# Patient Record
Sex: MALE | Race: WHITE | Employment: OTHER | URBAN - METROPOLITAN AREA
[De-identification: names, ages, dates, MRNs, and addresses within clinical notes are randomized per-mention and may not be internally consistent; named-entity substitution may affect disease eponyms.]

---

## 2017-02-07 ENCOUNTER — OFFICE VISIT (OUTPATIENT)
Dept: NEUROLOGY | Age: 77
End: 2017-02-07

## 2017-02-07 VITALS
HEART RATE: 82 BPM | BODY MASS INDEX: 31.92 KG/M2 | OXYGEN SATURATION: 97 % | DIASTOLIC BLOOD PRESSURE: 70 MMHG | HEIGHT: 64 IN | SYSTOLIC BLOOD PRESSURE: 124 MMHG | WEIGHT: 187 LBS

## 2017-02-07 DIAGNOSIS — G31.84 MCI (MILD COGNITIVE IMPAIRMENT): ICD-10-CM

## 2017-02-07 DIAGNOSIS — G20 PARKINSON'S DISEASE (HCC): Primary | ICD-10-CM

## 2017-02-07 NOTE — PROGRESS NOTES
Neurology Progress Note    Patient ID:  Brady Camacho  [id number]  22 y.o.  1940    Chief Complaint: PD    Subjective:    Patient is here for followup of Parkinson's disease. He says he is not getting better. He doesn't understand why he isn't better. He does go to the gym. He had a slip and bruised his foot without falling. He feels he is not as strong as he used to be. He denies freezing. He says he is not as agile. He is now on 2.5 mg of coumadin daily. His level has been off and they have been adjusting this. He is on this for stroke prevention. He is off celebrex. He is taking Vit E and multivitamin. They are questioning his need for this. Recap:  He says if he is sitting or standing he is fine. When he starts walking he will get pain. He is taking the Sinemet 4 times a day. He is afraid to increase his dose. He continues to tremor in his left hand. He says he is driving okay but he might be on the wrong side of the road. He says he has no issues otherwise. Reaction time is fine. Memory is fine. He feels like walking he is doing better than he was. He goes to the gym 5 times a week. He has some hearing aids but doesn't use them. Objective: All records in [facility] reviewed and noted    ROS:  Per HPI  All other 12 pt ROS negative    Meds:  Current Outpatient Prescriptions   Medication Sig    carbidopa-levodopa (SINEMET)  mg per tablet Take 2 tabs in AM, 1 tab at lunch, 1 tab at dinner, and 1 tab prior to bed    aspirin delayed-release 81 mg tablet Take  by mouth daily.  vitamin e (E GEMS) 1,000 unit capsule Take 1,000 Units by mouth daily.  acetaminophen (TYLENOL) 325 mg tablet Take 500 mg by mouth every four (4) hours as needed for Pain.  gabapentin (NEURONTIN) 100 mg capsule Take 2 capsules by mouth two (2) times a day.  warfarin (COUMADIN) 5 mg tablet Take 5 mg by mouth five (5) days a week.  On Tuesday, Thursday, Friday, Saturday, and Sunday    paroxetine (PAXIL) 10 mg tablet Take  by mouth daily.  esomeprazole (NEXIUM) 20 mg capsule Take 20 mg by mouth daily.  MULTIVITAMIN PO Take  by mouth daily.  amlodipine (NORVASC) 10 mg tablet Take  by mouth daily.  atorvastatin (LIPITOR) 40 mg tablet Take  by mouth daily. No current facility-administered medications for this visit. Imaging:  No new imaging    Lab Review   Results for orders placed or performed during the hospital encounter of 07/07/15   PROTHROMBIN TIME + INR   Result Value Ref Range    INR 2.0 (H) 0.9 - 1.1      Prothrombin time 21.0 (H) 9.0 - 11.1 sec       Exam:  Visit Vitals    /70    Pulse 82    Ht 5' 4\" (1.626 m)    Wt 187 lb (84.8 kg)    SpO2 97%    BMI 32.1 kg/m2     Gen: Well developed  CV: RRR  Lungs: non labored breathing  Abd: non distending  Neuro: A&O x 3, no dysarthria or aphasia  CN II-XII: PERRL, EOMI, face symmetric, tongue/palate midline  Motor: strength 5/5 all four ext, no resting tremor today  Sensory: intact to LT  Gait: stooped and shuffling    Assessment:     Patient Active Problem List   Diagnosis Code    Lumbar stenosis M48.06    Hypertension I10    Stroke ([facility] [address] I63.9    MCI (mild cognitive impairment) G31.84    CKD (chronic kidney disease) stage 2, GFR 60-89 ml/min N18.2    Parkinson's disease ([facility] [address] G20       65 y/o here for f/u of PD. He is still having some freezing. I think at this point, he would benefit from an increased dose of the Sinemet in the morning. He is not interested in adding a new medication but is willing to try two tabs in the AM.    Plan:   1. Cont sinemet 25/100 QID. He will add an additional tablet in the morning as needed. Wife does the medication  2. Discussed dopamine agonists or azilect for future potential options. Pt doesn't want to add now. 3. Encourage patient to continue to exercise. He will continue with [employer]  4.  Cont Paxil 10mg daily    FU 6 months    Signed:  Lauren Hayes MD  2/7/2017  4:02 PM    This note was created using voice recognition software. Despite editing, there may be syntax errors. This note will not be viewable in [address]

## 2017-02-07 NOTE — LETTER
Neurology Progress Note Patient ID: 
Janet Basilio [id number] 
64 y.o. 
1940 Chief Complaint: PD Subjective:  
 Patient is here for followup of Parkinson's disease. He says he is not getting better. He doesn't understand why he isn't better. He does go to the gym. He had a slip and bruised his foot without falling. He feels he is not as strong as he used to be. He denies freezing. He says he is not as agile. He is now on 2.5 mg of coumadin daily. His level has been off and they have been adjusting this. He is on this for stroke prevention. He is off celebrex. He is taking Vit E and multivitamin. They are questioning his need for this. Recap: 
He says if he is sitting or standing he is fine. When he starts walking he will get pain. He is taking the Sinemet 4 times a day. He is afraid to increase his dose. He continues to tremor in his left hand. He says he is driving okay but he might be on the wrong side of the road. He says he has no issues otherwise. Reaction time is fine. Memory is fine. He feels like walking he is doing better than he was. He goes to the gym 5 times a week. He has some hearing aids but doesn't use them. Objective: All records in [facility] reviewed and noted ROS: 
Per HPI All other 12 pt ROS negative Meds: 
Current Outpatient Prescriptions Medication Sig  carbidopa-levodopa (SINEMET)  mg per tablet Take 2 tabs in AM, 1 tab at lunch, 1 tab at dinner, and 1 tab prior to bed  aspirin delayed-release 81 mg tablet Take  by mouth daily.  vitamin e (E GEMS) 1,000 unit capsule Take 1,000 Units by mouth daily.  acetaminophen (TYLENOL) 325 mg tablet Take 500 mg by mouth every four (4) hours as needed for Pain.  gabapentin (NEURONTIN) 100 mg capsule Take 2 capsules by mouth two (2) times a day.  warfarin (COUMADIN) 5 mg tablet Take 5 mg by mouth five (5) days a week. On Tuesday, Thursday, Friday, Saturday, and Sunday  paroxetine (PAXIL) 10 mg tablet Take  by mouth daily.  esomeprazole (NEXIUM) 20 mg capsule Take 20 mg by mouth daily.  MULTIVITAMIN PO Take  by mouth daily.  amlodipine (NORVASC) 10 mg tablet Take  by mouth daily.  atorvastatin (LIPITOR) 40 mg tablet Take  by mouth daily. No current facility-administered medications for this visit. Imaging: No new imaging Lab Review Results for orders placed or performed during the hospital encounter of 07/07/15 PROTHROMBIN TIME + INR Result Value Ref Range INR 2.0 (H) 0.9 - 1.1 Prothrombin time 21.0 (H) 9.0 - 11.1 sec Exam: 
Visit Vitals  /70  Pulse 82  Ht 5' 4\" (1.626 m)  Wt 187 lb (84.8 kg)  SpO2 97%  BMI 32.1 kg/m2 Gen: Well developed CV: RRR Lungs: non labored breathing Abd: non distending Neuro: A&O x 3, no dysarthria or aphasia CN II-XII: PERRL, EOMI, face symmetric, tongue/palate midline Motor: strength 5/5 all four ext, no resting tremor today Sensory: intact to LT Gait: stooped and shuffling Assessment:  
 
Patient Active Problem List  
Diagnosis Code  Lumbar stenosis M48.06  
 Hypertension I10  Stroke ([facility] [address] I63.9  MCI (mild cognitive impairment) G31.84  
 CKD (chronic kidney disease) stage 2, GFR 60-89 ml/min N18.2  Parkinson's disease ([facility] [address] G20  
 
 
65 y/o here for f/u of PD. He is still having some freezing. I think at this point, he would benefit from an increased dose of the Sinemet in the morning. He is not interested in adding a new medication but is willing to try two tabs in the AM. Plan: 1. Cont sinemet 25/100 QID. He will add an additional tablet in the morning as needed. Wife does the medication 2. Discussed dopamine agonists or azilect for future potential options. Pt doesn't want to add now. 3. Encourage patient to continue to exercise. He will continue with [employer] 4. Cont Paxil 10mg daily FU 6 months Signed: 
Sherice Page MD 
 2/7/2017 
4:02 PM 
 
This note was created using voice recognition software. Despite editing, there may be syntax errors. This note will not be viewable in [address]

## 2017-02-07 NOTE — MR AVS SNAPSHOT
Visit Information Date & Time Provider Department Dept. Phone Encounter #  
 2/7/2017  1:40 PM Mónica Barros MD Neurology Clinic at [facility] [phone] [id number] Upcoming Health Maintenance Date Due DTaP/Tdap/Td series (1 - Tdap) 2/10/1959 ZOSTER VACCINE AGE 60> 2/10/1998 GLAUCOMA SCREENING Q2Y 2/10/2003 MEDICARE YEARLY EXAM 2/10/2003 INFLUENZA AGE 9 TO ADULT 8/1/2016 Pneumococcal 65+ High/Highest Risk (2 of 2 - PPSV23) 11/1/2019 Allergies as of 2/7/2017  Review Complete On: 7/26/2016 By: Mónica Barros MD  
 No Known Allergies Current Immunizations  Never Reviewed Name Date Influenza Vaccine 11/1/2014 Pneumococcal Vaccine (Unspecified Type) 11/1/2014 Not reviewed this visit You Were Diagnosed With   
  
 Codes Comments [facility]    -  Primary ICD-10-CM: G20 
ICD-9-CM: 332.0 MCI (mild cognitive impairment)     ICD-10-CM: G31.84 ICD-9-CM: 331.83 Vitals BP Pulse Height(growth percentile) Weight(growth percentile) SpO2 BMI  
 124/70 82 5' 4\" (1.626 m) 187 lb (84.8 kg) 97% 32.1 kg/m2 Smoking Status Former Smoker Vitals History BMI and BSA Data Body Mass Index Body Surface Area  
 32.1 kg/m 2 1.96 m 2 Preferred Pharmacy Pharmacy Name Phone [address] [facility] [phone] Your Updated Medication List  
  
   
This list is accurate as of: 2/7/17  2:15 PM.  Always use your most recent med list. amLODIPine 10 mg tablet Commonly known as:  [place] Take  by mouth daily. aspirin delayed-release 81 mg tablet Take  by mouth daily. carbidopa-levodopa  mg per tablet Commonly known as:  SINEMET Take 2 tabs in AM, 1 tab at lunch, 1 tab at dinner, and 1 tab prior to bed  
  
 gabapentin 100 mg capsule Commonly known as:  NEURONTIN  
 Take 2 capsules by mouth two (2) times a day. LIPITOR 40 mg tablet Generic drug:  atorvastatin Take  by mouth daily. MULTIVITAMIN PO Take  by mouth daily. NexIUM 20 mg capsule Generic drug:  esomeprazole Take 20 mg by mouth daily. PARoxetine 10 mg tablet Commonly known as:  PAXIL Take  by mouth daily. TYLENOL 325 mg tablet Generic drug:  acetaminophen Take 500 mg by mouth every four (4) hours as needed for Pain.  
  
 vitamin e 1,000 unit capsule Commonly known as:  E GEMS Take 1,000 Units by mouth daily. warfarin 5 mg tablet Commonly known as:  COUMADIN Take 5 mg by mouth five (5) days a week. On Tuesday, Thursday, Friday, Saturday, and Sunday Introducing [facility]! [facility] introduces AlgEvolve patient portal. Now you can access parts of your medical record, email your doctor's office, and request medication refills online. 1. In your internet browser, go to [email or web address] 2. Click on the First Time User? Click Here link in the Sign In box. You will see the New Member Sign Up page. 3. Enter your AlgEvolve Access Code exactly as it appears below. You will not need to use this code after youve completed the sign-up process. If you do not sign up before the expiration date, you must request a new code. · AlgEvolve Access Code: [email or web address] Expires: 5/8/2017  2:14 PM 
 
4. Enter the last four digits of your Social Security Number (xxxx) and Date of Birth (mm/dd/yyyy) as indicated and click Submit. You will be taken to the next sign-up page. 5. Create a Mercantect ID. This will be your AlgEvolve login ID and cannot be changed, so think of one that is secure and easy to remember. 6. Create a AlgEvolve password. You can change your password at any time. 7. Enter your Password Reset Question and Answer. This can be used at a later time if you forget your password. 8. Enter your e-mail address. You will receive e-mail notification when new information is available in [address] 9. Click Sign Up. You can now view and download portions of your medical record. 10. Click the Download Summary menu link to download a portable copy of your medical information. If you have questions, please visit the Frequently Asked Questions section of the Pintics website. Remember, Pintics is NOT to be used for urgent needs. For medical emergencies, dial 911. Now available from your iPhone and Android! Please provide this summary of care documentation to your next provider. Your primary care clinician is listed as [address] [place] [address]. If you have any questions after today's visit, please call [phone].

## 2017-04-11 RX ORDER — CARBIDOPA AND LEVODOPA 25; 100 MG/1; MG/1
TABLET ORAL
Qty: 450 TAB | Refills: 1 | Status: SHIPPED | OUTPATIENT
Start: 2017-04-11 | End: 2017-10-08 | Stop reason: SDUPTHER

## 2017-06-26 ENCOUNTER — OFFICE VISIT (OUTPATIENT)
Dept: NEUROLOGY | Age: 77
End: 2017-06-26

## 2017-06-26 VITALS
HEART RATE: 74 BPM | HEIGHT: 64 IN | SYSTOLIC BLOOD PRESSURE: 142 MMHG | DIASTOLIC BLOOD PRESSURE: 70 MMHG | OXYGEN SATURATION: 97 % | WEIGHT: 181 LBS | BODY MASS INDEX: 30.9 KG/M2

## 2017-06-26 DIAGNOSIS — G31.84 MCI (MILD COGNITIVE IMPAIRMENT): ICD-10-CM

## 2017-06-26 DIAGNOSIS — G20 PARKINSON'S DISEASE (HCC): Primary | ICD-10-CM

## 2017-06-26 DIAGNOSIS — I63.9 CEREBROVASCULAR ACCIDENT (CVA), UNSPECIFIED MECHANISM (HCC): ICD-10-CM

## 2017-06-26 NOTE — MR AVS SNAPSHOT
Visit Information Date & Time Provider Department Dept. Phone Encounter #  
 6/26/2017 11:40 AM Citlali Elias MD Neurology Clinic at [facility] [phone] [id number] Follow-up Instructions Return in about 6 months (around 12/26/2017). Your Appointments 6/29/2017 10:40 AM  
FOLLOW UP 10 with MD HEMALATHA Valentine [facility] ([facility]) Appt Note: 3m  
 [address] [id number] [address] [facility] [phone] Upcoming Health Maintenance Date Due DTaP/Tdap/Td series (1 - Tdap) 2/10/1959 ZOSTER VACCINE AGE 60> 2/10/1998 GLAUCOMA SCREENING Q2Y 2/10/2003 MEDICARE YEARLY EXAM 2/10/2003 INFLUENZA AGE 9 TO ADULT 8/1/2017 Pneumococcal 65+ Low/Medium Risk (2 of 2 - PPSV23) 11/1/2019 Allergies as of 6/26/2017  Review Complete On: 6/26/2017 By: Ashley Witt LPN No Known Allergies Current Immunizations  Never Reviewed Name Date Influenza Vaccine 11/1/2014 Pneumococcal Vaccine (Unspecified Type) 11/1/2014 Not reviewed this visit Vitals BP Pulse Height(growth percentile) Weight(growth percentile) SpO2 BMI  
 142/70 74 5' 4\" (1.626 m) 181 lb (82.1 kg) 97% 31.07 kg/m2 Smoking Status Former Smoker Vitals History BMI and BSA Data Body Mass Index Body Surface Area 31.07 kg/m 2 1.93 m 2 Preferred Pharmacy Pharmacy Name Phone Vignesh Rangel [facility] [phone] Your Updated Medication List  
  
   
This list is accurate as of: 6/26/17 11:56 AM.  Always use your most recent med list. amLODIPine 10 mg tablet Commonly known as:  Ysabel Ortiz Take  by mouth daily. aspirin delayed-release 81 mg tablet Take  by mouth daily. carbidopa-levodopa  mg per tablet Commonly known as:  SINEMET  
TAKE 2 TABLETS IN THE MORNING, 1 TABLET AT LUNCH, 1 TABLET AT DINNER, AND 1 TABLET PRIOR TO BED  
  
 gabapentin 100 mg capsule Commonly known as:  NEURONTIN Take 2 capsules by mouth two (2) times a day. LIPITOR 40 mg tablet Generic drug:  atorvastatin Take  by mouth daily. MULTIVITAMIN PO Take  by mouth daily. NexIUM 20 mg capsule Generic drug:  esomeprazole Take 20 mg by mouth daily. PARoxetine 10 mg tablet Commonly known as:  PAXIL Take  by mouth daily. TYLENOL 325 mg tablet Generic drug:  acetaminophen Take 500 mg by mouth every four (4) hours as needed for Pain.  
  
 vitamin e 1,000 unit capsule Commonly known as:  E GEMS Take 1,000 Units by mouth daily. warfarin 5 mg tablet Commonly known as:  COUMADIN Take 5 mg by mouth five (5) days a week. On Tuesday, Thursday, Friday, Saturday, and Sunday Follow-up Instructions Return in about 6 months (around 12/26/2017). Patient Instructions PRESCRIPTION REFILL POLICY [facility] Statement to Patients April 1, 2014 In an effort to ensure the large volume of patient prescription refills is processed in the most efficient and expeditious manner, we are asking our patients to assist us by calling your Pharmacy for all prescription refills, this will include also your  Mail Order Pharmacy. The pharmacy will contact our office electronically to continue the refill process. Please do not wait until the last minute to call your pharmacy. We need at least 48 hours (2days) to fill prescriptions. We also encourage you to call your pharmacy before going to  your prescription to make sure it is ready.   
 
With regard to controlled substance prescription refill requests (narcotic refills) that need to be picked up at our office, we ask your cooperation by providing us with at least 72 hours (3days) notice that you will need a refill. We will not refill narcotic prescription refill requests after 4:00pm on any weekday, Monday through Thursday, or after 2:00pm on Fridays, or on the weekends. We encourage everyone to explore another way of getting your prescription refill request processed using Zenoss, our patient web portal through our electronic medical record system. Zenoss is an efficient and effective way to communicate your medication request directly to the office and  downloadable as an nina on your smart phone . Zenoss also features a review functionality that allows you to view your medication list as well as leave messages for your physician. Are you ready to get connected? If so please review the attatched instructions or speak to any of our staff to get you set up right away! Thank you so much for your cooperation. Should you have any questions please contact our Practice Administrator. The Physicians and Staff,  [facility] Introducing [facility]! [facility] introduces Zenoss patient portal. Now you can access parts of your medical record, email your doctor's office, and request medication refills online. 1. In your internet browser, go to [email or web address] 2. Click on the First Time User? Click Here link in the Sign In box. You will see the New Member Sign Up page. 3. Enter your Zenoss Access Code exactly as it appears below. You will not need to use this code after youve completed the sign-up process. If you do not sign up before the expiration date, you must request a new code. · Zenoss Access Code: [email or web address] Expires: 9/24/2017 11:56 AM 
 
4. Enter the last four digits of your Social Security Number (xxxx) and Date of Birth (mm/dd/yyyy) as indicated and click Submit. You will be taken to the next sign-up page. 5. Create a [employer]S [employer]etics ID. This will be your [employer] login ID and cannot be changed, so think of one that is secure and easy to remember. 6. Create a [employer] password. You can change your password at any time. 7. Enter your Password Reset Question and Answer. This can be used at a later time if you forget your password. 8. Enter your e-mail address. You will receive e-mail notification when new information is available in [address] 9. Click Sign Up. You can now view and download portions of your medical record. 10. Click the Download Summary menu link to download a portable copy of your medical information. If you have questions, please visit the Frequently Asked Questions section of the [employer]S [employer] website. Remember, [employer] is NOT to be used for urgent needs. For medical emergencies, dial 911. Now available from your iPhone and Android! Please provide this summary of care documentation to your next provider. Your primary care clinician is listed as Georges Weathers. If you have any questions after today's visit, please call [phone].

## 2017-06-26 NOTE — PATIENT INSTRUCTIONS
10 [facility]   Statement to Patients  April 1, 2014      In an effort to ensure the large volume of patient prescription refills is processed in the most efficient and expeditious manner, we are asking our patients to assist us by calling your Pharmacy for all prescription refills, this will include also your  Mail Order Pharmacy. The pharmacy will contact our office electronically to continue the refill process. Please do not wait until the last minute to call your pharmacy. We need at least 48 hours (2days) to fill prescriptions. We also encourage you to call your pharmacy before going to  your prescription to make sure it is ready. With regard to controlled substance prescription refill requests (narcotic refills) that need to be picked up at our office, we ask your cooperation by providing us with at least 72 hours (3days) notice that you will need a refill. We will not refill narcotic prescription refill requests after 4:00pm on any weekday, Monday through Thursday, or after 2:00pm on Fridays, or on the weekends. We encourage everyone to explore another way of getting your prescription refill request processed using Zoeticx, our patient web portal through our electronic medical record system. Zoeticx is an efficient and effective way to communicate your medication request directly to the office and  downloadable as an nina on your smart phone . Zo[employer]x also features a review functionality that allows you to view your medication list as well as leave messages for your physician. Are you ready to get connected? If so please review the attatched instructions or speak to any of our staff to get you set up right away! Thank you so much for your cooperation. Should you have any questions please contact our Practice Administrator.     The Physicians and Staff,  [facility]

## 2017-06-26 NOTE — PROGRESS NOTES
HISTORY OF PRESENT ILLNESS  Bess Murray is a 76 y.o. male. HPI Comments: The patient does not think he has progressed since he last saw Dr. Allison Torres 6 months ago. He continues to operate a motor vehicle. He goes to the gym and rides the stationary bicycle. He is taking Sinemet 25 102 tablets in the morning 1 at lunch and one at dinner. His biggest complaint is trouble getting out of the car. He also states that he would like to ride his bicycle around the neighborhood. He denies any visual hallucinations, he has no problems with freezing episodes, neither his wife nor he have noticed any choreoathetosis. Tremors     Neurologic Problem   The history is provided by the patient and spouse. This is a chronic (Several years) problem. The problem has been gradually worsening. There was no focality noted. Review of Systems   Constitutional: Negative. HENT: Positive for hearing loss. Eyes: Negative. Respiratory: Negative. Cardiovascular: Negative. Gastrointestinal: Negative. Genitourinary: Negative. Musculoskeletal: Negative. Skin: Negative. Neurological: Positive for tremors. Negative for speech change. Endo/Heme/Allergies: Negative. Psychiatric/Behavioral: Negative. Current Outpatient Prescriptions on File Prior to Visit   Medication Sig Dispense Refill    carbidopa-levodopa (SINEMET)  mg per tablet TAKE 2 TABLETS IN THE MORNING, 1 TABLET AT LUNCH, 1 TABLET AT DINNER, AND 1 TABLET PRIOR TO  Tab 1    aspirin delayed-release 81 mg tablet Take  by mouth daily.  vitamin e (E GEMS) 1,000 unit capsule Take 1,000 Units by mouth daily.  acetaminophen (TYLENOL) 325 mg tablet Take 500 mg by mouth every four (4) hours as needed for Pain.  gabapentin (NEURONTIN) 100 mg capsule Take 2 capsules by mouth two (2) times a day. 1 capsule 0    warfarin (COUMADIN) 5 mg tablet Take 5 mg by mouth five (5) days a week.  On Tuesday, Thursday, Friday, Saturday, and Sunday      paroxetine (PAXIL) 10 mg tablet Take  by mouth daily.  esomeprazole (NEXIUM) 20 mg capsule Take 20 mg by mouth daily.  MULTIVITAMIN PO Take  by mouth daily.  amlodipine (NORVASC) 10 mg tablet Take  by mouth daily.  atorvastatin (LIPITOR) 40 mg tablet Take  by mouth daily. No current facility-administered medications on file prior to visit. Past Medical History:   Diagnosis Date    Aaron esophagus     CKD (chronic kidney disease) stage 2, GFR 60-89 ml/min     Constipation     Depression     Falls     Headache     Hyperlipidemia     Hypertension     MCI (mild cognitive impairment)     Snoring     Stroke (HCC)     Unspecified sleep apnea     lost weight no longer has it     /70  Pulse 74  Ht 5' 4\" (1.626 m)  Wt 181 lb (82.1 kg)  SpO2 97%  BMI 31.07 kg/m2  Physical Exam   Constitutional: He is oriented to person, place, and time. He appears well-developed and well-nourished. HENT:   Head: Normocephalic and atraumatic. Eyes: Conjunctivae and EOM are normal. Pupils are equal, round, and reactive to light. Cardiovascular: Normal rate, regular rhythm and normal heart sounds. Musculoskeletal: Normal range of motion. He exhibits no edema. Neurological: He is alert and oriented to person, place, and time. He has normal strength and normal reflexes. He displays no atrophy. No cranial nerve deficit or sensory deficit. He exhibits normal muscle tone. He displays no Babinski's sign on the right side. He displays no Babinski's sign on the left side. He has a mild resting tremor in his hands, he has minimal facial masking. Speech, language and mentation appear to be normal  Walking with the walker from the door of room 6 to the end of the robles by my office at back to 33 seconds. Skin: Skin is warm and dry. Psychiatric: He has a normal mood and affect.  His behavior is normal. Judgment and thought content normal.       ASSESSMENT and PLAN    Parkinson's disease  His symptoms appear to be under moderate control on Sinemet 25 102 tablets in the a.m., 1 tablet at lunch, 1 tablet at dinner and 1 tablet prior to bed. He might benefit from some longer acting agent such as Requip however he and his wife are not interested in extra medication at this time. I did tell him that under no circumstances was easily riding his bicycle, his wife agreed. He continues to take his Paxil prescribed by his primary care doctor. I will see him back in 6 months, I left the option of additional medication up to them, they will let me know when they feel he is ready. History of stroke  I see no evidence of it on my exam however he will continue to take his aspirin 81 mg a day. Mild cognitive impairment  I suspect this secondary to of his Parkinson's disease however time will tell if we are dealing with a significant progressive dementia. His last head CT scan was done 2 years ago and did show volume loss and periventricular white matter changes but no evidence of stroke.

## 2017-07-12 DIAGNOSIS — G47.30 SLEEP APNEA IN ADULT: ICD-10-CM

## 2017-07-12 DIAGNOSIS — M77.9 TENDINITIS: ICD-10-CM

## 2017-07-12 DIAGNOSIS — R25.1 TREMOR: ICD-10-CM

## 2017-07-12 PROBLEM — I25.10 CORONARY ARTERY DISEASE: Status: ACTIVE | Noted: 2017-07-12

## 2017-07-12 PROBLEM — H91.93 HEARING DIFFICULTY OF BOTH EARS: Status: ACTIVE | Noted: 2017-07-12

## 2017-07-12 PROBLEM — N40.0 BENIGN NODULAR PROSTATIC HYPERPLASIA WITHOUT LOWER URINARY TRACT SYMPTOMS: Status: ACTIVE | Noted: 2017-07-12

## 2017-07-12 PROBLEM — R41.89 IMPAIRED COGNITION: Status: ACTIVE | Noted: 2017-07-12

## 2017-07-12 PROBLEM — M75.82 TENDINITIS OF LEFT ROTATOR CUFF: Status: ACTIVE | Noted: 2017-07-12

## 2017-07-12 PROBLEM — G25.81 RESTLESS LEG SYNDROME: Status: ACTIVE | Noted: 2017-07-12

## 2017-07-12 PROBLEM — M19.90 DJD (DEGENERATIVE JOINT DISEASE): Status: ACTIVE | Noted: 2017-07-12

## 2017-07-12 PROBLEM — M10.9 GOUT: Status: ACTIVE | Noted: 2017-07-12

## 2017-07-12 RX ORDER — AMOXICILLIN 250 MG
1 CAPSULE ORAL EVERY OTHER DAY
COMMUNITY

## 2017-07-13 ENCOUNTER — LAB ONLY (OUTPATIENT)
Dept: INTERNAL MEDICINE CLINIC | Age: 77
End: 2017-07-13

## 2017-07-13 DIAGNOSIS — I63.9 CEREBROVASCULAR ACCIDENT (CVA), UNSPECIFIED MECHANISM (HCC): Primary | ICD-10-CM

## 2017-07-14 ENCOUNTER — TELEPHONE (OUTPATIENT)
Dept: INTERNAL MEDICINE CLINIC | Age: 77
End: 2017-07-14

## 2017-07-14 LAB
INR PPP: 1.5 (ref 0.8–1.2)
PROTHROMBIN TIME: 15.4 SEC (ref 9.1–12)

## 2017-07-14 NOTE — TELEPHONE ENCOUNTER
Patient was seen office today and notified of lab results also had a blood pressure today of 122/80. Notified if he has any questions to call office.

## 2017-07-14 NOTE — TELEPHONE ENCOUNTER
----- Message from Eduardo José MD sent at 7/14/2017  8:57 AM EDT -----  INR low 1.5. Increase Coumadin 5 mg thurs and Fridays. 2.5 mg other days.   Recheck INR 2 weeks

## 2017-07-28 ENCOUNTER — LAB ONLY (OUTPATIENT)
Dept: INTERNAL MEDICINE CLINIC | Age: 77
End: 2017-07-28

## 2017-07-28 DIAGNOSIS — I63.9 CEREBROVASCULAR ACCIDENT (CVA), UNSPECIFIED MECHANISM (HCC): Primary | ICD-10-CM

## 2017-07-29 LAB
INR PPP: 1.8 (ref 0.8–1.2)
PROTHROMBIN TIME: 18.6 SEC (ref 9.1–12)

## 2017-07-31 ENCOUNTER — TELEPHONE ANTICOAG (OUTPATIENT)
Dept: INTERNAL MEDICINE CLINIC | Age: 77
End: 2017-07-31

## 2017-07-31 DIAGNOSIS — I63.9 CEREBROVASCULAR ACCIDENT (CVA), UNSPECIFIED MECHANISM (HCC): Primary | ICD-10-CM

## 2017-08-08 RX ORDER — PAROXETINE 10 MG/1
10 TABLET, FILM COATED ORAL DAILY
Qty: 90 TAB | Refills: 3 | Status: SHIPPED | OUTPATIENT
Start: 2017-08-08 | End: 2018-08-06 | Stop reason: SDUPTHER

## 2017-08-11 ENCOUNTER — LAB ONLY (OUTPATIENT)
Dept: INTERNAL MEDICINE CLINIC | Age: 77
End: 2017-08-11

## 2017-08-11 DIAGNOSIS — I63.9 CEREBROVASCULAR ACCIDENT (CVA), UNSPECIFIED MECHANISM (HCC): Primary | ICD-10-CM

## 2017-08-11 LAB
INR BLD: NORMAL
PT POC: NORMAL SECONDS
VALID INTERNAL CONTROL?: NORMAL

## 2017-08-12 LAB
INR PPP: 1.8 (ref 0.8–1.2)
PROTHROMBIN TIME: 18.6 SEC (ref 9.1–12)

## 2017-08-14 ENCOUNTER — TELEPHONE ANTICOAG (OUTPATIENT)
Dept: INTERNAL MEDICINE CLINIC | Age: 77
End: 2017-08-14

## 2017-08-14 NOTE — PROGRESS NOTES
Mr. Mukul Kenny is here today for anticoagulation monitoring for the diagnosis of CVA. His INR goal is 2.0-3.0 and his current Coumadin dose is 5mg. Today's findings include an INR of 1.8. Considering Mr. Ohara's past history, todays findings, and per the coumadin policy/protocol, Mr. Ammy Wilkins was instructed to take Coumadin as follows,  Continue the smame. He was also instructed to schedule an appointment in 4 weeks. A full discussion of the nature of anticoagulants has been carried out. A full discussion of the need for frequent and regular monitoring, precise dosage adjustment and compliance was stressed. Side effects of potential bleeding were discussed and Mr. Ammy Wilkins was instructed to call [phone] if there are any signs of abnormal bleeding. Mr. Ammy Wilkins was instructed to avoid any OTC items containing aspirin or ibuprofen and prior to starting any new OTC products to consult with his physician or pharmacist to ensure no drug interactions are present. Mr. Ammy Wilkins was instructed to avoid any major changes in his general diet and to avoid alcohol consumption. .    Mr. Ammy Wilkins was provided a literature booklet, \"Treatment with Warfarin (Coumadin)\", that includes topics on understanding coumadin therapy, drug interaction considerations, vitamin K and coumadin use, interactions with foods and supplements containing vitamin K, and the use of herbal products. Mr. Ammy Wilkins verbalized his understanding of all instructions and will call the office with any questions, concerns, or signs of abnormal bleeding or blood clot.

## 2017-09-26 RX ORDER — AMLODIPINE BESYLATE 10 MG/1
TABLET ORAL
Qty: 90 TAB | Refills: 2 | Status: SHIPPED | OUTPATIENT
Start: 2017-09-26 | End: 2018-06-23 | Stop reason: SDUPTHER

## 2017-09-30 PROBLEM — Z00.00 PE (PHYSICAL EXAM), ANNUAL: Status: ACTIVE | Noted: 2017-09-30

## 2017-09-30 PROBLEM — K21.9 GASTROESOPHAGEAL REFLUX DISEASE WITHOUT ESOPHAGITIS: Status: ACTIVE | Noted: 2017-09-30

## 2017-09-30 RX ORDER — ACETAMINOPHEN 500 MG
1000 TABLET ORAL
COMMUNITY

## 2017-10-02 ENCOUNTER — OFFICE VISIT (OUTPATIENT)
Dept: INTERNAL MEDICINE CLINIC | Age: 77
End: 2017-10-02

## 2017-10-02 VITALS
OXYGEN SATURATION: 95 % | RESPIRATION RATE: 20 BRPM | TEMPERATURE: 97.9 F | SYSTOLIC BLOOD PRESSURE: 177 MMHG | WEIGHT: 184 LBS | HEART RATE: 52 BPM | DIASTOLIC BLOOD PRESSURE: 82 MMHG | BODY MASS INDEX: 31.41 KG/M2 | HEIGHT: 64 IN

## 2017-10-02 DIAGNOSIS — I10 HYPERTENSION, UNSPECIFIED TYPE: ICD-10-CM

## 2017-10-02 DIAGNOSIS — Z23 ENCOUNTER FOR IMMUNIZATION: ICD-10-CM

## 2017-10-02 DIAGNOSIS — I63.9 CEREBROVASCULAR ACCIDENT (CVA), UNSPECIFIED MECHANISM (HCC): Primary | ICD-10-CM

## 2017-10-02 DIAGNOSIS — E78.5 HYPERLIPIDEMIA, UNSPECIFIED HYPERLIPIDEMIA TYPE: ICD-10-CM

## 2017-10-02 DIAGNOSIS — N18.2 CKD (CHRONIC KIDNEY DISEASE) STAGE 2, GFR 60-89 ML/MIN: ICD-10-CM

## 2017-10-02 LAB
BUN BLD-MCNC: NORMAL MG/DL (ref 9–20)
CALCIUM BLD-MCNC: NORMAL MG/DL (ref 8.4–10.2)
CHLORIDE BLD-SCNC: NORMAL MMOL/L (ref 98–107)
CO2 POC: NORMAL MMOL/L (ref 22–32)
CREAT BLD-MCNC: NORMAL MG/DL (ref 0.8–1.5)
EGFR (POC): NORMAL
GLUCOSE POC: NORMAL MG/DL (ref 75–110)
GRAN# POC: NORMAL K/UL (ref 2–7.8)
GRAN% POC: NORMAL % (ref 37–92)
HCT VFR BLD CALC: NORMAL % (ref 37–51)
HGB BLD-MCNC: NORMAL G/DL (ref 12–18)
INR BLD: NORMAL
LY# POC: NORMAL K/UL (ref 0.6–4.1)
LY% POC: NORMAL % (ref 10–58.5)
MCH RBC QN: NORMAL PG (ref 26–32)
MCHC RBC-ENTMCNC: NORMAL G/DL (ref 30–36)
MCV RBC: NORMAL FL (ref 80–97)
MID #, POC: NORMAL K/UL (ref 0–1.8)
MID% POC: NORMAL % (ref 0.1–24)
PLATELET # BLD: NORMAL K/UL (ref 140–440)
POTASSIUM SERPL-SCNC: NORMAL MMOL/L (ref 3.6–5)
PT POC: NORMAL SECONDS
RBC # BLD: NORMAL M/UL (ref 4.2–6.3)
SODIUM SERPL-SCNC: NORMAL MMOL/L (ref 137–145)
VALID INTERNAL CONTROL?: NORMAL
WBC # BLD: NORMAL K/UL (ref 4.1–10.9)

## 2017-10-02 RX ORDER — LOSARTAN POTASSIUM 50 MG/1
50 TABLET ORAL DAILY
Qty: 30 TAB | Refills: 0 | Status: SHIPPED | OUTPATIENT
Start: 2017-10-02 | End: 2017-10-27 | Stop reason: SDUPTHER

## 2017-10-02 NOTE — PROGRESS NOTES
Subjective:   Linda Romero is a 76 y.o. male      Chief Complaint   Patient presents with    Parkinsons Disease     pt here for follow up.  Immunization/Injection     Flu Shot        History of present illness:  Mr. Nathan Solo returns in follow up. He's been stable since last visit. He denies any new complaints. He's had no problems with shortness of breath or chest pain and denies any recurrent neurologic or TIA-like symptoms. He continues with issues with his Parkinson's, which limits his mobility to a degree. Otherwise he's been very stable. No chest pain, shortness of breath, increasing lower extremity edema, nausea, vomiting, abdominal pain or  issues. He does continue to exercise at the gym four times per week. There is still some question about his Coumadin dose, but will check his Pro-Time and INR and check with his daughter about his dosing to make sure. She apparently has taken over management of his medications. He will bring a list with him the next time he comes in and his daughter will try to provide us with a list within the next 24-48 hours as well so that we can confirm his current medications. I believe they are correct at the present time, however.         Patient Active Problem List    Diagnosis Date Noted    Parkinson's disease ([facility] 01/30/2015     Priority: 1 - One    Hypertension      Priority: 1 - One    Stroke ([facility]      Priority: 1 - One    MCI (mild cognitive impairment)      Priority: 1 - One    Aaron esophagus      Priority: 2 - Two    Hyperlipidemia      Priority: 2 - Two    Coronary artery disease 07/12/2017     Priority: 2 - Two    CKD (chronic kidney disease) stage 2, GFR 60-89 ml/min      Priority: 2 - Two    Gastroesophageal reflux disease without esophagitis 09/30/2017     Priority: 3 - Three    Depression      Priority: 3 - Three    Tendinitis of left rotator cuff 07/12/2017     Priority: 3 - Three    DJD (degenerative joint disease) 07/12/2017 Priority: 3 - Three    Tremor 07/12/2017     Priority: 3 - Three    Snoring      Priority: 4 - Four    Sleep apnea in adult 07/12/2017     Priority: 4 - Four    Tendinitis 07/12/2017     Priority: 4 - Four    Gout 07/12/2017     Priority: 4 - Four    Restless leg syndrome 07/12/2017     Priority: 4 - Four    Benign nodular prostatic hyperplasia without lower urinary tract symptoms 07/12/2017     Priority: 4 - Four    Lumbar stenosis 01/29/2015     Priority: 4 - Four    Hearing difficulty of both ears 07/12/2017     Priority: 5 - Five    PE (physical exam), annual 09/30/2017      Past Surgical History:   Procedure Laterality Date    HX ORTHOPAEDIC  1/27/15    L4-L5 MICRODECOMPRESSION (Right    CT EGD TRANSORAL BIOPSY SINGLE/MULTIPLE  8/16/2011         CT EGD TRANSORAL BIOPSY SINGLE/MULTIPLE  10/1/2013           No Known Allergies   Family History   Problem Relation Age of Onset    Heart Disease Mother     Heart Disease Father       Social History     Social History    Marital status:      Spouse name: N/A    Number of children: N/A    Years of education: N/A     Occupational History    Not on file. Social History Main Topics    Smoking status: Former Smoker    Smokeless tobacco: Never Used    Alcohol use No    Drug use: No    Sexual activity: Not on file     Other Topics Concern    Not on file     Social History Narrative          Review of Systems              Constitutional:  He denies fever, weight loss, sweats or fatigue. HEENT:  No blurred or double vision, headache or dizziness. No difficulty with swallowing, taste, speech or smell. Respiratory:  No cough, wheezing or shortness of breath. No sputum production. Cardiac:  Denies chest pain, palpitations, unexplained indigestion, syncope, edema, PND or orthopnea. GI:  No changes in bowel movements, no abdominal pain, no bloating, anorexia, nausea, vomiting or heartburn. :  No frequency or dysuria.   Denies incontinence. Extremities:  No joint pain, stiffness or swelling. Skin:  No recent rashes or mole changes. Neurological:  No numbness, tingling, burning paresthesias or loss of motor strength. No syncope, dizziness, frequent headaches or memory loss. Objective:     Vitals:    10/02/17 1034   BP: 177/82   Pulse: (!) 52   Resp: 20   Temp: 97.9 °F (36.6 °C)   TempSrc: Oral   SpO2: 95%   Weight: 184 lb (83.5 kg)   Height: 5' 4\" (1.626 m)   PainSc:   0 - No pain       Body mass index is 31.58 kg/(m^2). Physical Examination:              General Appearance:  Well-developed, well-nourished, no acute  distress. HEENT:      Ears:  The TMs and ear canals were clear. Eyes:  The pupillary responses were normal.  Extraocular muscle function intact. Lids and conjunctiva not injected. Neck:  Supple without thyromegaly or adenopathy. No JVD noted. Lungs:  Clear to auscultation and percussion. Cardiac:  Regular rate and rhythm without murmur. GI: nontender w/o mass. Normal BS's. Extremities:  No clubbing, cyanosis or edema. Skin:  No rash or unusual mole changes noted. Neurological:  Grossly normal.            Assessment/Plan:   Impressions:      ICD-10-CM ICD-9-CM    1. Cerebrovascular accident (CVA), unspecified mechanism ([facility] I63.9 434.91 AMB POC COMPLETE CBC,AUTOMATED ENTER      AMB POC PT/INR      PROTHROMBIN TIME + INR      CBC WITH AUTOMATED DIFF   2. Hypertension, unspecified type I10 401.9 AMB POC BASIC METABOLIC PANEL      METABOLIC PANEL, BASIC   3. Hyperlipidemia, unspecified hyperlipidemia type E78.5 272.4 AMB POC BASIC METABOLIC PANEL   4. CKD (chronic kidney disease) stage 2, GFR 60-89 ml/min N18.2 585.2 AMB POC BASIC METABOLIC PANEL      METABOLIC PANEL, BASIC   5. Encounter for immunization Z23 V03.89 INFLUENZA VIRUS VACCINE, HIGH DOSE SEASONAL, PRESERVATIVE FREE      VA IMMUNIZ ADMIN,1 SINGLE/COMB VAC/TOXOID        Plan:  1. Continue present meds  2.  Lifestyle modifications including Na restriction, low carb/fat diet, weight reduction and exercise (at least a walking program). 3. Added Losartan        Follow-up Disposition:  Return in about 4 weeks (around 10/30/2017). Orders Placed This Encounter    Influenza virus vaccine (FLUZONE HIGH-DOSE) 65 years and older (65737)    PROTHROMBIN TIME + INR    CBC WITH AUTOMATED DIFF    METABOLIC PANEL, BASIC    AMB POC COMPLETE CBC,AUTOMATED ENTER    AMB POC BASIC METABOLIC PANEL    AMB POC PT/INR    PA IMMUNIZ ADMIN,1 SINGLE/COMB VAC/TOXOID    losartan (COZAAR) 50 mg tablet     Sig: Take 1 Tab by mouth daily.      Dispense:  30 Tab     Refill:  0       Sarah Garcia MD

## 2017-10-02 NOTE — PROGRESS NOTES
Chief Complaint   Patient presents with    Parkinsons Disease     pt here for follow up. 1. Have you been to the ER, urgent care clinic since your last visit? Hospitalized since your last visit? No    2. Have you seen or consulted any other health care providers outside of the [address] [place] [address] since your last visit? Include any pap smears or colon screening.  No

## 2017-10-02 NOTE — MR AVS SNAPSHOT
Visit Information Date & Time Provider Department Dept. Phone Encounter #  
 10/2/2017 10:40 AM Yudith Aragon MD [address] [place] [address] [facility] [phone] [id number] Follow-up Instructions Return in about 4 weeks (around 10/30/2017). Your Appointments 12/22/2017  9:40 AM  
Follow Up with Eros Gant MD  
Neurology Clinic at [facility] [address]) Appt Note: follow up Parkinson $ 0 CP jll 6/26/17  
 [address], 
[address] [place] [address], [address] [phone]  
[address] [facility], [address] [place] [address], [phone] Upcoming Health Maintenance Date Due DTaP/Tdap/Td series (1 - Tdap) 2/10/1961 ZOSTER VACCINE AGE 60> 12/10/1997 GLAUCOMA SCREENING Q2Y 2/10/2003 MEDICARE YEARLY EXAM 2/10/2003 INFLUENZA AGE 9 TO ADULT 8/1/2017 Pneumococcal 65+ Low/Medium Risk (2 of 2 - PPSV23) 11/1/2019 Allergies as of 10/2/2017  Review Complete On: 10/2/2017 By: Yudith Aragon MD  
 No Known Allergies Current Immunizations  Never Reviewed Name Date Influenza Vaccine 11/1/2014 Pneumococcal Vaccine (Unspecified Type) 11/1/2014 Not reviewed this visit You Were Diagnosed With   
  
 Codes Comments Cerebrovascular accident (CVA), unspecified mechanism ([facility]    -  Primary ICD-10-CM: I63.9 ICD-9-CM: 434.91 Hypertension, unspecified type     ICD-10-CM: I10 
ICD-9-CM: 401.9 Hyperlipidemia, unspecified hyperlipidemia type     ICD-10-CM: E78.5 ICD-9-CM: 272.4 CKD (chronic kidney disease) stage 2, GFR 60-89 ml/min     ICD-10-CM: N18.2 ICD-9-CM: 552. 2 Vitals BP Pulse Temp Resp Height(growth percentile) Weight(growth percentile) 177/82 (BP 1 Location: Right arm, BP Patient Position: Sitting) (!) 52 97.9 °F (36.6 °C) (Oral) 20 5' 4\" (1.626 m) 184 lb (83.5 kg) SpO2 BMI Smoking Status 95% 31.58 kg/m2 Former Smoker BMI and BSA Data Body Mass Index Body Surface Area 31.58 kg/m 2 1.94 m 2 Preferred Pharmacy Pharmacy Name Phone [facility] [phone] Your Updated Medication List  
  
   
This list is accurate as of: 10/2/17 11:29 AM.  Always use your most recent med list. amLODIPine 10 mg tablet Commonly known as:  Owenal Song TAKE 1 TABLET DAILY  
  
 aspirin delayed-release 81 mg tablet Take  by mouth daily. carbidopa-levodopa  mg per tablet Commonly known as:  SINEMET  
TAKE 2 TABLETS IN THE MORNING, 1 TABLET AT LUNCH, 1 TABLET AT DINNER, AND 1 TABLET PRIOR TO BED  
  
 gabapentin 100 mg capsule Commonly known as:  NEURONTIN Take 2 capsules by mouth two (2) times a day. LIPITOR 40 mg tablet Generic drug:  atorvastatin Take  by mouth daily. losartan 50 mg tablet Commonly known as:  COZAAR Take 1 Tab by mouth daily. MULTIVITAMIN PO Take 1 Tab by mouth daily. NexIUM 20 mg capsule Generic drug:  esomeprazole Take 20 mg by mouth daily. PARoxetine 10 mg tablet Commonly known as:  PAXIL Take 1 Tab by mouth daily. SENNA-S 8.6-50 mg per tablet Generic drug:  senna-docusate Take 1 Tab by mouth two (2) times a day. TYLENOL EXTRA STRENGTH 500 mg tablet Generic drug:  acetaminophen Take 1,000 mg by mouth three (3) times daily as needed for Pain.  
  
 vitamin e 1,000 unit capsule Commonly known as:  E GEMS Take 2,000 Units by mouth daily. warfarin 5 mg tablet Commonly known as:  COUMADIN Take 5 mg by mouth. 5 mg thur/Fri; 2.5 mg Sat-Wed Prescriptions Sent to Pharmacy Refills  
 losartan (COZAAR) 50 mg tablet 0 Sig: Take 1 Tab by mouth daily. Class: Normal  
 Pharmacy: [facility] Ph #: [phone]  Route: Oral  
  
 We Performed the Following AMB POC BASIC METABOLIC PANEL [88357 CPT(R)] AMB POC COMPLETE CBC,AUTOMATED ENTER B532280 CPT(R)] AMB POC PT/INR [14852 CPT(R)] Follow-up Instructions Return in about 4 weeks (around 10/30/2017). Introducing [facility]! [facility] introduces FanIQ patient portal. Now you can access parts of your medical record, email your doctor's office, and request medication refills online. 1. In your internet browser, go to [email or web address] 2. Click on the First Time User? Click Here link in the Sign In box. You will see the New Member Sign Up page. 3. Enter your FanIQ Access Code exactly as it appears below. You will not need to use this code after youve completed the sign-up process. If you do not sign up before the expiration date, you must request a new code. · FanIQ Access Code: [email or web address] Expires: 12/31/2017 10:51 AM 
 
4. Enter the last four digits of your Social Security Number (xxxx) and Date of Birth (mm/dd/yyyy) as indicated and click Submit. You will be taken to the next sign-up page. 5. Create a FanIQ ID. This will be your FanIQ login ID and cannot be changed, so think of one that is secure and easy to remember. 6. Create a FanIQ password. You can change your password at any time. 7. Enter your Password Reset Question and Answer. This can be used at a later time if you forget your password. 8. Enter your e-mail address. You will receive e-mail notification when new information is available in [address] 9. Click Sign Up. You can now view and download portions of your medical record. 10. Click the Download Summary menu link to download a portable copy of your medical information. If you have questions, please visit the Frequently Asked Questions section of the FanIQ website. Remember, FanIQ is NOT to be used for urgent needs. For medical emergencies, dial 911. Now available from your iPhone and Android! Please provide this summary of care documentation to your next provider. Your primary care clinician is listed as Georges Weathers. If you have any questions after today's visit, please call [phone].

## 2017-10-03 ENCOUNTER — TELEPHONE ANTICOAG (OUTPATIENT)
Dept: INTERNAL MEDICINE CLINIC | Age: 77
End: 2017-10-03

## 2017-10-03 LAB
BASOPHILS # BLD AUTO: 0 X10E3/UL (ref 0–0.2)
BASOPHILS NFR BLD AUTO: 0 %
BUN SERPL-MCNC: 19 MG/DL (ref 8–27)
BUN/CREAT SERPL: 21 (ref 10–24)
CALCIUM SERPL-MCNC: 9.4 MG/DL (ref 8.6–10.2)
CHLORIDE SERPL-SCNC: 100 MMOL/L (ref 96–106)
CO2 SERPL-SCNC: 22 MMOL/L (ref 18–29)
CREAT SERPL-MCNC: 0.92 MG/DL (ref 0.76–1.27)
EOSINOPHIL # BLD AUTO: 0.2 X10E3/UL (ref 0–0.4)
EOSINOPHIL NFR BLD AUTO: 3 %
ERYTHROCYTE [DISTWIDTH] IN BLOOD BY AUTOMATED COUNT: 13.4 % (ref 12.3–15.4)
GLUCOSE SERPL-MCNC: 98 MG/DL (ref 65–99)
HCT VFR BLD AUTO: 38 % (ref 37.5–51)
HGB BLD-MCNC: 12.9 G/DL (ref 12.6–17.7)
IMM GRANULOCYTES # BLD: 0 X10E3/UL (ref 0–0.1)
IMM GRANULOCYTES NFR BLD: 0 %
INR PPP: 2 (ref 0.8–1.2)
LYMPHOCYTES # BLD AUTO: 1.9 X10E3/UL (ref 0.7–3.1)
LYMPHOCYTES NFR BLD AUTO: 24 %
MCH RBC QN AUTO: 32.9 PG (ref 26.6–33)
MCHC RBC AUTO-ENTMCNC: 33.9 G/DL (ref 31.5–35.7)
MCV RBC AUTO: 97 FL (ref 79–97)
MONOCYTES # BLD AUTO: 0.5 X10E3/UL (ref 0.1–0.9)
MONOCYTES NFR BLD AUTO: 7 %
NEUTROPHILS # BLD AUTO: 5.1 X10E3/UL (ref 1.4–7)
NEUTROPHILS NFR BLD AUTO: 66 %
PLATELET # BLD AUTO: 209 X10E3/UL (ref 150–379)
POTASSIUM SERPL-SCNC: 4.8 MMOL/L (ref 3.5–5.2)
PROTHROMBIN TIME: 20.7 SEC (ref 9.1–12)
RBC # BLD AUTO: 3.92 X10E6/UL (ref 4.14–5.8)
SODIUM SERPL-SCNC: 143 MMOL/L (ref 134–144)
WBC # BLD AUTO: 7.7 X10E3/UL (ref 3.4–10.8)

## 2017-10-03 NOTE — PROGRESS NOTES
Patient informed that Hgb and WBC normal.  Blood sugar, liver and kidney function normal.  INR therapeutic (2.0).   Continue same dose.  Recheck 1 month.  Please confirm and document his dose in a note when you call. Rosana Peterson to his daughter  Patient is currently taking 5mg on thurs and fri 2.5mg on other days.

## 2017-10-03 NOTE — PROGRESS NOTES
Anticoagulation Summary as of 10/3/2017     INR goal 2.0-2.5    Today's INR 2.0 (10/2/2017)    Maintenance plan 5 mg (5 mg x 1) on Thu, Fri; 2.5 mg (5 mg x 0.5) all other days    Weekly total 22.5 mg    No change documented [place] last modified Paolo Bueno (7/31/2017)    Next INR check 11/3/2017    Target end date       Anticoagulation Episode Summary     INR check location     Preferred lab     Send INR reminders to     Comments       Anticoagulation Care Providers     Provider Role Specialty Phone number    Rachel Davidson MD [facility] Internal Medicine [phone]

## 2017-10-03 NOTE — PROGRESS NOTES
Hgb and WBC normal.  Blood sugar, liver and kidney function normal.  INR therapeutic (2.0). Continue same dose. Recheck 1 month. Please confirm and document his dose in a note when you call.   Talk to his daughter

## 2017-10-08 RX ORDER — CARBIDOPA AND LEVODOPA 25; 100 MG/1; MG/1
TABLET ORAL
Qty: 450 TAB | Refills: 1 | Status: SHIPPED | OUTPATIENT
Start: 2017-10-08 | End: 2018-04-06 | Stop reason: SDUPTHER

## 2017-10-27 RX ORDER — LOSARTAN POTASSIUM 50 MG/1
50 TABLET ORAL DAILY
Qty: 30 TAB | Refills: 1 | Status: SHIPPED | OUTPATIENT
Start: 2017-10-27 | End: 2017-11-03 | Stop reason: SDUPTHER

## 2017-10-27 NOTE — TELEPHONE ENCOUNTER
Requested Prescriptions     Pending Prescriptions Disp Refills    losartan (COZAAR) 50 mg tablet 30 Tab 1     Sig: Take 1 Tab by mouth daily.

## 2017-11-02 ENCOUNTER — OFFICE VISIT (OUTPATIENT)
Dept: INTERNAL MEDICINE CLINIC | Age: 77
End: 2017-11-02

## 2017-11-02 VITALS
OXYGEN SATURATION: 95 % | DIASTOLIC BLOOD PRESSURE: 74 MMHG | BODY MASS INDEX: 31.76 KG/M2 | WEIGHT: 186 LBS | HEART RATE: 56 BPM | SYSTOLIC BLOOD PRESSURE: 134 MMHG | HEIGHT: 64 IN

## 2017-11-02 DIAGNOSIS — N18.2 CKD (CHRONIC KIDNEY DISEASE) STAGE 2, GFR 60-89 ML/MIN: ICD-10-CM

## 2017-11-02 DIAGNOSIS — I63.9 CEREBROVASCULAR ACCIDENT (CVA), UNSPECIFIED MECHANISM (HCC): ICD-10-CM

## 2017-11-02 DIAGNOSIS — I10 HYPERTENSION, UNSPECIFIED TYPE: Primary | ICD-10-CM

## 2017-11-02 LAB
BUN BLD-MCNC: 27 MG/DL (ref 9–20)
CALCIUM BLD-MCNC: 9.6 MG/DL (ref 8.4–10.2)
CHLORIDE BLD-SCNC: 105 MMOL/L (ref 98–107)
CO2 POC: 26 MMOL/L (ref 22–32)
CREAT BLD-MCNC: 1.1 MG/DL (ref 0.8–1.5)
EGFR (POC): 63.5
GLUCOSE POC: 101 MG/DL (ref 75–110)
INR BLD: 1.6
POTASSIUM SERPL-SCNC: 4.6 MMOL/L (ref 3.6–5)
PT POC: 21.1 SECONDS
SODIUM SERPL-SCNC: 145 MMOL/L (ref 137–145)
VALID INTERNAL CONTROL?: YES

## 2017-11-02 NOTE — PROGRESS NOTES
Blood sugar, kidney and K+ normal.  INR slightly low. Change Coumadin 5 mg MWF; 2.5 mg TTSS.   Call daughter

## 2017-11-02 NOTE — PROGRESS NOTES
Reviewed record in preparation for visit and have obtained necessary documentation. Identified pt with two pt identifiers(name and ). Chief Complaint   Patient presents with    Follow-up     4 wk        Coordination of Care Questionnaire:  :     1) Have you been to an emergency room, urgent care clinic since your last visit? No    Hospitalized since your last visit? No    :          2) Have you seen or consulted any other health care providers outside of [address] [place] [address] since your last visit?  No

## 2017-11-02 NOTE — PROGRESS NOTES
Subjective:   John Schultz is a 76 y.o. male      Chief Complaint   Patient presents with    Follow-up     4 wk        History of present illness:  Mr. Rojas Loaiza returns in follow up. His blood pressure has improved significantly on the Losartan. He's due for his Pro-Time today. Will also check a BMP since we started the Losartan. A very long time was spent with him, his daughter and his wife because of issues surrounding whether they were going to continue with this practice or not. In addition, we had an extensive conversation about where he and his wife were headed in terms of both of them having cognitive impairment now and eventually they would not be able to maintain in their own home. I strongly encouraged them to think about looking at places like [facility], where there are gradations of care and to do that before it became an emergency to do so because they might not be able to get in there then. The daughter was in attendance for that as well. With regard to staying with the practice, from his standpoint it seems to be more of a monetary thing than anything else. The decision will be up to them. They've not made one yet and as such we will continue to see them and have planned for them to return for comprehensive examination in December. As noted, his blood pressure is excellent. He denies any new TIA or stroke-like symptoms. Parkinson's disease is stable. He still has issues with his back pain, but takes Tylenol with some improvement. He also is on the Gabapentin. I don't see anything to change in terms of his medication and will continue the Losartan at this point. He denies other new issues today.         Patient Active Problem List    Diagnosis Date Noted    Parkinson's disease ([facility] 01/30/2015     Priority: 1 - One    Hypertension      Priority: 1 - One    Stroke ([facility]      Priority: 1 - One    MCI (mild cognitive impairment)      Priority: 1 - One    Aaron esophagus Priority: 2 - Two    Hyperlipidemia      Priority: 2 - Two    Coronary artery disease 07/12/2017     Priority: 2 - Two    CKD (chronic kidney disease) stage 2, GFR 60-89 ml/min      Priority: 2 - Two    Gastroesophageal reflux disease without esophagitis 09/30/2017     Priority: 3 - Three    Depression      Priority: 3 - Three    Tendinitis of left rotator cuff 07/12/2017     Priority: 3 - Three    DJD (degenerative joint disease) 07/12/2017     Priority: 3 - Three    Tremor 07/12/2017     Priority: 3 - Three    Snoring      Priority: 4 - Four    Sleep apnea in adult 07/12/2017     Priority: 4 - Four    Tendinitis 07/12/2017     Priority: 4 - Four    Gout 07/12/2017     Priority: 4 - Four    Restless leg syndrome 07/12/2017     Priority: 4 - Four    Benign nodular prostatic hyperplasia without lower urinary tract symptoms 07/12/2017     Priority: 4 - Four    Lumbar stenosis 01/29/2015     Priority: 4 - Four    Hearing difficulty of both ears 07/12/2017     Priority: 5 - Five    PE (physical exam), annual 09/30/2017      Past Surgical History:   Procedure Laterality Date    HX ORTHOPAEDIC  1/27/15    L4-L5 MICRODECOMPRESSION (Right    AR EGD TRANSORAL BIOPSY SINGLE/MULTIPLE  8/16/2011         AR EGD TRANSORAL BIOPSY SINGLE/MULTIPLE  10/1/2013           No Known Allergies   Family History   Problem Relation Age of Onset    Heart Disease Mother     Heart Disease Father       Social History     Social History    Marital status:      Spouse name: N/A    Number of children: N/A    Years of education: N/A     Occupational History    Not on file.      Social History Main Topics    Smoking status: Former Smoker    Smokeless tobacco: Never Used    Alcohol use No    Drug use: No    Sexual activity: Not on file     Other Topics Concern    Not on file     Social History Narrative     Outpatient Prescriptions Marked as Taking for the 11/2/17 encounter (Office Visit) with Tiny Martinez MD Medication Sig Dispense Refill    losartan (COZAAR) 50 mg tablet Take 1 Tab by mouth daily. 30 Tab 1    carbidopa-levodopa (SINEMET)  mg per tablet TAKE 2 TABLETS IN THE MORNING, 1 TABLET AT LUNCH, 1 TABLET AT DINNER, AND 1 TABLET PRIOR TO  Tab 1    acetaminophen (TYLENOL EXTRA STRENGTH) 500 mg tablet Take 1,000 mg by mouth three (3) times daily as needed for Pain.  amLODIPine (NORVASC) 10 mg tablet TAKE 1 TABLET DAILY 90 Tab 2    PARoxetine (PAXIL) 10 mg tablet Take 1 Tab by mouth daily. 90 Tab 3    senna-docusate (SENNA-S) 8.6-50 mg per tablet Take 1 Tab by mouth daily as needed.  aspirin delayed-release 81 mg tablet Take  by mouth daily.  vitamin e (E GEMS) 1,000 unit capsule Take 2,000 Units by mouth daily.  gabapentin (NEURONTIN) 100 mg capsule Take 2 capsules by mouth two (2) times a day. 1 capsule 0    warfarin (COUMADIN) 5 mg tablet Take 5 mg by mouth. 5 mg thur/Fri; 2.5 mg Sat-Wed      esomeprazole (NEXIUM) 20 mg capsule Take 20 mg by mouth daily.  MULTIVITAMIN PO Take 1 Tab by mouth daily.  atorvastatin (LIPITOR) 40 mg tablet Take  by mouth daily. Review of Systems              Constitutional:  He denies fever, weight loss, sweats or fatigue. HEENT:  No blurred or double vision, headache or dizziness. No difficulty with swallowing, taste, speech or smell. Respiratory:  No cough, wheezing or shortness of breath. No sputum production. Cardiac:  Denies chest pain, palpitations, unexplained indigestion, syncope, edema, PND or orthopnea. GI:  No changes in bowel movements, no abdominal pain, no bloating, anorexia, nausea, vomiting or heartburn. :  No frequency or dysuria. Denies incontinence. Extremities:  No joint pain, stiffness or swelling. Skin:  No recent rashes or mole changes. Neurological:  No numbness, tingling, burning paresthesias or loss of motor strength. No syncope, dizziness, frequent headaches or memory loss. Objective:     Vitals:    11/02/17 1524   BP: 134/74   Pulse: (!) 56   SpO2: 95%   Weight: 186 lb (84.4 kg)   Height: 5' 4\" (1.626 m)       Body mass index is 31.93 kg/(m^2). Physical Examination:              General Appearance:  Well-developed, well-nourished, no acute  distress. HEENT:      Ears:  The TMs and ear canals were clear. Eyes:  The pupillary responses were normal.  Extraocular muscle function intact. Lids and conjunctiva not injected. Neck:  Supple without thyromegaly or adenopathy. No JVD noted. Lungs:  Clear to auscultation and percussion. Cardiac:  Regular rate and rhythm without murmur. GI: nontender w/o mass. Normal BS's. Extremities:  No clubbing, cyanosis or edema. Skin:  No rash or unusual mole changes noted. Neurological:  Grossly normal.            Assessment/Plan:   Impressions:      ICD-10-CM ICD-9-CM    1. Hypertension, unspecified type I10 401.9 AMB POC BASIC METABOLIC PANEL   2. CKD (chronic kidney disease) stage 2, GFR 60-89 ml/min N18.2 585.2 AMB POC BASIC METABOLIC PANEL   3. Cerebrovascular accident (CVA), unspecified mechanism ([facility] I63.9 434.91 AMB POC BASIC METABOLIC PANEL      AMB POC PT/INR        Plan:  1. Continue present meds  2. > 50% time spent in counseling with total time spent > 1 hour  3. Lifestyle modifications including Na restriction, low carb/fat diet, weight reduction and exercise (at least a walking program). Follow-up Disposition:  Return in about 6 weeks (around 12/14/2017) for CPE.       Orders Placed This Encounter    AMB [facility] AMB POC PT/INR       Gerda Simpson MD

## 2017-11-03 ENCOUNTER — TELEPHONE ANTICOAG (OUTPATIENT)
Dept: INTERNAL MEDICINE CLINIC | Age: 77
End: 2017-11-03

## 2017-11-03 RX ORDER — LOSARTAN POTASSIUM 50 MG/1
50 TABLET ORAL DAILY
Qty: 90 TAB | Refills: 3 | Status: SHIPPED | OUTPATIENT
Start: 2017-11-03 | End: 2018-11-05 | Stop reason: SDUPTHER

## 2017-11-03 NOTE — PROGRESS NOTES
Anticoagulation Summary as of 11/3/2017     INR goal 2.0-2.5    Today's INR 1.6! (11/2/2017)    Maintenance plan 5 mg (5 mg x 1) on Mon, Wed, Fri; 2.5 mg (5 mg x 0.5) all other days    Weekly total 25 mg    Plan last modified Chelle Chandra (11/3/2017)    Next INR check 12/6/2017    Target end date       Anticoagulation Episode Summary     INR check location     Preferred lab     Send INR reminders to     Comments       Anticoagulation Care Providers     Provider Role Specialty Phone number    Jazmine Lau MD [facility] Internal Medicine [phone]

## 2017-11-03 NOTE — TELEPHONE ENCOUNTER
Requested Prescriptions     Pending Prescriptions Disp Refills    losartan (COZAAR) 50 mg tablet 90 Tab 3     Sig: Take 1 Tab by mouth daily.

## 2017-11-10 ENCOUNTER — OFFICE VISIT (OUTPATIENT)
Dept: NEUROLOGY | Age: 77
End: 2017-11-10

## 2017-11-10 VITALS
HEIGHT: 64 IN | HEART RATE: 87 BPM | BODY MASS INDEX: 31.92 KG/M2 | WEIGHT: 187 LBS | OXYGEN SATURATION: 98 % | SYSTOLIC BLOOD PRESSURE: 132 MMHG | DIASTOLIC BLOOD PRESSURE: 80 MMHG

## 2017-11-10 DIAGNOSIS — G20 PARKINSON DISEASE (HCC): Primary | ICD-10-CM

## 2017-11-10 DIAGNOSIS — F02.80 DEMENTIA DUE TO PARKINSON'S DISEASE WITHOUT BEHAVIORAL DISTURBANCE (HCC): ICD-10-CM

## 2017-11-10 DIAGNOSIS — G20 DEMENTIA DUE TO PARKINSON'S DISEASE WITHOUT BEHAVIORAL DISTURBANCE (HCC): ICD-10-CM

## 2017-11-10 NOTE — LETTER
11/10/2017 2:11 PM 
 
Patient:  Barbara Conti YOB: 1940 Date of Visit: 11/10/2017 Dear MD Yoel Salcedo [address] [id number] VIA In Basket 
 : Thank you for referring Mr. Eliecer Love to me for evaluation/treatment. Below are the relevant portions of my assessment and plan of care. HISTORY OF PRESENT ILLNESS Barbara Conti is a 76 y.o. male. HPI Comments: This patient returns in follow-up of his Parkinson's disease. He is taking Sinemet 25 102 p.o. a.m. one at lunch 1 at dinner and 1 before bed. He goes to the gym and rides the stationary bicycle. He is taking Sinemet 25 102 tablets in the morning 1 at lunch and one at dinner. He is walking some outside with his walker and uses a stationary bicycle for exercise. Not having any visual hallucinations. He is not having freezing episodes. Review of Systems Constitutional: Negative. HENT: Positive for hearing loss. .   
Skin: Negative. Neurological: Positive for tremors. Negative for speech change. Endo/Heme/Allergies: Negative. Psychiatric/Behavioral: Negative. Current Outpatient Prescriptions on File Prior to Visit Medication Sig Dispense Refill  losartan (COZAAR) 50 mg tablet Take 1 Tab by mouth daily. 90 Tab 3  carbidopa-levodopa (SINEMET)  mg per tablet TAKE 2 TABLETS IN THE MORNING, 1 TABLET AT LUNCH, 1 TABLET AT DINNER, AND 1 TABLET PRIOR TO  Tab 1  
 acetaminophen (TYLENOL EXTRA STRENGTH) 500 mg tablet Take 1,000 mg by mouth three (3) times daily as needed for Pain.  amLODIPine (NORVASC) 10 mg tablet TAKE 1 TABLET DAILY 90 Tab 2  
 PARoxetine (PAXIL) 10 mg tablet Take 1 Tab by mouth daily. 90 Tab 3  
 senna-docusate (SENNA-S) 8.6-50 mg per tablet Take 1 Tab by mouth daily as needed.  aspirin delayed-release 81 mg tablet Take  by mouth daily.  vitamin e (E GEMS) 1,000 unit capsule Take 2,000 Units by mouth daily.  gabapentin (NEURONTIN) 100 mg capsule Take 2 capsules by mouth two (2) times a day. 1 capsule 0  
 warfarin (COUMADIN) 5 mg tablet Take 5 mg by mouth. 5 mg thur/Fri; 2.5 mg Sat-Wed  esomeprazole (NEXIUM) 20 mg capsule Take 20 mg by mouth daily.  MULTIVITAMIN PO Take 1 Tab by mouth daily.  atorvastatin (LIPITOR) 40 mg tablet Take  by mouth daily. No current facility-administered medications on file prior to visit. Past Medical History:  
Diagnosis Date  Aaron esophagus  Benign nodular prostatic hyperplasia without lower urinary tract symptoms 7/12/2017  CKD (chronic kidney disease) stage 2, GFR 60-89 ml/min  Constipation  Coronary artery disease 7/12/2017  Depression  DJD (degenerative joint disease) 7/12/2017 [address]  Gout 7/12/2017  Headache   
 Hearing difficulty of both ears 7/12/2017  Hyperlipidemia  Hypertension  Impaired cognition 7/12/2017  MCI (mild cognitive impairment)  Restless leg syndrome 7/12/2017  Sleep apnea in adult 7/12/2017 No longer on CPAP @13  Snoring  Stroke ([facility]  Tendinitis 7/12/2017 ACH[facility]ES  Tendinitis of left rotator cuff 07/12/2017  Tremor 7/12/2017 LEFT HAND  Unspecified sleep apnea   
 lost weight no longer has it /80  Pulse 87  Ht 5' 4\" (1.626 m)  Wt 187 lb (84.8 kg)  SpO2 98%  BMI 32.1 kg/m2 Physical Exam  
Constitutional: He is oriented to person, place, and time. He appears well-developed and well-nourished. HENT:  
Head: Normocephalic and atraumatic. Eyes: Conjunctivae and EOM are normal. Pupils are equal, round, and reactive to light. Cardiovascular: Normal rate, regular rhythm and normal heart sounds. Musculoskeletal: Normal range of motion. He exhibits no edema. Neurological: He is alert and oriented to person, place, and time. He has normal strength and normal reflexes. He displays no atrophy.  No cranial nerve deficit or sensory deficit. He exhibits normal muscle tone. He displays no Babinski's sign on the right side. He displays no Babinski's sign on the left side. He has a mild resting tremor in his hands, he has minimal facial masking. Speech, language and mentation appear to be normal 
Walking with the walker from the door of room 6 to the end of the robles by my office at back to 33 seconds. Skin: Skin is warm and dry. Psychiatric: He has a normal mood and affect. His behavior is normal. Judgment and thought content normal.  
 
 
ASSESSMENT and PLAN Parkinson's disease His symptoms appear to be under moderate control on Sinemet 25 102 tablets in the a.m., 1 tablet at lunch, 1 tablet at dinner and 1 tablet prior to bed. I did check to see that they had a support chair over the commode in the bathroom, they do not I recommended they get one as that is a high risk area as everything his heart is a rock and the patient has to turn and move backwards to sit down on the toilet. I am not sure I got through. He continues to take his Paxil prescribed by his primary care doctor. I will see him back in 6 months.y. History of stroke I see no evidence of it on my exam however he will continue to take his aspirin 81 mg a day. Mild cognitive impairment I suspect this secondary to of his Parkinson's disease however time will tell if we are dealing with a significant progressive dementia. His last head CT scan was done 2 years ago and did show volume loss and periventricular white matter changes but no evidence of stroke. This note will not be viewable in [address] If you have questions, please do not hesitate to call me. I look forward to following Mr. Noemy Hayes along with you. Sincerely, Mariana Nguyen MD

## 2017-11-10 NOTE — MR AVS SNAPSHOT
Visit Information Date & Time Provider Department Dept. Phone Encounter #  
 11/10/2017  1:40 PM Nathan Clark MD Neurology Clinic at [facility] [phone] [id number] Your Appointments 12/6/2017  8:50 AM  
LAB with LAB ONLY  
HEMALATHA HERNANDEZ [facility] Appt Note: cpe labs [address] [id number] [address] [address]  
  
    
 12/13/2017  2:30 PM  
COMPLETE PHYSICAL with MD TEO Royal [facility] Appt Note: cpe review [address] [phone] [address] [facility] [id number] 5/11/2018  1:40 PM  
Follow Up with Nathan Clark MD  
Neurology Clinic at [facility] Appt Note: follow up tremors $ 0 CP jll 11/10/17  
 [address] [place] [address], 
[address] [place] [address], [address] [address]  
[address] [facility], [address] [place] [address], [address] [phone] Upcoming Health Maintenance Date Due DTaP/Tdap/Td series (1 - Tdap) 2/10/1959 ZOSTER VACCINE AGE 60> 12/10/1997 GLAUCOMA SCREENING Q2Y 2/10/2003 MEDICARE YEARLY EXAM 2/10/2003 Pneumococcal 65+ Low/Medium Risk (2 of 2 - PPSV23) 11/1/2019 Allergies as of 11/10/2017  Review Complete On: 11/10/2017 By: Nathan Clark MD  
 No Known Allergies Current Immunizations  Never Reviewed Name Date Influenza High Dose Vaccine PF 10/2/2017 Influenza Vaccine 11/1/2014 Pneumococcal Vaccine (Unspecified Type) 11/1/2014 Not reviewed this visit Vitals BP Pulse Height(growth percentile) Weight(growth percentile) SpO2 BMI  
 132/80 87 5' 4\" (1.626 m) 187 lb (84.8 kg) 98% 32.1 kg/m2 Smoking Status Former Smoker Vitals History BMI and BSA Data Body Mass Index Body Surface Area  
 32.1 kg/m 2 1.96 m 2 Preferred Pharmacy Pharmacy Name Phone [address] [phone] Your Updated Medication List  
  
   
This list is accurate as of: 11/10/17  2:06 PM.  Always use your most recent med list. amLODIPine 10 mg tablet Commonly known as:  Clark Del TAKE 1 TABLET DAILY  
  
 aspirin delayed-release 81 mg tablet Take  by mouth daily. carbidopa-levodopa  mg per tablet Commonly known as:  SINEMET  
TAKE 2 TABLETS IN THE MORNING, 1 TABLET AT LUNCH, 1 TABLET AT DINNER, AND 1 TABLET PRIOR TO BED  
  
 gabapentin 100 mg capsule Commonly known as:  NEURONTIN Take 2 capsules by mouth two (2) times a day. LIPITOR 40 mg tablet Generic drug:  atorvastatin Take  by mouth daily. losartan 50 mg tablet Commonly known as:  COZAAR Take 1 Tab by mouth daily. MULTIVITAMIN PO Take 1 Tab by mouth daily. NexIUM 20 mg capsule Generic drug:  esomeprazole Take 20 mg by mouth daily. PARoxetine 10 mg tablet Commonly known as:  PAXIL Take 1 Tab by mouth daily. SENNA-S 8.6-50 mg per tablet Generic drug:  senna-docusate Take 1 Tab by mouth daily as needed. TYLENOL EXTRA STRENGTH 500 mg tablet Generic drug:  acetaminophen Take 1,000 mg by mouth three (3) times daily as needed for Pain.  
  
 vitamin e 1,000 unit capsule Commonly known as:  E GEMS Take 2,000 Units by mouth daily. warfarin 5 mg tablet Commonly known as:  COUMADIN Take 5 mg by mouth. 5 mg thur/Fri; 2.5 mg Sat-Wed Patient Instructions PRESCRIPTION REFILL POLICY [facility] Statement to Patients April 1, 2014 In an effort to ensure the large volume of patient prescription refills is processed in the most efficient and expeditious manner, we are asking our patients to assist us by calling your Pharmacy for all prescription refills, this will include also your  Mail Order Pharmacy. The pharmacy will contact our office electronically to continue the refill process. Please do not wait until the last minute to call your pharmacy. We need at least 48 hours (2days) to fill prescriptions. We also encourage you to call your pharmacy before going to  your prescription to make sure it is ready. With regard to controlled substance prescription refill requests (narcotic refills) that need to be picked up at our office, we ask your cooperation by providing us with at least 72 hours (3days) notice that you will need a refill. We will not refill narcotic prescription refill requests after 4:00pm on any weekday, Monday through Thursday, or after 2:00pm on Fridays, or on the weekends. We encourage everyone to explore another way of getting your prescription refill request processed using gumi, our patient web portal through our electronic medical record system. gumi is an efficient and effective way to communicate your medication request directly to the office and  downloadable as an nina on your smart phone . gumi also features a review functionality that allows you to view your medication list as well as leave messages for your physician. Are you ready to get connected? If so please review the attatched instructions or speak to any of our staff to get you set up right away! Thank you so much for your cooperation. Should you have any questions please contact our Practice Administrator. The Physicians and Staff,  [facility] Introducing [facility]! [facility] introduces gumi patient portal. Now you can access parts of your medical record, email your doctor's office, and request medication refills online. 1. In your internet browser, go to [email or web address] 2. Click on the First Time User? Click Here link in the Sign In box. You will see the New Member Sign Up page. 3. Enter your Mobile Iron Access Code exactly as it appears below. You will not need to use this code after youve completed the sign-up process. If you do not sign up before the expiration date, you must request a new code. · Mobile Iron Access Code: [email or web address] Expires: 12/31/2017  9:51 AM 
 
4. Enter the last four digits of your Social Security Number (xxxx) and Date of Birth (mm/dd/yyyy) as indicated and click Submit. You will be taken to the next sign-up page. 5. Create a Mobile Iron ID. This will be your Mobile Iron login ID and cannot be changed, so think of one that is secure and easy to remember. 6. Create a Mobile Iron password. You can change your password at any time. 7. Enter your Password Reset Question and Answer. This can be used at a later time if you forget your password. 8. Enter your e-mail address. You will receive e-mail notification when new information is available in [address] 9. Click Sign Up. You can now view and download portions of your medical record. 10. Click the Download Summary menu link to download a portable copy of your medical information. If you have questions, please visit the Frequently Asked Questions section of the Mobile Iron website. Remember, Mobile Iron is NOT to be used for urgent needs. For medical emergencies, dial 911. Now available from your iPhone and Android! Please provide this summary of care documentation to your next provider. Your primary care clinician is listed as [facility] [address]. If you have any questions after today's visit, please call [phone].

## 2017-11-10 NOTE — PATIENT INSTRUCTIONS
[facility]   Statement to Patients  April 1, 2014      In an effort to ensure the large volume of patient prescription refills is processed in the most efficient and expeditious manner, we are asking our patients to assist us by calling your Pharmacy for all prescription refills, this will include also your  Mail Order Pharmacy. The pharmacy will contact our office electronically to continue the refill process. Please do not wait until the last minute to call your pharmacy. We need at least 48 hours (2days) to fill prescriptions. We also encourage you to call your pharmacy before going to  your prescription to make sure it is ready. With regard to controlled substance prescription refill requests (narcotic refills) that need to be picked up at our office, we ask your cooperation by providing us with at least 72 hours (3days) notice that you will need a refill. We will not refill narcotic prescription refill requests after 4:00pm on any weekday, Monday through Thursday, or after 2:00pm on Fridays, or on the weekends. We encourage everyone to explore another way of getting your prescription refill request processed using BackerKit, our patient web portal through our electronic medical record system. BackerKit is an efficient and effective way to communicate your medication request directly to the office and  downloadable as an nina on your smart phone . BackerKit also features a review functionality that allows you to view your medication list as well as leave messages for your physician. Are you ready to get connected? If so please review the attatched instructions or speak to any of our staff to get you set up right away! Thank you so much for your cooperation. Should you have any questions please contact our Practice Administrator.     The Physicians and Staff,  [facility]

## 2017-12-06 ENCOUNTER — LAB ONLY (OUTPATIENT)
Dept: INTERNAL MEDICINE CLINIC | Age: 77
End: 2017-12-06

## 2017-12-06 DIAGNOSIS — E78.5 HYPERLIPIDEMIA, UNSPECIFIED HYPERLIPIDEMIA TYPE: ICD-10-CM

## 2017-12-06 DIAGNOSIS — Z00.00 ROUTINE GENERAL MEDICAL EXAMINATION AT A HEALTH CARE FACILITY: Primary | ICD-10-CM

## 2017-12-06 DIAGNOSIS — M10.9 GOUT, UNSPECIFIED CAUSE, UNSPECIFIED CHRONICITY, UNSPECIFIED SITE: ICD-10-CM

## 2017-12-06 LAB
ALBUMIN SERPL-MCNC: 4.3 G/DL (ref 3.9–5.4)
ALKALINE PHOS POC: 75 U/L (ref 38–126)
ALT SERPL-CCNC: 14 U/L (ref 9–52)
AST SERPL-CCNC: 22 U/L (ref 14–36)
BACTERIA UA POCT, BACTPOCT: ABNORMAL
BILIRUB UR QL STRIP: NEGATIVE
BUN BLD-MCNC: 22 MG/DL (ref 9–20)
CALCIUM BLD-MCNC: 9.8 MG/DL (ref 8.4–10.2)
CASTS UA POCT: ABNORMAL
CHLORIDE BLD-SCNC: 106 MMOL/L (ref 98–107)
CHOLEST SERPL-MCNC: 122 MG/DL (ref 0–200)
CLUE CELLS, CLUEPOCT: NEGATIVE
CO2 POC: 28 MMOL/L (ref 22–32)
CREAT BLD-MCNC: 1 MG/DL (ref 0.8–1.5)
CRYSTALS UA POCT, CRYSPOCT: NEGATIVE
EGFR (POC): 71.3
EPITHELIAL CELLS POCT: ABNORMAL
GLUCOSE POC: 94 MG/DL (ref 75–110)
GLUCOSE UR-MCNC: NEGATIVE MG/DL
GRAN# POC: 5.2 K/UL (ref 2–7.8)
GRAN% POC: 73.8 % (ref 37–92)
HCT VFR BLD CALC: 37.5 % (ref 37–51)
HDLC SERPL-MCNC: 48 MG/DL (ref 35–130)
HGB BLD-MCNC: 12.7 G/DL (ref 12–18)
INR BLD: NORMAL
IRON POC: 154 UG/DL (ref 49–181)
IRON SATURATION POC: 44 % (ref 15–55)
KETONES P FAST UR STRIP-MCNC: ABNORMAL MG/DL
LDL CHOLESTEROL POC: 50.8 MG/DL (ref 0–130)
LY# POC: 1.5 K/UL (ref 0.6–4.1)
LY% POC: 22.8 % (ref 10–58.5)
MCH RBC QN: 34.5 PG (ref 26–32)
MCHC RBC-ENTMCNC: 33.9 G/DL (ref 30–36)
MCV RBC: 102 FL (ref 80–97)
MID #, POC: 0.2 K/UL (ref 0–1.8)
MID% POC: 3.4 % (ref 0.1–24)
MUCUS UA POCT, MUCPOCT: ABNORMAL
PH UR STRIP: 5 [PH] (ref 5–7)
PLATELET # BLD: 206 K/UL (ref 140–440)
POTASSIUM SERPL-SCNC: 4.9 MMOL/L (ref 3.6–5)
PROT SERPL-MCNC: 7.3 G/DL (ref 6.3–8.2)
PROT UR QL STRIP: ABNORMAL
PSA SERPL-MCNC: 0.7 NG/ML (ref 0–4)
PT POC: NORMAL SECONDS
RBC # BLD: 3.68 M/UL (ref 4.2–6.3)
RBC UA POCT, RBCPOCT: ABNORMAL
SODIUM SERPL-SCNC: 145 MMOL/L (ref 137–145)
SP GR UR STRIP: 1.01 (ref 1.01–1.02)
TCHOL/HDL RATIO (POC): 2.5 (ref 0–4)
TIBC POC: 352 UG/DL (ref 260–462)
TOTAL BILIRUBIN POC: 0.6 MG/DL (ref 0.2–1.3)
TRICH UA POCT, TRICHPOC: NEGATIVE
TRIGL SERPL-MCNC: 116 MG/DL (ref 0–200)
TSH BLD-ACNC: 0.88 UIU/ML (ref 0.4–4.2)
UA UROBILINOGEN AMB POC: NORMAL (ref 0.2–1)
URINALYSIS CLARITY POC: CLEAR
URINALYSIS COLOR POC: ABNORMAL
URINE BLOOD POC: ABNORMAL
URINE CULT COMMENT, POCT: ABNORMAL
URINE LEUKOCYTES POC: NEGATIVE
URINE NITRITES POC: NEGATIVE
VALID INTERNAL CONTROL?: NORMAL
VITAMIN D POC: 34.1 NG/ML (ref 30–96)
VLDLC SERPL CALC-MCNC: 23.2 MG/DL
WBC # BLD: 6.9 K/UL (ref 4.1–10.9)
WBC UA POCT, WBCPOCT: ABNORMAL
YEAST UA POCT, YEASTPOC: NEGATIVE

## 2017-12-07 ENCOUNTER — TELEPHONE ANTICOAG (OUTPATIENT)
Dept: INTERNAL MEDICINE CLINIC | Age: 77
End: 2017-12-07

## 2017-12-07 LAB
CK SERPL-CCNC: 72 U/L (ref 24–204)
INR PPP: 2.1 (ref 0.8–1.2)
PROTHROMBIN TIME: 20.9 SEC (ref 9.1–12)
URATE SERPL-MCNC: 5.3 MG/DL (ref 3.7–8.6)

## 2017-12-07 NOTE — PROGRESS NOTES
Anticoagulation Summary as of 12/7/2017     INR goal 2.0-2.5    Today's INR 2.1 (12/6/2017)    Maintenance plan 5 mg (5 mg x 1) on Mon, Wed, Fri; 2.5 mg (5 mg x 0.5) all other days    Weekly total 25 mg    Plan last modified Fredie Handing (11/3/2017)    Next INR check 1/8/2018    Target end date       Anticoagulation Episode Summary     INR check location Clinic Lab    Preferred lab     Send INR reminders to     Comments       Anticoagulation Care Providers     Provider Role Specialty Phone number    Wendie Mejias MD [facility] Internal Medicine [phone]

## 2017-12-13 ENCOUNTER — OFFICE VISIT (OUTPATIENT)
Dept: INTERNAL MEDICINE CLINIC | Age: 77
End: 2017-12-13

## 2017-12-13 VITALS
BODY MASS INDEX: 31.24 KG/M2 | RESPIRATION RATE: 16 BRPM | HEART RATE: 60 BPM | TEMPERATURE: 97.6 F | SYSTOLIC BLOOD PRESSURE: 129 MMHG | WEIGHT: 183 LBS | DIASTOLIC BLOOD PRESSURE: 75 MMHG | OXYGEN SATURATION: 98 % | HEIGHT: 64 IN

## 2017-12-13 DIAGNOSIS — K22.70 BARRETT'S ESOPHAGUS WITHOUT DYSPLASIA: ICD-10-CM

## 2017-12-13 DIAGNOSIS — G20 PARKINSON'S DISEASE (HCC): ICD-10-CM

## 2017-12-13 DIAGNOSIS — E78.5 HYPERLIPIDEMIA, UNSPECIFIED HYPERLIPIDEMIA TYPE: ICD-10-CM

## 2017-12-13 DIAGNOSIS — I10 HYPERTENSION, UNSPECIFIED TYPE: ICD-10-CM

## 2017-12-13 DIAGNOSIS — I63.9 CEREBROVASCULAR ACCIDENT (CVA), UNSPECIFIED MECHANISM (HCC): Primary | ICD-10-CM

## 2017-12-13 DIAGNOSIS — N18.2 CKD (CHRONIC KIDNEY DISEASE) STAGE 2, GFR 60-89 ML/MIN: ICD-10-CM

## 2017-12-13 DIAGNOSIS — G31.84 MCI (MILD COGNITIVE IMPAIRMENT): ICD-10-CM

## 2017-12-13 NOTE — PROGRESS NOTES
Chief Complaint   Patient presents with    Complete Physical     1. Have you been to the ER, urgent care clinic since your last visit? Hospitalized since your last visit? No    2. Have you seen or consulted any other health care providers outside of the [address] [place] [address] since your last visit? Include any pap smears or colon screening.  No

## 2017-12-13 NOTE — PROGRESS NOTES
Mr. Den Byrd is a 76year old, white,  male who comes to the office today for annual comprehensive personal healthcare examination. History of Present Illness: This patient has multiple medical problems. These include:  1. Coronary artery disease by EBT heart scan without symptomatic angina or symptoms of congestive heart failure. 2. Hyperlipidemia. 3. Hypertension. 4. 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 5. Parkinson's disease with some tremor and gait disturbance and bradykinesia. 6. Lumbar spinal stenosis, status post laminectomy at L4-5. He still has some residual back pain at times and occasionally some leg pain. 7. Markedly diminished auditory acuity. He has tried hearing aids, but rejected them. 8. History of gout. 9. History of depression. 10. Obstructive sleep apnea, no longer using CPAP. He does have some snoring, however. 11. Aaron's esophagus, status post dilatation of a stricture. He appears to me to be due for repeat endoscopy, having had the last one in 2013. 12. Gastroesophageal reflux disease. 13. CKD, stage 2.  14. Benign prostatic hypertrophy. 15. Degenerative joint disease. 16. Restless leg syndrome. 17. Left rotator cuff tendinitis, currently quiescent. 18. Cognitive impairment. 19. History of Achilles tendinitis, improved. At the time of the visit he denied new cardiorespiratory, GI or  symptoms. He was still somewhat slow. We did have a discussion about his ability to drive and the degree of comfort with driving that he and his wife were noting. He is going to have the discussion with the rest of his family members and consider a driving evaluation. He continues with some difficulties with his cognitive impairment and requires a lot of help and direction from his wife and children. He denies new TIA or stroke-like symptoms.     Past Medical History:  Otherwise remarkable for the laminectomy. Allergies:  None known. Current Medications:  1. Amlodipine 10 mg daily. 2. Lipitor 40 mg daily. 3. Aspirin 81 mg daily. 4. Acahlqkxbi04 mg daily. 5. Nexium 20 mg daily. 6. Losartan 50 mg daily. 7. Multivitamin daily. 8. Carbidopa/Levodopa 25/100, two in the morning and one at lunch, dinner and at bedtime. 9. Coumadin 5 mg tablets Thursday and Friday and one half tablets Saturday through Wednesday. 10. Senna S daily as needed. 11. Gabapentin 100 mg, two twice a day. 12. Vitamin E 1,000 I U, two a day. 13. Extra strength Tylenol, two tablets three times a day. Social History:  He does not smoke. He has an occasional glass of wine. He tries to follow a lowfat diet. He exercises regularly. He is retired. He is disabled from his stroke. Family History:  Father  of an MI at age 79. Mother  of an MI at age 61. Review of Systems:  CONSTITUTIONAL:  He denies fever, weight loss, sweats or fatigue. HEENT:  No blurred or double vision, headache or dizziness. No difficulty with swallowing, taste, speech or smell. RESPIRATORY:  No cough, wheezing or shortness of breath. No sputum production. CARDIAC:  Denies chest pain, palpitations, unexplained indigestion, syncope, edema, PND or orthopnea. GI:  No changes in bowel movements, no abdominal pain, no bloating, anorexia, nausea, vomiting or heartburn. :  He denies frequency, nocturia, stranguria, dysuria or sexual dysfunction. EXTREMITIES:  No joint pain, stiffness or swelling. SKIN:  No recent rashes or mole changes. NEUROLOGICAL:  Some gait disturbance, as well as short term memory loss. No numbness, tingling, burning paresthesias or loss of motor strength. No syncope, dizziness, frequent headaches. MUSCULOSKELETAL:  Back pain as noted. Physical Examination:  GENERAL:  Well-developed, well-nourished, no acute distress. VITAL SIGNS:  BP: 129/75. P: 60.  R: 16.  T: 97.6. HT: 5'4\". WT: 183 lbs.   BMI: 31.4.  VISION:  Deferred to ophthalmologist.  HEARING:  Not tested as it has revealed markedly diminished hearing in the past and he has now rejected hearing aids. HEENT:  Ears:  The TMs and ear canals were clear. Eyes:  The pupillary responses were normal.  Extraocular muscle function intact. Lids and conjunctivae not injected. Fundoscopic exam revealed sharp disc margins. Pharynx:  Clear with teeth in good repair. No masses were noted. NECK:  Supple without thyromegaly or adenopathy. No JVD noted. No carotid bruits. LUNGS:  Clear to auscultation and percussion. CARDIAC:  Regular rate and rhythm without murmur. PMI not displaced. No gallop, rub or click. ABDOMEN:  Flat, soft, non-tender without palpable organomegaly or mass. No pulsatile mass was felt and no bruit was heard. Bowel sounds were active. :  Uncircumcised male. Both testes descended and no masses felt. RECTAL EXAM:  Normal sphincter tone. Prostate is mildly enlarged, but soft and non nodular and non tender. No rectal masses felt. Stool brown and heme negative. EXTREMITIES:  No clubbing, cyanosis or edema. PULSES:  Dorsalis pedis and posterior tibial pulses felt without difficulty. SKIN:  No rash or unusual mole changes noted. LYMPH NODES:  None felt in the cervical, supraclavicular, axillary or inguinal region. NEUROLOGICAL:  Gait is forward flexed and wide based. He is bradykinetic. He has a mild tremor of outstretched hands and increased tone overall. Short term memory impairment is noted as well. Laboratory:  Hemoglobin is 12.7. White blood count is 6,900. Blood sugar is 94. Liver and kidney function are normal.  Electrolytes are normal.  Iron is 154. TSH is 0.88. Vitamin D level is 34.1. Uric acid 5.3. CK 72. PSA 0.7. Recent INR 2.1 and therapeutic. His Coumadin dosing was not changed. Lipid profile reveals a total cholesterol of 122, triglycerides of 116, HDL cholesterol of 48 and an LDL of 51.   UA clear.    Health Maintenance Issues and Ancillary Studies:  1. TDAP (pertussis and tetanus) vaccine was given in December, 2012. 2. Pneumovax 23 (PPSV23) was given in November, 2005. 3. Seasonal influenza vaccine was given in October, 2017.  4. Shingles vaccine was given in April, 2007. 5. Prevnar 13 (PCV13) was given in December, 2014. 6. Colonoscopy was negative in November, 2011, (follow up as needed). 7. Modified barium swallow revealed a stricture in April, 2007. Endoscopy in October, 2013 revealed some evidence for Aaron's esophagus. He has not had a three year follow up yet. 8. Lexiscan stress testing was negative in May of 2014.  9. EBT heart scan in April, 2014 revealed a calcium score of 994.   10. CT scan of the head in July, 2015 was negative. 11. MRI of the lumbosacral spine revealed L4-5 stenosis and anterolisthesis of L4-5. This was in December, 2014. He did undergo a small laminectomy in January, 2015. 12. EKG in the office was normal.  13. Pulmonary function studies were normal.  14. Health screening assessments were essentially normal.    Impression:  1. CAD by EBT, asymptomatic. 2. Hypertension, adequate control. 3. Hyperlipidemia, well controlled. 4. History of old stroke with residual cognitive impairment. 5. Parkinson's disease. 6. Lumbar spinal stenosis, status post L4-5 laminectomy. 7. BPH. 8. Decreased auditory acuity. 9. History of gout. 10. Depression. 11. DREAD. 12. Aaron's esophagus, ? needs another EGD. 13. Left rotator cuff tendinitis, improved. 14. History of Achilles tendinitis, improved. 15. CKD-2.  16. Restless leg syndrome. 17. Degenerative joint disease. Plan:  1. Continue present medications. 2. Continue prudent diet. 3. Continue regular activity. 4. Restrict salt regarding his blood pressure. 5. I have opened discussion about his driving abilities and he will pursue that with family discussions.   6. We will talk with Dr. Corral Anchors office about the need for endoscopy. 7. Recheck here three months time. 8. He is appropriately treated for his ten year coronary heart disease risk.

## 2018-01-12 ENCOUNTER — LAB ONLY (OUTPATIENT)
Dept: INTERNAL MEDICINE CLINIC | Age: 78
End: 2018-01-12

## 2018-01-12 ENCOUNTER — TELEPHONE ANTICOAG (OUTPATIENT)
Dept: INTERNAL MEDICINE CLINIC | Age: 78
End: 2018-01-12

## 2018-01-12 DIAGNOSIS — I63.9 CEREBROVASCULAR ACCIDENT (CVA), UNSPECIFIED MECHANISM (HCC): Primary | ICD-10-CM

## 2018-01-12 LAB
INR BLD: 2
PT POC: 26.4 SECONDS
VALID INTERNAL CONTROL?: YES

## 2018-01-12 NOTE — PROGRESS NOTES
PHI verified and patient's daughter Hemant Park and his wife notified of patients labs. Anticoag calender mailed to patient.

## 2018-01-12 NOTE — MR AVS SNAPSHOT
Visit Information Date & Time Provider Department Dept. Phone Encounter #  
 1/12/2018  3:32 PM [facility] [facility] [address] [facility] [phone] [id number] Your Appointments 2/12/2018  8:20 AM  
LAB with LAB ONLY  
HEMALATHA HERNANDEZ [facility] ([address]) Appt Note: pt/inr  
 [address] [phone] [address] [facility] [address] 3/13/2018 11:00 AM  
Follow Up with MD EDUARDO Melissa [facility] ([address]) Appt Note: follow up [address] [phone] [address] [facility] [address] 5/11/2018  1:40 PM  
Follow Up with Miranda Schmidt MD  
[facility] [address] [facility]) Appt Note: follow up tremors $ 0 CP jll 11/10/17  
 [address] [place] [address], 
[address] [place] [address], [address] [phone]  
[address] [facility], [address] [place] [address], [address] [phone] Upcoming Health Maintenance Date Due  
 GLAUCOMA SCREENING Q2Y 2/10/2003 MEDICARE YEARLY EXAM 2/10/2003 DTaP/Tdap/Td series (2 - Td) 12/4/2022 Allergies as of 1/12/2018  Review Complete On: 12/13/2017 By: Kamala Dominguez MD  
 No Known Allergies Current Immunizations  Never Reviewed Name Date Influenza High Dose Vaccine PF 10/2/2017 Influenza Vaccine 11/1/2014 Pneumococcal Conjugate (PCV-13) 12/2/2014 Pneumococcal Polysaccharide (PPSV-23) 11/1/2005 Pneumococcal Vaccine (Unspecified Type) 11/1/2014 Tdap 12/4/2012 Zoster Vaccine, Live 4/1/2007 Not reviewed this visit Vitals Smoking Status Former Smoker Your Updated Medication List  
  
   
This list is accurate as of: 1/12/18  3:37 PM.  Always use your most recent med list.  
  
  
  
  
 amLODIPine 10 mg tablet Commonly known as:  Saintclair Rickers TAKE 1 TABLET DAILY  
  
 aspirin delayed-release 81 mg tablet Take  by mouth daily. carbidopa-levodopa  mg per tablet Commonly known as:  SINEMET  
TAKE 2 TABLETS IN THE MORNING, 1 TABLET AT LUNCH, 1 TABLET AT DINNER, AND 1 TABLET PRIOR TO BED  
  
 gabapentin 100 mg capsule Commonly known as:  NEURONTIN Take 2 capsules by mouth two (2) times a day. LIPITOR 40 mg tablet Generic drug:  atorvastatin Take  by mouth daily. losartan 50 mg tablet Commonly known as:  COZAAR Take 1 Tab by mouth daily. MULTIVITAMIN PO Take 1 Tab by mouth daily. NexIUM 20 mg capsule Generic drug:  esomeprazole Take 20 mg by mouth daily. PARoxetine 10 mg tablet Commonly known as:  PAXIL Take 1 Tab by mouth daily. SENNA-S 8.6-50 mg per tablet Generic drug:  senna-docusate Take 1 Tab by mouth daily as needed. TYLENOL EXTRA STRENGTH 500 mg tablet Generic drug:  acetaminophen Take 1,000 mg by mouth three (3) times daily as needed for Pain.  
  
 vitamin e 1,000 unit capsule Commonly known as:  E GEMS Take 2,000 Units by mouth daily. warfarin 5 mg tablet Commonly known as:  COUMADIN Take 5 mg by mouth. 5 mg thur/Fri; 2.5 mg Sat-Wed January 2018 Details Sun Mon Tue Wed Thu Fri Sat  
   1  
  
  
  
   2  
  
  
  
   3  
  
  
  
   4  
  
  
  
   5  
  
  
  
   6  
  
  
  
  
  7  
  
  
  
   8  
  
  
  
   9  
  
  
  
   10  
  
  
  
   11  
  
  
  
   12  
  
5 mg See details 13  
  
2.5 mg  
  
  
  14  
  
2.5 mg  
  
   15  
  
5 mg  
  
   16  
  
2.5 mg  
  
   17  
  
5 mg  
  
   18  
  
2.5 mg  
  
   19  
  
5 mg  
  
   20  
  
2.5 mg  
  
  
  21  
  
2.5 mg  
  
   22  
  
5 mg  
  
   23  
  
2.5 mg  
  
   24  
  
5 mg  
  
   25  
  
2.5 mg  
  
   26  
  
5 mg  
  
   27  
  
2.5 mg  
  
  
  28  
  
2.5 mg  
  
   29  
  
5 mg 27 2.5 mg  
  
   31  
  
5 mg Date Details 01/12 This INR check INR: 2.0 How to take your warfarin dose To take:  2.5 mg Take half of a 5 mg tablet. To take:  5 mg Take one of the 5 mg tablets. February 2018 Details Kathy Proctor Tue Wed Thu Fri Sat  
      1  
  
2.5 mg  
  
   2  
  
5 mg  
  
   3  
  
2.5 mg  
  
  
  4  
  
2.5 mg  
  
   5  
  
5 mg  
  
   6  
  
2.5 mg  
  
   7  
  
5 mg  
  
   8  
  
2.5 mg  
  
   9  
  
5 mg  
  
   10  
  
2.5 mg  
  
  
  11  
  
2.5 mg  
  
   12  
  
5 mg  
  
   13  
  
  
  
   14  
  
  
  
   15  
  
  
  
   16  
  
  
  
   17  
  
  
  
  
  18  
  
  
  
   19  
  
  
  
   20  
  
  
  
   21  
  
  
  
   22  
  
  
  
   23  
  
  
  
   24  
  
  
  
  
  25  
  
  
  
   26  
  
  
  
   27  
  
  
  
   28  
  
  
  
     
 Date Details No additional details Date of next INR:  2/12/2018 How to take your warfarin dose To take:  2.5 mg Take half of a 5 mg tablet. To take:  5 mg Take one of the 5 mg tablets. Introducing [facility] SERVICES! Quan Slater introduces Zuzu[employer] patient portal. Now you can access parts of your medical record, email your doctor's office, and request medication refills online. 1. In your internet browser, go to [email or web address] 2. Click on the First Time User? Click Here link in the Sign In box. You will see the New Member Sign Up page. 3. Enter your [employer] Access Code exactly as it appears below. You will not need to use this code after youve completed the sign-up process. If you do not sign up before the expiration date, you must request a new code. · [employer] Access Code: [email or web address] Expires: 4/12/2018  3:32 PM 
 
4. Enter the last four digits of your Social Security Number (xxxx) and Date of Birth (mm/dd/yyyy) as indicated and click Submit. You will be taken to the next sign-up page. 5. Create a Blue Medora ID. This will be your [employer] Med[employer] login ID and cannot be changed, so think of one that is secure and easy to remember. 6. Create a [employer] Medora password. You can change your password at any time. 7. Enter your Password Reset Question and Answer. This can be used at a later time if you forget your password. 8. Enter your e-mail address. You will receive e-mail notification when new information is available in [address] 9. Click Sign Up. You can now view and download portions of your medical record. 10. Click the Download Summary menu link to download a portable copy of your medical information. If you have questions, please visit the Frequently Asked Questions section of the [employer] website. Remember, [employer] is NOT to be used for urgent needs. For medical emergencies, dial 911. Now available from your iPhone and Android! Please provide this summary of care documentation to your next provider. Your primary care clinician is listed as Georges Weathers. If you have any questions after today's visit, please call [phone].

## 2018-01-12 NOTE — PROGRESS NOTES
Anticoagulation Summary as of 1/12/2018     INR goal 2.0-2.5    Today's INR 2.0    Maintenance plan 5 mg (5 mg x 1) on Mon, Wed, Fri; 2.5 mg (5 mg x 0.5) all other days    Weekly total 25 mg    No change documented Pamela last modified Kelli Montoya (11/3/2017)    Next INR check 2/12/2018    Target end date       Anticoagulation Episode Summary     INR check location Clinic Lab    Preferred lab     Send INR reminders to     Comments       Anticoagulation Care Providers     Provider Role Specialty Phone number    Alejandra Vasquez MD [facility] Internal Medicine [phone]

## 2018-01-29 RX ORDER — GABAPENTIN 100 MG/1
200 CAPSULE ORAL 2 TIMES DAILY
Qty: 360 CAP | Refills: 3 | Status: SHIPPED | OUTPATIENT
Start: 2018-01-29 | End: 2018-10-08 | Stop reason: SDUPTHER

## 2018-01-29 NOTE — TELEPHONE ENCOUNTER
Requested Prescriptions     Pending Prescriptions Disp Refills    gabapentin (NEURONTIN) 100 mg capsule 360 Cap 3     Sig: Take 2 Caps by mouth two (2) times a day.

## 2018-02-01 ENCOUNTER — TELEPHONE ANTICOAG (OUTPATIENT)
Dept: INTERNAL MEDICINE CLINIC | Age: 78
End: 2018-02-01

## 2018-02-01 ENCOUNTER — LAB ONLY (OUTPATIENT)
Dept: INTERNAL MEDICINE CLINIC | Age: 78
End: 2018-02-01

## 2018-02-01 DIAGNOSIS — I63.9 CEREBROVASCULAR ACCIDENT (CVA), UNSPECIFIED MECHANISM (HCC): Primary | ICD-10-CM

## 2018-02-01 LAB
INR BLD: 1.7
PT POC: 23.1 SECONDS
VALID INTERNAL CONTROL?: YES

## 2018-02-01 NOTE — PROGRESS NOTES
INR low co change warfarin 5 mg 4 days a week (MWF and Sat). 2.5 mg othsr days. Recheck INR 2 weeks.   Call daughter as well

## 2018-02-01 NOTE — PROGRESS NOTES
Anticoagulation Summary as of 2/1/2018     INR goal 2.0-2.5    Today's INR 1. 7!     Maintenance plan 2.5 mg (5 mg x 0.5) on Sun, Tue, Thu; 5 mg (5 mg x 1) all other days    Weekly total 27.5 mg    Plan last modified Sadia Cohen (2/1/2018)    Next INR check 2/15/2018    Target end date       Anticoagulation Episode Summary     INR check location Clinic Lab    Preferred lab     Send INR reminders to     Comments       Anticoagulation Care Providers     Provider Role Specialty Phone number    Rashid Garcia MD [facility] Internal Medicine [phone]

## 2018-02-12 ENCOUNTER — TELEPHONE ANTICOAG (OUTPATIENT)
Dept: INTERNAL MEDICINE CLINIC | Age: 78
End: 2018-02-12

## 2018-02-12 ENCOUNTER — LAB ONLY (OUTPATIENT)
Dept: INTERNAL MEDICINE CLINIC | Age: 78
End: 2018-02-12

## 2018-02-12 DIAGNOSIS — I63.9 CEREBROVASCULAR ACCIDENT (CVA), UNSPECIFIED MECHANISM (HCC): Primary | ICD-10-CM

## 2018-02-12 LAB
INR BLD: 1.9
PT POC: 24.7 SECONDS
VALID INTERNAL CONTROL?: YES

## 2018-02-12 NOTE — PROGRESS NOTES
[facility] verified and patient's wife and daughter Anita Nam notified of patients labs.

## 2018-02-12 NOTE — PROGRESS NOTES
Anticoagulation Summary as of 2/12/2018     INR goal 2.0-2.5    Today's INR 1. 9!     Maintenance plan 2.5 mg (5 mg x 0.5) on Sun, Tue, Thu; 5 mg (5 mg x 1) all other days    Weekly total 27.5 mg    Plan last modified Robert Landry (2/1/2018)    Next INR check 2/26/2018    Target end date       Anticoagulation Episode Summary     INR check location Clinic Lab    Preferred lab     Send INR reminders to     Comments       Anticoagulation Care Providers     Provider Role Specialty Phone number    Cooper Montoya MD Responsible Internal Medicine [phone]

## 2018-02-26 ENCOUNTER — TELEPHONE ANTICOAG (OUTPATIENT)
Dept: INTERNAL MEDICINE CLINIC | Age: 78
End: 2018-02-26

## 2018-02-26 ENCOUNTER — LAB ONLY (OUTPATIENT)
Dept: INTERNAL MEDICINE CLINIC | Age: 78
End: 2018-02-26

## 2018-02-26 DIAGNOSIS — I63.9 CEREBROVASCULAR ACCIDENT (CVA), UNSPECIFIED MECHANISM (HCC): Primary | ICD-10-CM

## 2018-02-26 LAB
INR BLD: 3.1
PT POC: 41.4 SECONDS
VALID INTERNAL CONTROL?: YES

## 2018-02-26 NOTE — PROGRESS NOTES
Anticoagulation Summary as of 2/26/2018     INR goal 2.0-2.5    Today's INR 3. 1!     Maintenance plan 5 mg (5 mg x 1) on Mon, Wed, Fri; 2.5 mg (5 mg x 0.5) all other days    Weekly total 25 mg    Plan last modified Evelia Rich (2/26/2018)    Next INR check 3/12/2018    Target end date       Anticoagulation Episode Summary     INR check location Clinic Lab    Preferred lab     Send INR reminders to     Comments       Anticoagulation Care Providers     Provider Role Specialty Phone number    Sridhar Beal MD Responsible Internal Medicine [phone]

## 2018-03-07 RX ORDER — WARFARIN SODIUM 5 MG/1
TABLET ORAL
Qty: 120 TAB | Refills: 3 | Status: SHIPPED | OUTPATIENT
Start: 2018-03-07 | End: 2019-01-16

## 2018-03-13 ENCOUNTER — OFFICE VISIT (OUTPATIENT)
Dept: INTERNAL MEDICINE CLINIC | Age: 78
End: 2018-03-13

## 2018-03-13 VITALS
WEIGHT: 187.6 LBS | BODY MASS INDEX: 32.03 KG/M2 | SYSTOLIC BLOOD PRESSURE: 130 MMHG | TEMPERATURE: 97.4 F | HEART RATE: 52 BPM | HEIGHT: 64 IN | OXYGEN SATURATION: 96 % | DIASTOLIC BLOOD PRESSURE: 68 MMHG

## 2018-03-13 DIAGNOSIS — I10 HYPERTENSION, UNSPECIFIED TYPE: ICD-10-CM

## 2018-03-13 DIAGNOSIS — G20 PARKINSON'S DISEASE (HCC): Primary | ICD-10-CM

## 2018-03-13 DIAGNOSIS — G31.84 MCI (MILD COGNITIVE IMPAIRMENT): ICD-10-CM

## 2018-03-13 DIAGNOSIS — N18.2 CKD (CHRONIC KIDNEY DISEASE) STAGE 2, GFR 60-89 ML/MIN: ICD-10-CM

## 2018-03-13 DIAGNOSIS — I63.9 CEREBROVASCULAR ACCIDENT (CVA), UNSPECIFIED MECHANISM (HCC): ICD-10-CM

## 2018-03-13 NOTE — PROGRESS NOTES
Reviewed record in preparation for visit and have obtained necessary documentation. Identified pt with two pt identifiers(name and ). Chief Complaint   Patient presents with    Hypertension        Coordination of Care Questionnaire:  :     1) Have you been to an emergency room, urgent care clinic since your last visit? No     Hospitalized since your last visit? No               2) Have you seen or consulted any other health care providers outside of [address] [place] [address] since your last visit?  No

## 2018-03-13 NOTE — PROGRESS NOTES
Subjective:   Kadi Andrade is a [de-identified] y.o. male      Chief Complaint   Patient presents with    Hypertension        History of present illness:  Mr. Luba Karimi returns in follow up. He recently saw the neurologist, who did not feel it was necessary to change his medications relative to his Parkinson's. His daughter notes that he seems to be struggling more in the morning and if this persists she will get back with us and we will reevaluate him and talk to the neurologist.  He remains on Coumadin without difficulties. This originates from long ago a stroke that required anticoagulation to stop from progressing and he's been on it ever since with reluctance on our part and his part to stop the medication and since he's been very stable without complications with the Coumadin therapy, we have continued it. He is not exercising or as active as he had been before. He could use more exercise to help with Parkinson's, as well as maintain weight. We talked about salt restriction as well relative to his blood pressure, although his blood pressure is adequate today. He denies new CR, GI or  symptoms. He and his daughter did discuss his Aaron's with Dr. Heidi Carvalho and given the fact that his last biopsy was negative for specialized metaplastic epithelium and dysplasia, it was elected not to proceed with another EGD, particularly in the face of his Coumadin therapy. I think that is appropriate and he's having no significant reflex type symptoms or difficulties with swallowing. He remains stable otherwise. Will check him again in three months time, though they will call back sooner should he have increasing difficulties relative to his Parkinson's and mobility.         Patient Active Problem List    Diagnosis Date Noted    Parkinson's disease ([facility] 01/30/2015     Priority: 1 - One    Hypertension      Priority: 1 - One    Stroke ([facility]      Priority: 1 - One    MCI (mild cognitive impairment)      Priority: 1 - One  Aaron esophagus      Priority: 2 - Two    Hyperlipidemia      Priority: 2 - Two    Coronary artery disease 07/12/2017     Priority: 2 - Two    CKD (chronic kidney disease) stage 2, GFR 60-89 ml/min      Priority: 2 - Two    Gastroesophageal reflux disease without esophagitis 09/30/2017     Priority: 3 - Three    Depression      Priority: 3 - Three    Tendinitis of left rotator cuff 07/12/2017     Priority: 3 - Three    DJD (degenerative joint disease) 07/12/2017     Priority: 3 - Three    Tremor 07/12/2017     Priority: 3 - Three    Snoring      Priority: 4 - Four    Sleep apnea in adult 07/12/2017     Priority: 4 - Four    Tendinitis 07/12/2017     Priority: 4 - Four    Gout 07/12/2017     Priority: 4 - Four    Restless leg syndrome 07/12/2017     Priority: 4 - Four    Benign nodular prostatic hyperplasia without lower urinary tract symptoms 07/12/2017     Priority: 4 - Four    Lumbar stenosis 01/29/2015     Priority: 4 - Four    Hearing difficulty of both ears 07/12/2017     Priority: 5 - Five    PE (physical exam), annual 09/30/2017      Past Surgical History:   Procedure Laterality Date    HX ORTHOPAEDIC  1/27/15    L4-L5 MICRODECOMPRESSION (Right    RI EGD TRANSORAL BIOPSY SINGLE/MULTIPLE  8/16/2011         RI EGD TRANSORAL BIOPSY SINGLE/MULTIPLE  10/1/2013           No Known Allergies   Family History   Problem Relation Age of Onset    Heart Disease Mother     Heart Disease Father       Social History     Social History    Marital status:      Spouse name: N/A    Number of children: N/A    Years of education: N/A     Occupational History    Not on file.      Social History Main Topics    Smoking status: Former Smoker    Smokeless tobacco: Never Used    Alcohol use No    Drug use: No    Sexual activity: Not on file     Other Topics Concern    Not on file     Social History Narrative          Review of Systems              Constitutional:  He denies fever, weight loss, sweats or fatigue. HEENT:  No blurred or double vision, headache or dizziness. No difficulty with swallowing, taste, speech or smell. Respiratory:  No cough, wheezing or shortness of breath. No sputum production. Cardiac:  Denies chest pain, palpitations, unexplained indigestion, syncope, edema, PND or orthopnea. GI:  No changes in bowel movements, no abdominal pain, no bloating, anorexia, nausea, vomiting or heartburn. :  No frequency or dysuria. Denies incontinence. Extremities:  No joint pain, stiffness or swelling. Skin:  No recent rashes or mole changes. Neurological:  No numbness, tingling, burning paresthesias or loss of motor strength. No syncope, dizziness, frequent headaches or memory loss. Objective:     Vitals:    03/13/18 1043   BP: 130/68   Pulse: (!) 52   Temp: 97.4 °F (36.3 °C)   TempSrc: Oral   SpO2: 96%   Weight: 187 lb 9.6 oz (85.1 kg)   Height: 5' 4\" (1.626 m)   PainSc:   0 - No pain       Body mass index is 32.2 kg/(m^2). Physical Examination:              General Appearance:  Well-developed, well-nourished, no acute  distress. HEENT:     Ears:  The TMs and ear canals were clear. Eyes:  The pupillary responses were normal.  Extraocular muscle function intact. Lids and conjunctiva not injected. Neck:  Supple without thyromegaly or adenopathy. No JVD noted. Lungs:  Clear to auscultation and percussion. Cardiac:  Regular rate and rhythm without murmur. GI: nontender w/o mass. Normal BS's. Extremities:  No clubbing, cyanosis or edema. Skin:  No rash or unusual mole changes noted. Neurological:  Grossly normal.            Assessment/Plan:   Impressions:      ICD-10-CM ICD-9-CM    1. Parkinson's disease ([facility] G20 332.0 PROTHROMBIN TIME + INR      METABOLIC PANEL, BASIC      CANCELED: AMB POC BASIC METABOLIC PANEL   2.  Cerebrovascular accident (CVA), unspecified mechanism ([facility] I63.9 434.91 PROTHROMBIN TIME + INR      METABOLIC PANEL, BASIC CANCELED: AMB POC PT/INR   3. Hypertension, unspecified type I10 401.9 PROTHROMBIN TIME + INR      METABOLIC PANEL, BASIC      CANCELED: AMB POC BASIC METABOLIC PANEL   4. MCI (mild cognitive impairment) G31.84 331.83 PROTHROMBIN TIME + INR      METABOLIC PANEL, BASIC      CANCELED: AMB POC BASIC METABOLIC PANEL   5. CKD (chronic kidney disease) stage 2, GFR 60-89 ml/min N18.2 585.2 PROTHROMBIN TIME + INR      METABOLIC PANEL, BASIC      CANCELED: AMB POC BASIC METABOLIC PANEL        Plan:  1. Continue present meds  2. Discussed lifestyle modifications including Na restriction, low carb/fat diet, weight reduction and exercise (at least a walking program). Follow-up Disposition:  Return in about 3 months (around 6/13/2018) for Fasting.       Orders Placed This Encounter   Faustino Carlson + INR    METABOLIC PANEL, Geena Vu MD

## 2018-03-14 ENCOUNTER — TELEPHONE ANTICOAG (OUTPATIENT)
Dept: INTERNAL MEDICINE CLINIC | Age: 78
End: 2018-03-14

## 2018-03-14 LAB
BUN SERPL-MCNC: 18 MG/DL (ref 8–27)
BUN/CREAT SERPL: 21 (ref 10–24)
CALCIUM SERPL-MCNC: 9.4 MG/DL (ref 8.6–10.2)
CHLORIDE SERPL-SCNC: 104 MMOL/L (ref 96–106)
CO2 SERPL-SCNC: 22 MMOL/L (ref 18–29)
CREAT SERPL-MCNC: 0.85 MG/DL (ref 0.76–1.27)
GFR SERPLBLD CREATININE-BSD FMLA CKD-EPI: 82 ML/MIN/1.73
GFR SERPLBLD CREATININE-BSD FMLA CKD-EPI: 95 ML/MIN/1.73
GLUCOSE SERPL-MCNC: 89 MG/DL (ref 65–99)
INR PPP: 3.1 (ref 0.8–1.2)
POTASSIUM SERPL-SCNC: 4.5 MMOL/L (ref 3.5–5.2)
PROTHROMBIN TIME: 30.3 SEC (ref 9.1–12)
SODIUM SERPL-SCNC: 145 MMOL/L (ref 134–144)

## 2018-03-14 NOTE — PROGRESS NOTES
Anticoagulation Summary as of 3/14/2018     INR goal 2.0-2.5    Today's INR 3.1! (3/13/2018)    Maintenance plan 5 mg (5 mg x 1) on Mon, Wed, Fri; 2.5 mg (5 mg x 0.5) all other days    Weekly total 25 mg    No change documented [place] last modified Yarely Gums (2/26/2018)    Next INR check 3/27/2018    Target end date       Anticoagulation Episode Summary     INR check location Clinic [facility]    Preferred lab     Send INR reminders to     Comments       Anticoagulation Care Providers     Provider Role Specialty Phone number    Jeff Humphrey MD Responsible Internal Medicine [phone]

## 2018-03-14 NOTE — PROGRESS NOTES
INR still 3.1. No change dose. Repeat 2 weeks.   Blood sugar, kidney and K+ normal.  Call [facility]

## 2018-03-25 RX ORDER — ATORVASTATIN CALCIUM 40 MG/1
TABLET, FILM COATED ORAL
Qty: 90 TAB | Refills: 3 | Status: SHIPPED | OUTPATIENT
Start: 2018-03-25 | End: 2019-03-20 | Stop reason: SDUPTHER

## 2018-03-27 ENCOUNTER — LAB ONLY (OUTPATIENT)
Dept: INTERNAL MEDICINE CLINIC | Age: 78
End: 2018-03-27

## 2018-03-27 ENCOUNTER — TELEPHONE ANTICOAG (OUTPATIENT)
Dept: INTERNAL MEDICINE CLINIC | Age: 78
End: 2018-03-27

## 2018-03-27 DIAGNOSIS — I63.9 CEREBROVASCULAR ACCIDENT (CVA), UNSPECIFIED MECHANISM (HCC): Primary | ICD-10-CM

## 2018-03-27 LAB
INR BLD: 1.9
PT POC: 25.5 SECONDS
VALID INTERNAL CONTROL?: YES

## 2018-03-27 NOTE — PROGRESS NOTES
Anticoagulation Summary as of 3/27/2018     INR goal 2.0-2.5    Today's INR 1. 9!     Maintenance plan 5 mg (5 mg x 1) on Mon, Wed, Fri; 2.5 mg (5 mg x 0.5) all other days    Weekly total 25 mg    No change documented [place] last modified Robert Landry (2/26/2018)    Next INR check 4/6/2018    Target end date       Anticoagulation Episode Summary     INR check location Clinic Lab    Preferred lab     Send INR reminders to     Comments       Anticoagulation Care Providers     Provider Role Specialty Phone number    Cooper Montoya MD [facility] Internal Medicine [phone]

## 2018-04-06 ENCOUNTER — LAB ONLY (OUTPATIENT)
Dept: INTERNAL MEDICINE CLINIC | Age: 78
End: 2018-04-06

## 2018-04-06 DIAGNOSIS — I63.9 CEREBROVASCULAR ACCIDENT (CVA), UNSPECIFIED MECHANISM (HCC): Primary | ICD-10-CM

## 2018-04-07 LAB
INR PPP: 2 (ref 0.8–1.2)
PROTHROMBIN TIME: 20 SEC (ref 9.1–12)

## 2018-04-09 ENCOUNTER — TELEPHONE ANTICOAG (OUTPATIENT)
Dept: INTERNAL MEDICINE CLINIC | Age: 78
End: 2018-04-09

## 2018-04-09 RX ORDER — CARBIDOPA AND LEVODOPA 25; 100 MG/1; MG/1
TABLET ORAL
Qty: 450 TAB | Refills: 1 | Status: SHIPPED | OUTPATIENT
Start: 2018-04-09 | End: 2018-10-01 | Stop reason: SDUPTHER

## 2018-04-09 NOTE — PROGRESS NOTES
Anticoagulation Summary as of 4/9/2018     INR goal 2.0-2.5    Today's INR 2.0 (4/6/2018)    Maintenance plan 5 mg (5 mg x 1) on Mon, Wed, Fri; 2.5 mg (5 mg x 0.5) all other days    Weekly total 25 mg    No change documented [place] last modified [employer] (2/26/2018)    Next INR check 5/9/2018    Target end date       Anticoagulation Episode Summary     INR check location Clinic Lab    Preferred lab     Send INR reminders to     Comments       Anticoagulation Care Providers     Provider Role Specialty Phone number    Velna Cabot, MD [facility] Internal Medicine [phone]

## 2018-05-03 ENCOUNTER — TELEPHONE (OUTPATIENT)
Dept: NEUROLOGY | Age: 78
End: 2018-05-03

## 2018-05-03 ENCOUNTER — OFFICE VISIT (OUTPATIENT)
Dept: NEUROLOGY | Age: 78
End: 2018-05-03

## 2018-05-03 VITALS
WEIGHT: 185 LBS | HEIGHT: 64 IN | OXYGEN SATURATION: 97 % | BODY MASS INDEX: 31.58 KG/M2 | DIASTOLIC BLOOD PRESSURE: 70 MMHG | HEART RATE: 72 BPM | SYSTOLIC BLOOD PRESSURE: 120 MMHG

## 2018-05-03 DIAGNOSIS — I10 ESSENTIAL HYPERTENSION: ICD-10-CM

## 2018-05-03 DIAGNOSIS — I48.21 PERMANENT ATRIAL FIBRILLATION (HCC): ICD-10-CM

## 2018-05-03 DIAGNOSIS — G20 PRIMARY PARKINSONISM (HCC): Primary | ICD-10-CM

## 2018-05-03 DIAGNOSIS — G31.84 MILD COGNITIVE IMPAIRMENT: ICD-10-CM

## 2018-05-03 NOTE — PROGRESS NOTES
HISTORY OF PRESENT ILLNESS  Rudy Busby is a [de-identified] y.o. male. HPI Comments: This patient returns in follow-up of his Parkinson's disease. He is taking Sinemet 25/1oo, 2 p.o. a.m. one at lunch 1 at dinner and 1 before bed. He goes to the gym and rides the stationary bicycle. He is walking some outside with his walker and uses a stationary bicycle for exercise. Not having any visual hallucinations. He is not having freezing episodes. His wife brought up the fact that he is driving and she is a little concerned about it. Other than that there appeared to be no new issues. He is not having any hallucinations, she does not think he has significant episodes of confusion. She has currently been driving but the patient is bucking the system. His adult children are worried about him driving. Review of Systems   Constitutional: Negative. HENT: Positive for hearing loss. .    Skin: Negative. Neurological: Positive for tremors. Negative for speech change. Endo/Heme/Allergies: Negative. Psychiatric/Behavioral: Negative. Current Outpatient Prescriptions on File Prior to Visit   Medication Sig Dispense Refill    carbidopa-levodopa (SINEMET)  mg per tablet TAKE 2 TABLETS IN THE MORNING, 1 TABLET AT LUNCH, 1 TABLET AT DINNER, AND 1 TABLET PRIOR TO  Tab 1    atorvastatin (LIPITOR) 40 mg tablet TAKE 1 TABLET DAILY 90 Tab 3    warfarin (COUMADIN) 5 mg tablet TAKE ONE-HALF (1/2) TABLET ON MONDAY, WEDNESDAY AND FRIDAY AND TAKE 1 TABLET ALL OTHER DAYS 120 Tab 3    gabapentin (NEURONTIN) 100 mg capsule Take 2 Caps by mouth two (2) times a day. 360 Cap 3    losartan (COZAAR) 50 mg tablet Take 1 Tab by mouth daily. 90 Tab 3    acetaminophen (TYLENOL EXTRA STRENGTH) 500 mg tablet Take 1,000 mg by mouth three (3) times daily as needed for Pain.  amLODIPine (NORVASC) 10 mg tablet TAKE 1 TABLET DAILY 90 Tab 2    PARoxetine (PAXIL) 10 mg tablet Take 1 Tab by mouth daily.  90 Tab 3    senna-docusate (SENNA-S) 8.6-50 mg per tablet Take 1 Tab by mouth daily as needed.  aspirin delayed-release 81 mg tablet Take  by mouth daily.  vitamin e (E GEMS) 1,000 unit capsule Take 2,000 Units by mouth daily.  esomeprazole (NEXIUM) 20 mg capsule Take 20 mg by mouth daily.  MULTIVITAMIN PO Take 1 Tab by mouth daily. No current facility-administered medications on file prior to visit. Past Medical History:   Diagnosis Date    Aaron esophagus     Benign nodular prostatic hyperplasia without lower urinary tract symptoms 7/12/2017    CKD (chronic kidney disease) stage 2, GFR 60-89 ml/min     Constipation     Coronary artery disease 7/12/2017    Depression     DJD (degenerative joint disease) 7/12/2017    Falls     Gout 7/12/2017    Headache     Hearing difficulty of both ears 7/12/2017    Hyperlipidemia     Hypertension     Impaired cognition 7/12/2017    MCI (mild cognitive impairment)     Restless leg syndrome 7/12/2017    Sleep apnea in adult 7/12/2017    No longer on CPAP @13    Snoring     Stroke (HCC)     Tendinitis 7/12/2017    ACHILLES    Tendinitis of left rotator cuff 07/12/2017    Tremor 7/12/2017    LEFT HAND    Unspecified sleep apnea     lost weight no longer has it     /70  Pulse 72  Ht 5' 4\" (1.626 m)  Wt 185 lb (83.9 kg)  SpO2 97%  BMI 31.76 kg/m2     Physical Exam   Constitutional: He is oriented to person, place, and time. He appears well-developed and well-nourished. Cardiovascular: Normal rate, regular rhythm and normal heart sounds. Musculoskeletal: Normal range of motion. He exhibits no edema. Neurological: He is alert and oriented to person, place, and time. He has a mild resting tremor in his hands, he has minimal facial masking.   Speech, language and mentation appear to be normal  Walking with the walker in the hallway does reveal slight festination on turning but other than that he gets his feet picked up pretty well and has a reasonable stride considering his age and condition. .   Skin: Skin is warm and dry. Psychiatric: He has a normal mood and affect. His behavior is normal. Judgment and thought content normal.       ASSESSMENT and PLAN    PARKINSON'S DISEASE  His symptoms appear to be under moderate control on Sinemet 25/100 2 tablets in the a.m., 1 tablet at lunch, 1 tablet at dinner and 1 tablet prior to bed. I see no reason to change his medication at this time. I did let them know that the [facility] offers driving evaluations for free however if you fail it they take her license. I told him that [facility] offers driving evaluations as well the cost $300. He opted for that. I would doubt he is going to pass. HISTORY OF STROKE  I see no evidence of it on my exam however he will continue to take his aspirin 81 mg a day. MILD  COGNITIVE IMPAIRMENT  I suspect this secondary to of his Parkinson's disease however time will tell if we are dealing with a significant progressive dementia. His last head CT scan was done 2 years ago and did show volume loss and periventricular white matter changes but no evidence of stroke. I will probably repeat his scan of the next time I see him in 6 months. This note will not be viewable in [address]

## 2018-05-03 NOTE — LETTER
5/3/2018 11:49 AM 
 
Patient:  Blair Smith YOB: 1940 Date of Visit: 5/3/2018 Dear MD Yoel Spears [address] [id number] VIA In Basket 
 : Thank you for referring Mr. Michael Avila to me for evaluation/treatment. Below are the relevant portions of my assessment and plan of care. HISTORY OF PRESENT ILLNESS Blair Smith is a [de-identified] y.o. male. HPI Comments: This patient returns in follow-up of his Parkinson's disease. He is taking Sinemet 25/1oo, 2 p.o. a.m. one at lunch 1 at dinner and 1 before bed. He goes to the gym and rides the stationary bicycle. He is walking some outside with his walker and uses a stationary bicycle for exercise. Not having any visual hallucinations. He is not having freezing episodes. His wife brought up the fact that he is driving and she is a little concerned about it. Other than that there appeared to be no new issues. He is not having any hallucinations, she does not think he has significant episodes of confusion. She has currently been driving but the patient is bucking the system. His adult children are worried about him driving. Review of Systems Constitutional: Negative. HENT: Positive for hearing loss. .   
Skin: Negative. Neurological: Positive for tremors. Negative for speech change. Endo/Heme/Allergies: Negative. Psychiatric/Behavioral: Negative. Current Outpatient Prescriptions on File Prior to Visit Medication Sig Dispense Refill  carbidopa-levodopa (SINEMET)  mg per tablet TAKE 2 TABLETS IN THE MORNING, 1 TABLET AT LUNCH, 1 TABLET AT DINNER, AND 1 TABLET PRIOR TO  Tab 1  
 atorvastatin (LIPITOR) 40 mg tablet TAKE 1 TABLET DAILY 90 Tab 3  warfarin (COUMADIN) 5 mg tablet TAKE ONE-HALF (1/2) TABLET ON MONDAY, WEDNESDAY AND FRIDAY AND TAKE 1 TABLET ALL OTHER DAYS 120 Tab 3  
 gabapentin (NEURONTIN) 100 mg capsule Take 2 Caps by mouth two (2) times a day. 360 Cap 3  
 losartan (COZAAR) 50 mg tablet Take 1 Tab by mouth daily. 90 Tab 3  
 acetaminophen (TYLENOL EXTRA STRENGTH) 500 mg tablet Take 1,000 mg by mouth three (3) times daily as needed for Pain.  amLODIPine (NORVASC) 10 mg tablet TAKE 1 TABLET DAILY 90 Tab 2  
 PARoxetine (PAXIL) 10 mg tablet Take 1 Tab by mouth daily. 90 Tab 3  
 senna-docusate (SENNA-S) 8.6-50 mg per tablet Take 1 Tab by mouth daily as needed.  aspirin delayed-release 81 mg tablet Take  by mouth daily.  vitamin e (E GEMS) 1,000 unit capsule Take 2,000 Units by mouth daily.  esomeprazole (NEXIUM) 20 mg capsule Take 20 mg by mouth daily.  MULTIVITAMIN PO Take 1 Tab by mouth daily. No current facility-administered medications on file prior to visit. Past Medical History:  
Diagnosis Date  Aaron esophagus  Benign nodular prostatic hyperplasia without lower urinary tract symptoms 7/12/2017  CKD (chronic kidney disease) stage 2, GFR 60-89 ml/min  Constipation  Coronary artery disease 7/12/2017  Depression  DJD (degenerative joint disease) 7/12/2017 [address]  Gout 7/12/2017  Headache   
 Hearing difficulty of both ears 7/12/2017  Hyperlipidemia  Hypertension  Impaired cognition 7/12/2017  MCI (mild cognitive impairment)  Restless leg syndrome 7/12/2017  Sleep apnea in adult 7/12/2017 No longer on CPAP @13  Snoring  Stroke ([facility]  Tendinitis 7/12/2017 ACHILLES  Tendinitis of left rotator cuff 07/12/2017  Tremor 7/12/2017 LEFT HAND  Unspecified sleep apnea   
 lost weight no longer has it /70  Pulse 72  Ht 5' 4\" (1.626 m)  Wt 185 lb (83.9 kg)  SpO2 97%  BMI 31.76 kg/m2 Physical Exam  
Constitutional: He is oriented to person, place, and time. He appears well-developed and well-nourished. Cardiovascular: Normal rate, regular rhythm and normal heart sounds. Musculoskeletal: Normal range of motion. He exhibits no edema. Neurological: He is alert and oriented to person, place, and time. He has a mild resting tremor in his hands, he has minimal facial masking. Speech, language and mentation appear to be normal 
Walking with the walker in the hallway does reveal slight festination on turning but other than that he gets his feet picked up pretty well and has a reasonable stride considering his age and condition. Gio Botello Skin: Skin is warm and dry. Psychiatric: He has a normal mood and affect. His behavior is normal. Judgment and thought content normal.  
 
 
ASSESSMENT and PLAN PARKINSON'S DISEASE His symptoms appear to be under moderate control on Sinemet 25/100 2 tablets in the a.m., 1 tablet at lunch, 1 tablet at dinner and 1 tablet prior to bed. I see no reason to change his medication at this time. I did let them know that the [facility] offers driving evaluations for free however if you fail it they take her license. I told him that [facility] offers driving evaluations as well the cost $300. He opted for that. I would doubt he is going to pass. HISTORY OF STROKE I see no evidence of it on my exam however he will continue to take his aspirin 81 mg a day. MILD  COGNITIVE IMPAIRMENT I suspect this secondary to of his Parkinson's disease however time will tell if we are dealing with a significant progressive dementia. His last head CT scan was done 2 years ago and did show volume loss and periventricular white matter changes but no evidence of stroke. I will probably repeat his scan of the next time I see him in 6 months. This note will not be viewable in [address] If you have questions, please do not hesitate to call me. I look forward to following Mr. Octaviano Beach along with you. Sincerely, Claudio Padron MD

## 2018-05-03 NOTE — MR AVS SNAPSHOT
[address], 
[address], [address] [place] [id number] Patient: Jocelin Valencia MRN: [id number] Visit Information Date & Time Provider Department Dept. Phone Encounter #  
 5/3/2018 11:20 AM Cat Griffith MD Neurology [facility] at [facility] [phone] [id number] Your Appointments 5/7/2018  8:40 AM  
LAB with LAB ONLY  
HEMALATHA JAMES [facility] ([facility]) Appt Note: pt/inr  
 [address] [address] [address] [address] 6/14/2018  8:50 AM  
ACUTE CARE with MD EDUARDO Rain [facility] ([facility]) Appt Note: fasting 3 month  
 [address] [address] [address] [facility] [address]  
  
    
 11/1/2018 10:00 AM  
Follow Up with Cat Griffith MD  
Neurology Clinic at [facility]) Appt Note: follow up $ 0 CP jll 5/3/18  
 [address], [address] [phone]  
[address], [address] [place] [address] [address] [address] Upcoming Health Maintenance Date Due  
 GLAUCOMA SCREENING Q2Y 2/10/2003 MEDICARE YEARLY EXAM 3/14/2018 Influenza Age 5 to Adult 8/1/2018 DTaP/Tdap/Td series (2 - Td) 12/4/2022 Allergies as of 5/3/2018  Review Complete On: 5/3/2018 By: Cat Griffith MD  
 No Known Allergies Current Immunizations  Never Reviewed Name Date Influenza High Dose Vaccine PF 10/2/2017 Influenza Vaccine 11/1/2014 Pneumococcal Conjugate (PCV-13) 12/2/2014 Pneumococcal Polysaccharide (PPSV-23) 11/1/2005 Pneumococcal Vaccine (Unspecified Type) 11/1/2014 Tdap 12/4/2012 Zoster Vaccine, Live 4/1/2007 Not reviewed this visit Vitals BP Pulse Height(growth percentile) Weight(growth percentile) SpO2 BMI  
 120/70 72 5' 4\" (1.626 m) 185 lb (83.9 kg) 97% 31.76 kg/m2 Smoking Status Former Smoker Vitals History BMI and BSA Data Body Mass Index Body Surface Area 31.76 kg/m 2 1.95 m 2 Preferred Pharmacy Pharmacy Name Phone [facility], [facility] [phone] Your Updated Medication List  
  
   
This list is accurate as of 5/3/18 11:47 AM.  Always use your most recent med list. amLODIPine 10 mg tablet Commonly known as:  Anguiano Inks TAKE 1 TABLET DAILY  
  
 aspirin delayed-release 81 mg tablet Take  by mouth daily. atorvastatin 40 mg tablet Commonly known as:  LIPITOR  
TAKE 1 TABLET DAILY  
  
 carbidopa-levodopa  mg per tablet Commonly known as:  SINEMET  
TAKE 2 TABLETS IN THE MORNING, 1 TABLET AT LUNCH, 1 TABLET AT DINNER, AND 1 TABLET PRIOR TO BED  
  
 gabapentin 100 mg capsule Commonly known as:  NEURONTIN Take 2 Caps by mouth two (2) times a day. losartan 50 mg tablet Commonly known as:  COZAAR Take 1 Tab by mouth daily. MULTIVITAMIN PO Take 1 Tab by mouth daily. NexIUM 20 mg capsule Generic drug:  esomeprazole Take 20 mg by mouth daily. PARoxetine 10 mg tablet Commonly known as:  PAXIL Take 1 Tab by mouth daily. SENNA-S 8.6-50 mg per tablet Generic drug:  senna-docusate Take 1 Tab by mouth daily as needed. TYLENOL EXTRA STRENGTH 500 mg tablet Generic drug:  acetaminophen Take 1,000 mg by mouth three (3) times daily as needed for Pain.  
  
 vitamin e 1,000 unit capsule Commonly known as:  E GEMS Take 2,000 Units by mouth daily. warfarin 5 mg tablet Commonly known as:  COUMADIN  
TAKE ONE-HALF (1/2) TABLET ON MONDAY, WEDNESDAY AND FRIDAY AND TAKE 1 TABLET ALL OTHER DAYS Introducing [facility] [place] [facility]! [facility] introduces [employer] patient portal. Now you can access parts of your medical record, email your doctor's office, and request medication refills online. 1. In your internet browser, go to [email or web address] 2. Click on the First Time User? Click Here link in the Sign In box. You will see the New Member Sign Up page. 3. Enter your [employer] Access Code exactly as it appears below. You will not need to use this code after youve completed the sign-up process. If you do not sign up before the expiration date, you must request a new code. · [employer] Access Code: [email or web address] Expires: 8/1/2018 11:47 AM 
 
4. Enter the last four digits of your Social Security Number (xxxx) and Date of Birth (mm/dd/yyyy) as indicated and click Submit. You will be taken to the next sign-up page. 5. Create a [employer] ID. This will be your [employer] login ID and cannot be changed, so think of one that is secure and easy to remember. 6. Create a [employer] password. You can change your password at any time. 7. Enter your Password Reset Question and Answer. This can be used at a later time if you forget your password. 8. Enter your e-mail address. You will receive e-mail notification when new information is available in [address] 9. Click Sign Up. You can now view and download portions of your medical record. 10. Click the Download Summary menu link to download a portable copy of your medical information. If you have questions, please visit the Frequently Asked Questions section of the [employer] website. Remember, [employer] is NOT to be used for urgent needs. For medical emergencies, dial 911. Now available from your iPhone and Android! Please provide this summary of care documentation to your next provider. Your primary care clinician is listed as Georges Weathers. If you have any questions after today's visit, please call [phone].

## 2018-05-04 ENCOUNTER — LAB ONLY (OUTPATIENT)
Dept: INTERNAL MEDICINE CLINIC | Age: 78
End: 2018-05-04

## 2018-05-04 ENCOUNTER — DOCUMENTATION ONLY (OUTPATIENT)
Dept: NEUROLOGY | Age: 78
End: 2018-05-04

## 2018-05-04 DIAGNOSIS — I63.9 CEREBROVASCULAR ACCIDENT (CVA), UNSPECIFIED MECHANISM (HCC): Primary | ICD-10-CM

## 2018-05-04 LAB
INR BLD: 2
PT POC: 26.9 SECONDS
VALID INTERNAL CONTROL?: YES

## 2018-05-07 ENCOUNTER — TELEPHONE ANTICOAG (OUTPATIENT)
Dept: INTERNAL MEDICINE CLINIC | Age: 78
End: 2018-05-07

## 2018-05-07 NOTE — PROGRESS NOTES
Anticoagulation Summary as of 5/7/2018     INR goal 2.0-2.5    Today's INR 2.0 (5/4/2018)    Warfarin maintenance plan 5 mg (5 mg x 1) on Mon, Wed, Fri; 2.5 mg (5 mg x 0.5) all other days    Weekly warfarin total 25 mg    No change documented [place] last modified [facility] (2/26/2018)    Next INR check 6/4/2018    Target end date       Anticoagulation Episode Summary     INR check location Clinic Lab    Preferred lab     Send INR reminders to     Comments       Anticoagulation Care Providers     Provider Role Specialty Phone number    Anibal Quinn MD Responsible Internal Medicine [phone]

## 2018-05-17 RX ORDER — HYDROGEN PEROXIDE 3 %
SOLUTION, NON-ORAL MISCELLANEOUS
Qty: 90 CAP | Refills: 3 | Status: SHIPPED | OUTPATIENT
Start: 2018-05-17 | End: 2019-05-13 | Stop reason: SDUPTHER

## 2018-06-04 ENCOUNTER — TELEPHONE ANTICOAG (OUTPATIENT)
Dept: INTERNAL MEDICINE CLINIC | Age: 78
End: 2018-06-04

## 2018-06-04 ENCOUNTER — LAB ONLY (OUTPATIENT)
Dept: INTERNAL MEDICINE CLINIC | Age: 78
End: 2018-06-04

## 2018-06-04 DIAGNOSIS — I63.9 CEREBROVASCULAR ACCIDENT (CVA), UNSPECIFIED MECHANISM (HCC): Primary | ICD-10-CM

## 2018-06-04 LAB
INR BLD: 1.6
PT POC: 21.4 SECONDS
VALID INTERNAL CONTROL?: YES

## 2018-06-04 NOTE — PROGRESS NOTES
Anticoagulation Summary as of 6/4/2018     INR goal 2.0-2.5    Today's INR 1.6!     Warfarin maintenance plan 2.5 mg (5 mg x 0.5) on Sun, Sat; 5 mg (5 mg x 1) all other days    Weekly warfarin total 30 mg    Plan last modified Ron Beauchamp (6/4/2018)    Next INR check 6/18/2018    Target end date       Anticoagulation Episode Summary     INR check location Clinic Lab    Preferred lab     Send INR reminders to     Comments       Anticoagulation Care Providers     Provider Role Specialty Phone number    Brady Bocanegra MD [facility] Internal Medicine [phone]

## 2018-06-13 PROBLEM — Z79.899 LONG-TERM CURRENT USE OF HIGH RISK MEDICATION OTHER THAN ANTICOAGULANT: Status: ACTIVE | Noted: 2018-06-13

## 2018-06-14 ENCOUNTER — TELEPHONE ANTICOAG (OUTPATIENT)
Dept: INTERNAL MEDICINE CLINIC | Age: 78
End: 2018-06-14

## 2018-06-14 ENCOUNTER — OFFICE VISIT (OUTPATIENT)
Dept: INTERNAL MEDICINE CLINIC | Age: 78
End: 2018-06-14

## 2018-06-14 VITALS
SYSTOLIC BLOOD PRESSURE: 124 MMHG | WEIGHT: 186 LBS | BODY MASS INDEX: 31.76 KG/M2 | OXYGEN SATURATION: 96 % | DIASTOLIC BLOOD PRESSURE: 70 MMHG | HEART RATE: 58 BPM | HEIGHT: 64 IN

## 2018-06-14 DIAGNOSIS — I10 HYPERTENSION, UNSPECIFIED TYPE: ICD-10-CM

## 2018-06-14 DIAGNOSIS — G20 PARKINSON'S DISEASE (HCC): Primary | ICD-10-CM

## 2018-06-14 DIAGNOSIS — E78.5 HYPERLIPIDEMIA, UNSPECIFIED HYPERLIPIDEMIA TYPE: ICD-10-CM

## 2018-06-14 DIAGNOSIS — G31.84 MCI (MILD COGNITIVE IMPAIRMENT): ICD-10-CM

## 2018-06-14 DIAGNOSIS — N18.2 CKD (CHRONIC KIDNEY DISEASE) STAGE 2, GFR 60-89 ML/MIN: ICD-10-CM

## 2018-06-14 DIAGNOSIS — I63.9 CEREBROVASCULAR ACCIDENT (CVA), UNSPECIFIED MECHANISM (HCC): ICD-10-CM

## 2018-06-14 DIAGNOSIS — Z79.899 LONG-TERM CURRENT USE OF HIGH RISK MEDICATION OTHER THAN ANTICOAGULANT: ICD-10-CM

## 2018-06-14 LAB
CHOLEST SERPL-MCNC: 111 MG/DL (ref 0–200)
CK (CPK) POC: 64 U/L (ref 30–135)
HDLC SERPL-MCNC: 52 MG/DL (ref 35–130)
INR BLD: 1.9
LDL CHOLESTEROL POC: 29 MG/DL (ref 0–130)
PT POC: 25.9 SECONDS
TCHOL/HDL RATIO (POC): 2.1 (ref 0–4)
TRIGL SERPL-MCNC: 150 MG/DL (ref 0–200)
VALID INTERNAL CONTROL?: YES
VLDLC SERPL CALC-MCNC: 30 MG/DL

## 2018-06-14 NOTE — PROGRESS NOTES
Reviewed record in preparation for visit and have obtained necessary documentation. Identified pt with two pt identifiers(name and ). Chief Complaint   Patient presents with    Hypertension     follow up        Coordination of Care Questionnaire:  :     1) Have you been to an emergency room, urgent care clinic since your last visit? no    Hospitalized since your last visit? no    :          2) Have you seen or consulted any other health care providers outside of [address] [place] [address] since your last visit? Yes, he saw Dr Jonathan Sorensen last month.

## 2018-06-14 NOTE — PROGRESS NOTES
Subjective:   Levy Brar is a [de-identified] y.o. male      Chief Complaint   Patient presents with    Hypertension     follow up        History of present illness:  Mr. Luis Angel Little returns in follow up. Overall he's done well. He saw Dr. Nell Sanders recently and he did not alter his Parkinson's regimen. He denies new CR, GI or  symptoms. He's been compliant with his medications. His wife watches over those, as does his daughter. He exercises regularly at the gym. He tries to avoid salt and is prudent about carbohydrates and fats. He can stool lose a little bit of weight, but I think he's at least been stable throughout the last three months time. He denies any other new issues today. We're not going to change much, but will check his labs to make sure everything is stable.         Patient Active Problem List    Diagnosis Date Noted    [facility]'s disease ([facility] 01/30/2015     Priority: 1 - One    Hypertension      Priority: 1 - One    Stroke ([facility]      Priority: 1 - One    MCI (mild cognitive impairment)      Priority: 1 - One    Aaron esophagus      Priority: 2 - Two    Hyperlipidemia      Priority: 2 - Two    Coronary artery disease 07/12/2017     Priority: 2 - Two    CKD (chronic kidney disease) stage 2, GFR 60-89 ml/min      Priority: 2 - Two    Long-term current use of high risk medication other than anticoagulant 06/13/2018     Priority: 3 - Three    Gastroesophageal reflux disease without esophagitis 09/30/2017     Priority: 3 - Three    Depression      Priority: 3 - Three    Tendinitis of left rotator cuff 07/12/2017     Priority: 3 - Three    DJD (degenerative joint disease) 07/12/2017     Priority: 3 - Three    Tremor 07/12/2017     Priority: 3 - Three    Snoring      Priority: 4 - Four    Sleep apnea in adult 07/12/2017     Priority: 4 - Four    Tendinitis 07/12/2017     Priority: 4 - Four    Gout 07/12/2017     Priority: 4 - Four    Restless leg syndrome 07/12/2017     Priority: 4 - Four    Benign nodular prostatic hyperplasia without lower urinary tract symptoms 07/12/2017     Priority: 4 - Four    Lumbar stenosis 01/29/2015     Priority: 4 - Four    Hearing difficulty of both ears 07/12/2017     Priority: 5 - Five    PE (physical exam), annual 09/30/2017      Past Surgical History:   Procedure Laterality Date    HX ORTHOPAEDIC  1/27/15    L4-L5 MICRODECOMPRESSION (Right    WA EGD TRANSORAL BIOPSY SINGLE/MULTIPLE  8/16/2011         WA EGD TRANSORAL BIOPSY SINGLE/MULTIPLE  10/1/2013           No Known Allergies   Family History   Problem Relation Age of Onset    Heart Disease Mother     Heart Disease Father       Social History     Social History    Marital status:      Spouse name: N/A    Number of children: N/A    Years of education: N/A     Occupational History    Not on file. Social History Main Topics    Smoking status: Former Smoker    Smokeless tobacco: Never Used    Alcohol use No    Drug use: No    Sexual activity: Not on file     Other Topics Concern    Not on file     Social History Narrative     Outpatient Prescriptions Marked as Taking for the 6/14/18 encounter (Office Visit) with Dimitry Gallardo MD   Medication Sig Dispense Refill    esomeprazole (NEXIUM) 20 mg capsule TAKE 1 CAPSULE DAILY 90 Cap 3    carbidopa-levodopa (SINEMET)  mg per tablet TAKE 2 TABLETS IN THE MORNING, 1 TABLET AT LUNCH, 1 TABLET AT DINNER, AND 1 TABLET PRIOR TO  Tab 1    atorvastatin (LIPITOR) 40 mg tablet TAKE 1 TABLET DAILY 90 Tab 3    warfarin (COUMADIN) 5 mg tablet TAKE ONE-HALF (1/2) TABLET ON MONDAY, WEDNESDAY AND FRIDAY AND TAKE 1 TABLET ALL OTHER DAYS 120 Tab 3    gabapentin (NEURONTIN) 100 mg capsule Take 2 Caps by mouth two (2) times a day. 360 Cap 3    losartan (COZAAR) 50 mg tablet Take 1 Tab by mouth daily. 90 Tab 3    acetaminophen (TYLENOL EXTRA STRENGTH) 500 mg tablet Take 1,000 mg by mouth three (3) times daily as needed for Pain.       amLODIPine (NORVASC) 10 mg tablet TAKE 1 TABLET DAILY 90 Tab 2    PARoxetine (PAXIL) 10 mg tablet Take 1 Tab by mouth daily. 90 Tab 3    senna-docusate (SENNA-S) 8.6-50 mg per tablet Take 1 Tab by mouth daily as needed.  aspirin delayed-release 81 mg tablet Take  by mouth daily.  vitamin e (E GEMS) 1,000 unit capsule Take 2,000 Units by mouth daily.  MULTIVITAMIN PO Take 1 Tab by mouth daily. Review of Systems              Constitutional:  He denies fever, weight loss, sweats or fatigue. HEENT:  No blurred or double vision, headache or dizziness. No difficulty with swallowing, taste, speech or smell. Respiratory:  No cough, wheezing or shortness of breath. No sputum production. Cardiac:  Denies chest pain, palpitations, unexplained indigestion, syncope, edema, PND or orthopnea. GI:  No changes in bowel movements, no abdominal pain, no bloating, anorexia, nausea, vomiting or heartburn. :  No frequency or dysuria. Denies incontinence. Extremities:  No joint pain, stiffness or swelling. Skin:  No recent rashes or mole changes. Neurological:  No numbness, tingling, burning paresthesias or loss of motor strength. No syncope, dizziness, frequent headaches or memory loss. Objective:     Vitals:    06/14/18 0921   BP: 124/70   Pulse: (!) 58   SpO2: 96%   Weight: 186 lb (84.4 kg)   Height: 5' 4\" (1.626 m)   PainSc:   0 - No pain       Body mass index is 31.93 kg/(m^2). Physical Examination:              General Appearance:  Well-developed, well-nourished, no acute  distress. HEENT:     Ears:  The TMs and ear canals were clear. Eyes:  The pupillary responses were normal.  Extraocular muscle function intact. Lids and conjunctiva not injected. Neck:  Supple without thyromegaly or adenopathy. No JVD noted. Lungs:  Clear to auscultation and percussion. Cardiac:  Regular rate and rhythm without murmur. GI: nontender w/o mass. Normal BS's.   Extremities:  No clubbing, cyanosis or edema. Skin:  No rash or unusual mole changes noted. Neurological:  Grossly normal.            Assessment/Plan:   Impressions:      ICD-10-CM ICD-9-CM    1. Parkinson's disease ([facility] G20 332.0 AMB POC COMPREHENSIVE METABOLIC PANEL   2. Hypertension, unspecified type I10 401.9 AMB POC COMPREHENSIVE METABOLIC PANEL   3. Cerebrovascular accident (CVA), unspecified mechanism ([facility] I63.9 434.91 AMB POC PT/INR   4. MCI (mild cognitive impairment) G31.84 331.83 AMB POC COMPREHENSIVE METABOLIC PANEL   5. Hyperlipidemia, unspecified hyperlipidemia type E78.5 272.4 AMB POC COMPREHENSIVE METABOLIC PANEL      AMB POC LIPID PROFILE   6. CKD (chronic kidney disease) stage 2, GFR 60-89 ml/min N18.2 585.2 AMB POC COMPREHENSIVE METABOLIC PANEL   7. Long-term current use of high risk medication other than anticoagulant Z79.899 V58.69 AMB POC COMPREHENSIVE METABOLIC PANEL      AMB POC CK (CPK)        Plan:  1. Continue present meds  2. Discussed lifestyle modifications including Na restriction, low carb/fat diet, weight reduction and exercise (at least a walking program). Follow-up Disposition:  Return in about 3 months (around 9/14/2018).       Orders Placed This Encounter    AMB POC COMPREHENSIVE METABOLIC PANEL    AMB POC PT/INR    AMB POC CK (CPK)    AMB POC LIPID PROFILE       Alan Blum MD

## 2018-06-14 NOTE — PROGRESS NOTES
Anticoagulation Summary as of 6/14/2018     INR goal 2.0-2.5    Today's INR 1.9!     Warfarin maintenance plan 2.5 mg (5 mg x 0.5) on Sun, Sat; 5 mg (5 mg x 1) all other days    Weekly warfarin total 30 mg    No change documented [place] last modified Guo Comfort (6/4/2018)    Next INR check 6/28/2018    Target end date       Anticoagulation Episode Summary     INR check location Clinic Lab    Preferred lab     Send INR reminders to     Comments       Anticoagulation Care Providers     Provider Role Specialty Phone number    Francesca Garcia MD Responsible Internal Medicine [phone]

## 2018-06-15 LAB
ALBUMIN SERPL-MCNC: 4.4 G/DL (ref 3.9–5.4)
ALKALINE PHOS POC: 65 U/L (ref 38–126)
ALT SERPL-CCNC: 23 U/L (ref 9–52)
AST SERPL-CCNC: 23 U/L (ref 14–36)
BUN BLD-MCNC: 20 MG/DL (ref 9–20)
CALCIUM BLD-MCNC: 9.9 MG/DL (ref 8.4–10.2)
CHLORIDE BLD-SCNC: 105 MMOL/L (ref 98–107)
CO2 POC: 27 MMOL/L (ref 22–32)
CREAT BLD-MCNC: 1 MG/DL (ref 0.8–1.5)
EGFR (POC): 70.8
GLUCOSE POC: 92 MG/DL (ref 75–110)
POTASSIUM SERPL-SCNC: 4.9 MMOL/L (ref 3.6–5)
PROT SERPL-MCNC: 7.5 G/DL (ref 6.3–8.2)
SODIUM SERPL-SCNC: 145 MMOL/L (ref 137–145)
TOTAL BILIRUBIN POC: 1 MG/DL (ref 0.2–1.3)

## 2018-06-15 NOTE — PROGRESS NOTES
Blood sugar, liver and kidney function normal.  CK (muscle enzyme) normal.  Lipids excellent. LDL 29  Total cholesterol 111. No change in Rx.

## 2018-06-24 RX ORDER — AMLODIPINE BESYLATE 10 MG/1
TABLET ORAL
Qty: 90 TAB | Refills: 2 | Status: SHIPPED | OUTPATIENT
Start: 2018-06-24 | End: 2019-03-20 | Stop reason: SDUPTHER

## 2018-06-28 ENCOUNTER — TELEPHONE ANTICOAG (OUTPATIENT)
Dept: INTERNAL MEDICINE CLINIC | Age: 78
End: 2018-06-28

## 2018-06-28 ENCOUNTER — LAB ONLY (OUTPATIENT)
Dept: INTERNAL MEDICINE CLINIC | Age: 78
End: 2018-06-28

## 2018-06-28 DIAGNOSIS — I63.9 CEREBROVASCULAR ACCIDENT (CVA), UNSPECIFIED MECHANISM (HCC): Primary | ICD-10-CM

## 2018-06-28 LAB
INR BLD: 2.5
PT POC: 32.7 SECONDS
VALID INTERNAL CONTROL?: YES

## 2018-06-28 NOTE — PROGRESS NOTES
Anticoagulation Summary as of 6/28/2018     INR goal 2.0-2.5    Today's INR 2.5    Warfarin maintenance plan 2.5 mg (5 mg x 0.5) on Sun, Sat; 5 mg (5 mg x 1) all other days    Weekly warfarin total 30 mg    No change documented [place] last modified Javi Hyde (6/4/2018)    Next INR check 7/27/2018    Target end date       Anticoagulation Episode Summary     INR check location Clinic Lab    Preferred lab     Send INR reminders to     Comments       Anticoagulation Care Providers     Provider Role Specialty Phone number    Rosas Sharpe MD [facility] Internal Medicine [phone]

## 2018-07-30 ENCOUNTER — LAB ONLY (OUTPATIENT)
Dept: INTERNAL MEDICINE CLINIC | Age: 78
End: 2018-07-30

## 2018-07-30 DIAGNOSIS — I63.9 CEREBROVASCULAR ACCIDENT (CVA), UNSPECIFIED MECHANISM (HCC): Primary | ICD-10-CM

## 2018-07-31 ENCOUNTER — TELEPHONE ANTICOAG (OUTPATIENT)
Dept: INTERNAL MEDICINE CLINIC | Age: 78
End: 2018-07-31

## 2018-07-31 LAB
INR PPP: 2.9 (ref 0.8–1.2)
PROTHROMBIN TIME: 28.9 SEC (ref 9.1–12)

## 2018-07-31 NOTE — PROGRESS NOTES
Anticoagulation Summary as of 7/31/2018     INR goal 2.0-2.5    Today's INR 2.9! (7/30/2018)    Warfarin maintenance plan 2.5 mg (5 mg x 0.5) on Sun, Sat; 5 mg (5 mg x 1) all other days    Weekly warfarin total 30 mg    No change documented [place] last modified Sathish Tapia (6/4/2018)    Next INR check 8/13/2018    Target end date       Anticoagulation Episode Summary     INR check location Clinic Lab    Preferred lab     Send INR reminders to     Comments       Anticoagulation Care Providers     Provider Role Specialty Phone number    Bri Gill MD [facility] Internal Medicine [phone]

## 2018-08-06 RX ORDER — PAROXETINE 10 MG/1
TABLET, FILM COATED ORAL
Qty: 90 TAB | Refills: 3 | Status: SHIPPED | OUTPATIENT
Start: 2018-08-06 | End: 2019-08-07 | Stop reason: SDUPTHER

## 2018-08-13 ENCOUNTER — LAB ONLY (OUTPATIENT)
Dept: INTERNAL MEDICINE CLINIC | Age: 78
End: 2018-08-13

## 2018-08-13 DIAGNOSIS — I63.9 CEREBROVASCULAR ACCIDENT (CVA), UNSPECIFIED MECHANISM (HCC): Primary | ICD-10-CM

## 2018-08-13 LAB
INR BLD: NORMAL
PT POC: NORMAL SECONDS
VALID INTERNAL CONTROL?: NORMAL

## 2018-08-14 LAB
INR PPP: 3.2 (ref 0.8–1.2)
PROTHROMBIN TIME: 31.2 SEC (ref 9.1–12)

## 2018-08-15 ENCOUNTER — TELEPHONE ANTICOAG (OUTPATIENT)
Dept: INTERNAL MEDICINE CLINIC | Age: 78
End: 2018-08-15

## 2018-08-15 NOTE — PROGRESS NOTES
Anticoagulation Summary as of 8/15/2018     INR goal 2.0-2.5    Today's INR 3. 2!  (8/13/2018)    Warfarin maintenance plan 2.5 mg (5 mg x 0.5) on Mon, Wed, Fri; 5 mg (5 mg x 1) all other days    Weekly warfarin total 27.5 mg    Plan last modified Yvon Collier (8/15/2018)    Next INR check 8/29/2018    Target end date       Anticoagulation Episode Summary     INR check location Clinic Lab    Preferred lab     Send INR reminders to     Comments       Anticoagulation Care Providers     Provider Role Specialty Phone number    Earle Giles MD Responsible Internal Medicine [phone]

## 2018-08-15 NOTE — PROGRESS NOTES
INR high 3.2. I believe he is on 5 mg M->F. 2.5 mg S/S. If so change to 2.5 mg MWF and 5 mg TTSS. Repeat 2 weeks. If different dose let me know.

## 2018-08-29 ENCOUNTER — LAB ONLY (OUTPATIENT)
Dept: INTERNAL MEDICINE CLINIC | Age: 78
End: 2018-08-29

## 2018-08-29 DIAGNOSIS — I63.9 CEREBROVASCULAR ACCIDENT (CVA), UNSPECIFIED MECHANISM (HCC): Primary | ICD-10-CM

## 2018-08-29 LAB
INR BLD: NORMAL
PT POC: NORMAL SECONDS
VALID INTERNAL CONTROL?: NORMAL

## 2018-08-30 ENCOUNTER — OFFICE VISIT (OUTPATIENT)
Dept: NEUROLOGY | Age: 78
End: 2018-08-30

## 2018-08-30 VITALS
DIASTOLIC BLOOD PRESSURE: 70 MMHG | SYSTOLIC BLOOD PRESSURE: 130 MMHG | HEIGHT: 64 IN | OXYGEN SATURATION: 98 % | BODY MASS INDEX: 31.92 KG/M2 | HEART RATE: 72 BPM | WEIGHT: 187 LBS

## 2018-08-30 DIAGNOSIS — G31.84 MCI (MILD COGNITIVE IMPAIRMENT): ICD-10-CM

## 2018-08-30 DIAGNOSIS — Z86.73 HISTORY OF STROKE: ICD-10-CM

## 2018-08-30 DIAGNOSIS — I10 ESSENTIAL HYPERTENSION: ICD-10-CM

## 2018-08-30 DIAGNOSIS — G20 PARKINSON DISEASE (HCC): Primary | ICD-10-CM

## 2018-08-30 LAB
INR PPP: 2.8 (ref 0.8–1.2)
PROTHROMBIN TIME: 27.4 SEC (ref 9.1–12)

## 2018-08-30 RX ORDER — CARBIDOPA AND LEVODOPA 25; 100 MG/1; MG/1
1 TABLET ORAL 4 TIMES DAILY
Qty: 120 TAB | Refills: 5 | Status: SHIPPED | OUTPATIENT
Start: 2018-08-30 | End: 2018-08-30 | Stop reason: ALTCHOICE

## 2018-08-30 RX ORDER — CARBIDOPA AND LEVODOPA 25; 100 MG/1; MG/1
TABLET ORAL
Qty: 240 TAB | Refills: 5 | Status: SHIPPED | OUTPATIENT
Start: 2018-08-30 | End: 2019-01-29 | Stop reason: SDUPTHER

## 2018-08-30 NOTE — PATIENT INSTRUCTIONS
He is currently taking Sinemet 25/100 2 in the morning and then 1 3 times a day after that. I am increasing him to 2 tablets 4 times a day.

## 2018-08-30 NOTE — PROGRESS NOTES
HISTORY OF PRESENT ILLNESS  Mathieu Sanabria is a [de-identified] y.o. male. HPI Comments: This patient returns in follow-up of his Parkinson's disease. He is taking Sinemet 25/1oo, 2 p.o. a.m. one at lunch 1 at dinner and 1 before bed. He goes to the gym and rides the stationary bicycle. He is walking some outside with his walker and uses a stationary bicycle for exercise. Not having any visual hallucinations. The family has told him that he could only drive in case of an emergency. I told him that it was my instructions that he not drive at all unless he had a 's evaluation at [facility] that I believe he would fail. I told the wife I have not changed my opinion on that. She does feel he is slowing down a little bit late in the day. Other than that there is nothing new. Review of Systems   Constitutional: Negative. HENT: Positive for hearing loss. .    Skin: Negative. Neurological: Positive for tremors. Negative for speech change. Endo/Heme/Allergies: Negative. Psychiatric/Behavioral: Negative. Current Outpatient Prescriptions on File Prior to Visit   Medication Sig Dispense Refill    PARoxetine (PAXIL) 10 mg tablet TAKE 1 TABLET DAILY 90 Tab 3    amLODIPine (NORVASC) 10 mg tablet TAKE 1 TABLET DAILY 90 Tab 2    esomeprazole (NEXIUM) 20 mg capsule TAKE 1 CAPSULE DAILY 90 Cap 3    carbidopa-levodopa (SINEMET)  mg per tablet TAKE 2 TABLETS IN THE MORNING, 1 TABLET AT LUNCH, 1 TABLET AT DINNER, AND 1 TABLET PRIOR TO  Tab 1    atorvastatin (LIPITOR) 40 mg tablet TAKE 1 TABLET DAILY 90 Tab 3    warfarin (COUMADIN) 5 mg tablet TAKE ONE-HALF (1/2) TABLET ON MONDAY, WEDNESDAY AND FRIDAY AND TAKE 1 TABLET ALL OTHER DAYS 120 Tab 3    gabapentin (NEURONTIN) 100 mg capsule Take 2 Caps by mouth two (2) times a day. 360 Cap 3    losartan (COZAAR) 50 mg tablet Take 1 Tab by mouth daily.  90 Tab 3    acetaminophen (TYLENOL EXTRA STRENGTH) 500 mg tablet Take 1,000 mg by mouth three (3) times daily as needed for Pain.  senna-docusate (SENNA-S) 8.6-50 mg per tablet Take 1 Tab by mouth daily as needed.  aspirin delayed-release 81 mg tablet Take  by mouth daily.  vitamin e (E GEMS) 1,000 unit capsule Take 2,000 Units by mouth daily.  MULTIVITAMIN PO Take 1 Tab by mouth daily. No current facility-administered medications on file prior to visit. Past Medical History:   Diagnosis Date    Aaron esophagus     Benign nodular prostatic hyperplasia without lower urinary tract symptoms 7/12/2017    CKD (chronic kidney disease) stage 2, GFR 60-89 ml/min     Constipation     Coronary artery disease 7/12/2017    Depression     DJD (degenerative joint disease) 7/12/2017    Falls     Gout 7/12/2017    Headache     Hearing difficulty of both ears 7/12/2017    Hyperlipidemia     Hypertension     Impaired cognition 7/12/2017    MCI (mild cognitive impairment)     Restless leg syndrome 7/12/2017    Sleep apnea in adult 7/12/2017    No longer on CPAP @13    Snoring     St[facility] ([facility]     Tendinitis 7/12/2017    ACHILLES    Tendinitis of left rotator cuff 07/12/2017    Tremor 7/12/2017    LEFT HAND    Unspecified sleep apnea     lost weight no longer has it     /70  Pulse 72  Ht 5' 4\" (1.626 m)  Wt 187 lb (84.8 kg)  SpO2 98%  BMI 32.1 kg/m2     Physical Exam   Constitutional: He is oriented to person, place, and time. He appears well-developed and well-nourished. Cardiovascular: Normal rate, regular rhythm and normal heart sounds. Musculoskeletal: Normal range of motion. He exhibits no edema. Neurological: He is alert and oriented to person, place, and time. He has a mild resting tremor in his hands, he has minimal facial masking. Speech, language and mentation appear to be normal  Walking with the walker in the hallway does reveal festination on turning he has a stooped posture and decreased arm swing with some pronation of the hands.   He does have facial masking  Skin: Skin is warm and dry. Psychiatric: He has a normal mood and affect. His behavior is normal. Judgment and thought content normal.       ASSESSMENT and PLAN    PARKINSON'S DISEASE  His Parkinson's is progressing. I would increase his Sinemet to 25 100, 2 tablets p.o. 4 times daily, I do not want to change to the 25/250 pill as I do not think for many people it is enough carbidopa to block the nausea of 250 mg of levodopa. I have asked him to call me if he has any side effects to the medication. I will see him back in 4 months. HISTORY OF STROKE  I see no evidence of it on my exam however he will continue to take his aspirin 81 mg a day. MILD  COGNITIVE IMPAIRMENT  I suspect this secondary to of his Parkinson's disease however time will tell if we are dealing with a significant progressive dementia. His last head CT scan was done 2 years ago and did show volume loss and periventricular white matter changes but no evidence of stroke. I will probably repeat his scan of the next time I see him in 6 months. HYPERTENSION  Stroke risk, stable, managed by primary care    This note will not be viewable in MyChart.

## 2018-08-30 NOTE — MR AVS SNAPSHOT
[address] [place] [address], 
[id number], [address] [facility] 
[phone] Patient: Britney Lawton MRN: [id number] RCI:7/13/1925 Visit Information Date & Time Provider Department Dept. Phone Encounter #  
 8/30/2018  9:40 AM Mitchel Horta MD Neurology [facility] at [facility] [phone] [id number] Follow-up Instructions Return in about 4 months (around 12/30/2018). Your Appointments 9/19/2018  8:30 AM  
Any with MD Arleth Mehta 26 ([facility]) Appt Note: 3 Mnth F/U  
 [address] [id number] [address] [facility] [id number]  
  
    
 11/1/2018 10:00 AM  
Follow Up with Mitchel Horta MD  
Neurology Clinic at [facility]) Appt Note: follow up $ 0 CP jl 5/3/18  
 [address] [place] [address], [address] [phone]  
[address] [facility], [address] [place] [address], [address] [place] [address] [address] [id number] Upcoming Health Maintenance Date Due  
 GLAUCOMA SCREENING Q2Y 2/10/2003 MEDICARE YEARLY EXAM 3/14/2018 Influenza Age 5 to Adult 8/1/2018 DTaP/Tdap/Td series (2 - Td) 12/4/2022 Allergies as of 8/30/2018  Review Complete On: 8/30/2018 By: Mitchel Horta MD  
 No Known Allergies Current Immunizations  Never Reviewed Name Date Influenza High Dose Vaccine PF 10/2/2017 Influenza Vaccine 11/1/2014 Pneumococcal Conjugate (PCV-13) 12/2/2014 Pneumococcal Polysaccharide (PPSV-23) 11/1/2005 Pneumococcal Vaccine (Unspecified Type) 11/1/2014 Tdap 12/4/2012 Zoster Vaccine, Live 4/1/2007 Not reviewed this visit Vitals BP Pulse Height(growth percentile) Weight(growth percentile) SpO2 BMI  
 130/70 72 5' 4\" (1.626 m) 187 lb (84.8 kg) 98% 32.1 kg/m2 Smoking Status Former Smoker Vitals History BMI and BSA Data Body Mass Index Body Surface Area  
 32.1 kg/m 2 1.96 m 2 Preferred Pharmacy Pharmacy Name Phone [facility], [facility] [phone] Your Updated Medication List  
  
   
This list is accurate as of 8/30/18 10:04 AM.  Always use your most recent med list. amLODIPine 10 mg tablet Commonly known as:  Tanner Chafe TAKE 1 TABLET DAILY  
  
 aspirin delayed-release 81 mg tablet Take  by mouth daily. atorvastatin 40 mg tablet Commonly known as:  LIPITOR  
TAKE 1 TABLET DAILY * carbidopa-levodopa  mg per tablet Commonly known as:  SINEMET  
TAKE 2 TABLETS IN THE MORNING, 1 TABLET AT LUNCH, 1 TABLET AT DINNER, AND 1 TABLET PRIOR TO BED * carbidopa-levodopa  mg per tablet Commonly known as:  SINEMET  
2 p.o. 4 times daily  Indications: IDIOPATHIC PARKINSONISM  
  
 esomeprazole 20 mg capsule Commonly known as:  NEXIUM  
TAKE 1 CAPSULE DAILY  
  
 gabapentin 100 mg capsule Commonly known as:  NEURONTIN Take 2 Caps by mouth two (2) times a day. losartan 50 mg tablet Commonly known as:  COZAAR Take 1 Tab by mouth daily. MULTIVITAMIN PO Take 1 Tab by mouth daily. PARoxetine 10 mg tablet Commonly known as:  PAXIL TAKE 1 TABLET DAILY SENNA-S 8.6-50 mg per tablet Generic drug:  senna-docusate Take 1 Tab by mouth daily as needed. TYLENOL EXTRA STRENGTH 500 mg tablet Generic drug:  acetaminophen Take 1,000 mg by mouth three (3) times daily as needed for Pain.  
  
 vitamin e 1,000 unit capsule Commonly known as:  E GEMS Take 2,000 Units by mouth daily. warfarin 5 mg tablet Commonly known as:  COUMADIN  
TAKE ONE-HALF (1/2) TABLET ON MONDAY, WEDNESDAY AND FRIDAY AND TAKE 1 TABLET ALL OTHER DAYS * Notice:   This list has 2 medication(s) that are the same as other medications prescribed for you. Read the directions carefully, and ask your doctor or other care provider to review them with you. Prescriptions Sent to Pharmacy Refills  
 carbidopa-levodopa (SINEMET)  mg per tablet 5 Si p.o. 4 times daily  Indications: IDIOPATHIC PARKINSONISM Class: Normal  
 Pharmacy: [facility] , [address] [place] [address] #: [phone] Follow-up Instructions Return in about 4 months (around 2018). Patient Instructions He is currently taking Sinemet 25/100 2 in the morning and then 1 3 times a day after that. I am increasing him to 2 tablets 4 times a day. Introducing [facility]! [employer] introduces SlidePay patient portal. Now you can access parts of your medical record, email your doctor's office, and request medication refills online. 1. In your internet browser, go to [email or web address] 2. Click on the First Time User? Click Here link in the Sign In box. You will see the New Member Sign Up page. 3. Enter your SlidePay Access Code exactly as it appears below. You will not need to use this code after youve completed the sign-up process. If you do not sign up before the expiration date, you must request a new code. · SlidePay Access Code: [email or web address] Expires: 2018  1:14 PM 
 
4. Enter the last four digits of your Social Security Number (xxxx) and Date of Birth (mm/dd/yyyy) as indicated and click Submit. You will be taken to the next sign-up page. 5. Create a [employer] ID. This will be your SlidePay login ID and cannot be changed, so think of one that is secure and easy to remember. 6. Create a SlidePay password. You can change your password at any time. 7. Enter your Password Reset Question and Answer. This can be used at a later time if you forget your password. 8. Enter your e-mail address.  You will receive e-mail notification when new information is available in [employer]. 9. Click Sign Up. You can now view and download portions of your medical record. 10. Click the Download Summary menu link to download a portable copy of your medical information. If you have questions, please visit the Frequently Asked Questions section of the [employer] website. Remember, [employer] is NOT to be used for urgent needs. For medical emergencies, dial 911. Now available from your iPhone and Android! Please provide this summary of care documentation to your next provider. Your primary care clinician is listed as Georges Weathers. If you have any questions after today's visit, please call [phone].

## 2018-08-31 ENCOUNTER — TELEPHONE (OUTPATIENT)
Dept: INTERNAL MEDICINE CLINIC | Age: 78
End: 2018-08-31

## 2018-08-31 ENCOUNTER — TELEPHONE ANTICOAG (OUTPATIENT)
Dept: INTERNAL MEDICINE CLINIC | Age: 78
End: 2018-08-31

## 2018-08-31 NOTE — PROGRESS NOTES
Anticoagulation Summary as of 8/31/2018     INR goal 2.0-2.5    Today's INR 2.8! (8/29/2018)    Warfarin maintenance plan 2.5 mg (5 mg x 0.5) on Mon, Wed, Fri; 5 mg (5 mg x 1) all other days    Weekly warfarin total 27.5 mg    No change documented Leatha Arnold LPN    Plan last modified Yvon Collier (8/15/2018)    Next INR check 9/30/2018    Target end date       Anticoagulation Episode Summary     INR check location Clinic Lab    Preferred lab     Send INR reminders to     Comments       Anticoagulation Care Providers     Provider Role Specialty Phone number    Earle Giles MD [facility] Internal Medicine [phone]

## 2018-08-31 NOTE — PROGRESS NOTES
Called patient and spoke to his wife. verified . Advised of lab results and the patient stated she understood.

## 2018-09-19 ENCOUNTER — TELEPHONE (OUTPATIENT)
Dept: INTERNAL MEDICINE CLINIC | Age: 78
End: 2018-09-19

## 2018-10-01 ENCOUNTER — OFFICE VISIT (OUTPATIENT)
Dept: INTERNAL MEDICINE CLINIC | Age: 78
End: 2018-10-01

## 2018-10-01 VITALS
HEART RATE: 58 BPM | WEIGHT: 188 LBS | SYSTOLIC BLOOD PRESSURE: 128 MMHG | DIASTOLIC BLOOD PRESSURE: 76 MMHG | OXYGEN SATURATION: 97 % | BODY MASS INDEX: 32.1 KG/M2 | HEIGHT: 64 IN

## 2018-10-01 DIAGNOSIS — I63.9 CEREBROVASCULAR ACCIDENT (CVA), UNSPECIFIED MECHANISM (HCC): ICD-10-CM

## 2018-10-01 DIAGNOSIS — Z23 ENCOUNTER FOR IMMUNIZATION: ICD-10-CM

## 2018-10-01 DIAGNOSIS — Z79.899 LONG-TERM CURRENT USE OF HIGH RISK MEDICATION OTHER THAN ANTICOAGULANT: ICD-10-CM

## 2018-10-01 DIAGNOSIS — I10 HYPERTENSION, UNSPECIFIED TYPE: Primary | ICD-10-CM

## 2018-10-01 LAB
BUN BLD-MCNC: 27 MG/DL (ref 9–20)
CALCIUM BLD-MCNC: 9.8 MG/DL (ref 8.4–10.2)
CHLORIDE BLD-SCNC: 107 MMOL/L (ref 98–107)
CO2 POC: 26 MMOL/L (ref 22–32)
CREAT BLD-MCNC: 1 MG/DL (ref 0.8–1.5)
EGFR (POC): 70.8
GLUCOSE POC: 78 MG/DL (ref 75–110)
GRAN# POC: 6.4 K/UL (ref 2–7.8)
GRAN% POC: 75.8 % (ref 37–92)
HCT VFR BLD CALC: 38.8 % (ref 37–51)
HGB BLD-MCNC: 13.1 G/DL (ref 12–18)
INR BLD: 2.3
LY# POC: 1.7 K/UL (ref 0.6–4.1)
LY% POC: 21.1 % (ref 10–58.5)
MCH RBC QN: 33.8 PG (ref 26–32)
MCHC RBC-ENTMCNC: 33.6 G/DL (ref 30–36)
MCV RBC: 100 FL (ref 80–97)
MID #, POC: 0.2 K/UL (ref 0–1.8)
MID% POC: 3.1 % (ref 0.1–24)
PLATELET # BLD: 253 K/UL (ref 140–440)
POTASSIUM SERPL-SCNC: 4.6 MMOL/L (ref 3.6–5)
PT POC: 30 SECONDS
RBC # BLD: 3.87 M/UL (ref 4.2–6.3)
SODIUM SERPL-SCNC: 145 MMOL/L (ref 137–145)
VALID INTERNAL CONTROL?: YES
WBC # BLD: 8.3 K/UL (ref 4.1–10.9)

## 2018-10-01 NOTE — PROGRESS NOTES
Reviewed record in preparation for visit and have obtained necessary documentation. Identified pt with two pt identifiers(name and ). Chief Complaint   Patient presents with    Hypertension     follow up        Coordination of Care Questionnaire:  :     1) Have you been to an emergency room, urgent care clinic since your last visit? no    Hospitalized since your last visit?no     2) Have you seen or consulted any other health care providers outside of [address] [place] [address] since your last visit?  Yes, Neurologist

## 2018-10-01 NOTE — PROGRESS NOTES
Blood sugar, kidney and K+ normal. Hgb and WBC normal. INR therapeutic (2.3). Continue same dose. Recheck 1 month.

## 2018-10-02 ENCOUNTER — TELEPHONE ANTICOAG (OUTPATIENT)
Dept: INTERNAL MEDICINE CLINIC | Age: 78
End: 2018-10-02

## 2018-10-08 ENCOUNTER — TELEPHONE (OUTPATIENT)
Dept: INTERNAL MEDICINE CLINIC | Age: 78
End: 2018-10-08

## 2018-10-08 RX ORDER — GABAPENTIN 100 MG/1
200 CAPSULE ORAL 2 TIMES DAILY
Qty: 360 CAP | Refills: 3 | Status: SHIPPED | OUTPATIENT
Start: 2018-10-08 | End: 2019-08-05 | Stop reason: SDUPTHER

## 2018-10-08 NOTE — TELEPHONE ENCOUNTER
Patient's daughter called stating that Mr Greg Zamorano has been sluggish since the increase in his Sinemet. Is this normal. Per Dr Binta Magana, patient's daughter advised that no, but can cut back to 6/day 2 in am, 1 at lunch, 2 at dinner,and 1 at bedtime, and see how he does.

## 2018-10-08 NOTE — TELEPHONE ENCOUNTER
RX refill request from the patient/pharmacy. Patient last seen 10/1/2018 with labs, and next appt. scheduled for 1/8/2019. Requested Prescriptions     Pending Prescriptions Disp Refills    gabapentin (NEURONTIN) 100 mg capsule 360 Cap 3     Sig: Take 2 Caps by mouth two (2) times a day.

## 2018-11-01 ENCOUNTER — LAB ONLY (OUTPATIENT)
Dept: INTERNAL MEDICINE CLINIC | Age: 78
End: 2018-11-01

## 2018-11-01 ENCOUNTER — OFFICE VISIT (OUTPATIENT)
Dept: NEUROLOGY | Age: 78
End: 2018-11-01

## 2018-11-01 VITALS
BODY MASS INDEX: 32.61 KG/M2 | WEIGHT: 190 LBS | HEART RATE: 73 BPM | OXYGEN SATURATION: 98 % | DIASTOLIC BLOOD PRESSURE: 80 MMHG | SYSTOLIC BLOOD PRESSURE: 128 MMHG

## 2018-11-01 DIAGNOSIS — I63.9 CEREBROVASCULAR ACCIDENT (CVA), UNSPECIFIED MECHANISM (HCC): ICD-10-CM

## 2018-11-01 DIAGNOSIS — I63.9 CEREBROVASCULAR ACCIDENT (CVA), UNSPECIFIED MECHANISM (HCC): Primary | ICD-10-CM

## 2018-11-01 DIAGNOSIS — G20 PARKINSON'S DISEASE (HCC): Primary | ICD-10-CM

## 2018-11-01 DIAGNOSIS — I10 ESSENTIAL HYPERTENSION: ICD-10-CM

## 2018-11-01 NOTE — PATIENT INSTRUCTIONS
Continue carbidopa levodopa 25/100 mg tablet 2 p.o. 4 times a day return in 4 months.,       Office Policies  o Phone calls/patient messages:  Please allow up to 24 hours for someone in the office to contact you about your call or message. Be mindful your provider may be out of the office or your message may require further review. We encourage you to use [employer] for your messages as this is a faster, more efficient way to communicate with our office  o Medication Refills:  Prescription medications require up to 48 business hours to process. We encourage you to use [employer] for your refills. For controlled medications: Please allow up to 72 business hours to process. Certain medications may require you to  a written prescription at our office. NO narcotic/controlled medications will be prescribed after 4pm Monday through Friday or on weekends  o Form/Paperwork Completion:  Please note there is a $25 fee for all paperwork completed by our providers. We ask that you allow 7-14 business days. Pre-payment is due prior to picking up/faxing the completed form. You may also download your forms to [employer] to have your doctor print off.  o Test Results: In order for a patient to obtain test results, an appointment must be made with the physician to review the results. Test results cannot be discussed over the phone. A Healthy Lifestyle: Care Instructions  Your Care Instructions    A healthy lifestyle can help you feel good, stay at a healthy weight, and have plenty of energy for both work and play. A healthy lifestyle is something you can share with your whole family. A healthy lifestyle also can lower your risk for serious health problems, such as high blood pressure, heart disease, and diabetes. You can follow a few steps listed below to improve your health and the health of your family. Follow-up care is a key part of your treatment and safety.  Be sure to make and go to all appointments, and call your doctor if you are having problems. It's also a good idea to know your test results and keep a list of the medicines you take. How can you care for yourself at home? · Do not eat too much sugar, fat, or fast foods. You can still have dessert and treats now and then. The goal is moderation. · Start small to improve your eating habits. Pay attention to portion sizes, drink less juice and soda pop, and eat more fruits and vegetables. ? Eat a healthy amount of food. A 3-ounce serving of meat, for example, is about the size of a deck of cards. Fill the rest of your plate with vegetables and whole grains. ? Limit the amount of soda and sports drinks you have every day. Drink more water when you are thirsty. ? Eat at least 5 servings of fruits and vegetables every day. It may seem like a lot, but it is not hard to reach this goal. A serving or helping is 1 piece of fruit, 1 cup of vegetables, or 2 cups of leafy, raw vegetables. Have an apple or some carrot sticks as an afternoon snack instead of a candy bar. Try to have fruits and/or vegetables at every meal.  · Make exercise part of your daily routine. You may want to start with simple activities, such as walking, bicycling, or slow swimming. Try to be active 30 to 60 minutes every day. You do not need to do all 30 to 60 minutes all at once. For example, you can exercise 3 times a day for 10 or 20 minutes. Moderate exercise is safe for most people, but it is always a good idea to talk to your doctor before starting an exercise program.  · Keep moving. Citlaly Wilkins the lawn, work in the garden, or [employer]. Take the stairs instead of the elevator at work. · If you smoke, quit. People who smoke have an increased risk for heart attack, stroke, cancer, and other lung illnesses. Quitting is hard, but there are ways to boost your chance of quitting tobacco for good. ? Use nicotine gum, patches, or lozenges. ?  Ask your doctor about stop-smoking programs and medicines. ? Keep trying. In addition to reducing your risk of diseases in the future, you will notice some benefits soon after you stop using tobacco. If you have shortness of breath or asthma symptoms, they will likely get better within a few weeks after you quit. · Limit how much alcohol you drink. Moderate amounts of alcohol (up to 2 drinks a day for men, 1 drink a day for women) are okay. But drinking too much can lead to liver problems, high blood pressure, and other health problems. Family health  If you have a family, there are many things you can do together to improve your health. · Eat meals together as a family as often as possible. · Eat healthy foods. This includes fruits, vegetables, lean meats and dairy, and whole grains. · Include your family in your fitness plan. Most people think of activities such as jogging or tennis as the way to fitness, but there are many ways you and your family can be more active. Anything that makes you breathe hard and gets your heart pumping is exercise. Here are some tips:  ? Walk to do errands or to take your child to school or the bus.  ? Go for a family bike ride after dinner instead of watching TV. Where can you learn more? Go to http://aditi-angela.info/. Enter N127 in the search box to learn more about \"A Healthy Lifestyle: Care Instructions. \"  Current as of: December 7, 2017  Content Version: 11.8  © 4202-7625 Healthwise, Incorporated. Care instructions adapted under license by [employer] (which disclaims liability or warranty for this information). If you have questions about a medical condition or this instruction, always ask your healthcare professional. [address] any warranty or liability for your use of this information.

## 2018-11-01 NOTE — PROGRESS NOTES
HISTORY OF PRESENT ILLNESS  Namita Vazquez is a [de-identified] y.o. male. HPI Comments: This patient returns in follow-up of his Parkinson's disease. He is taking Sinemet 25/1oo, 2 p.o. a.m. one at lunch 1 at dinner and 1 before bed. He goes to the gym and rides the stationary bicycle. He is walking some outside with his walker and uses a stationary bicycle for exercise. Not having any visual hallucinations. The family has told him that he could only drive in case of an emergency. I told him that it was my instructions that he not drive at all unless he had a 's evaluation at [facility] that I believe he would fail. I told the wife I have not changed my opinion on that. She does feel he is slowing down a little bit late in the day. He returns in follow-up today. He continues to take his Sinemet 25 100, 2 p.o. 4 times daily. His wife states that he is not operating a motor vehicle. He goes to the gym regularly and exercise several other machines. She states he is walking and moving well. Review of Systems   Constitutional: Negative. HENT: Positive for hearing loss. .    Skin: Negative. Neurological: Positive for tremors. Negative for speech change. Endo/Heme/Allergies: Negative. Psychiatric/Behavioral: Negative. Current Outpatient Medications on File Prior to Visit   Medication Sig Dispense Refill    gabapentin (NEURONTIN) 100 mg capsule Take 2 Caps by mouth two (2) times a day.  360 Cap 3    carbidopa-levodopa (SINEMET)  mg per tablet 2 p.o. 4 times daily  Indications: IDIOPATHIC PARKINSONISM 240 Tab 5    PARoxetine (PAXIL) 10 mg tablet TAKE 1 TABLET DAILY 90 Tab 3    amLODIPine (NORVASC) 10 mg tablet TAKE 1 TABLET DAILY 90 Tab 2    esomeprazole (NEXIUM) 20 mg capsule TAKE 1 CAPSULE DAILY 90 Cap 3    atorvastatin (LIPITOR) 40 mg tablet TAKE 1 TABLET DAILY 90 Tab 3    warfarin (COUMADIN) 5 mg tablet TAKE ONE-HALF (1/2) TABLET ON MONDAY, WEDNESDAY AND FRIDAY AND TAKE 1 TABLET ALL OTHER DAYS 120 Tab 3    losartan (COZAAR) 50 mg tablet Take 1 Tab by mouth daily. 90 Tab 3    acetaminophen (TYLENOL EXTRA STRENGTH) 500 mg tablet Take 1,000 mg by mouth three (3) times daily as needed for Pain.  senna-docusate (SENNA-S) 8.6-50 mg per tablet Take 1 Tab by mouth daily as needed.  aspirin delayed-release 81 mg tablet Take  by mouth daily.  MULTIVITAMIN PO Take 1 Tab by mouth daily.  vitamin e (E GEMS) 1,000 unit capsule Take 2,000 Units by mouth daily. No current facility-administered medications on file prior to visit. Past Medical History:   Diagnosis Date    Aaron esophagus     Benign nodular prostatic hyperplasia without lower urinary tract symptoms 7/12/2017    CKD (chronic kidney disease) stage 2, GFR 60-89 ml/min     Constipation     Coronary artery disease 7/12/2017    Depression     DJD (degenerative joint disease) 7/12/2017    Falls     Gout 7/12/2017    Headache     Hearing difficulty of both ears 7/12/2017    Hyperlipidemia     Hypertension     Impaired cognition 7/12/2017    MCI (mild cognitive impairment)     Restless leg syndrome 7/12/2017    Sleep apnea in adult 7/12/2017    No longer on CPAP @13    Snoring     Stroke (HCC)     Tendinitis 7/12/2017    ACHILLES    Tendinitis of left rotator cuff 07/12/2017    Tremor 7/12/2017    LEFT HAND    Unspecified sleep apnea     lost weight no longer has it     /80   Pulse 73   Wt 86.2 kg (190 lb)   SpO2 98%   BMI 32.61 kg/m²      Physical Exam   Constitutional: He is oriented to person, place, and time. He appears well-developed and well-nourished. Cardiovascular: Normal rate, regular rhythm and normal heart sounds. Musculoskeletal: Normal range of motion. He exhibits no edema. Neurological: He is alert and oriented to person, place, and time. He has a mild resting tremor in his hands, he has minimal facial masking.   Speech, language and mentation appear to be normal  Walking with the walker in the hallway reveals relatively smooth gait, I cannot  any festination on turning. He does have facial mild facial masking. Skin: Skin is warm and dry. Psychiatric: He has a normal mood and affect. His behavior is normal. Judgment and thought content normal.       ASSESSMENT and PLAN    PARKINSON'S DISEASE  His Parkinson's diseaseHe is  seems stable between now and his last appointment. He is moving relatively well and looks smooth. I will alter his current dose of Sinemet 25/102 p.o. 4 times daily. I will plan to see him back in 6 months. HISTORY OF STROKE  I see no evidence of it on my exam however he will continue to take his aspirin 81 mg a day. MILD  COGNITIVE IMPAIRMENT  I suspect this secondary to of his Parkinson's disease however time will tell if we are dealing with a significant progressive dementia. His last head CT scan was done 2 years ago and did show volume loss and periventricular white matter changes but no evidence of stroke. Neuroimaging at this time his cognition is doing well, I see no reason for repeating his neuroimaging at this time. HYPERTENSION  Stroke risk, stable, managed by primary care    This note will not be viewable in MyChart.

## 2018-11-02 ENCOUNTER — TELEPHONE ANTICOAG (OUTPATIENT)
Dept: INTERNAL MEDICINE CLINIC | Age: 78
End: 2018-11-02

## 2018-11-02 LAB
INR PPP: 3.6 (ref 0.8–1.2)
PROTHROMBIN TIME: 34.8 SEC (ref 9.1–12)

## 2018-11-02 NOTE — PROGRESS NOTES
Anticoagulation Summary  As of 11/2/2018    INR goal:   2.0-2.5   TTR:   28.7 % (1.1 y)   INR used for dosing:   3.6! (11/1/2018)   Warfarin maintenance plan:   2.5 mg (5 mg x 0.5) on Mon, Wed, Fri; 5 mg (5 mg x 1) all other days   Weekly warfarin total:   27.5 mg   Plan last modified:   Matteo Duncan (8/15/2018)   Next INR check:   11/5/2018   Target end date:            Anticoagulation Episode Summary     INR check location:   Clinic Lab    Preferred lab:       Send INR reminders to:       Comments:         Anticoagulation Care Providers     Provider Role Specialty Phone number    Venita Alegria MD [facility] Internal Medicine [phone]

## 2018-11-05 ENCOUNTER — LAB ONLY (OUTPATIENT)
Dept: INTERNAL MEDICINE CLINIC | Age: 78
End: 2018-11-05

## 2018-11-05 DIAGNOSIS — I63.9 CEREBROVASCULAR ACCIDENT (CVA), UNSPECIFIED MECHANISM (HCC): Primary | ICD-10-CM

## 2018-11-05 RX ORDER — LOSARTAN POTASSIUM 50 MG/1
TABLET ORAL
Qty: 90 TAB | Refills: 3 | Status: SHIPPED | OUTPATIENT
Start: 2018-11-05 | End: 2019-06-06

## 2018-11-06 ENCOUNTER — TELEPHONE ANTICOAG (OUTPATIENT)
Dept: INTERNAL MEDICINE CLINIC | Age: 78
End: 2018-11-06

## 2018-11-06 LAB
INR PPP: 1.4 (ref 0.8–1.2)
PROTHROMBIN TIME: 14 SEC (ref 9.1–12)

## 2018-11-06 NOTE — PROGRESS NOTES
PHI verified and patient's daughter Maurilio Ahumada notified of patients labs   Resume 2.5 mg 5 days and 5 mg 2 days.   Recheck 2 weeks

## 2018-11-06 NOTE — PROGRESS NOTES
Anticoagulation Summary  As of 2018    INR goal:   2.0-2.5   TTR:   28.6 % (1.1 y)   INR used for dosin.4! (2018)   Warfarin maintenance plan:   5 mg (5 mg x 1) on Sun, Sat; 2.5 mg (5 mg x 0.5) all other days   Weekly warfarin total:   22.5 mg   Plan last modified:   Janene Johnson (2018)   Next INR check:   2018   Target end date:            Anticoagulation Episode Summary     INR check location:   Clinic Lab    Preferred lab:       Send INR reminders to:       Comments:         Anticoagulation Care Providers     Provider Role Specialty Phone number    Shantal Johnson MD [facility] Internal Medicine [phone]

## 2018-11-20 ENCOUNTER — LAB ONLY (OUTPATIENT)
Dept: INTERNAL MEDICINE CLINIC | Age: 78
End: 2018-11-20

## 2018-11-20 DIAGNOSIS — I63.9 CEREBROVASCULAR ACCIDENT (CVA), UNSPECIFIED MECHANISM (HCC): Primary | ICD-10-CM

## 2018-11-21 LAB
INR PPP: 1.5 (ref 0.8–1.2)
PROTHROMBIN TIME: 15 SEC (ref 9.1–12)

## 2018-11-27 ENCOUNTER — LAB ONLY (OUTPATIENT)
Dept: INTERNAL MEDICINE CLINIC | Age: 78
End: 2018-11-27

## 2018-11-27 DIAGNOSIS — I63.9 CEREBROVASCULAR ACCIDENT (CVA), UNSPECIFIED MECHANISM (HCC): Primary | ICD-10-CM

## 2018-11-28 LAB
INR PPP: 2.2 (ref 0.8–1.2)
PROTHROMBIN TIME: 21.8 SEC (ref 9.1–12)

## 2018-12-11 ENCOUNTER — LAB ONLY (OUTPATIENT)
Dept: INTERNAL MEDICINE CLINIC | Age: 78
End: 2018-12-11

## 2018-12-11 DIAGNOSIS — I63.9 CEREBROVASCULAR ACCIDENT (CVA), UNSPECIFIED MECHANISM (HCC): Primary | ICD-10-CM

## 2018-12-12 ENCOUNTER — TELEPHONE ANTICOAG (OUTPATIENT)
Dept: INTERNAL MEDICINE CLINIC | Age: 78
End: 2018-12-12

## 2018-12-12 LAB
INR PPP: 2.5 (ref 0.8–1.2)
PROTHROMBIN TIME: 24.3 SEC (ref 9.1–12)

## 2018-12-12 NOTE — PROGRESS NOTES
Anticoagulation Summary  As of 2018    INR goal:   2.0-2.5   TTR:   29.9 % (1.2 y)   INR used for dosin.5 (2018)   Warfarin maintenance plan:   5 mg (5 mg x 1) on Sun, Sat; 2.5 mg (5 mg x 0.5) all other days   Weekly warfarin total:   22.5 mg   No change documented:   Corry Snow   Plan last modified:   Corry Snow (2018)   Next INR check:   2019   Target end date:            Anticoagulation Episode Summary     INR check location:   Clinic Lab    Preferred lab:       Send INR reminders to:       Comments:         Anticoagulation Care Providers     Provider Role Specialty Phone number    Cassidy Tripp MD [facility] Internal Medicine [phone]

## 2019-01-15 ENCOUNTER — HOSPITAL ENCOUNTER (INPATIENT)
Age: 79
LOS: 1 days | Discharge: HOME | DRG: 394 | End: 2019-01-16
Attending: EMERGENCY MEDICINE | Admitting: INTERNAL MEDICINE
Payer: MEDICARE

## 2019-01-15 ENCOUNTER — OFFICE VISIT (OUTPATIENT)
Dept: INTERNAL MEDICINE CLINIC | Age: 79
End: 2019-01-15

## 2019-01-15 ENCOUNTER — APPOINTMENT (OUTPATIENT)
Dept: GENERAL RADIOLOGY | Age: 79
DRG: 394 | End: 2019-01-15
Attending: INTERNAL MEDICINE
Payer: MEDICARE

## 2019-01-15 VITALS
HEART RATE: 55 BPM | DIASTOLIC BLOOD PRESSURE: 64 MMHG | WEIGHT: 184 LBS | SYSTOLIC BLOOD PRESSURE: 118 MMHG | BODY MASS INDEX: 31.41 KG/M2 | OXYGEN SATURATION: 94 % | HEIGHT: 64 IN | TEMPERATURE: 97.8 F

## 2019-01-15 DIAGNOSIS — I63.9 CEREBROVASCULAR ACCIDENT (CVA), UNSPECIFIED MECHANISM (HCC): ICD-10-CM

## 2019-01-15 DIAGNOSIS — I63.9 CEREBROVASCULAR ACCIDENT (CVA), UNSPECIFIED MECHANISM (HCC): Primary | ICD-10-CM

## 2019-01-15 DIAGNOSIS — K62.5 RECTAL BLEED: ICD-10-CM

## 2019-01-15 DIAGNOSIS — G31.84 MCI (MILD COGNITIVE IMPAIRMENT): ICD-10-CM

## 2019-01-15 DIAGNOSIS — I25.119 CORONARY ARTERY DISEASE WITH ANGINA PECTORIS, UNSPECIFIED VESSEL OR LESION TYPE, UNSPECIFIED WHETHER NATIVE OR TRANSPLANTED HEART (HCC): ICD-10-CM

## 2019-01-15 DIAGNOSIS — K62.5 RECTAL BLEEDING: ICD-10-CM

## 2019-01-15 DIAGNOSIS — G20 PARKINSON'S DISEASE (HCC): ICD-10-CM

## 2019-01-15 LAB
ABO + RH BLD: NORMAL
ALBUMIN SERPL-MCNC: 4.1 G/DL (ref 3.5–5)
ALBUMIN/GLOB SERPL: 1.1 {RATIO} (ref 1.1–2.2)
ALP SERPL-CCNC: 79 U/L (ref 45–117)
ALT SERPL-CCNC: 12 U/L (ref 12–78)
ANION GAP SERPL CALC-SCNC: 7 MMOL/L (ref 5–15)
AST SERPL-CCNC: 16 U/L (ref 15–37)
BASOPHILS # BLD: 0 K/UL (ref 0–0.1)
BASOPHILS NFR BLD: 0 % (ref 0–1)
BILIRUB SERPL-MCNC: 0.4 MG/DL (ref 0.2–1)
BLOOD GROUP ANTIBODIES SERPL: NORMAL
BUN SERPL-MCNC: 31 MG/DL (ref 6–20)
BUN/CREAT SERPL: 25 (ref 12–20)
CALCIUM SERPL-MCNC: 9.1 MG/DL (ref 8.5–10.1)
CHLORIDE SERPL-SCNC: 106 MMOL/L (ref 97–108)
CO2 SERPL-SCNC: 25 MMOL/L (ref 21–32)
CREAT SERPL-MCNC: 1.24 MG/DL (ref 0.7–1.3)
DIFFERENTIAL METHOD BLD: ABNORMAL
EOSINOPHIL # BLD: 0.2 K/UL (ref 0–0.4)
EOSINOPHIL NFR BLD: 2 % (ref 0–7)
ERYTHROCYTE [DISTWIDTH] IN BLOOD BY AUTOMATED COUNT: 12.3 % (ref 11.5–14.5)
GLOBULIN SER CALC-MCNC: 3.9 G/DL (ref 2–4)
GLUCOSE SERPL-MCNC: 102 MG/DL (ref 65–100)
GRAN# POC: 5.3 K/UL (ref 2–7.8)
GRAN% POC: 66.9 % (ref 37–92)
HCT VFR BLD AUTO: 37.3 % (ref 36.6–50.3)
HCT VFR BLD CALC: 35.7 % (ref 37–51)
HEMOCCULT STL QL: POSITIVE
HGB BLD-MCNC: 12.2 G/DL (ref 12–18)
HGB BLD-MCNC: 12.6 G/DL (ref 12.1–17)
IMM GRANULOCYTES # BLD AUTO: 0 K/UL (ref 0–0.04)
IMM GRANULOCYTES NFR BLD AUTO: 0 % (ref 0–0.5)
INR PPP: 2.2 (ref 0.9–1.1)
LY# POC: 2.2 K/UL (ref 0.6–4.1)
LY% POC: 28.9 % (ref 10–58.5)
LYMPHOCYTES # BLD: 1.9 K/UL (ref 0.8–3.5)
LYMPHOCYTES NFR BLD: 22 % (ref 12–49)
MCH RBC QN AUTO: 34.5 PG (ref 26–34)
MCH RBC QN: 34.5 PG (ref 26–32)
MCHC RBC AUTO-ENTMCNC: 33.8 G/DL (ref 30–36.5)
MCHC RBC-ENTMCNC: 34.2 G/DL (ref 30–36)
MCV RBC AUTO: 102.2 FL (ref 80–99)
MCV RBC: 101 FL (ref 80–97)
MID #, POC: 0.3 K/UL (ref 0–1.8)
MID% POC: 4.2 % (ref 0.1–24)
MONOCYTES # BLD: 0.5 K/UL (ref 0–1)
MONOCYTES NFR BLD: 6 % (ref 5–13)
NEUTS SEG # BLD: 6 K/UL (ref 1.8–8)
NEUTS SEG NFR BLD: 69 % (ref 32–75)
NRBC # BLD: 0 K/UL (ref 0–0.01)
NRBC BLD-RTO: 0 PER 100 WBC
PLATELET # BLD AUTO: 203 K/UL (ref 150–400)
PLATELET # BLD: 205 K/UL (ref 140–440)
PMV BLD AUTO: 9.9 FL (ref 8.9–12.9)
POTASSIUM SERPL-SCNC: 4.4 MMOL/L (ref 3.5–5.1)
PROT SERPL-MCNC: 8 G/DL (ref 6.4–8.2)
PROTHROMBIN TIME: 21.8 SEC (ref 9–11.1)
RBC # BLD AUTO: 3.65 M/UL (ref 4.1–5.7)
RBC # BLD: 3.55 M/UL (ref 4.2–6.3)
SODIUM SERPL-SCNC: 138 MMOL/L (ref 136–145)
SPECIMEN EXP DATE BLD: NORMAL
WBC # BLD AUTO: 8.6 K/UL (ref 4.1–11.1)
WBC # BLD: 7.8 K/UL (ref 4.1–10.9)

## 2019-01-15 PROCEDURE — 74011000258 HC RX REV CODE- 258: Performed by: INTERNAL MEDICINE

## 2019-01-15 PROCEDURE — 71045 X-RAY EXAM CHEST 1 VIEW: CPT

## 2019-01-15 PROCEDURE — 82272 OCCULT BLD FECES 1-3 TESTS: CPT

## 2019-01-15 PROCEDURE — 85610 PROTHROMBIN TIME: CPT

## 2019-01-15 PROCEDURE — 94760 N-INVAS EAR/PLS OXIMETRY 1: CPT

## 2019-01-15 PROCEDURE — 74011000250 HC RX REV CODE- 250: Performed by: INTERNAL MEDICINE

## 2019-01-15 PROCEDURE — 65660000000 HC RM CCU STEPDOWN

## 2019-01-15 PROCEDURE — 74011250637 HC RX REV CODE- 250/637: Performed by: INTERNAL MEDICINE

## 2019-01-15 PROCEDURE — 99283 EMERGENCY DEPT VISIT LOW MDM: CPT

## 2019-01-15 PROCEDURE — 80053 COMPREHEN METABOLIC PANEL: CPT

## 2019-01-15 PROCEDURE — 94761 N-INVAS EAR/PLS OXIMETRY MLT: CPT

## 2019-01-15 PROCEDURE — 36415 COLL VENOUS BLD VENIPUNCTURE: CPT

## 2019-01-15 PROCEDURE — 85025 COMPLETE CBC W/AUTO DIFF WBC: CPT

## 2019-01-15 PROCEDURE — 74011250636 HC RX REV CODE- 250/636: Performed by: INTERNAL MEDICINE

## 2019-01-15 PROCEDURE — 86900 BLOOD TYPING SEROLOGIC ABO: CPT

## 2019-01-15 RX ORDER — CARBIDOPA AND LEVODOPA 25; 100 MG/1; MG/1
2 TABLET ORAL 4 TIMES DAILY
Status: DISCONTINUED | OUTPATIENT
Start: 2019-01-15 | End: 2019-01-16 | Stop reason: HOSPADM

## 2019-01-15 RX ORDER — ATORVASTATIN CALCIUM 40 MG/1
40 TABLET, FILM COATED ORAL DAILY
Status: DISCONTINUED | OUTPATIENT
Start: 2019-01-16 | End: 2019-01-16 | Stop reason: HOSPADM

## 2019-01-15 RX ORDER — SODIUM CHLORIDE 0.9 % (FLUSH) 0.9 %
5-40 SYRINGE (ML) INJECTION AS NEEDED
Status: DISCONTINUED | OUTPATIENT
Start: 2019-01-15 | End: 2019-01-16 | Stop reason: HOSPADM

## 2019-01-15 RX ORDER — SODIUM CHLORIDE 0.9 % (FLUSH) 0.9 %
5-40 SYRINGE (ML) INJECTION EVERY 8 HOURS
Status: DISCONTINUED | OUTPATIENT
Start: 2019-01-15 | End: 2019-01-16 | Stop reason: HOSPADM

## 2019-01-15 RX ORDER — ACETAMINOPHEN 500 MG
1000 TABLET ORAL
Status: DISCONTINUED | OUTPATIENT
Start: 2019-01-15 | End: 2019-01-16 | Stop reason: HOSPADM

## 2019-01-15 RX ORDER — PANTOPRAZOLE SODIUM 40 MG/10ML
40 INJECTION, POWDER, LYOPHILIZED, FOR SOLUTION INTRAVENOUS DAILY
Status: DISCONTINUED | OUTPATIENT
Start: 2019-01-16 | End: 2019-01-16 | Stop reason: HOSPADM

## 2019-01-15 RX ORDER — PHYTONADIONE 10 MG/ML
5 INJECTION, EMULSION INTRAMUSCULAR; INTRAVENOUS; SUBCUTANEOUS ONCE
Status: DISCONTINUED | OUTPATIENT
Start: 2019-01-15 | End: 2019-01-15 | Stop reason: SDUPTHER

## 2019-01-15 RX ORDER — GABAPENTIN 100 MG/1
200 CAPSULE ORAL 2 TIMES DAILY
Status: DISCONTINUED | OUTPATIENT
Start: 2019-01-15 | End: 2019-01-16 | Stop reason: HOSPADM

## 2019-01-15 RX ORDER — SODIUM CHLORIDE 9 MG/ML
100 INJECTION, SOLUTION INTRAVENOUS CONTINUOUS
Status: DISCONTINUED | OUTPATIENT
Start: 2019-01-15 | End: 2019-01-16 | Stop reason: HOSPADM

## 2019-01-15 RX ORDER — PAROXETINE HYDROCHLORIDE 20 MG/1
10 TABLET, FILM COATED ORAL DAILY
Status: DISCONTINUED | OUTPATIENT
Start: 2019-01-16 | End: 2019-01-16 | Stop reason: HOSPADM

## 2019-01-15 RX ADMIN — CARBIDOPA AND LEVODOPA 2 TABLET: 25; 100 TABLET ORAL at 22:23

## 2019-01-15 RX ADMIN — PHYTONADIONE 5 MG: 10 INJECTION, EMULSION INTRAMUSCULAR; INTRAVENOUS; SUBCUTANEOUS at 21:09

## 2019-01-15 RX ADMIN — ACETAMINOPHEN 1000 MG: 500 TABLET ORAL at 22:23

## 2019-01-15 RX ADMIN — SODIUM CHLORIDE 100 ML/HR: 900 INJECTION, SOLUTION INTRAVENOUS at 21:09

## 2019-01-15 RX ADMIN — Medication 10 ML: at 21:12

## 2019-01-15 RX ADMIN — POLYETHYLENE GLYCOL 3350, SODIUM SULFATE ANHYDROUS, SODIUM BICARBONATE, SODIUM CHLORIDE, POTASSIUM CHLORIDE 4000 ML: 236; 22.74; 6.74; 5.86; 2.97 POWDER, FOR SOLUTION ORAL at 21:58

## 2019-01-15 NOTE — PROGRESS NOTES
GI  Note I spoke to Dr Jesus Jade and will see pt in am  
Plan to do  Colonoscopy Wed around noon Bowel prep ordered

## 2019-01-15 NOTE — ED NOTES
Pt presents to ED c/o rectal bleeding and was sent from PCP's office. Pt noticed bright red bleeding from yesterday. Pt is on Coumadin for hx of CVA. Denies any abdominal pain, diarrhea. Pt is A&O, VSS.

## 2019-01-15 NOTE — PROGRESS NOTES
Mr. Mickey Peacock came to the office today for his comprehensive examination. While initiating the examination he was noted to have rectal bleeding through his pants onto the paper on the exam table. Subsequent evaluation revealed bright red blood and maroon type blood exuding from his rectum. Anoscopy did not reveal the source of this. He is on anticoagulation, but his INR for today is unknown, though it was therapeutic three weeks ago. CBC was done a week ago and 12.4 and has only fallen to 12.2 as of today. Nonetheless with the nature of the bleeding and the fact that he is anticoagulated it is felt prudent to admit him for further evaluation and therapy.

## 2019-01-15 NOTE — ED PROVIDER NOTES
EMERGENCY DEPARTMENT HISTORY AND PHYSICAL EXAM 
 
 
Date: 1/15/2019 Patient Name: Rudy Busby History of Presenting Illness Chief Complaint Patient presents with  Rectal Bleeding  
  pt ambulatory to triage. A/Ox4. x couple hours bright red blood while at PCP. denies pain. pt currently on coumadin History Provided By: Patient and Patient's Daughter HPI: Rudy Busby, [de-identified] y.o. male with PMHx significant for HTN, stroke, CKD, constipation, presents ambulatory to the ED with cc of rectal bleeding since yesterday. He reports an associated symptom of constipation. Pt states he had went to see his PCP today for an annual physical exam, when the RN had noticed blood on the back of his pants. Pt had told his wife that he believes the bleeding was secondary to him straining to pass a bowel movement and believes the bleeding is from a hemorrhoid. Pt's daughter notes the pt is on Coumadin and the pt states he is on the anticoagulant secondary to a prior stroke. When asked about a h/o rectal bleeding, the pt notes he had 1-2 episodes in the past and denies any recent colonoscopies. He further confirms he had strained to pass a BM yesterday as well with a \"few drops\" of rectal bleeding. Pt denies any rectal pain and abdominal pain. There are no other complaints, changes, or physical findings at this time. PCP: Marck Aaron MD 
 
No current facility-administered medications on file prior to encounter. Current Outpatient Medications on File Prior to Encounter Medication Sig Dispense Refill  losartan (COZAAR) 50 mg tablet TAKE 1 TABLET DAILY 90 Tab 3  
 gabapentin (NEURONTIN) 100 mg capsule Take 2 Caps by mouth two (2) times a day. [place]  carbidopa-levodopa (SINEMET)  mg per tablet 2 p.o. 4 times daily  Indications: IDIOPATHIC PARKINSONISM 240 Tab 5  PARoxetine (PAXIL) 10 mg tablet TAKE 1 TABLET DAILY 90 Tab 3  
  amLODIPine (NORVASC) 10 mg tablet TAKE 1 TABLET DAILY 90 Tab 2  
 esomeprazole (NEXIUM) 20 mg capsule TAKE 1 CAPSULE DAILY 90 Cap 3  
 atorvastatin (LIPITOR) 40 mg tablet TAKE 1 TABLET DAILY 90 Tab 3  warfarin (COUMADIN) 5 mg tablet TAKE ONE-HALF (1/2) TABLET ON MONDAY, WEDNESDAY AND FRIDAY AND TAKE 1 TABLET ALL OTHER DAYS 120 Tab 3  
 acetaminophen (TYLENOL EXTRA STRENGTH) 500 mg tablet Take 1,000 mg by mouth three (3) times daily as needed for Pain.  senna-docusate (SENNA-S) 8.6-50 mg per tablet Take 1 Tab by mouth daily as needed.  aspirin delayed-release 81 mg tablet Take  by mouth daily.  vitamin e (E GEMS) 1,000 unit capsule Take 2,000 Units by mouth daily.  MULTIVITAMIN PO Take 1 Tab by mouth daily. Past History Past Medical History: 
Past Medical History:  
Diagnosis Date  Aaron esophagus  Benign nodular prostatic hyperplasia without lower urinary tract symptoms 7/12/2017  CKD (chronic kidney disease) stage 2, GFR 60-89 ml/min  Constipation  Coronary artery disease 7/12/2017  Depression  DJD (degenerative joint disease) 7/12/2017 [address]  Gout 7/12/2017  Headache   
 Hearing difficulty of both ears 7/12/2017  Hyperlipidemia  Hypertension  Impaired cognition 7/12/2017  MCI (mild cognitive impairment)  Restless leg syndrome 7/12/2017  Sleep apnea in adult 7/12/2017 No longer on CPAP @13  Snoring  Stroke ([facility] [address]  Tendinitis 7/12/2017 ACHILLES  Tendinitis of left rotator cuff 07/12/2017  Tremor 7/12/2017 LEFT HAND  Unspecified sleep apnea   
 lost weight no longer has it Past Surgical History: 
Past Surgical History:  
Procedure Laterality Date  HX ORTHOPAEDIC  1/27/15 L4-L5 MICRODECOMPRESSION (Right  [place] EGD TRANSORAL BIOPSY SINGLE/MULTIPLE  8/16/2011  [place] EGD TRANSORAL BIOPSY SINGLE/MULTIPLE  10/1/2013 Family History: 
Family History Problem Relation Age of Onset  Heart Disease Mother  Heart Disease Father Social History: 
Social History Tobacco Use  Smoking status: Former Smoker  Smokeless tobacco: Never Used Substance Use Topics  Alcohol use: No  
 Drug use: No  
 
 
Allergies: 
No Known Allergies Review of Systems Review of Systems Constitutional: Negative for chills and fever. Respiratory: Negative for cough and shortness of breath. Cardiovascular: Negative for chest pain. Gastrointestinal: Positive for anal bleeding. Negative for abdominal pain, constipation, diarrhea, nausea, rectal pain and vomiting. Neurological: Negative for weakness and numbness. All other systems reviewed and are negative. Physical Exam  
Physical Exam  
Constitutional: He is oriented to person, place, and time. He appears well-developed and well-nourished. HENT:  
Head: Normocephalic and atraumatic. Eyes: Conjunctivae and EOM are normal.  
Neck: Normal range of motion. Neck supple. Cardiovascular: Normal rate and regular rhythm. Pulmonary/Chest: Effort normal and breath sounds normal. No respiratory distress. Abdominal: Soft. He exhibits no distension. There is no tenderness. Musculoskeletal: Normal range of motion. Neurological: He is alert and oriented to person, place, and time. Shuffling gait Skin: Skin is warm and dry. Psychiatric: He has a normal mood and affect. Nursing note and vitals reviewed. Diagnostic Study Results Labs - Recent Results (from the past 12 hour(s)) AMB POC COMPLETE CBC,AUTOMATED ENTER Collection Time: 01/15/19  2:52 PM  
Result Value Ref Range WBC (POC) 7.8 4.1 - 10.9 K/uL  
 RBC (POC) 3.55 (A) 4.20 - 6.30 M/uL  
 HGB (POC) 12.2 12.0 - 18.0 g/dL HCT (POC) 35.7 (A) 37.0 - 51.0 %  
 MCV (POC) 101.0 (A) 80.0 - 97.0 fL  
 MCH (POC) 34.5 (A) 26.0 - 32.0 pg  
 MCHC (POC) 34.2 30.0 - 36.0 g/dL PLATELET (POC) 289.1 140.0 - 440.0 K/uL ABS. LYMPHS (POC) 2.2 0.6 - 4.1 K/uL LYMPHOCYTES (POC) 28.9 10.0 - 58.5 % Mid # (POC) 0.3 0.0 - 1.8 K/uL MID% POC 4.2 0.1 - 24.0 %  
 ABS. GRANS (POC) 5.3 2.0 - 7.8 K/uL GRANULOCYTES (POC) 66.9 37.0 - 92.0 % CBC WITH AUTOMATED DIFF Collection Time: 01/15/19  4:51 PM  
Result Value Ref Range WBC 8.6 4.1 - 11.1 K/uL  
 RBC 3.65 (L) 4.10 - 5.70 M/uL  
 HGB 12.6 12.1 - 17.0 g/dL HCT 37.3 36.6 - 50.3 % .2 (H) 80.0 - 99.0 FL  
 MCH 34.5 (H) 26.0 - 34.0 PG  
 MCHC 33.8 30.0 - 36.5 g/dL  
 RDW 12.3 11.5 - 14.5 % PLATELET 839 466 - 346 K/uL MPV 9.9 8.9 - 12.9 FL  
 NRBC 0.0 0  WBC ABSOLUTE NRBC 0.00 0.00 - 0.01 K/uL NEUTROPHILS 69 32 - 75 % LYMPHOCYTES 22 12 - 49 % MONOCYTES 6 5 - 13 % EOSINOPHILS 2 0 - 7 % BASOPHILS 0 0 - 1 % IMMATURE GRANULOCYTES 0 0.0 - 0.5 % ABS. NEUTROPHILS 6.0 1.8 - 8.0 K/UL  
 ABS. LYMPHOCYTES 1.9 0.8 - 3.5 K/UL  
 ABS. MONOCYTES 0.5 0.0 - 1.0 K/UL  
 ABS. EOSINOPHILS 0.2 0.0 - 0.4 K/UL  
 ABS. BASOPHILS 0.0 0.0 - 0.1 K/UL  
 ABS. IMM. GRANS. 0.0 0.00 - 0.04 K/UL  
 DF AUTOMATED METABOLIC PANEL, COMPREHENSIVE Collection Time: 01/15/19  4:51 PM  
Result Value Ref Range Sodium 138 136 - 145 mmol/L Potassium 4.4 3.5 - 5.1 mmol/L Chloride 106 97 - 108 mmol/L  
 CO2 25 21 - 32 mmol/L Anion gap 7 5 - 15 mmol/L Glucose 102 (H) 65 - 100 mg/dL BUN 31 (H) 6 - 20 MG/DL Creatinine 1.24 0.70 - 1.30 MG/DL  
 BUN/Creatinine ratio 25 (H) 12 - 20 GFR est AA >60 >60 ml/min/1.73m2 GFR est non-AA 56 (L) >60 ml/min/1.73m2 Calcium 9.1 8.5 - 10.1 MG/DL Bilirubin, total 0.4 0.2 - 1.0 MG/DL  
 ALT (SGPT) 12 12 - 78 U/L  
 AST (SGOT) 16 15 - 37 U/L Alk. phosphatase 79 45 - 117 U/L Protein, total 8.0 6.4 - 8.2 g/dL Albumin 4.1 3.5 - 5.0 g/dL Globulin 3.9 2.0 - 4.0 g/dL A-G Ratio 1.1 1.1 - 2.2 PROTHROMBIN TIME + INR Collection Time: 01/15/19  4:51 PM  
Result Value Ref Range INR 2.2 (H) 0.9 - 1.1 Prothrombin time 21.8 (H) 9.0 - 11.1 sec TYPE & SCREEN Collection Time: 01/15/19  4:51 PM  
Result Value Ref Range Crossmatch Expiration 01/18/2019 ABO/Rh(D) A POSITIVE Antibody screen NEG   
OCCULT BLOOD, STOOL Collection Time: 01/15/19  5:45 PM  
Result Value Ref Range Occult blood, stool POSITIVE (A) NEG Radiologic Studies - No orders to display Medical Decision Making I am the first provider for this patient. I reviewed the vital signs, available nursing notes, past medical history, past surgical history, family history and social history. Vital Signs-Reviewed the patient's vital signs. Patient Vitals for the past 12 hrs: 
 Temp Pulse Resp BP SpO2  
01/15/19 1640 98.5 °F (36.9 °C) 71 17 123/74 100 % Records Reviewed: Nursing Notes, Old Medical Records, Previous Radiology Studies and Previous Laboratory Studies Provider Notes (Medical Decision Making):  
Patient presents with lower GI bleeding on warfarin. DDx: AVM, diverticulosis, diverticulitis, IBD, IBS, colitis, hemorrhoids. Will get labs, T+S, keep on monitor and give fluids to start if prior EF% allows. Will also get orthostatics PRN. ED Course:  
Initial assessment performed. The patients presenting problems have been discussed, and they are in agreement with the care plan formulated and outlined with them. I have encouraged them to ask questions as they arise throughout their visit. PROGRESS NOTE: 
5:33 PM 
Updated the pt on available results. Procedure Note - Rectal Exam:  
5:40 PM 
Performed by: Kimber Thompson M.D Chaperoned by: Velasquez Burgess RN Rectal exam performed. Gross blood in rectal vault. Stool was collected and sent to the lab for Hemoccult testing. Other findings: gauze with gross blood on gauze, no external hemorrhoids, no fissures/tears, no palpable internal hemorrhoids The procedure took 1-15 minutes, and pt tolerated well.  
 
PROGRESS NOTE: 
 5:57 PM 
RN informs that the pt's PCP had called and is questioning why the pt is not a direct admission. He states he will be admitting the pt. CONSULT NOTE: 
5:58 PM 
Priscilla Lomax M.D spoke with Dr. Lucio Field, Specialty: PCP Discussed patient's hx, disposition, and available diagnostic and imaging results. Reviewed care plans. Consultant agrees with plans as outlined. Consultant states he had already direct admitted the pt and that he has a neuro-tele bed available for the pt. He had spoken to Dr. Elisha Cervantes, who will see the pt tomorrow. Critical Care Time:  
0 Disposition: PLAN: 
1. Admit ADMIT NOTE: 
6:01 PM 
Patient is being admitted to the hospital by Dr. Lucio Field. The results of their tests and reasons for their admission have been discussed with them and/or available family. They convey agreement and understanding for the need to be admitted and for their admission diagnosis. Consultation has been made with the inpatient physician specialist for hospitalization. Diagnosis Clinical Impression: No diagnosis found. Attestations: This note is prepared by Devonda Aase, acting as Scribe for Priscilla Lomax M.D. Priscilla Lomax M.D: The scribe's documentation has been prepared under my direction and personally reviewed by me in its entirety. I confirm that the note above accurately reflects all work, treatment, procedures, and medical decision making performed by me. This note will not be viewable in [address]

## 2019-01-15 NOTE — PROGRESS NOTES
Chief Complaint Patient presents with [facility] Annual Wellness Visit Depression Risk Factor Screening: PHQ over the last two weeks 12/13/2017 Little interest or pleasure in doing things Not at all Feeling down, depressed, irritable, or hopeless Not at all Total Score PHQ 2 0 Functional Ability and Level of Safety: Activities of Daily Living ADL Assessment 1/15/2019 Feeding yourself No Help Needed Getting from bed to chair No Help Needed Getting dressed No Help Needed Bathing or showering No Help Needed Walk across the room (includes cane/walker) No Help Needed Using the telphone No Help Needed Taking your medications Help Needed Preparing meals No Help Needed Managing money (expenses/bills) Help Needed Moderately strenuous housework (laundry) No Help Needed Shopping for personal items (toiletries/medicines) No Help Needed Shopping for groceries No Help Needed Driving Help Needed Climbing a flight of stairs No Help Needed Getting to places beyond walking distances No Help Needed Fall Risk Fall Risk Assessment, last 12 mths 1/15/2019 Able to walk? Yes Fall in past 12 months? No  
 
 
Abuse Screen Abuse Screening Questionnaire 1/15/2019 Do you ever feel afraid of your partner? Bayard Epley Are you in a relationship with someone who physically or mentally threatens you? Bayard Epley Is it safe for you to go home? Ryan Keenan Patient Care Team  
Patient Care Team: 
Lucero Higuera MD as PCP - General (Internal Medicine) Bela Landa MD as Physician (Neurology) Chester Crooks MD (Neurology)

## 2019-01-16 ENCOUNTER — ANESTHESIA EVENT (OUTPATIENT)
Dept: ENDOSCOPY | Age: 79
DRG: 394 | End: 2019-01-16
Payer: MEDICARE

## 2019-01-16 ENCOUNTER — ANESTHESIA (OUTPATIENT)
Dept: ENDOSCOPY | Age: 79
DRG: 394 | End: 2019-01-16
Payer: MEDICARE

## 2019-01-16 VITALS
HEIGHT: 65 IN | RESPIRATION RATE: 15 BRPM | DIASTOLIC BLOOD PRESSURE: 81 MMHG | HEART RATE: 57 BPM | TEMPERATURE: 97.6 F | SYSTOLIC BLOOD PRESSURE: 170 MMHG | BODY MASS INDEX: 31.11 KG/M2 | WEIGHT: 186.73 LBS | OXYGEN SATURATION: 97 %

## 2019-01-16 PROBLEM — D62 ACUTE BLOOD LOSS ANEMIA: Status: ACTIVE | Noted: 2019-01-16

## 2019-01-16 LAB
ANION GAP SERPL CALC-SCNC: 4 MMOL/L (ref 5–15)
BASOPHILS # BLD: 0 K/UL (ref 0–0.1)
BASOPHILS NFR BLD: 1 % (ref 0–1)
BUN SERPL-MCNC: 25 MG/DL (ref 6–20)
BUN/CREAT SERPL: 27 (ref 12–20)
CALCIUM SERPL-MCNC: 8.8 MG/DL (ref 8.5–10.1)
CHLORIDE SERPL-SCNC: 108 MMOL/L (ref 97–108)
CO2 SERPL-SCNC: 26 MMOL/L (ref 21–32)
COMMENT, HOLDF: NORMAL
CREAT SERPL-MCNC: 0.93 MG/DL (ref 0.7–1.3)
DIFFERENTIAL METHOD BLD: ABNORMAL
EOSINOPHIL # BLD: 0.2 K/UL (ref 0–0.4)
EOSINOPHIL NFR BLD: 3 % (ref 0–7)
ERYTHROCYTE [DISTWIDTH] IN BLOOD BY AUTOMATED COUNT: 12.1 % (ref 11.5–14.5)
GLUCOSE SERPL-MCNC: 86 MG/DL (ref 65–100)
HCT VFR BLD AUTO: 33.6 % (ref 36.6–50.3)
HCT VFR BLD AUTO: 35.8 % (ref 36.6–50.3)
HGB BLD-MCNC: 11.2 G/DL (ref 12.1–17)
HGB BLD-MCNC: 12.1 G/DL (ref 12.1–17)
IMM GRANULOCYTES # BLD AUTO: 0 K/UL (ref 0–0.04)
IMM GRANULOCYTES NFR BLD AUTO: 0 % (ref 0–0.5)
INR PPP: 1.7 (ref 0.9–1.1)
INR PPP: 2.4 (ref 0.8–1.2)
LYMPHOCYTES # BLD: 2.8 K/UL (ref 0.8–3.5)
LYMPHOCYTES NFR BLD: 33 % (ref 12–49)
MCH RBC QN AUTO: 33.6 PG (ref 26–34)
MCHC RBC AUTO-ENTMCNC: 33.3 G/DL (ref 30–36.5)
MCV RBC AUTO: 100.9 FL (ref 80–99)
MONOCYTES # BLD: 0.7 K/UL (ref 0–1)
MONOCYTES NFR BLD: 8 % (ref 5–13)
NEUTS SEG # BLD: 4.7 K/UL (ref 1.8–8)
NEUTS SEG NFR BLD: 56 % (ref 32–75)
NRBC # BLD: 0 K/UL (ref 0–0.01)
NRBC BLD-RTO: 0 PER 100 WBC
PLATELET # BLD AUTO: 186 K/UL (ref 150–400)
PMV BLD AUTO: 10.1 FL (ref 8.9–12.9)
POTASSIUM SERPL-SCNC: 4.4 MMOL/L (ref 3.5–5.1)
PROTHROMBIN TIME: 17 SEC (ref 9–11.1)
PROTHROMBIN TIME: 23.3 SEC (ref 9.1–12)
RBC # BLD AUTO: 3.33 M/UL (ref 4.1–5.7)
SAMPLES BEING HELD,HOLD: NORMAL
SODIUM SERPL-SCNC: 138 MMOL/L (ref 136–145)
WBC # BLD AUTO: 8.5 K/UL (ref 4.1–11.1)

## 2019-01-16 PROCEDURE — 36415 COLL VENOUS BLD VENIPUNCTURE: CPT

## 2019-01-16 PROCEDURE — 77030014243 HC BND LIG VRCES BSC -D: Performed by: INTERNAL MEDICINE

## 2019-01-16 PROCEDURE — 80048 BASIC METABOLIC PNL TOTAL CA: CPT

## 2019-01-16 PROCEDURE — 76060000032 HC ANESTHESIA 0.5 TO 1 HR: Performed by: INTERNAL MEDICINE

## 2019-01-16 PROCEDURE — 0W3P8ZZ CONTROL BLEEDING IN GASTROINTESTINAL TRACT, VIA NATURAL OR ARTIFICIAL OPENING ENDOSCOPIC: ICD-10-PCS | Performed by: INTERNAL MEDICINE

## 2019-01-16 PROCEDURE — 85610 PROTHROMBIN TIME: CPT

## 2019-01-16 PROCEDURE — 85025 COMPLETE CBC W/AUTO DIFF WBC: CPT

## 2019-01-16 PROCEDURE — 74011000258 HC RX REV CODE- 258: Performed by: INTERNAL MEDICINE

## 2019-01-16 PROCEDURE — C9113 INJ PANTOPRAZOLE SODIUM, VIA: HCPCS | Performed by: INTERNAL MEDICINE

## 2019-01-16 PROCEDURE — 74011250636 HC RX REV CODE- 250/636

## 2019-01-16 PROCEDURE — 85018 HEMOGLOBIN: CPT

## 2019-01-16 PROCEDURE — 74011250636 HC RX REV CODE- 250/636: Performed by: INTERNAL MEDICINE

## 2019-01-16 PROCEDURE — 94760 N-INVAS EAR/PLS OXIMETRY 1: CPT

## 2019-01-16 PROCEDURE — 74011250637 HC RX REV CODE- 250/637: Performed by: INTERNAL MEDICINE

## 2019-01-16 PROCEDURE — 76040000007: Performed by: INTERNAL MEDICINE

## 2019-01-16 RX ORDER — SODIUM CHLORIDE 9 MG/ML
75 INJECTION, SOLUTION INTRAVENOUS CONTINUOUS
Status: DISCONTINUED | OUTPATIENT
Start: 2019-01-16 | End: 2019-01-16 | Stop reason: HOSPADM

## 2019-01-16 RX ORDER — PROPOFOL 10 MG/ML
INJECTION, EMULSION INTRAVENOUS AS NEEDED
Status: DISCONTINUED | OUTPATIENT
Start: 2019-01-16 | End: 2019-01-16 | Stop reason: HOSPADM

## 2019-01-16 RX ORDER — PHYTONADIONE 10 MG/ML
2.5 INJECTION, EMULSION INTRAMUSCULAR; INTRAVENOUS; SUBCUTANEOUS ONCE
Status: DISCONTINUED | OUTPATIENT
Start: 2019-01-16 | End: 2019-01-16

## 2019-01-16 RX ORDER — LIDOCAINE HYDROCHLORIDE 20 MG/ML
INJECTION, SOLUTION EPIDURAL; INFILTRATION; INTRACAUDAL; PERINEURAL AS NEEDED
Status: DISCONTINUED | OUTPATIENT
Start: 2019-01-16 | End: 2019-01-16 | Stop reason: HOSPADM

## 2019-01-16 RX ADMIN — SODIUM CHLORIDE 75 ML/HR: 900 INJECTION, SOLUTION INTRAVENOUS at 11:19

## 2019-01-16 RX ADMIN — PROPOFOL 10 MG: 10 INJECTION, EMULSION INTRAVENOUS at 11:54

## 2019-01-16 RX ADMIN — PROPOFOL 20 MG: 10 INJECTION, EMULSION INTRAVENOUS at 11:45

## 2019-01-16 RX ADMIN — CARBIDOPA AND LEVODOPA 2 TABLET: 25; 100 TABLET ORAL at 10:30

## 2019-01-16 RX ADMIN — PROPOFOL 20 MG: 10 INJECTION, EMULSION INTRAVENOUS at 11:35

## 2019-01-16 RX ADMIN — ATORVASTATIN CALCIUM 40 MG: 40 TABLET, FILM COATED ORAL at 10:30

## 2019-01-16 RX ADMIN — GABAPENTIN 200 MG: 100 CAPSULE ORAL at 09:21

## 2019-01-16 RX ADMIN — PROPOFOL 10 MG: 10 INJECTION, EMULSION INTRAVENOUS at 11:59

## 2019-01-16 RX ADMIN — LIDOCAINE HYDROCHLORIDE 40 MG: 20 INJECTION, SOLUTION EPIDURAL; INFILTRATION; INTRACAUDAL; PERINEURAL at 11:30

## 2019-01-16 RX ADMIN — PHYTONADIONE 2.5 MG: 10 INJECTION, EMULSION INTRAMUSCULAR; INTRAVENOUS; SUBCUTANEOUS at 09:21

## 2019-01-16 RX ADMIN — PAROXETINE 10 MG: 20 TABLET, FILM COATED ORAL at 10:30

## 2019-01-16 RX ADMIN — PANTOPRAZOLE SODIUM 40 MG: 40 INJECTION, POWDER, FOR SOLUTION INTRAVENOUS at 10:30

## 2019-01-16 RX ADMIN — PROPOFOL 20 MG: 10 INJECTION, EMULSION INTRAVENOUS at 11:40

## 2019-01-16 RX ADMIN — PROPOFOL 20 MG: 10 INJECTION, EMULSION INTRAVENOUS at 11:31

## 2019-01-16 RX ADMIN — CARBIDOPA AND LEVODOPA 2 TABLET: 25; 100 TABLET ORAL at 13:44

## 2019-01-16 RX ADMIN — Medication 10 ML: at 09:21

## 2019-01-16 NOTE — ANESTHESIA PREPROCEDURE EVALUATION
Anesthetic History No history of anesthetic complications Review of Systems / Medical History Patient summary reviewed, nursing notes reviewed and pertinent labs reviewed Pulmonary Sleep apnea Neuro/Psych CVA 
TIA and psychiatric history Comments: RADICULOPATHY, SPINAL STENOSIS Cardiovascular Hypertension CAD Exercise tolerance: >4 METS Comments: Inferior infarct , age undetermined GI/Hepatic/Renal 
  
 
 
 
 
 
Comments: beltran's Endo/Other Arthritis Other Findings Comments: Rectal bleeding Hgb 12.1 Physical Exam 
 
Airway Mallampati: I Neck ROM: normal range of motion Mouth opening: Normal 
 
 Cardiovascular Regular rate and rhythm,  S1 and S2 normal,  no murmur, click, rub, or gallop Dental 
 
 
Comments: No loose Pulmonary Breath sounds clear to auscultation Abdominal 
GI exam deferred Other Findings Anesthetic Plan ASA: 3 Anesthesia type: general and total IV anesthesia Induction: Intravenous Anesthetic plan and risks discussed with: Patient

## 2019-01-16 NOTE — PROGRESS NOTES
Bedside and Verbal shift change report given to [facility] (oncoming nurse) by krystle (offgoing nurse). Report included the following information SBAR, Kardex, ED Summary, MAR, Recent Results and Cardiac Rhythm not on tele. Zone Phone:   [phone] Significant changes during shift:  New admission. Vitamin K given. colyte given for possible colonoscopy. Pt had two watery brownish/red stools Patient Information Gem Rowe [de-identified] y.o. 
1/15/2019  4:58 PM by Tita Moran MD. Gem Rowe was admitted from Home 
 
Problem List 
 
Patient Active Problem List  
 Diagnosis Date Noted  Parkinson's disease ([facility] [address] 01/30/2015 Priority: 2 - Two  Long-term current use of high risk medication other than anticoagulant 06/13/2018 Priority: 3 - Three  Gastroesophageal reflux disease without esophagitis 09/30/2017 Priority: 3 - Three  Rectal bleeding 01/15/2019  PE (physical exam), annual 09/30/2017  Aaron esophagus  Depression  Hyperlipidemia  Snoring  Hearing difficulty of both ears 07/12/2017  Sleep apnea in adult 07/12/2017  Tendinitis of left rotator cuff 07/12/2017  Coronary artery disease 07/12/2017  DJD (degenerative joint disease) 07/12/2017  Tendinitis 07/12/2017  Gout 07/12/2017  Tremor 07/12/2017  Restless leg syndrome 07/12/2017  Benign nodular prostatic hyperplasia without lower urinary tract symptoms 07/12/2017  Lumbar stenosis 01/29/2015  Hypertension  Stroke ([facility] [address]  MCI (mild cognitive impairment)  CKD (chronic kidney disease) stage 2, GFR 60-89 ml/min Past Medical History:  
Diagnosis Date  Aaron esophagus  Benign nodular prostatic hyperplasia without lower urinary tract symptoms 7/12/2017  CKD (chronic kidney disease) stage 2, GFR 60-89 ml/min  Constipation  Coronary artery disease 7/12/2017  Depression  DJD (degenerative joint disease) 7/12/2017 [address]  Gout 7/12/2017  Headache   
 Hearing difficulty of both ears 7/12/2017  Hyperlipidemia  Hypertension  Impaired cognition 7/12/2017  MCI (mild cognitive impairment)  Restless leg syndrome 7/12/2017  Sleep apnea in adult 7/12/2017 No longer on CPAP @13  Snoring  Stroke ([address]  Tendinitis 7/12/2017 ACHILLES  Tendinitis of left rotator cuff 07/12/2017  Tremor 7/12/2017 LEFT HAND  Unspecified sleep apnea   
 lost weight no longer has it Core Measures: CVA: No No 
CHF:No No 
PNA:No No 
 
 
 
Activity Status: OOB to Chair Yes Ambulated this shift Yes Bed Rest No 
 
Supplemental O2: (If Applicable) N/A 
 
 
LINES AND DRAINS: 
 
PIV  
DVT prophylaxis: DVT prophylaxis Med- No 
DVT prophylaxis SCD or LASHAUN- No  
 
Wounds: (If Applicable) Wounds- No 
 
 
 
Patient Safety: 
 
Falls Score Total Score: 3 Safety Level_______ Bed Alarm On? Yes Sitter? No 
 
Plan for upcoming shift:  Pt to remain npo except meds, continue colyte. Monitor for bleeding, serial H&H q8h - [facility] scheduled for 0930. Discharge Plan: No, ongoing. Active Consults: 
IP CONSULT TO GASTROENTEROLOGY

## 2019-01-16 NOTE — PROGRESS NOTES
[address] [place] [address] 
[phone] Medical Progress Note NAME: Amira Sharpe :  1940 MRM:  [id number] Date/Time: 2019  7:22 AM 
 
Subjective:  
 
Admitted with LGI bleed. No complaints. Tolerating prep for colonoscopy and still appears to be bleeding or clearing out old blood. Hgb down to 11.2. INR 1.7. hemodynamically stable Past Medical History reviewed and unchanged from Admission History and Physical and/or prior progress note/electronic medical record Medications reviewed. ROS:   
 
General: negative for fever, chills, sweats, weakness Ear Nose and Throat: negative for rhinorrhea, pharyngitis, otalgia, tinnitus, speech or swallowing difficulties Respiratory:  negative for cough, sputum production, SOB, wheezing, MURCIA, pleuritic pain 
Cardiology:  negative for chest pain, palpitations, orthopnea, PND, edema, syncope Gastrointestinal: negative for abdominal pain, N/V, dysphagia,still with blood with stooling Genitourinary: negative for frequency, urgency, dysuria, hematuria, incontinence Hematology: negative for easy bruising, bleeding, lymphadenopathy Neurological: positive for tremor and Parkinson's sxs Objective:  
 
Last 24hrs VS reviewed since prior progress note. Most recent are: 
 
Visit Vitals /73 Pulse (!) 54 Temp 97 °F (36.1 °C) Resp 18 Ht 5' 5\" (1.651 m) Wt 186 lb 11.7 oz (84.7 kg) SpO2 97% BMI 31.07 kg/m² SpO2 Readings from Last 6 Encounters:  
19 97% 01/15/19 94% 18 98% 10/01/18 97% 18 98% 18 96% Intake/Output Summary (Last 24 hours) at 2019 6194 Last data filed at 2019 9936 Gross per 24 hour Intake 2400 ml Output 400 ml Net 2000 ml Physical Exam: 
 
General appearance: alert, cooperative, no distress, appears stated age Eyes: negative Neck: supple, symmetrical, trachea midline, no adenopathy, thyroid: not enlarged, symmetric, no tenderness/mass/nodules, no carotid bruit and no JVD Lungs: clear to auscultation bilaterally Heart: regular rate and rhythm, S1, S2 normal, no murmur, click, rub or gallop Abdomen: soft, non-tender. Bowel sounds normal. No masses,  no organomegaly Extremities: extremities normal, atraumatic, no cyanosis or edema Neurologic: Tremor, increased tone Data Review: 
 
Lab: 
 
BMP:  
Lab Results Component Value Date/Time  01/16/2019 01:32 AM  
 K 4.4 01/16/2019 01:32 AM  
  01/16/2019 01:32 AM  
 CO2 26 01/16/2019 01:32 AM  
 AGAP 4 (L) 01/16/2019 01:32 AM  
 GLU 86 01/16/2019 01:32 AM  
 BUN 25 (H) 01/16/2019 01:32 AM  
 CREA 0.93 01/16/2019 01:32 AM  
 GFRAA >60 01/16/2019 01:32 AM  
 GFRNA >60 01/16/2019 01:32 AM  
 
CBC:  
Lab Results Component Value Date/Time WBC 8.5 01/16/2019 01:32 AM  
 HGB 11.2 (L) 01/16/2019 01:32 AM  
 HCT 33.6 (L) 01/16/2019 01:32 AM  
  01/16/2019 01:32 AM  
 
COAGS:  
Lab Results Component Value Date/Time PTP 17.0 (H) 01/16/2019 01:32 AM  
 INR 1.7 (H) 01/16/2019 01:32 AM  
 
All labs reviewed in past 24 hours Other pertinent lab: none Imaging: 
 
Reviewed. CXR Assessment / Plan: Active Problems: Hypertension () Stroke [facility]) () Overview: Left parieto- occipital with cognitive impairment MCI (mild cognitive impairment) () 
 
  Parkinson's disease ([facility] [address] (1/30/2015) Rectal bleeding (1/15/2019) CKD (chronic kidney disease) stage 2, GFR 60-89 ml/min () 1. Serial H/H and INR's 2. Repeat Vit K 3. Colonoscopy 4. Continue other Rx Care Plan discussed with: Patient and Family Discussed:  Care Plan and D/C Planning Prophylaxis:  H2B/PPI Disposition:  Home w/Family 
___________________________________________________ Attending Physician: Waqar Caro MD

## 2019-01-16 NOTE — ROUTINE PROCESS
While discharging patient after patient left, pop up came up stating that pt discharge written for the 17th. Looked back at order and no discharge date had been written. Called Dr. Pj Caicedo office and nurse had stated that she didn't see anything either until she searched around. She stated that the order was written for the 17th. Nurse said going to have on call physician page to clarify order.

## 2019-01-16 NOTE — ROUTINE PROCESS
Fidel Bigler 1940 
[id number] Situation: 
Verbal report received from: Reilly Caldwell RN Procedure: Procedure(s) with comments: 
COLONOSCOPY 
ENDOSCOPIC BANDING OR LIGATION - 2 bands were used Background: 
 
Preoperative diagnosis: rectal bleed Postoperative diagnosis: bleeding hemorrhoid, diverticulosis :  Dr. Niharika Frances Assistant(s): Endoscopy Technician-1: Kathy Castillo Endoscopy RN-1: Bhakti Nevarez RN Specimens: * No specimens in log * H. Pylori  no Assessment: 
Intra-procedure medications Anesthesia gave intra-procedure sedation and medications, see anesthesia flow sheet Intravenous fluids: NS@ Shreya Alosa Vital signs stable Abdominal assessment: round and soft Recommendation:. Return to floor Family or Friend Permission to share finding with family or friend yes

## 2019-01-16 NOTE — ANESTHESIA POSTPROCEDURE EVALUATION
Procedure(s): 
COLONOSCOPY 
ENDOSCOPIC BANDING OR LIGATION. Anesthesia Post Evaluation Patient location during evaluation: PACU Note status: Adequate. Level of consciousness: responsive to verbal stimuli and sleepy but conscious Pain management: satisfactory to patient Airway patency: patent Anesthetic complications: no 
Cardiovascular status: acceptable Respiratory status: acceptable Hydration status: acceptable Comments: +Post-Anesthesia Evaluation and Assessment Patient: Sharona Black MRN: [id number]  SSN: [id number] YOB: 1940  Age: [de-identified] y.o. Sex: male Cardiovascular Function/Vital Signs /81   Pulse (!) 57   Temp 36.4 °C (97.6 °F)   Resp 15   Ht 5' 5\" (1.651 m)   Wt 84.7 kg (186 lb 11.7 oz)   SpO2 97%   BMI 31.07 kg/m² Patient is status post Procedure(s) with comments: 
COLONOSCOPY 
ENDOSCOPIC BANDING OR LIGATION - 2 bands were used. Nausea/Vomiting: Controlled. Postoperative hydration reviewed and adequate. Pain: 
Pain Scale 1: Numeric (0 - 10) (01/16/19 1251) Pain Intensity 1: 0 (01/16/19 1251) Managed. Neurological Status: At baseline. Mental Status and Level of Consciousness: Arousable. Pulmonary Status:  
O2 Device: Room air (01/16/19 1251) Adequate oxygenation and airway patent. Complications related to anesthesia: None Post-anesthesia assessment completed. No concerns. Signed By: Tai Cyr DO  
 1/16/2019 Post anesthesia nausea and vomiting:  controlled Visit Vitals /81 Pulse (!) 57 Temp 36.4 °C (97.6 °F) Resp 15 Ht 5' 5\" (1.651 m) Wt 84.7 kg (186 lb 11.7 oz) SpO2 97% BMI 31.07 kg/m²

## 2019-01-16 NOTE — PROGRESS NOTES
.. TRANSFER - OUT REPORT: 
 
Verbal report given to Quirino(name) on Ry Current  being transferred to [facility](unit) for routine progression of care Report consisted of patients Situation, Background, Assessment and  
Recommendations(SBAR). Information from the following report(s) Procedure Summary, Intake/Output, MAR, Accordion and Recent Results was reviewed with the receiving nurse. Lines:  
Peripheral IV 01/15/19 Left Forearm (Active) Site Assessment Clean, dry, & intact 1/16/2019  9:13 AM  
Phlebitis Assessment 0 1/16/2019  9:13 AM  
Infiltration Assessment 0 1/16/2019  9:13 AM  
Dressing Status Clean, dry, & intact 1/16/2019  9:13 AM  
Dressing Type Tape;Transparent 1/16/2019  9:13 AM  
Hub Color/Line Status Pink 1/16/2019  9:13 AM  
  
 
Opportunity for questions and clarification was provided. Patient transported with: 
 Registered Nurse

## 2019-01-16 NOTE — H&P
[facility] HISTORY AND PHYSICAL Name:Aide ANGELA. 
MR#: [id number] : 1940 ACCOUNT #: [de-identified] ADMIT DATE: 01/15/2019 PATIENT PROFILE:  The patient is an 78-year-old  male admitted with rectal bleeding. HISTORY OF PRESENT ILLNESS:  This patient stated that he was in his usual state of health until the last couple of days, during which he has felt constipated and been straining at stool. He did note during some attempts to defecate some bleeding, which he thought was from a hemorrhoid. He is anticoagulated. He presented to the office on the day of admission for a comprehensive examination, and in the course of the examination was noted to have bloodied underwear and pants with the blood seeping through to the paper on the examining room table. Examination subsequently revealed maroonish and bright-red blood per rectum. Anoscopy did not reveal any significant hemorrhoids or anal fissures, and the blood was noted to be above the reach of the anoscope. Rectal examination revealed gross blood on examination without palpable hemorrhoids as well. He was subsequently referred for admission in the face of his age and his anticoagulation. INR at the time of admission was 2.2 and therapeutic, and his hemoglobin was stable at 12.2. He was hemodynamically stable as well, but given his anticoagulant status, it was felt prudent to admit him for further evaluation and therapy. His last colonoscopy revealed diverticulosis only, but this was in . Prior to this time, he had not noted significant previous bleeding. PAST MEDICAL HISTORY:  Includes: 1. Coronary artery disease by EBT heart scan without symptomatic angina or symptoms of congestive heart failure. 2.  Hyperlipidemia. 3.  Hypertension. 4.  Parkinson disease with tremor and gait disturbance, and bradykinesia.  
5.  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 6.  Lumbar spinal stenosis, status post laminectomy at L4-5. He continues to have residual back pain at times and occasionally some leg pain. 7.  Markedly diminished auditory acuity. He has tried hearing aids, but rejected them. 8.  History of gout. 9.  History of depression. 10.  Obstructive sleep apnea, no longer using CPAP. He still has snoring according to his wife. 11.  History of Aaron esophagus, status post dilatation of a stricture. His last EGD was in . 12.  Gastroesophageal reflux disease. 13.  Chronic kidney disease stage II. 14.  Benign prostatic hypertrophy. 15.  Degenerative joint disease. 16.  Restless leg syndrome. 17.  Left rotator cuff tendinitis. 18.  Cognitive impairment. 19.  History of Achilles tendinitis. ALLERGIES:  NONE KNOWN. CURRENT MEDICATIONS: 
1. Amlodipine 10 mg daily. 2.  Lipitor 40 mg daily. 3.  Aspirin 81 mg daily. 4.  Paroxetine 10 mg daily. 5.  Nexium 20 mg daily. 6.  Losartan 50 mg daily. 7.  Multivitamin daily. 8.  Carbidopa/levodopa  two 4 times a day. 9.  Coumadin 5 mg tablets on Saturday and  and 2.5 mg on all other days. 10.  Senna-S daily as needed. 11.  Gabapentin 100 mg 2 twice a day. 12.  Vitamin E 1000 international units 2 a day. 13.  Extra Strength Tylenol 2 tablets 3 times a day. SOCIAL HISTORY:  He does not smoke. He has an occasional glass of wine. He is retired. He tries to follow a low-fat diet. He exercises regularly. He is disabled from a stroke. FAMILY HISTORY:  Father  of MI at age 79. Mother  of an MI at age 61. REVIEW OF SYSTEMS: 
CONSTITUTIONAL:  He denies fever, weight loss, sweats, or fatigue. HEENT:  No blurred or double vision, headache or dizziness. No difficulties with swallowing, taste, speech, or smell. RESPIRATORY:  No cough, wheezing, or shortness of breath.  
CARDIOVASCULAR:  He denies chest pain, palpitations, unexplained indigestion, syncope, edema, PND, or orthopnea. GASTROINTESTINAL:  Rectal bleeding as noted. No abdominal pain, anorexia, nausea, vomiting, or heartburn. GENITOURINARY:  He denies frequency, nocturia, stranguria, or dysuria. EXTREMITIES:  Some intermittent joint pain and stiffness without swelling. NEUROLOGIC:  Gait disturbance as well as short-term memory loss. He has a tremor as well as bradykinesia and increased tone. PHYSICAL EXAMINATION: 
GENERAL:  Well developed, well nourished, no acute distress. VITAL SIGNS:  Blood pressure 123/74, pulse 71, respirations 17, O2 saturation 100% on room air, temperature 98.5. HEENT:  Face symmetric. Pharynx clear. NECK:  Supple without adenopathy. No JVD or bruit. CHEST:  Clear to P and A. CARDIOVASCULAR:  Regular rate and rhythm without significant murmur. ABDOMEN:  Flat, soft, nontender, no palpable organomegaly or mass. RECTAL:  Lax sphincter tone. Prostate is mildly enlarged, but soft and non-nodular. Gross blood obtained on rectal examination as well. Anoscopy reveals gross blood extending above the scope of the anoscope with no obvious hemorrhoids externally or internally. EXTREMITIES:  No clubbing, cyanosis, or edema. Pulses intact. NEUROLOGIC:  Gait is forward flexed and wide based. He is bradykinetic. He has tremor of his outstretched hands and increased tone overall. Short-term memory impairment is noted as well. LABORATORY DATA:  Initial hemoglobin 12.6, WBC 8600. Urinalysis clear. INR 2.2. CMP remarkable for a creatinine of 1.24, but otherwise normal.  EKG reveals sinus bradycardia without acute changes. Chest x-ray is pending. IMPRESSION: 
1. Rectal bleeding with last colonoscopy in the remote past revealing diverticulosis. Most likely, this is a diverticular bleed. We will rule out other etiologies. 2.  Coronary artery disease by EBT heart scan without symptomatic angina. 3.  Hyperlipidemia. 4.  Hypertension. 5.  Remote history of left parietooccipital infarct with residual cognitive impairment. 6.  Parkinson disease. 7.  Lumbar spinal stenosis, status post laminectomy at L4-5. 
8.  History of gout. 9.  History of depression. 10.  Obstructive sleep apnea. 11.  History of Aaron esophagus, status post dilatation for stricture. 12.  Gastroesophageal reflux disease. 13.  Chronic kidney disease stage II. 14.  Benign prostatic hypertrophy. 15.  Degenerative joint disease. 16.  Restless leg syndrome. 17.  Left rotator cuff tendinitis. 18.  Cognitive impairment. ADMISSION PLAN:   
1. The patient will be admitted, maintained n.p.o. except for meds. 2.  He will be prepped for colonoscopy. 3.  I have talked with Dr. Shital Braden, who will see him in the morning and consider him for colonoscopy. 4.  Medications will be continued except for his blood pressure medications, which will be held. 5.  Vitamin K will be given to partially reverse his INR. 6.  Serial H and H's will be obtained. 7.  He will be given Protonix IV. 8.  Further evaluation and therapy will be dependent upon his initial hospital course. MD SIMONA Viveros/RAYMOND 
D: 01/15/2019 18:56    
T: 01/15/2019 19:43 JOB #: [id number] CC: Aebba Owen MD

## 2019-01-16 NOTE — CONSULTS
GASTROENTEROLOGY CONSULTATION NOTE Ava Jackson MD 
[facility], [address] [place] [address], [address] 
[phone] 
[email or web address] NAME:  Amparo Sanders :   1940 MRN:   [id number] PCP: Brian St MD 
Date/Time:  2019 10:40 AM 
 
Referring Physician: Marcelo Blanc MD 
 
Consult Date: 2019 10:40 AM 
 
Reason for consult: Rectal bleeding Assessment: 1. Acute hematochezia 2. Chronic anticoagulation 3. Known colonic diverticulosis and hemorrhoids from colonoscopy done  Active Problems: 
  Parkinson's disease ([facility] (2015) Hypertension () Stroke [facility]) () Overview: Left parieto- occipital with cognitive impairment MCI (mild cognitive impairment) () 
 
  CKD (chronic kidney disease) stage 2, GFR 60-89 ml/min () Rectal bleeding (1/15/2019) Plan:  
Colonoscopy today Coumadin on hold Serial cbc History of Present Illness:  Patient is a [de-identified] y.o. who is seen in consultation at the request of Dr. Umu Buck for rectal bleeding. Mr Clark Raya is a pleasant [de-identified] yo WM with history of Aaron's esophagus, CAD, hx of CVA on chronic coumadin who developed painless rectal bleeding for several days PTA. Saw Dr. Umu Buck yesterday and had episode of passing blood per rectum---maroon to brighter red. Anoscopy showed no source of bleeding, so he was admitted for evaluation. Hgb was 12.2 and INR was therapeutic at 2.2. Prior colonoscopy in  showed diverticulosis. There is no prior hx of GI bleeding. PMH: 
Past Medical History:  
Diagnosis Date  Aaron esophagus  Benign nodular prostatic hyperplasia without lower urinary tract symptoms 2017  CKD (chronic kidney disease) stage 2, GFR 60-89 ml/min  Constipation  Coronary artery disease 2017  Depression  DJD (degenerative joint disease) 2017 [address]  Gout 7/12/2017  Headache   
 Hearing difficulty of both ears 7/12/2017  Hyperlipidemia  Hypertension  Impaired cognition 7/12/2017  MCI (mild cognitive impairment)  Restless leg syndrome 7/12/2017  Sleep apnea in adult 7/12/2017 No longer on CPAP @13  Snoring  Stroke ([address]  Tendinitis 7/12/2017 [facility]ES  Tendinitis of left rotator cuff 07/12/2017  Tremor 7/12/2017 LEFT HAND  Unspecified sleep apnea   
 lost weight no longer has it PSH: 
Past Surgical History:  
Procedure Laterality Date  HX ORTHOPAEDIC  1/27/15 L4-L5 MICRODECOMPRESSION (Right  MA EGD TRANSORAL BIOPSY SINGLE/MULTIPLE  8/16/2011  MA EGD TRANSORAL BIOPSY SINGLE/MULTIPLE  10/1/2013 Allergies: 
No Known Allergies Hospital Medications: 
Current Facility-Administered Medications Medication Dose Route Frequency  acetaminophen (TYLENOL) tablet 1,000 mg  1,000 mg Oral TID PRN  
 atorvastatin (LIPITOR) tablet 40 mg  40 mg Oral DAILY  carbidopa-levodopa (SINEMET)  mg per tablet 2 Tab  2 Tab Oral QID  gabapentin (NEURONTIN) capsule 200 mg  200 mg Oral BID  PARoxetine (PAXIL) tablet 10 mg  10 mg Oral DAILY  sodium chloride (NS) flush 5-40 mL  5-40 mL IntraVENous Q8H  
 sodium chloride (NS) flush 5-40 mL  5-40 mL IntraVENous PRN  
 0.9% sodium chloride infusion  100 mL/hr IntraVENous CONTINUOUS  
 pantoprazole (PROTONIX) injection 40 mg  40 mg IntraVENous DAILY Home Medications: 
Prior to Admission Medications Prescriptions Last Dose Informant Patient Reported? Taking? MULTIVITAMIN PO   Yes No  
Sig: Take 1 Tab by mouth daily. PARoxetine (PAXIL) 10 mg tablet   No No  
Sig: TAKE 1 TABLET DAILY  
acetaminophen (TYLENOL EXTRA STRENGTH) 500 mg tablet   Yes No  
Sig: Take 1,000 mg by mouth three (3) times daily as needed for Pain. amLODIPine (NORVASC) 10 mg tablet   No No  
Sig: TAKE 1 TABLET DAILY aspirin delayed-release 81 mg tablet   Yes No  
Sig: Take  by mouth daily. atorvastatin (LIPITOR) 40 mg tablet   No No  
Sig: TAKE 1 TABLET DAILY  
carbidopa-levodopa (SINEMET)  mg per tablet   No No  
Si p.o. 4 times daily  Indications: IDIOPATHIC PARKINSONISM  
esomeprazole (NEXIUM) 20 mg capsule   No No  
Sig: TAKE 1 CAPSULE DAILY  
gabapentin (NEURONTIN) 100 mg capsule   No No  
Sig: Take 2 Caps by mouth two (2) times a day. losartan (COZAAR) 50 mg tablet   No No  
Sig: TAKE 1 TABLET DAILY  
senna-docusate (SENNA-S) 8.6-50 mg per tablet   Yes No  
Sig: Take 1 Tab by mouth daily as needed. vitamin e (E GEMS) 1,000 unit capsule   Yes No  
Sig: Take 2,000 Units by mouth daily. warfarin (COUMADIN) 5 mg tablet   No No  
Sig: TAKE ONE-HALF (1/2) TABLET ON MONDAY, WEDNESDAY AND FRIDAY AND TAKE 1 TABLET ALL OTHER DAYS Facility-Administered Medications: None Social History: 
Social History Tobacco Use  Smoking status: Former Smoker  Smokeless tobacco: Never Used Substance Use Topics  Alcohol use: No  
 
 
Family History: 
Family History Problem Relation Age of Onset  Heart Disease Mother  Heart Disease Father Objective:  
 
Patient Vitals for the past 8 hrs: 
 BP Temp Pulse Resp SpO2  
19 0813 150/64 97.6 °F (36.4 °C) 65 18 99 % 19 0330 148/73 97 °F (36.1 °C) (!) 54 18 97 % No intake/output data recorded.  1901 -  0700 In: 2400 [P.O.:1500; I.V.:900] Out: 400 [Urine:400] EXAM:   
 NEURO-a&o HEENT-wnl LUNGS-clear COR-regular rate and rhythym ABD-soft , no tenderness, no rebound, good bs Data Review Recent Labs  
  19 
0132 01/15/19 
1651 WBC 8.5 8.6 HGB 11.2* 12.6 HCT 33.6* 37.3  203 Recent Labs  
  19 
0132 01/15/19 
1651  138  
K 4.4 4.4  106 CO2 26 25 BUN 25* 31* CREA 0.93 1.24  
GLU 86 102* CA 8.8 9.1 Recent Labs  
  01/15/19 
7672 SGOT 16  
AP 79 TBILI 0.4 TP 8.0 ALB 4.1  
GLOB 3.9 ALT 12 Recent Labs  
  01/15/19 
1651 SGOT 16  
AP 79  
TP 8.0 ALB 4.1  
GLOB 3.9 Recent Labs  
  01/16/19 
0132 01/15/19 
1651 INR 1.7* 2.2* PTP 17.0* 21.8* IMAGING RESULTS: 
 []      I have personally reviewed the actual   []    CXR  []    CT  []     US Care Plan discussed with: 
  [x]    Patient   [x]    Family   [x]    Nursing   [x]    Attending Sandra Cook MD

## 2019-01-16 NOTE — PROGRESS NOTES
Reason for Admission:   Patient came in to ed for complaint of rectal bleeding. RRAT Score:    26 Resources/supports as identified by patient/family: Has supportive daughter and wife. Top Challenges facing patient (as identified by patient/family and CM): Finances/Medication cost?    Wife denies problems obtaining patient's medications. Transportation? Wife transports him to his follow up appointments. Support system or lack thereof? He has supportive family. Living arrangements? He lives in one story home with his wife. Self-care/ADLs/Cognition? Per wife states patient was independent prior to coming to the hospital. He does not drive. He uses a rollator sometimes at home. He denies using home oxygen. Current Advanced Directive/Advance Care Plan:  Not on file. Plan for utilizing home health:    He has used home health services in the past. He has been to [address] inpatient in the past.  
                   
Likelihood of readmission:  Based on comorbidities--high Transition of Care Plan:    Patient plans to return home with family when discharged and will follow up with medical team. Family states they will transport him home when discharged. Care Management Interventions PCP Verified by CM: Yes Transition of Care Consult (CM Consult): Discharge Planning Discharge Durable Medical Equipment: (Patient has rollator at home. ) Current Support Network: Lives with Spouse Confirm Follow Up Transport: Family Plan discussed with Pt/Family/Caregiver: Yes Discharge Location Discharge Placement: Home

## 2019-01-16 NOTE — PROGRESS NOTES
Problem: Falls - Risk of 
Goal: *Absence of Falls Document Ebb [place] Fall Risk and appropriate interventions in the flowsheet. Outcome: Progressing Towards Goal 
Fall Risk Interventions: 
Mobility Interventions: Bed/chair exit alarm, Patient to call before getting OOB, Utilize walker, cane, or other assistive device Medication Interventions: Bed/chair exit alarm, Patient to call before getting OOB Elimination Interventions: Bed/chair exit alarm, Call light in reach, Patient to call for help with toileting needs

## 2019-01-16 NOTE — ROUTINE PROCESS
Pt discharged. Discharge instructions reviewed with patient and family. Included medications and follow up appointments. All questions answered. Family spoke with Dr. Portillo Umana nurse to confirm discharge as they weren't aware of pt being discharged. Nurse confirmed discharged and said okay to d/c today. IV taken out. No telebox on patient. Volunteers took patient downstairs along with wife and daughter.

## 2019-01-16 NOTE — DISCHARGE INSTRUCTIONS
[address]  [phone]      Patient Discharge Instructions    Sharona Black / [id number] : 1940    Admitted 1/15/2019 Discharged: 2019     Active Problems:    Hypertension ()      Stroke ([facility] ()      Overview: Left parieto- occipital with cognitive impairment      MCI (mild cognitive impairment) ()      Parkinson's disease ([facility] (2015)      Rectal bleeding (1/15/2019)      CKD (chronic kidney disease) stage 2, GFR 60-89 ml/min ()      Acute blood loss anemia (2019)          No Known Allergies    · It is important that you take the medication exactly as they are prescribed. · Do not take other medications without consulting your doctor. What to do at Next Level of Care    Disposition:  HOME    Recommended diet: Regular Diet    Recommended activity: Activity as tolerated    Wound Care:  none    Lab:  none    Other:  none    Oxygen:  none          Information obtained by :  I understand that if any problems occur once I am at home I am to contact my physician. I understand and acknowledge receipt of the instructions indicated above.                                                                                                                                            Physician's or R.N.'s Signature                                                                  Date/Time                                                                                                                                              Patient or Representative Signature                                                          Date/Time

## 2019-01-16 NOTE — PROCEDURES
[facility] Colonoscopy Operative Report 1/16/2019 Keith Sepulveda [id number] 
1940 Procedure Type:   Colonoscopy with control of bleeding Indications:    Lower GI bleeding Pre-operative Diagnosis: see indication above Post-operative Diagnosis:  See findings below :  Tramaine Harris MD 
 
Referring Provider: Ann Temple MD 
 
 
Sedation:  MAC anesthesia Propofol Pre-Procedural Exam: Airway: clear,  No airway problems anticipated Heart: RRR, without gallops or rubs Lungs: clear bilaterally without wheezes, crackles, or rhonchi Abdomen: soft, nontender, nondistended, bowel sounds present Mental Status: awake, alert and oriented to person, place and time Procedure Details:  After informed consent was obtained with all risks and benefits of procedure explained and preoperative exam completed, the patient was taken to the endoscopy suite and placed in the left lateral decubitus position. Upon sequential sedation as per above, a digital rectal exam was performed . The Olympus videocolonoscope  was inserted in the rectum and carefully advanced to the cecum, which was identified by the ileocecal valve and appendiceal orifice. The cecum was identified by the ileocecal valve and appendiceal orifice. The quality of preparation was adequate. The colonoscope was slowly withdrawn with careful evaluation between folds. Retroflexion in the rectum was completed demonstrating internal hemorrhoids. Findings:  
Rectum: Actively bleeding internal hemorrhoid from a pinpoint area. Banded with 2 bands with complete hemostasis. Sigmoid:     - Diverticulosis Descending Colon:     - Diverticulosis Transverse Colon: normal 
Ascending Colon: normal 
Cecum: normal 
Terminal Ileum: not intubated Specimen Removed:  none Complications: None. EBL:  None. Impression:    Actively bleeding internal hemorrhoid---banded Diverticulosis coli---not bleeding Recommendations: --Regular diet. Needs to be off coumadin for at least three weeks. Christa Faust MD 
 
1/16/2019 NICHOLAS Starks MD 
[facility], [address] [place] [address], [address] 
[phone] 
[email or web address]

## 2019-01-16 NOTE — PROGRESS NOTES
TRANSFER - IN REPORT: 
 
Verbal report received from Huang Bush (name) on Consuelo Hillman  being received from ED(unit) for routine progression of care Report consisted of patients Situation, Background, Assessment and  
Recommendations(SBAR). Information from the following report(s) SBAR, ED Summary and MAR was reviewed with the receiving nurse. Opportunity for questions and clarification was provided. Assessment completed upon patients arrival to unit and care assumed.

## 2019-01-16 NOTE — CDMP QUERY
Account Number: [de-identified] MRN: [id number] Patient: Sobia Bowman Created: [id number] Clinician Name: Jaci Horton RN, CCDS Dr. Renee Ventura MD : 
Please clarify if this patient is being treated/managed for:  
 
=> Acute drop in hemoglobin in the setting of GI bleed requiring serial H/H,   
=> Acute blood loss anemia POA  
=> Other explanation of clinical findings 
=> Clinically Undetermined (no explanation for clinical findings) The medical record reflects the following clinical findings, treatment, and risk factors. Risk Factors:  oral anticoagulant, diverticular bleed Clinical Indicators:   Hgb down to 11.2 from 12 overnight. Treatment: Serial H/H and INR's, IVF, colonoscopy  w/ control of bleed Please clarify and document your clinical opinion in the progress notes and discharge summary including the definitive and/or presumptive diagnosis, (suspected or probable), related to the above clinical findings. Please include clinical findings supporting your diagnosis.  
Thank Trish Childers RN, CCDS

## 2019-01-17 ENCOUNTER — PATIENT OUTREACH (OUTPATIENT)
Dept: INTERNAL MEDICINE CLINIC | Age: 79
End: 2019-01-17

## 2019-01-17 NOTE — PROGRESS NOTES
Spiritual Care Partner Volunteer visited patient in neuro tele on January 16, 2019. Documented by: 
ABDIEL Lo Div  Paging Service [phone])

## 2019-01-17 NOTE — DISCHARGE SUMMARY
[address] DISCHARGE SUMMARY Name:Sunni ANGELA 
MR#: [id number] : 1940 ACCOUNT #: [de-identified] ADMIT DATE: 01/15/2019 DISCHARGE DATE: 2019 PATIENT PROFILE:  The patient is an 69-year-old  male admitted with rectal bleeding. This patient was in his usual state of health until the day of admission when he presented to the office for his comprehensive yearly examination and was found to have bloodied his underwear and pants with the blood seeping through to the paper on the examining table. He had noted some mild issues with constipation and straining at stool and had noted some minimal bleeding prior to that time and felt that it was hemorrhoidal in origin, though he was having no pain. Examination in the office subsequently revealed maroonish and bright red blood per rectum. Anoscopy did not reveal any significant hemorrhoids or anal fissures and the blood was noted to be above the region of the anoscope. Rectal revealed gross blood on examination without palpable hemorrhoids as well. He was subsequently referred for admission in the face of his age and his anticoagulation. His last colonoscopy dated back to , at which time he was noted only to have diverticulosis. Other problems were as detailed in the admission history and physical. 
 
EXAMINATION AT THE TIME OF ADMISSION:   
GENERAL:  He appeared well-developed, well-nourished, no acute distress. VITAL SIGNS:  Blood pressure 123/74, pulse 71, respirations 17, O2 saturation 100% on room air, temperature 98.5. HEENT:  Face symmetric. Pharynx clear. NECK:  Supple, without adenopathy. CHEST:  Clear to P and A. CARDIOVASCULAR:  Regular rate and rhythm without significant murmur. ABDOMEN:  Flat, soft, nontender, no palpable organomegaly or mass. RECTAL:  Revealed a lax anal sphincter. Prostate was mildly enlarged. Gross blood was obtained on rectal examination.   Anoscopy revealed gross blood extending above the scope of the anoscope with no obvious hemorrhoids externally or internally. EXTREMITIES:  No clubbing, cyanosis or edema. Pulses intact. NEUROLOGIC:  Moderate tremor, increased tone overall. Short term memory impairment. Gait is forward flexed and wide based and he appeared bradykinetic consistent with his Parkinson's disease. LABORATORY DATA:  Initial Hemoglobin 12.6, WBC 8600. H and H fell at 11.2 and 33.6, but rebounded to 12.1 and 35.8 prior to discharge. Urinalysis clear. INR initially 2.2 and after vitamin K 1.7. CMP normal with a creatinine of 1.24 on admission, which improved to 0.93 by discharge with some hydration. IMAGING:  Chest x-ray:  No active disease. EKG revealed sinus bradycardia, but was otherwise normal. 
 
HOSPITAL COURSE:  Patient was admitted, started on gentle IV hydration. He was given vitamin K to reverse his anticoagulation. The evening of admission, he was prepped for colonoscopy the next day. He was seen in consultation by Dr. Maxi Dale and the day following admission, was brought to the endoscopy suite and underwent colonoscopy. This revealed actively bleeding internal hemorrhoid from a pinpoint area. This was banded with 2 bands and the bleeding stopped. He otherwise noted to have diverticulosis. Following the colonoscopy, he remained stable. It was originally intended that he would be discharged on the 17th, but the orders were misread. He was actually discharged shortly after his colonoscopy, which was not felt to have been particularly egregious issue. His daughter is a nurse and can monitor him for the next 12-24 hours. He can follow up quickly in the office as well. FINAL DIAGNOSES: 
1. Rectal bleeding hemorrhoidal in origin. 2.  Parkinson disease. 3.  Hypertension. 4.  History of stroke. 5.  Chronic kidney disease stage II. 6.  Acute blood loss anemia. 7.  History of Aaron esophagus. PROCEDURE:  Colonoscopy. CONSULTATIONS:  Gastroenterology, Dr. Ilda Hayward. CONDITION AT DISCHARGE:  Stable to improve. DISCHARGE PLANS: 
1. Regular diet. 2.  Activity as tolerated. 3.  Follow up in the office 5 days' time. 4.  He is to stop his Coumadin. 5.  Other medications to  include amlodipine 10 mg daily, aspirin 81 mg daily, atorvastatin 40 mg daily, carbidopa/levodopa 25/100 two 4 times a day, Nexium 20 mg daily,  gabapentin 100 mg 2 twice a day, losartan 50 mg daily, multivitamin daily, Paroxetine 10 mg daily, Senna S daily as needed and Extra Strength Tylenol 1000 mg 3 times a day as needed and vitamin E 1000 units 2 gel caps daily. DISPOSITION:  Home MD SIMONA Carlos/RN 
D: 01/16/2019 19:36    
T: 01/17/2019 07:03 
JOB #: [id number] CC: Warren Villalobos MD

## 2019-01-17 NOTE — PROGRESS NOTES
Hospital Discharge Follow-Up Date/Time:  2019 12:52 PM 
 
Patient was admitted to [facility] on 1/15/19 and discharged on 19 for rectal bleeding. The physician discharge summary was available at the time of outreach. Patient was contacted within two business days of discharge. Top Challenges reviewed with the provider Patient currently off Coumadin per discharge instructions Currently has no ACP on file Method of communication with provider :chart routing Inpatient RRAT score: 26 Was this a readmission? no  
Patient stated reason for the readmission: n/a Nurse Navigator (NN) contacted the family by telephone to perform post hospital discharge assessment. Verified name and  with family as identifiers. Provided introduction to self, and explanation of the Nurse Navigator role. Reviewed discharge instructions and red flags with family who verbalized understanding. Family given an opportunity to ask questions and does not have any further questions or concerns at this time. The family agrees to contact the PCP office for questions related to their healthcare. NN provided contact information for future reference. Disease Specific:   N/A Summary of patient's top problems: 1. Recent hospital stay for rectal bleeding - colonoscopy revealed active bleeding from internal hemorrhoids. Banded with 2 bands. 2. Coumadin being held at this time due to bleeding - History of stroke. Home Health orders at discharge: none [address]: n/a Date of initial visit: n/a Durable Medical Equipment ordered/company: n/a Durable Medical Equipment received: n/a Barriers to care? none Advance Care Planning:  
Does patient have an Advance Directive:  not on file; education provided Medication(s):  
New Medications at Discharge: n/a Changed Medications at Discharge: n/a Discontinued Medications at Discharge: coumadin Medication reconciliation was performed with family, who verbalizes understanding of administration of home medications. There were no barriers to obtaining medications identified at this time. Referral to Pharm D needed: no  
 
Current Outpatient Medications Medication Sig  
 losartan (COZAAR) 50 mg tablet TAKE 1 TABLET DAILY  gabapentin (NEURONTIN) 100 mg capsule Take 2 Caps by mouth two (2) times a day.  carbidopa-levodopa (SINEMET)  mg per tablet 2 p.o. 4 times daily  Indications: IDIOPATHIC PARKINSONISM  PARoxetine (PAXIL) 10 mg tablet TAKE 1 TABLET DAILY  amLODIPine (NORVASC) 10 mg tablet TAKE 1 TABLET DAILY  esomeprazole (NEXIUM) 20 mg capsule TAKE 1 CAPSULE DAILY  atorvastatin (LIPITOR) 40 mg tablet TAKE 1 TABLET DAILY  acetaminophen (TYLENOL EXTRA STRENGTH) 500 mg tablet Take 1,000 mg by mouth three (3) times daily as needed for Pain.  senna-docusate (SENNA-S) 8.6-50 mg per tablet Take 1 Tab by mouth daily as needed.  aspirin delayed-release 81 mg tablet Take  by mouth daily.  vitamin e (E GEMS) 1,000 unit capsule Take 2,000 Units by mouth daily.  MULTIVITAMIN PO Take 1 Tab by mouth daily. No current facility-administered medications for this visit. There are no discontinued medications. [facility] follow up appointment(s):  
Future Appointments Date Time Provider Rox Barahonaisti 1/21/2019  2:15 PM Nadja Sarabia MD 3 Dhruv Garcia  
3/7/2019 10:00 AM Gianni Lao  [facility] follow up appointment(s): n/a [facility]:  n/a  
 
 
Goals  Understands red flags post discharge. 1/17/19-NN spoke with patient's wife and daughter re: recent hospital stay for rectal bleeding. Patient has had no more episodes of bleeding at this time. Advised them to report any additional episodes of bleeding immediately should they occur. Patient has a f/u with PCP on 1/21/19. Family given opportunity to ask questions or discuss any concerns and they have none at this time. They will contact MD should any arise. NN will f/u in 2 weeks with patient / v.s.

## 2019-01-21 ENCOUNTER — OFFICE VISIT (OUTPATIENT)
Dept: INTERNAL MEDICINE CLINIC | Age: 79
End: 2019-01-21

## 2019-01-21 VITALS
OXYGEN SATURATION: 99 % | TEMPERATURE: 97.1 F | HEART RATE: 53 BPM | WEIGHT: 187.4 LBS | RESPIRATION RATE: 20 BRPM | DIASTOLIC BLOOD PRESSURE: 60 MMHG | BODY MASS INDEX: 31.22 KG/M2 | HEIGHT: 65 IN | SYSTOLIC BLOOD PRESSURE: 102 MMHG

## 2019-01-21 DIAGNOSIS — D62 ACUTE BLOOD LOSS ANEMIA: ICD-10-CM

## 2019-01-21 DIAGNOSIS — G31.84 MCI (MILD COGNITIVE IMPAIRMENT): ICD-10-CM

## 2019-01-21 DIAGNOSIS — K62.5 RECTAL BLEEDING: Primary | ICD-10-CM

## 2019-01-21 DIAGNOSIS — I10 HYPERTENSION, UNSPECIFIED TYPE: ICD-10-CM

## 2019-01-21 DIAGNOSIS — G20 PARKINSON'S DISEASE (HCC): ICD-10-CM

## 2019-01-21 LAB
GRAN# POC: 6.3 K/UL (ref 2–7.8)
GRAN% POC: 70.4 % (ref 37–92)
HCT VFR BLD CALC: 34.3 % (ref 37–51)
HGB BLD-MCNC: 11.7 G/DL (ref 12–18)
LY# POC: 2.2 K/UL (ref 0.6–4.1)
LY% POC: 26 % (ref 10–58.5)
MCH RBC QN: 34.5 PG (ref 26–32)
MCHC RBC-ENTMCNC: 34 G/DL (ref 30–36)
MCV RBC: 101 FL (ref 80–97)
MID #, POC: 0.3 K/UL (ref 0–1.8)
MID% POC: 3.6 % (ref 0.1–24)
PLATELET # BLD: 217 K/UL (ref 140–440)
RBC # BLD: 3.39 M/UL (ref 4.2–6.3)
WBC # BLD: 8.8 K/UL (ref 4.1–10.9)

## 2019-01-21 NOTE — PROGRESS NOTES
Transitions Of Care [facility], [facility], 1/15/19 - 01/16/19, Rectal Bleeding)       HPI:  Latisha Peterson is a [de-identified]y.o. year old male who is here for a follow up visit for hospitalization transition of care. He was last seen by me on 1/16/2019. Discharged on: 1/16/2019 and called within 48 hours of discharge by nursing staff. Diagnosis in hospital: rectal bleeding    Complications in hospital:  none    Medication reconciliation was completed this visit and medication changes include the following: On ASA 81 mg;  Off Coumadin    Discharge Summary reviewed. He reports the following:    Mr. Kyra Pascual returns from his recent overnight hospitalization for rectal bleeding. He was admitted and prepped the same day and underwent colonoscopy the following day and was found to have a bleeding hemorrhoid, which was not visible with anoscopy or straight colonoscopy, but could only be seen with a colonoscope retroflexed. Dr. Paige Ramirez banded the hemorrhoid and the bleeding stopped and he was subsequently discharged. He has had no further bleeding since returning home. His bowel movements have been scanty, but he was prepped for the colonoscopy and has been eating relatively little since returning home. He does feel as though he is constipated so we will start him on some Senokot-S. He denies abdominal pain, melena, hematochezia, nausea, vomiting, fever or chills or other GI complaints. He denies any chest pain. His daughter accompanied him today and we discussed at length whether he needs to continue Coumadin. I would like to stop it because controlling his INR has been more difficult recently and also because of his Parkinson's disease and tendency to fall. In addition, he has been long term on Coumadin without any recurrent difficulties and I think aspirin alone should be adequate. The family is in agreement with this.   His last visit here was supposed to be his comprehensive examination, but he was admitted to the hospital instead. We will reschedule that for a month.           Visit Vitals  /60 (BP 1 Location: Left arm, BP Patient Position: Sitting)   Pulse (!) 53   Temp 97.1 °F (36.2 °C) (Oral)   Resp 20   Ht 5' 5\" (1.651 m)   Wt 187 lb 6.4 oz (85 kg)   SpO2 99%   BMI 31.18 kg/m²       Historical Data    Patient Active Problem List    Diagnosis Date Noted    Rectal bleeding 01/15/2019     Priority: 1 - One    Parkinson's disease ([facility] [address] 01/30/2015     Priority: 1 - One    Hypertension      Priority: 1 - One    Stroke ([facility] [address]      Priority: 1 - One    MCI (mild cognitive impairment)      Priority: 1 - One    Acute blood loss anemia 01/16/2019     Priority: 2 - Two    Aaron esophagus      Priority: 2 - Two    Hyperlipidemia      Priority: 2 - Two    Coronary artery disease 07/12/2017     Priority: 2 - Two    CKD (chronic kidney disease) stage 2, GFR 60-89 ml/min      Priority: 2 - Two    Long-term current use of high risk medication other than anticoagulant 06/13/2018     Priority: 3 - Three    Gastroesophageal reflux disease without esophagitis 09/30/2017     Priority: 3 - Three    Depression      Priority: 3 - Three    Tendinitis of left rotator cuff 07/12/2017     Priority: 3 - Three    DJD (degenerative joint disease) 07/12/2017     Priority: 3 - Three    Tremor 07/12/2017     Priority: 3 - Three    Snoring      Priority: 4 - Four    Sleep apnea in adult 07/12/2017     Priority: 4 - Four    Tendinitis 07/12/2017     Priority: 4 - Four    Gout 07/12/2017     Priority: 4 - Four    Restless leg syndrome 07/12/2017     Priority: 4 - Four    Benign nodular prostatic hyperplasia without lower urinary tract symptoms 07/12/2017     Priority: 4 - Four    Lumbar stenosis 01/29/2015     Priority: 4 - Four    Hearing difficulty of both ears 07/12/2017     Priority: 5 - Five    PE (physical exam), annual 09/30/2017       Past Surgical History:   Procedure Laterality Date    COLONOSCOPY N/A 1/16/2019    COLONOSCOPY performed by Diogenes Bocanegra MD at hospitals ENDOSCOPY    COLONOSCOPY,FLEX,W/CONTROL, BLEEDING  1/16/2019         HX ORTHOPAEDIC  1/27/15    L4-L5 MICRODECOMPRESSION (Right    UT EGD TRANSORAL BIOPSY SINGLE/MULTIPLE  8/16/2011         UT EGD TRANSORAL BIOPSY SINGLE/MULTIPLE  10/1/2013            Outpatient Medications Marked as Taking for the 1/21/19 encounter (Office Visit) with Danny Mccauley MD   Medication Sig Dispense Refill    losartan (COZAAR) 50 mg tablet TAKE 1 TABLET DAILY 90 Tab 3    gabapentin (NEURONTIN) 100 mg capsule Take 2 Caps by mouth two (2) times a day. 360 Cap 3    carbidopa-levodopa (SINEMET)  mg per tablet 2 p.o. 4 times daily  Indications: IDIOPATHIC PARKINSONISM 240 Tab 5    PARoxetine (PAXIL) 10 mg tablet TAKE 1 TABLET DAILY 90 Tab 3    amLODIPine (NORVASC) 10 mg tablet TAKE 1 TABLET DAILY 90 Tab 2    esomeprazole (NEXIUM) 20 mg capsule TAKE 1 CAPSULE DAILY 90 Cap 3    atorvastatin (LIPITOR) 40 mg tablet TAKE 1 TABLET DAILY 90 Tab 3    acetaminophen (TYLENOL EXTRA STRENGTH) 500 mg tablet Take 1,000 mg by mouth three (3) times daily as needed for Pain.  senna-docusate (SENNA-S) 8.6-50 mg per tablet Take 1 Tab by mouth daily as needed.  aspirin delayed-release 81 mg tablet Take  by mouth daily.  vitamin e (E GEMS) 1,000 unit capsule Take 2,000 Units by mouth daily.  MULTIVITAMIN PO Take 1 Tab by mouth daily.           No Known Allergies     Social History     Socioeconomic History    Marital status:      Spouse name: Not on file    Number of children: Not on file    Years of education: Not on file    Highest education level: Not on file   Social Needs    Financial resource strain: Not on file    Food insecurity - worry: Not on file    Food insecurity - inability: Not on file    Transportation needs - medical: Not on file   [employer] needs - non-medical: Not on file   Occupational History    Not on file   Tobacco Use    Smoking status: Former Smoker    Smokeless tobacco: Never Used   Substance and Sexual Activity    Alcohol use: No    Drug use: No    Sexual activity: Not on file   Other Topics Concern    Not on file   Social History Narrative    Not on file        Review of Systems              Constitutional:  He denies fever, weight loss, sweats or fatigue. HEENT:  No blurred or double vision, headache or dizziness. No difficulty with swallowing, taste, speech or smell. Respiratory:  No cough, wheezing or shortness of breath. No sputum production. Cardiac:  Denies chest pain, palpitations, unexplained indigestion, syncope, edema, PND or orthopnea. GI:  No changes in bowel movements, no abdominal pain, no bloating, anorexia, nausea, vomiting or heartburn. :  No frequency or dysuria. Denies incontinence. Extremities:  No joint pain, stiffness or swelling. Skin:  No recent rashes or mole changes. Neurological:  No numbness, tingling, burning paresthesias or loss of motor strength. No syncope, dizziness, frequent headaches or memory loss. Vitals:    01/21/19 1426   BP: 102/60   Pulse: (!) 53   Resp: 20   Temp: 97.1 °F (36.2 °C)   TempSrc: Oral   SpO2: 99%   Weight: 187 lb 6.4 oz (85 kg)   Height: 5' 5\" (1.651 m)   PainSc:   0 - No pain       Body mass index is 31.18 kg/m². Physical Examination:              General Appearance:  Well-developed, well-nourished, no acute  distress. HEENT:      Ears:  The TMs and ear canals were clear. Eyes:  The pupillary responses were normal.  Extraocular muscle function intact. Lids and conjunctiva not injected. Neck:  Supple without thyromegaly or adenopathy. No JVD noted. Lungs:  Clear to auscultation and percussion. Cardiac:  Regular rate and rhythm without murmur. GI: nontender w/o mass. Normal BS's. Extremities:  No clubbing, cyanosis or edema. Skin:  No rash or unusual mole changes noted.     Neurological:  Grossly normal.      1. Rectal bleeding    - AMB POC COMPLETE CBC,AUTOMATED ENTER    2. Hypertension, unspecified type      3. Parkinson's disease ([facility] [address]      4. MCI (mild cognitive impairment)      5. Acute blood loss anemia    - AMB POC COMPLETE CBC,AUTOMATED ENTER      I have reviewed the patient's medical history in detail and updated the computerized patient record. We had a prolonged discussion about these complex clinical issues and went over the various important aspects to consider. All questions were answered. Advised him to call back or return to office if symptoms do not improve, change in nature, or persist.    He was given an after visit summary or informed of [employer] which includes patient instructions, diagnoses, current medications, & vitals. He expressed understanding with the diagnosis and plan.

## 2019-01-21 NOTE — PROGRESS NOTES
1. Have you been to the ER, urgent care clinic since your last visit? Hospitalized since your last visit? Yes, ED [facility], 01/15/19 - 01/16/19, for rectal bleeding    2. Have you seen or consulted any other health care providers outside of the [address] [place] [address] since your last visit? Include any pap smears or colon screening.  No    Chief Complaint   Patient presents with   Sg, 1/15/19 - 01/16/19, Rectal Bleeding

## 2019-01-29 RX ORDER — CARBIDOPA AND LEVODOPA 25; 100 MG/1; MG/1
TABLET ORAL
Qty: 720 TAB | Refills: 3 | Status: SHIPPED | OUTPATIENT
Start: 2019-01-29 | End: 2020-01-06 | Stop reason: SDUPTHER

## 2019-01-29 NOTE — TELEPHONE ENCOUNTER
PCP: Vee Singh MD    Last appt: 2019  Future Appointments   Date Time Provider Rox Randhawa   2019  3:00 PM Vee Singh MD 3 Dhruv Garcia   3/7/2019 10:00 AM Hima José  [address]       Requested Prescriptions     Pending Prescriptions Disp Refills    carbidopa-levodopa (SINEMET)  mg per tablet 720 Tab 3     Si p.o. 4 times daily       Prior labs and Blood pressures:  BP Readings from Last 3 Encounters:   19 102/60   19 170/81   01/15/19 118/64     Lab Results   Component Value Date/Time    Sodium 138 2019 01:32 AM    Potassium 4.4 2019 01:32 AM    Chloride 108 2019 01:32 AM    CO2 26 2019 01:32 AM    Anion gap 4 (L) 2019 01:32 AM    Glucose 86 2019 01:32 AM    BUN 25 (H) 2019 01:32 AM    Creatinine 0.93 2019 01:32 AM    BUN/Creatinine ratio 27 (H) 2019 01:32 AM    GFR est AA >60 2019 01:32 AM    GFR est non-AA >60 2019 01:32 AM    Calcium 8.8 2019 01:32 AM     Lab Results   Component Value Date/Time    Hemoglobin A1c 5.6 2015 03:30 PM     Lab Results   Component Value Date/Time    Cholesterol (POC) 114.0 2019 10:40 AM    HDL Cholesterol (POC) 47.0 2019 10:40 AM    LDL Cholesterol (POC) 46.6 2019 10:40 AM    Triglycerides (POC) 102.0 2019 10:40 AM     Lab Results   Component Value Date/Time    Vitamin D 25-Hydroxy 23.3 (L) 2015 05:45 AM       No results found for: TSH, TSH2, TSH3, TSHP, TSHEXT

## 2019-02-22 ENCOUNTER — OFFICE VISIT (OUTPATIENT)
Dept: INTERNAL MEDICINE CLINIC | Age: 79
End: 2019-02-22

## 2019-02-22 VITALS
HEART RATE: 66 BPM | HEIGHT: 65 IN | RESPIRATION RATE: 19 BRPM | BODY MASS INDEX: 30.82 KG/M2 | OXYGEN SATURATION: 96 % | TEMPERATURE: 97.6 F | WEIGHT: 185 LBS | SYSTOLIC BLOOD PRESSURE: 106 MMHG | DIASTOLIC BLOOD PRESSURE: 66 MMHG

## 2019-02-22 DIAGNOSIS — G31.84 MCI (MILD COGNITIVE IMPAIRMENT): ICD-10-CM

## 2019-02-22 DIAGNOSIS — I10 HYPERTENSION, UNSPECIFIED TYPE: ICD-10-CM

## 2019-02-22 DIAGNOSIS — K62.5 RECTAL BLEEDING: Primary | ICD-10-CM

## 2019-02-22 DIAGNOSIS — I63.9 CEREBROVASCULAR ACCIDENT (CVA), UNSPECIFIED MECHANISM (HCC): ICD-10-CM

## 2019-02-22 LAB
ERYTHROCYTE [DISTWIDTH] IN BLOOD BY AUTOMATED COUNT: 12.3 %
HCT VFR BLD AUTO: 40.6 % (ref 37–51)
HGB BLD-MCNC: 13.1 G/DL (ref 12–18)
LYMPHOCYTES ABSOLUTE: 2.1 K/UL (ref 0.6–4.1)
LYMPHOCYTES NFR BLD: 24.2 % (ref 10–58.5)
MCH RBC QN AUTO: 33.2 PG (ref 26–32)
MCHC RBC AUTO-ENTMCNC: 32.3 G/DL (ref 30–36)
MCV RBC AUTO: 103 FL (ref 80–97)
MONOCYTES ABS-DIF,2141: 0.6 K/UL (ref 0–1.8)
MONOCYTES NFR BLD: 6.9 % (ref 0.1–24)
NEUTROPHILS # BLD: 68.9 % (ref 37–92)
NEUTROPHILS ABS,2156: 6 K/UL (ref 2–7.8)
PLATELET # BLD AUTO: 248 K/UL (ref 140–440)
PMV BLD AUTO: 7.5 FL
RBC # BLD AUTO: 3.94 M/UL (ref 4.2–6.3)
WBC # BLD AUTO: 8.7 K/UL (ref 4.1–10.9)

## 2019-02-22 NOTE — PROGRESS NOTES
Subjective:  
Chaz Ferguson is a 78 y.o. male Chief Complaint Patient presents with  Rectal Bleeding  
  follow up History of present illness: Mr. Soledad Laboy returns in follow up. He has had no further bleeding. He is maintained well on the baby aspirin and we have not had to return to Coumadin. I still think this is the best course of action as the Coumadin risk is probably greater than his stroke risk. He denies new cardiorespiratory, GI or  symptoms. Last visit he was still mildly anemic and we will follow up on that again. He missed his comprehensive examination when he was admitted to the hospital and we will schedule that again for some point in the next 2-3 months' time. Overall I would continue as is and he will maintain his active lifestyle and current medications. Patient Active Problem List  
 Diagnosis Date Noted  Rectal bleeding 01/15/2019 Priority: 1 - One  Parkinson's disease ([facility] [address] 01/30/2015 Priority: 1 - One  
 Hypertension Priority: 1 - One  Stroke ([facility] [address] Priority: 1 - One  MCI (mild cognitive impairment) Priority: 1 - One  Acute blood loss anemia 01/16/2019 Priority: 2 - Two  Aaron esophagus Priority: 2 - Two  Hyperlipidemia Priority: 2 - Two  Coronary artery disease 07/12/2017 Priority: 2 - Two  CKD (chronic kidney disease) stage 2, GFR 60-89 ml/min Priority: 2 - Two  Long-term current use of high risk medication other than anticoagulant 06/13/2018 Priority: 3 - Three  Gastroesophageal reflux disease without esophagitis 09/30/2017 Priority: 3 - Three  Depression Priority: 3 - Three  Tendinitis of left rotator cuff 07/12/2017 Priority: 3 - Three  DJD (degenerative joint disease) 07/12/2017 Priority: 3 - Three  Tremor 07/12/2017 Priority: 3 - Three  Snoring Priority: 4 - Four  Sleep apnea in adult 07/12/2017 Priority: 4 - Four  Tendinitis 07/12/2017 Priority: 4 - Four  Gout 07/12/2017 Priority: 4 - Four  Restless leg syndrome 07/12/2017 Priority: 4 - Four  Benign nodular prostatic hyperplasia without lower urinary tract symptoms 07/12/2017 Priority: 4 - Four  Lumbar stenosis 01/29/2015 Priority: 4 - Four  Hearing difficulty of both ears 07/12/2017 Priority: 5 - Five  PE (physical exam), annual 09/30/2017 Past Surgical History:  
Procedure Laterality Date  COLONOSCOPY N/A 1/16/2019 COLONOSCOPY performed by Diogenes Bocanegra MD at [facility] ENDOSCOPY  COLONOSCOPY,FLEX,W/CONTROL, BLEEDING  1/16/2019  HX ORTHOPAEDIC  1/27/15 L4-L5 MICRODECOMPRESSION (Right  CT EGD TRANSORAL BIOPSY SINGLE/MULTIPLE  8/16/2011  CT EGD TRANSORAL BIOPSY SINGLE/MULTIPLE  10/1/2013 No Known Allergies Family History Problem Relation Age of Onset  Heart Disease Mother  Heart Disease Father Social History Socioeconomic History  Marital status:  Spouse name: Not on file  Number of children: Not on file  Years of education: Not on file  Highest education level: Not on file Social Needs  Financial resource strain: Not on file  Food insecurity - worry: Not on file  Food insecurity - inability: Not on file  Transportation needs - medical: Not on file  Transportation needs - non-medical: Not on file Occupational History  Not on file Tobacco Use  Smoking status: Former Smoker  Smokeless tobacco: Never Used Substance and Sexual Activity  Alcohol use: No  
 Drug use: No  
 Sexual activity: Not on file Other Topics Concern  Not on file Social History Narrative  Not on file Outpatient Medications Marked as Taking for the 2/22/19 encounter (Office Visit) with Danny Mccauley MD  
Medication Sig Dispense Refill  carbidopa-levodopa (SINEMET)  mg per tablet 2 p.o. 4 times daily 720 Tab 3  
 losartan (COZAAR) 50 mg tablet TAKE 1 TABLET DAILY 90 Tab 3  
 gabapentin (NEURONTIN) 100 mg capsule Take 2 Caps by mouth two (2) times a day. 360 Cap 3  
 PARoxetine (PAXIL) 10 mg tablet TAKE 1 TABLET DAILY 90 Tab 3  
 amLODIPine (NORVASC) 10 mg tablet TAKE 1 TABLET DAILY 90 Tab 2  
 esomeprazole (NEXIUM) 20 mg capsule TAKE 1 CAPSULE DAILY 90 Cap 3  
 atorvastatin (LIPITOR) 40 mg tablet TAKE 1 TABLET DAILY 90 Tab 3  
 acetaminophen (TYLENOL EXTRA STRENGTH) 500 mg tablet Take 1,000 mg by mouth three (3) times daily as needed for Pain.  senna-docusate (SENNA-S) 8.6-50 mg per tablet Take 1 Tab by mouth daily as needed.  aspirin delayed-release 81 mg tablet Take  by mouth daily.  vitamin e (E GEMS) 1,000 unit capsule Take 2,000 Units by mouth daily.  MULTIVITAMIN PO Take 1 Tab by mouth daily. Review of Systems Constitutional:  He denies fever, weight loss, sweats or fatigue. HEENT:  No blurred or double vision, headache or dizziness. No difficulty with swallowing, taste, speech or smell. Respiratory:  No cough, wheezing or shortness of breath. No sputum production. Cardiac:  Denies chest pain, palpitations, unexplained indigestion, syncope, edema, PND or orthopnea. GI:  No changes in bowel movements, no abdominal pain, no bloating, anorexia, nausea, vomiting or heartburn. :  No frequency or dysuria. Denies incontinence. Extremities:  No joint pain, stiffness or swelling. Skin:  No recent rashes or mole changes. Neurological:  No numbness, tingling, burning paresthesias or loss of motor strength. No syncope, dizziness, frequent headaches or memory loss. Objective:  
 
Vitals:  
 02/22/19 1507 BP: 106/66 Pulse: 66 Resp: 19 Temp: 97.6 °F (36.4 °C) TempSrc: Oral  
SpO2: 96% Weight: 185 lb (83.9 kg) Height: 5' 5\" (1.651 m) PainSc:   0 - No pain Body mass index is 30.79 kg/m². Physical Examination: General Appearance:  Well-developed, well-nourished, no acute  distress. HEENT:   
 Ears:  The TMs and ear canals were clear. Eyes:  The pupillary responses were normal.  Extraocular muscle function intact. Lids and conjunctiva not injected. Neck:  Supple without thyromegaly or adenopathy. No JVD noted. Lungs:  Clear to auscultation and percussion. Cardiac:  Regular rate and rhythm without murmur. GI: nontender w/o mass. Normal BS's. Extremities:  No clubbing, cyanosis or edema. Skin:  No rash or unusual mole changes noted. Neurological:  Tremor and increased tonel. Assessment/Plan:  
Impressions: ICD-10-CM ICD-9-CM 1. Rectal bleeding K62.5 569.3 CBC WITH AUTOMATED DIFF 2. Hypertension, unspecified type I10 401.9 CBC WITH AUTOMATED DIFF 3. MCI (mild cognitive impairment) G31.84 331.83 CBC WITH AUTOMATED DIFF 4. Cerebrovascular accident (CVA), unspecified mechanism ([facility] [address] I63.9 434.91 CBC WITH AUTOMATED DIFF Plan: 1. Continue present meds 2. Discussed lifestyle modifications including Na restriction, low carb/fat diet, weight reduction and exercise (at least a walking program). Follow-up Disposition: 
Return in about 2 months (around 4/22/2019) for CPE. Orders Placed This Encounter  CBC WITH AUTOMATED DIFF ([facility]) Tori Miller MD

## 2019-02-22 NOTE — PROGRESS NOTES
Chief Complaint Patient presents with  Rectal Bleeding  
  follow up 1. Have you been to the ER, urgent care clinic since your last visit? No  
 Hospitalized since your last visit? No  
 
2. Have you seen or consulted any other health care providers outside of the [address] [place] [address] since your last visit?  No

## 2019-03-07 ENCOUNTER — OFFICE VISIT (OUTPATIENT)
Dept: NEUROLOGY | Age: 79
End: 2019-03-07

## 2019-03-07 VITALS
SYSTOLIC BLOOD PRESSURE: 110 MMHG | WEIGHT: 182 LBS | BODY MASS INDEX: 30.32 KG/M2 | DIASTOLIC BLOOD PRESSURE: 68 MMHG | HEART RATE: 55 BPM | HEIGHT: 65 IN | OXYGEN SATURATION: 98 %

## 2019-03-07 DIAGNOSIS — G20 PARKINSONISM, UNSPECIFIED PARKINSONISM TYPE (HCC): Primary | ICD-10-CM

## 2019-03-07 RX ORDER — ROPINIROLE 1 MG/1
1 TABLET, FILM COATED ORAL 3 TIMES DAILY
Qty: 60 TAB | Refills: 5 | Status: SHIPPED | OUTPATIENT
Start: 2019-03-07 | End: 2019-06-06

## 2019-03-07 NOTE — PROGRESS NOTES
HISTORY OF PRESENT ILLNESS  Chance Wynne is a 78 y.o. male. HPI Comments: This patient returns in follow-up of his Parkinson's disease. He is taking Sinemet 25/1oo, 2 p.o. a.m. one at lunch 1 at dinner and 1 before bed. He goes to the gym and rides the stationary bicycle. He is walking some outside with his walker and uses a stationary bicycle for exercise. Not having any visual hallucinations. The family has told him that he could only drive in case of an emergency. I told him that it was my instructions that he not drive at all unless he had a 's evaluation at [facility] that I believe he would fail. I told the wife I have not changed my opinion on that. She does feel he is slowing down a little bit late in the day. His wife states that he is not operating a motor vehicle. He goes to the gym regularly and exercise several other machines. She states he is walking and moving well. He returns in follow-up today. He continues to take his Sinemet 25/100 2 p.o. 4 times daily. He feels he is a little bit worse than the last time he saw me. His mentation is good. Both his wife and him feel that his walking has deteriorated some. Review of Systems   Constitutional: Negative. HENT: Positive for hearing loss. .    Skin: Negative. Neurological: Positive for tremors. Negative for speech change. Endo/Heme/Allergies: Negative. Psychiatric/Behavioral: Negative. Current Outpatient Medications on File Prior to Visit   Medication Sig Dispense Refill    carbidopa-levodopa (SINEMET)  mg per tablet 2 p.o. 4 times daily 720 Tab 3    losartan (COZAAR) 50 mg tablet TAKE 1 TABLET DAILY 90 Tab 3    gabapentin (NEURONTIN) 100 mg capsule Take 2 Caps by mouth two (2) times a day.  360 Cap 3    PARoxetine (PAXIL) 10 mg tablet TAKE 1 TABLET DAILY 90 Tab 3    amLODIPine (NORVASC) 10 mg tablet TAKE 1 TABLET DAILY 90 Tab 2    esomeprazole (NEXIUM) 20 mg capsule TAKE 1 CAPSULE DAILY 90 Cap 3  atorvastatin (LIPITOR) 40 mg tablet TAKE 1 TABLET DAILY 90 Tab 3    acetaminophen (TYLENOL EXTRA STRENGTH) 500 mg tablet Take 1,000 mg by mouth three (3) times daily as needed for Pain.  senna-docusate (SENNA-S) 8.6-50 mg per tablet Take 1 Tab by mouth daily as needed.  aspirin delayed-release 81 mg tablet Take  by mouth daily.  vitamin e (E GEMS) 1,000 unit capsule Take 2,000 Units by mouth daily.  MULTIVITAMIN PO Take 1 Tab by mouth daily. No current facility-administered medications on file prior to visit. Past Medical History:   Diagnosis Date    Aaron esophagus     Benign nodular prostatic hyperplasia without lower urinary tract symptoms 7/12/2017    CKD (chronic kidney disease) stage 2, GFR 60-89 ml/min     Constipation     Coronary artery disease 7/12/2017    Depression     DJD (degenerative joint disease) 7/12/2017    Falls     Gout 7/12/2017    Headache     Hearing difficulty of both ears 7/12/2017    Hyperlipidemia     Hypertension     Impaired cognition 7/12/2017    MCI (mild cognitive impairment)     Restless leg syndrome 7/12/2017    Sleep apnea in adult 7/12/2017    No longer on CPAP @13    Snoring     Stroke ([facility] [address]     Tendinitis 7/12/2017    ACHILLES    Tendinitis of left rotator cuff 07/12/2017    Tremor 7/12/2017    LEFT HAND    Unspecified sleep apnea     lost weight no longer has it     Ht 5' 5\" (1.651 m)   Wt 82.6 kg (182 lb)   BMI 30.29 kg/m²      Physical Exam   Constitutional: He is oriented to person, place, and time. He appears well-developed and well-nourished. Cardiovascular: Normal rate, regular rhythm and normal heart sounds. Musculoskeletal: Normal range of motion. He exhibits no edema. Neurological: He is alert and oriented to person, place, and time.     Speech, language and mentation appear to be normal  Walking with the walker in the hallway reveals a little bit of festination to his gait and a stooped posture is consistent with Parkinson's. I really do not appreciate a tremor. He has a mild resting tremor in his hands,  Skin: Skin is warm and dry. Psychiatric: He has a normal mood and affect. His behavior is normal. Judgment and thought content normal.       ASSESSMENT and PLAN    PARKINSON'S DISEASE  I do not notice a great deal of the symptoms with his parkinsonism however he has wife feel that there are more symptoms. I did add some ropinirole 1 mg 3 times daily to his current Sinemet regimen. I will see him back in 4 months. David Fan HISTORY OF STROKE  I see no evidence of it on my exam however he will continue to take his aspirin 81 mg a day. MILD  COGNITIVE IMPAIRMENT  I suspect this secondary to of his Parkinson's disease however time will tell if we are dealing with a significant progressive dementia. His last head CT scan was done 2 years ago and did show volume loss and periventricular white matter changes but no evidence of stroke., his cognition is doing well, I see no reason for repeating his neuroimaging at this time. HYPERTENSION  Stroke risk, stable, managed by primary care    This note will not be viewable in MyChart. David Fan

## 2019-03-12 ENCOUNTER — TELEPHONE (OUTPATIENT)
Dept: NEUROLOGY | Age: 79
End: 2019-03-12

## 2019-03-20 RX ORDER — ATORVASTATIN CALCIUM 40 MG/1
TABLET, FILM COATED ORAL
Qty: 90 TAB | Refills: 3 | Status: SHIPPED | OUTPATIENT
Start: 2019-03-20 | End: 2020-03-16 | Stop reason: SDUPTHER

## 2019-03-20 RX ORDER — AMLODIPINE BESYLATE 10 MG/1
TABLET ORAL
Qty: 90 TAB | Refills: 2 | Status: SHIPPED | OUTPATIENT
Start: 2019-03-20 | End: 2019-06-06

## 2019-03-21 RX ORDER — VALSARTAN 80 MG/1
80 TABLET ORAL DAILY
Qty: 14 TAB | Refills: 0 | Status: SHIPPED | OUTPATIENT
Start: 2019-03-21 | End: 2019-06-13 | Stop reason: SDUPTHER

## 2019-03-21 RX ORDER — VALSARTAN 80 MG/1
80 TABLET ORAL DAILY
Qty: 90 TAB | Refills: 3 | Status: SHIPPED | OUTPATIENT
Start: 2019-03-21 | End: 2019-11-19 | Stop reason: SDUPTHER

## 2019-03-21 NOTE — TELEPHONE ENCOUNTER
Returned call to Dee, patient's daughter, who states patient has been c/o dizziness this week. He states he has been on Requip for the past 6 days. Also, his pharmacy sent them a notice that his Losartan is on recall and he needs an alternative medication. Per read back verbal order from Dr. Jesus Jade, give Valsartan 80mg daily and to have patient's daughter call Dr. Richard Roland concerning the Requip/dizziness. Dee notified. Dee states she will call Dr. Richard Roland. Dee also requests that a 2 week supply of Valsartan be sent to [facility] and a 90 day supply be sent to [facility]. Requested Prescriptions     Pending Prescriptions Disp Refills    valsartan (DIOVAN) 80 mg tablet 90 Tab 3     Sig: Take 1 Tab by mouth daily.  valsartan (DIOVAN) 80 mg tablet 14 Tab 0     Sig: Take 1 Tab by mouth daily.

## 2019-03-21 NOTE — TELEPHONE ENCOUNTER
Ana Brito called in reference to the patient and the patient was started on Requip for this Parkinson and the patient is complaining of being dizzy but she looked up and mainly side effects is GI. So she wanted to run by him and see what he thinks. She said you can call her on her cell.

## 2019-04-15 ENCOUNTER — TELEPHONE (OUTPATIENT)
Dept: NEUROLOGY | Age: 79
End: 2019-04-15

## 2019-04-15 NOTE — TELEPHONE ENCOUNTER
Spoke to patient daughter   Patient has been taking requip 1mg for last 2 weeks and felt better  Started requip 1mg two times daily on 4/10/19 and now having dizziness again   Dr. Roderick Lawton advise No

## 2019-04-15 NOTE — TELEPHONE ENCOUNTER
----- Message from Deidra Baugh sent at 4/15/2019  9:32 AM EDT -----  Regarding: Dr. Dawit Palomo  Pt's daughter (Stephen Bennettl questions regarding pt's restarting his medication(\"Requip\" 2 pills per day as of 04/10/19) and stated the dizziness has recurred, but not as bad. Best contact number [phone].

## 2019-04-17 ENCOUNTER — TELEPHONE (OUTPATIENT)
Dept: NEUROLOGY | Age: 79
End: 2019-04-17

## 2019-04-19 ENCOUNTER — TELEPHONE (OUTPATIENT)
Dept: INTERNAL MEDICINE CLINIC | Age: 79
End: 2019-04-19

## 2019-04-19 NOTE — TELEPHONE ENCOUNTER
Spoke with Clarence Arredondo. She states patient was placed on Requip 1mg by Dr. Kassi Jenkins back on 03-15-19. After about 1 week, he developed dizziness. The med was stopped and started again on 03-27-19 at 1 tablet daily. It was increased to 2 tablets daily on 04-10-19, and on day 5, developed dizziness again. Clarence Arredondo states she has called DR. Teran all week without any return calls from his office. She decided to reduce the Requip to 1 tablet daily. She also states she kept him on Losartan, since he was beginning the new med - Requip. She did not want to have him on 2 new meds at once. Greg Bosch I will pass this information on to Dr. Beverly Turner. Discussed with Dr. Rolando Lopes. Per Dr. Gladis Barbosa can keep patient on 1 tablet daily for now. Clarence Arredondo notified.

## 2019-04-19 NOTE — TELEPHONE ENCOUNTER
I recommend that he stop the Requip, that is the new medication that I put him on, he should stop it for 4 days, if the dizziness does not go away it is not related to the Requip. If the dizziness persists after stopping the Requip I will need to see him back in the office to figure out what is going on.

## 2019-04-19 NOTE — TELEPHONE ENCOUNTER
Cathie Doll called because she has not been able to get in touch with Dr. Ronnie Nye for his parkinson) so she wanted to talk to Dr. Maxine Orona in reference to his medication

## 2019-04-22 NOTE — TELEPHONE ENCOUNTER
Discussed information with Dr. Karen Da Silva. Per Dr. Karen Da Silva, the med changes made by [facility] are ok. Notified [facility].

## 2019-05-13 RX ORDER — HYDROGEN PEROXIDE 3 %
SOLUTION, NON-ORAL MISCELLANEOUS
Qty: 90 CAP | Refills: 3 | Status: SHIPPED | OUTPATIENT
Start: 2019-05-13 | End: 2020-08-05 | Stop reason: SDUPTHER

## 2019-05-30 ENCOUNTER — LAB ONLY (OUTPATIENT)
Dept: INTERNAL MEDICINE CLINIC | Age: 79
End: 2019-05-30

## 2019-05-30 DIAGNOSIS — D62 ACUTE BLOOD LOSS ANEMIA: ICD-10-CM

## 2019-05-30 DIAGNOSIS — Z12.5 SCREENING FOR PROSTATE CANCER: ICD-10-CM

## 2019-05-30 DIAGNOSIS — E55.9 VITAMIN D DEFICIENCY: ICD-10-CM

## 2019-05-30 DIAGNOSIS — I10 HYPERTENSION, UNSPECIFIED TYPE: Primary | ICD-10-CM

## 2019-05-30 DIAGNOSIS — E78.5 HYPERLIPIDEMIA, UNSPECIFIED HYPERLIPIDEMIA TYPE: ICD-10-CM

## 2019-05-30 LAB
BACTERIA,BACTU: ABNORMAL
BILIRUB UR QL: NEGATIVE
CLARITY: CLEAR
COLOR UR: ABNORMAL
ERYTHROCYTE [DISTWIDTH] IN BLOOD BY AUTOMATED COUNT: 13.6 %
GLUCOSE 24H UR-MRATE: NEGATIVE G/(24.H)
HCT VFR BLD AUTO: 40.1 % (ref 37–51)
HGB BLD-MCNC: 13.3 G/DL (ref 12–18)
HGB UR QL STRIP: NEGATIVE
KETONES UR QL STRIP.AUTO: ABNORMAL
LEUKOCYTE ESTERASE: NEGATIVE
LYMPHOCYTES ABSOLUTE: 2.2 K/UL (ref 0.6–4.1)
LYMPHOCYTES NFR BLD: 25 % (ref 10–58.5)
MCH RBC QN AUTO: 34.1 PG (ref 26–32)
MCHC RBC AUTO-ENTMCNC: 33.2 G/DL (ref 30–36)
MCV RBC AUTO: 102.9 FL (ref 80–97)
MONOCYTES ABS-DIF,2141: 0.5 K/UL (ref 0–1.8)
MONOCYTES NFR BLD: 6.3 % (ref 0.1–24)
NEUTROPHILS # BLD: 68.7 % (ref 37–92)
NEUTROPHILS ABS,2156: 5.9 K/UL (ref 2–7.8)
NITRITE UR QL STRIP.AUTO: NEGATIVE
PH UR STRIP: 5 [PH] (ref 5–7)
PLATELET # BLD AUTO: 244 K/UL (ref 140–440)
PMV BLD AUTO: 8.1 FL
PROT UR STRIP-MCNC: ABNORMAL MG/DL
RBC # BLD AUTO: 3.9 M/UL (ref 4.2–6.3)
RBC #/AREA URNS HPF: 0 #/HPF
SP GR UR REFRACTOMETRY: 1.02 (ref 1–1.03)
UROBILINOGEN UR QL STRIP.AUTO: NEGATIVE
WBC # BLD AUTO: 8.6 K/UL (ref 4.1–10.9)
WBC URNS QL MICRO: 0 #/HPF

## 2019-06-04 LAB
25(OH)D3+25(OH)D2 SERPL-MCNC: 34.8 NG/ML (ref 30–100)
ALBUMIN SERPL-MCNC: 4.6 G/DL (ref 3.5–4.7)
ALBUMIN/GLOB SERPL: 1.5 {RATIO} (ref 1.2–2.2)
ALP SERPL-CCNC: 77 IU/L (ref 39–117)
ALT SERPL-CCNC: 18 IU/L (ref 0–44)
AST SERPL-CCNC: 16 IU/L (ref 0–40)
BILIRUB SERPL-MCNC: 0.4 MG/DL (ref 0–1.2)
BUN SERPL-MCNC: 20 MG/DL (ref 8–27)
BUN/CREAT SERPL: 20 (ref 10–24)
CALCIUM SERPL-MCNC: 9.9 MG/DL (ref 8.6–10.2)
CHLORIDE SERPL-SCNC: 108 MMOL/L (ref 96–106)
CHOLEST SERPL-MCNC: 118 MG/DL (ref 100–199)
CK SERPL-CCNC: 59 U/L (ref 24–204)
CO2 SERPL-SCNC: 24 MMOL/L (ref 20–29)
CREAT SERPL-MCNC: 0.98 MG/DL (ref 0.76–1.27)
GLOBULIN SER CALC-MCNC: 3.1 G/DL (ref 1.5–4.5)
GLUCOSE SERPL-MCNC: 97 MG/DL (ref 65–99)
HDLC SERPL-MCNC: 47 MG/DL
IRON SATN MFR SERPL: 37 % (ref 15–55)
IRON SERPL-MCNC: 109 UG/DL (ref 38–169)
LDLC SERPL CALC-MCNC: 52 MG/DL (ref 0–99)
POTASSIUM SERPL-SCNC: 5.1 MMOL/L (ref 3.5–5.2)
PROT SERPL-MCNC: 7.7 G/DL (ref 6–8.5)
PSA SERPL-MCNC: 0.7 NG/ML (ref 0–4)
SODIUM SERPL-SCNC: 147 MMOL/L (ref 134–144)
TIBC SERPL-MCNC: 292 UG/DL (ref 250–450)
TRIGL SERPL-MCNC: 95 MG/DL (ref 0–149)
TSH SERPL DL<=0.005 MIU/L-ACNC: 1.62 UIU/ML (ref 0.45–4.5)
UIBC SERPL-MCNC: 183 UG/DL (ref 111–343)
VLDLC SERPL CALC-MCNC: 19 MG/DL (ref 5–40)

## 2019-06-06 ENCOUNTER — OFFICE VISIT (OUTPATIENT)
Dept: INTERNAL MEDICINE CLINIC | Age: 79
End: 2019-06-06

## 2019-06-06 VITALS
HEART RATE: 55 BPM | WEIGHT: 181.4 LBS | BODY MASS INDEX: 30.22 KG/M2 | TEMPERATURE: 97.6 F | OXYGEN SATURATION: 95 % | RESPIRATION RATE: 20 BRPM | HEIGHT: 65 IN

## 2019-06-06 DIAGNOSIS — N18.2 CKD (CHRONIC KIDNEY DISEASE) STAGE 2, GFR 60-89 ML/MIN: ICD-10-CM

## 2019-06-06 DIAGNOSIS — M77.9 TENDINITIS: ICD-10-CM

## 2019-06-06 DIAGNOSIS — M48.062 SPINAL STENOSIS OF LUMBAR REGION WITH NEUROGENIC CLAUDICATION: ICD-10-CM

## 2019-06-06 DIAGNOSIS — N40.0 BENIGN NODULAR PROSTATIC HYPERPLASIA WITHOUT LOWER URINARY TRACT SYMPTOMS: ICD-10-CM

## 2019-06-06 DIAGNOSIS — K22.70 BARRETT'S ESOPHAGUS WITHOUT DYSPLASIA: ICD-10-CM

## 2019-06-06 DIAGNOSIS — I63.9 CEREBROVASCULAR ACCIDENT (CVA), UNSPECIFIED MECHANISM (HCC): Primary | ICD-10-CM

## 2019-06-06 DIAGNOSIS — M10.9 GOUT, UNSPECIFIED CAUSE, UNSPECIFIED CHRONICITY, UNSPECIFIED SITE: ICD-10-CM

## 2019-06-06 DIAGNOSIS — F33.0 MILD EPISODE OF RECURRENT MAJOR DEPRESSIVE DISORDER (HCC): ICD-10-CM

## 2019-06-06 DIAGNOSIS — K62.5 RECTAL BLEEDING: ICD-10-CM

## 2019-06-06 DIAGNOSIS — K21.9 GASTROESOPHAGEAL REFLUX DISEASE WITHOUT ESOPHAGITIS: ICD-10-CM

## 2019-06-06 DIAGNOSIS — G20 PARKINSON'S DISEASE (HCC): ICD-10-CM

## 2019-06-06 DIAGNOSIS — G47.30 SLEEP APNEA IN ADULT: ICD-10-CM

## 2019-06-06 DIAGNOSIS — G31.84 MCI (MILD COGNITIVE IMPAIRMENT): ICD-10-CM

## 2019-06-06 DIAGNOSIS — R06.83 SNORING: ICD-10-CM

## 2019-06-06 DIAGNOSIS — Z79.899 LONG-TERM CURRENT USE OF HIGH RISK MEDICATION OTHER THAN ANTICOAGULANT: ICD-10-CM

## 2019-06-06 DIAGNOSIS — D62 ACUTE BLOOD LOSS ANEMIA: ICD-10-CM

## 2019-06-06 DIAGNOSIS — M75.82 TENDINITIS OF LEFT ROTATOR CUFF: ICD-10-CM

## 2019-06-06 DIAGNOSIS — M19.91 PRIMARY OSTEOARTHRITIS, UNSPECIFIED SITE: ICD-10-CM

## 2019-06-06 DIAGNOSIS — I25.119 CORONARY ARTERY DISEASE WITH ANGINA PECTORIS, UNSPECIFIED VESSEL OR LESION TYPE, UNSPECIFIED WHETHER NATIVE OR TRANSPLANTED HEART (HCC): ICD-10-CM

## 2019-06-06 DIAGNOSIS — H91.93 HEARING DIFFICULTY OF BOTH EARS: ICD-10-CM

## 2019-06-06 DIAGNOSIS — E78.5 HYPERLIPIDEMIA, UNSPECIFIED HYPERLIPIDEMIA TYPE: ICD-10-CM

## 2019-06-06 DIAGNOSIS — I10 HYPERTENSION, UNSPECIFIED TYPE: ICD-10-CM

## 2019-06-06 DIAGNOSIS — R25.1 TREMOR: ICD-10-CM

## 2019-06-06 DIAGNOSIS — G25.81 RESTLESS LEG SYNDROME: ICD-10-CM

## 2019-06-06 RX ORDER — ROPINIROLE 1 MG/1
1 TABLET, FILM COATED ORAL DAILY
COMMUNITY
End: 2019-07-29 | Stop reason: SDUPTHER

## 2019-06-06 NOTE — PROGRESS NOTES
Mr. Ky Goodpasture is an 78year old, ,  male who comes to the office today for annual comprehensive personal healthcare examination. History of Present Illness (Problem List): This patient has multiple medical problems. These include: 1. Parkinson's disease with some tremor, gait disturbance, bradykinesia and weakness. He is followed by Dr. Dominik Sparks as well. 2. CAD by EBT heart scan without symptomatic angina or symptoms of CHF. 3. Hyperlipidemia. 4. Hypertension that appears to be currently over-treated and she is actually mildly orthostatic today. I am sure PD isease contributes as well to the orthostasis. 5. Lumbar spinal stenosis, S/P laminectomy at L4-5. He has residual back pain and occasionally some lung pain. 6. Markedly diminished auditory acuity. He has tried hearing aids, but rejected them. 7. Gout. 8. History of depression. 9. DREAD, no longer using CPAP. 10. History of Aaron's esophagus, S/P dilatation of a stricture. It does not appear as though he has been looked at recently, however. His last EGD and biopsy did not reveal significant specialized metaplasia, so after discussion with Dr. Joyce Valerio last year it was elected not to proceed with EGD. 11. GERD. 12. CKD stage 2. 
13. BPH. 14. DJD. 15. Restless leg syndrome. 16. Left rotator cuff tendinitis, currently quiescent. 17. Cognitive impairment. 18. 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 At the time of the visit he denied new CR, GI or  symptoms. He was still very slow. He is no longer driving. He is still trying to exercise on a regular basis, but not going to the gym now. He still has an exercise bike at home. He denies new TIA or stroke like symptoms. His BP today was 562 systolic lying and 96 sitting on exam table with his legs down. He was not experiencing any dizziness, however. Past Medical/Surgical History:  Otherwise remarkable for the laminectomy. Allergies:  None known. Current Medications: 
1. Lipitor 40 mg daily. 2. Carbidopa/Levodopa 25/100, two four times a day. 3. Gabapentin 100 mg, two twice a day. 4. Valsartan 80 mg daily. 5. Requip 1 mg daily. 6. Nexium 20 mg daily. 7. Paroxetine 10 mg daily. 8. Vitamin D 2,000 IU daily. 9. Aspirin 81 mg daily. 10. Centrum Silver, one a day. 11. Tylenol Extra Strength 500 mg, two three times a day. 12. Senna S as needed, generally every other day. His Amlodipine was put on hold on the day of the visit given his BP. He is no longer on Coumadin, which was stopped after his rectal bleeding earlier this year and the fact that he was far from his previous stroke and a fall risk as well. Social History:  He does not smoke. He has an occasional glass of wine. He tries to follow a lowfat diet. He tries to exercise regularly. He is disabled from his stroke. He is retired. Family History:  Father  of MI at age 79. Mother  of an MI at age 61. Review of Systems: 
CONSTITUTIONAL:  Denies fevers, weight loss, sweats. Notes fatigue and some weakness overall. HEENT:  No blurred or double vision, headaches or dizziness. No difficulty with swallowing, taste, speech or smell. RESPIRATORY:  No cough, wheezing or shortness of breath, or sputum production. CARDIAC:  Denies chest pain, palpitations, unexplained indigestion, syncope, edema, PND or orthopnea. GI:  No changes in bowel habits, abdominal pain, no bloating, anorexia, nausea, vomiting or heartburn. :  Denies frequency, nocturia, stranguria, dysuria or sexual dysfunction. EXTREMITIES:  Some intermittent joint pain and stiffness. No swelling. SKIN:  No recent rash or mole changes. NEUROLOGICAL:  No numbness, tingling, burning paresthesias or loss of motor strength. No syncope, frequent headaches.   Gait disturbance, as well as short term memory loss. Denies dizziness currently, though did have some when he was recently started on Requip by Dr. Pipo Taylor. Physical Examination: 
GENERAL:  Reasonably well-developed, well-nourished, no acute distress. VITAL SIGNS:  BP: lying 116/76, sitting 96/62. P: 60 with no change in position. R: 20.  O2 sat: 95%. HT: 5'5\". WT: 181 lbs.  T: 97.6. Barbie Mccall VISION:  Deferred to ophthalmologist. 
HEARING:  Not tested, but clearly has diminished hearing and needs hearing aids, but has rejected them. HEENT:  Ears:  The TMs and ear canals were clear. Eyes:  The pupillary responses were normal.  Extraocular muscle function intact. Lids and conjunctiva not injected. Funduscopic exam revealed sharp disc margins. Pharynx:  Clear with teeth in good repair. No masses were noted. NECK:  Supple without thyromegaly or adenopathy. No JVD noted. No carotid bruits. LUNGS: Clear to auscultation and percussion. CARDIAC:  Regular rate and rhythm. No murmur. PMI not displaced. No gallop, rub or click. ABDOMEN:  Mildly obese, soft, non-tender without palpable organomegaly or mass. No pulsatile mass was felt, and no bruit was heard. Bowel sounds were active. :  Uncircumcised male. Both testes descended and no masses felt. RECTAL EXAM: Deferred. Recently done when here for his GI bleed. EXTREMITIES:  No clubbing, cyanosis or edema. PULSES:  Dorsalis pedis and posterior tibial pulses felt without difficulty. SKIN:  No unusual rash or mole changes noted. LYMPH NODES:  None felt in the cervical, supraclavicular, axillary or inguinal region. NEUROLOGICAL:  Cranial nerves II-XII grossly intact. Motor strength 5/5. DTRs 2+ and symmetric. Gait is forward flexed and wide based. He is very bradykinetic and slow. He has tremor of the outstretched hands and increased tone overall. Short term memory impairment is noted as well. Laboratory:  Hemoglobin is 12.2. White blood count is 6,600. Iron 115. Blood sugar is 101. Liver and kidney function are normal.  Electrolytes are normal.   PSA is normal at 0.6. TSH is normal at 0.76, indicating normal thyroid function. Uric acid 6.5, which is good and should prevent recurrent gout. Urinalysis is clear. Vitamin D level is normal at 30.5. Lipid profile reveals a total cholesterol of 114, triglycerides of 102, HDL cholesterol of 47 and an LDL cholesterol of 46.6, which is quite good. Health Maintenance Issues and Ancillary Studies: 1. TDAP (pertussis and tetanus) vaccine was given in December, 2012. 2. Pneumovax 23 (PPSV23) was given in November, 2005. 3. Seasonal influenza vaccine was given in October, 2018. 4. Zostavax was given in April, 2007. 5. Prevnar 13 (PCV13) was given in December, 2014. 6. Prescription for Shingrix provided on the day of the visit. 7. Colonoscopy was negative except for bleeding hemorrhoids in January, 2019. 
8. Modified barium swallow revealed a stricture in April, 2007. 9. Endoscopy in October, 2013 revealed some evidence for Aaron's esophagus without significant metaplasia. 10. Lexiscan stress testing was negative in May, 2014. 11. EBT heart scan in April, 2014 revealed a calcium score of 994. 
12. CT scan of head in July, 2015 was negative. 13. MRI of the lumbosacral spine revealed L4-5 stenosis in December, 2014. He did undergo a small laminectomy in January, 2015 at that level. 14. EKG in the office was essentially normal. 
15. Health screening assessments were essentially normal. 
 
Impression: 1. Hypertension with current over-treatment and some orthostasis. 2. CAD by EBT, asymptomatic. 3. Hyperlipidemia. 4. Parkinson's disease. I am not sure how much his BP is contributing to his slowness today. 5. History of old stroke with residual cognitive impairment. 6. Spinal stenosis with L4-5 laminectomy. 7. BPH. 8. Decreased auditory acuity. 9. History of gout. 10. DREAD, but not using CPAP. 11. History of Aaron's esophagus, minimal disease. Does not need another EGD. 12. Left rotator cuff tendinitis, improved. 13. History of Achilles tendinitis, improved. 14. CKD-2. 
15. Restless leg syndrome. 16. DJD. 17. Cognitive impairment. Plan: 1. Continue present medications, but hold Amlodipine. 2. Continue prudent diet. 3. Continue regular activity. 4. Recheck one week's time to recheck BP. 
5. Continue usual neurology follow up. 6. No driving. 7. He is virtually up to date on all health maintenance issues. 8. He is appropriately treated for his ten year coronary heart disease risk. All screenings were reviewed and results discussed with the patient, who verbalized understanding and agreement with the plans. A copy of the after visit summary with a personalized health plan was provided to the patient on the day of the visit. This is a Subsequent Medicare Annual Wellness Exam (AWV) (Performed 12 months after IPPE or effective date of Medicare Part B enrollment) I have reviewed the patient's medical history in detail and updated the computerized patient record as noted above. Problem list reviewed with patient and risk factors discussed. PSH, SH, FH, Medications and HM issues also reviewed and discussed. Depression screen, fall risk assessment, functional abilities and ACP also reviewed and discussed as above and below. Depression Risk Factor Screening:  
 
3 most recent PHQ Screens 6/6/2019 Little interest or pleasure in doing things Not at all Feeling down, depressed, irritable, or hopeless Not at all Total Score PHQ 2 0 Trouble falling or staying asleep, or sleeping too much Not at all Feeling tired or having little energy Not at all Poor appetite, weight loss, or overeating Not at all Feeling bad about yourself - or that you are a failure or have let yourself or your family down Not at all Trouble concentrating on things such as school, work, reading, or watching TV Not at all Moving or speaking so slowly that other people could have noticed; or the opposite being so fidgety that others notice Nearly every day Thoughts of being better off dead, or hurting yourself in some way Not at all PHQ 9 Score 3 Alcohol Risk Factor Screening: You do not drink alcohol or very rarely. Functional Ability and Level of Safety:  
Hearing Loss Needs hearing aids but rejects Activities of Daily Living The home contains: handrails and grab bars Patient needs help with:  transportation, shopping, preparing meals, laundry, managing medications and bathing Fall Risk Fall Risk Assessment, last 12 mths 6/6/2019 Able to walk? Yes Fall in past 12 months? Yes Fall with injury? No  
Number of falls in past 12 months 2 Fall Risk Score 2 Abuse Screen Patient is not abused Cognitive Screening Evaluation of Cognitive Function: 
Has your family/caregiver stated any concerns about your memory: yes Normal, Abnormal  MMSE 18/30 Patient Care Team  
Patient Care Team: 
Kirt Palm MD as PCP - General (Internal Medicine) Cal Richardson MD as Physician (Neurology) Brittaney Lipscomb MD (Neurology) Assessment/Plan Education and counseling provided: 
Are appropriate based on today's review and evaluation Diagnoses and all orders for this visit: 1. Cerebrovascular accident (CVA), unspecified mechanism ([facility] 2. Rectal bleeding 3. Parkinson's disease ([address] 4. MCI (mild cognitive impairment) 5. Hypertension, unspecified type 6. Hyperlipidemia, unspecified hyperlipidemia type 7. Coronary artery disease with angina pectoris, unspecified vessel or lesion type, unspecified whether native or transplanted heart ([address] -     AMB POC EKG ROUTINE W/ 12 LEADS, INTER & REP 
 
 8. CKD (chronic kidney disease) stage 2, GFR 60-89 ml/min 9. Aaron's esophagus without dysplasia 10. Acute blood loss anemia 11. Tremor 12. Tendinitis of left rotator cuff 13. Long-term current use of high risk medication other than anticoagulant 14. Gastroesophageal reflux disease without esophagitis 15. Primary osteoarthritis, unspecified site 16. Mild episode of recurrent major depressive disorder ([address] 17. Tendinitis 18. Snoring 19. Sleep apnea in adult 20. Restless leg syndrome 21. Spinal stenosis of lumbar region with neurogenic claudication 22. Gout, unspecified cause, unspecified chronicity, unspecified site 23. Benign nodular prostatic hyperplasia without lower urinary tract symptoms 24. Hearing difficulty of both ears Other orders 
-     varicella-zoster recombinant, PF, (SHINGRIX, PF,) 50 mcg/0.5 mL susr injection; 0.5 mL by IntraMUSCular route once for 1 dose. Other problems as listed above. Health Maintenance Due Topic Date Due  Shingrix Vaccine Age 50> (1 of 2) 02/10/1988  GLAUCOMA SCREENING Q2Y  02/10/2003 Orders Placed This Encounter  AMB POC EKG ROUTINE W/  LEADS, INTER & REP Order Specific Question:   Reason for Exam: Answer:   complete physical  
 varicella-zoster recombinant, PF, (SHINGRIX, PF,) 50 mcg/0.5 mL susr injection Si.5 mL by IntraMUSCular route once for 1 dose. Dispense:  0.5 mL Refill:  1 Yudith Aragon MD

## 2019-06-06 NOTE — PROGRESS NOTES
Chief Complaint Patient presents with [facility] Annual Wellness Visit Depression Risk Factor Screening:  
 
3 most recent PHQ Screens 6/6/2019 Little interest or pleasure in doing things Not at all Feeling down, depressed, irritable, or hopeless Not at all Total Score PHQ 2 0 Trouble falling or staying asleep, or sleeping too much Not at all Feeling tired or having little energy Not at all Poor appetite, weight loss, or overeating Not at all Feeling bad about yourself - or that you are a failure or have let yourself or your family down Not at all Trouble concentrating on things such as school, work, reading, or watching TV Not at all Moving or speaking so slowly that other people could have noticed; or the opposite being so fidgety that others notice Nearly every day Thoughts of being better off dead, or hurting yourself in some way Not at all PHQ 9 Score 3 Functional Ability and Level of Safety: Activities of Daily Living ADL Assessment 6/6/2019 Feeding yourself No Help Needed Getting from bed to chair No Help Needed Getting dressed No Help Needed Bathing or showering No Help Needed Walk across the room (includes cane/walker) No Help Needed Using the telphone No Help Needed Taking your medications Help Needed Preparing meals No Help Needed Managing money (expenses/bills) No Help Needed Moderately strenuous housework (laundry) No Help Needed Shopping for personal items (toiletries/medicines) No Help Needed Shopping for groceries No Help Needed Driving Help Needed Climbing a flight of stairs No Help Needed Getting to places beyond walking distances No Help Needed Fall Risk Fall Risk Assessment, last 12 mths 6/6/2019 Able to walk? Yes Fall in past 12 months? Yes Fall with injury? No  
Number of falls in past 12 months 2 Fall Risk Score 2 Abuse Screen Abuse Screening Questionnaire 6/6/2019 Do you ever feel afraid of your partner? Sweta Cantu Are you in a relationship with someone who physically or mentally threatens you? Sweta Cantu Is it safe for you to go home? Luciana Mathias Reviewed record in preparation for visit and have obtained necessary documentation. Identified pt with two pt identifiers(name and ). Chief Complaint Patient presents with [facility] Annual Wellness Visit Wt Readings from Last 3 Encounters:  
19 181 lb 6.4 oz (82.3 kg) 19 182 lb (82.6 kg) 19 185 lb (83.9 kg) Temp Readings from Last 3 Encounters:  
19 97.6 °F (36.4 °C) (Oral) 19 97.6 °F (36.4 °C) (Oral) 19 97.1 °F (36.2 °C) (Oral) BP Readings from Last 3 Encounters:  
19 110/68  
19 106/66  
19 102/60 Pulse Readings from Last 3 Encounters:  
19 (!) 55  
19 (!) 55  
19 66 Coordination of Care Questionnaire: 
:  
 
1) Have you been to an emergency room, urgent care clinic since your last visit? No  
 
Hospitalized since your last visit? No  
 
 
     
 
2) Have you seen or consulted any other health care providers outside of [address] [place] [address] since your last visit? No  
 
 
 
 
 
Patient Care Team  
Patient Care Team: 
Darshan Kaur MD as PCP - General (Internal Medicine) Mateusz Cisneros MD as Physician (Neurology) Sudheer Ray MD (Neurology)

## 2019-06-13 ENCOUNTER — OFFICE VISIT (OUTPATIENT)
Dept: INTERNAL MEDICINE CLINIC | Age: 79
End: 2019-06-13

## 2019-06-13 VITALS
BODY MASS INDEX: 30.35 KG/M2 | SYSTOLIC BLOOD PRESSURE: 162 MMHG | HEIGHT: 65 IN | OXYGEN SATURATION: 96 % | RESPIRATION RATE: 16 BRPM | WEIGHT: 182.2 LBS | TEMPERATURE: 98.1 F | HEART RATE: 53 BPM | DIASTOLIC BLOOD PRESSURE: 74 MMHG

## 2019-06-13 DIAGNOSIS — G20 PARKINSON'S DISEASE (HCC): ICD-10-CM

## 2019-06-13 DIAGNOSIS — I10 HYPERTENSION, UNSPECIFIED TYPE: Primary | ICD-10-CM

## 2019-06-13 RX ORDER — AMLODIPINE BESYLATE 5 MG/1
5 TABLET ORAL DAILY
COMMUNITY
End: 2019-06-28

## 2019-06-13 NOTE — PROGRESS NOTES
Reviewed record in preparation for visit and have obtained necessary documentation. Identified pt with two pt identifiers(name and ). Chief Complaint   Patient presents with    Blood Pressure Check        Coordination of Care Questionnaire:  :     1) Have you been to an emergency room, urgent care clinic since your last visit? No     Hospitalized since your last visit? No               2) Have you seen or consulted any other health care providers outside of [address] [place] [address] since your last visit?  No

## 2019-06-13 NOTE — PROGRESS NOTES
Subjective:   Britney Lawton is a 78 y.o. male      Chief Complaint   Patient presents with    Blood Pressure Check        History of present illness:  Mr. Nita Khan returns for recheck of his blood pressure. Last visit he was orthostatic with a low blood pressure and we stopped Amlodipine. His blood pressure is now a bit high. We will resume Amlodipine at a lower dose of 5 mg. He has the 10 mg at home and can break it in half and I have talked with his daughter about this. We will recheck him in a couple weeks time to see where his blood pressure settles out at that point. She will let us know if he becomes increasingly symptomatic in the meantime, however.         Patient Active Problem List    Diagnosis Date Noted    Rectal bleeding 01/15/2019     Priority: 1 - One    [facility] 01/30/2015     Priority: 1 - One    Hypertension      Priority: 1 - One    Stroke ([facility]      Priority: 1 - One    MCI (mild cognitive impairment)      Priority: 1 - One    Acute blood loss anemia 01/16/2019     Priority: 2 - Two    Aaron esophagus      Priority: 2 - Two    Hyperlipidemia      Priority: 2 - Two    Coronary artery disease 07/12/2017     Priority: 2 - Two    CKD (chronic kidney disease) stage 2, GFR 60-89 ml/min      Priority: 2 - Two    Long-term current use of high risk medication other than anticoagulant 06/13/2018     Priority: 3 - Three    Gastroesophageal reflux disease without esophagitis 09/30/2017     Priority: 3 - Three    Depression      Priority: 3 - Three    Tendinitis of left rotator cuff 07/12/2017     Priority: 3 - Three    DJD (degenerative joint disease) 07/12/2017     Priority: 3 - Three    Tremor 07/12/2017     Priority: 3 - Three    Snoring      Priority: 4 - Four    Sleep apnea in adult 07/12/2017     Priority: 4 - Four    Tendinitis 07/12/2017     Priority: 4 - Four    Gout 07/12/2017     Priority: 4 - Four    Restless leg syndrome 07/12/2017     Priority: 4 - Four    Benign nodular prostatic hyperplasia without lower urinary tract symptoms 07/12/2017     Priority: 4 - Four    Lumbar stenosis 01/29/2015     Priority: 4 - Four    Hearing difficulty of both ears 07/12/2017     Priority: 5 - Five    PE (physical exam), annual 09/30/2017      Past Surgical History:   Procedure Laterality Date    COLONOSCOPY N/A 1/16/2019    COLONOSCOPY performed by Mariela Echeverria MD at [facility] ENDOSCOPY    COLONOSCOPY,FLEX,W/CONTROL, BLEEDING  1/16/2019         HX ORTHOPAEDIC  1/27/15    L4-L5 MICRODECOMPRESSION (Right    WA EGD TRANSORAL BIOPSY SINGLE/MULTIPLE  8/16/2011         WA EGD TRANSORAL BIOPSY SINGLE/MULTIPLE  10/1/2013           No Known Allergies   Family History   Problem Relation Age of Onset    Heart Disease Mother     Heart Disease Father       Social History     Socioeconomic History    Marital status:      Spouse name: Not on file    Number of children: Not on file    Years of education: Not on file    Highest education level: Not on file   Occupational History    Not on file   Social Needs    Financial resource strain: Not on file    Food insecurity:     Worry: Not on file     Inability: Not on file    Transportation needs:     Medical: Not on file     Non-medical: Not on file   Tobacco Use    Smoking status: Former Smoker    Smokeless tobacco: Never Used   Substance and Sexual Activity    Alcohol use: No    Drug use: No    Sexual activity: Not on file   Lifestyle    Physical activity:     Days per week: Not on file     Minutes per session: Not on file    Stress: Not on file   Relationships    Social connections:     Talks on phone: Not on file     Gets together: Not on file     Attends Confucianist service: Not on file     Active member of club or organization: Not on file     Attends meetings of clubs or organizations: Not on file     Relationship status: Not on file    Intimate partner violence:     Fear of current or ex partner: Not on file     Emotionally abused: Not on file     Physically abused: Not on file     Forced sexual activity: Not on file   Other Topics Concern    Not on file   Social History Narrative    Not on file     Outpatient Medications Marked as Taking for the 6/13/19 encounter (Office Visit) with Osvaldo Oliveira MD   Medication Sig Dispense Refill    amLODIPine (NORVASC) 5 mg tablet Take 5 mg by mouth daily.  rOPINIRole (REQUIP) 1 mg tablet Take 1 mg by mouth daily.  esomeprazole (NEXIUM) 20 mg capsule TAKE 1 CAPSULE DAILY 90 Cap 3    valsartan (DIOVAN) 80 mg tablet Take 1 Tab by mouth daily. 90 Tab 3    atorvastatin (LIPITOR) 40 mg tablet TAKE 1 TABLET DAILY 90 Tab 3    carbidopa-levodopa (SINEMET)  mg per tablet 2 p.o. 4 times daily 720 Tab 3    gabapentin (NEURONTIN) 100 mg capsule Take 2 Caps by mouth two (2) times a day. 360 Cap 3    PARoxetine (PAXIL) 10 mg tablet TAKE 1 TABLET DAILY 90 Tab 3    acetaminophen (TYLENOL EXTRA STRENGTH) 500 mg tablet Take 1,000 mg by mouth three (3) times daily as needed for Pain.  senna-docusate (SENNA-S) 8.6-50 mg per tablet Take 1 Tab by mouth every other day.  aspirin delayed-release 81 mg tablet Take  by mouth daily.  vitamin e (E GEMS) 1,000 unit capsule Take 2,000 Units by mouth daily.  MULTIVITAMIN PO Take 1 Tab by mouth daily. Review of Systems              Constitutional:  He denies fever, weight loss, sweats or fatigue. HEENT:  No blurred or double vision, headache or dizziness. No difficulty with swallowing, taste, speech or smell. Respiratory:  No cough, wheezing or shortness of breath. No sputum production. Cardiac:  Denies chest pain, palpitations, unexplained indigestion, syncope, edema, PND or orthopnea. GI:  No changes in bowel movements, no abdominal pain, no bloating, anorexia, nausea, vomiting or heartburn. :  No frequency or dysuria. Denies incontinence.   Extremities:  No joint pain, stiffness or swelling. Skin:  No recent rashes or mole changes. Neurological:  No numbness, tingling, burning paresthesias or loss of motor strength. No syncope, dizziness, frequent headaches. Objective:     Vitals:    06/13/19 1613   BP: 162/74   Pulse: (!) 53   Resp: 16   Temp: 98.1 °F (36.7 °C)   TempSrc: Oral   SpO2: 96%   Weight: 182 lb 3.2 oz (82.6 kg)   Height: 5' 5\" (1.651 m)   PainSc:   0 - No pain       Body mass index is 30.32 kg/m². Physical Examination:              General Appearance:  Well-developed, well-nourished, no acute  distress. HEENT:     Ears:  The TMs and ear canals were clear. Eyes:  The pupillary responses were normal.  Extraocular muscle function intact. Lids and conjunctiva not injected. Neck:  Supple without thyromegaly or adenopathy. No JVD noted. Lungs:  Clear to auscultation and percussion. Cardiac:  Regular rate and rhythm without murmur. GI: nontender w/o mass. Normal BS's. Extremities:  No clubbing, cyanosis or edema. Skin:  No rash or unusual mole changes noted. Assessment/Plan:   Impressions:      ICD-10-CM ICD-9-CM    1. Hypertension, unspecified type I10 401.9    2. Parkinson's disease ([facility] G20 332.0         Plan:  1. Continue present meds  2. Discussed lifestyle modifications including Na restriction, low carb/fat diet, weight reduction and exercise (at least a walking program). Follow-up and Dispositions    · Return in about 2 weeks (around 6/27/2019). No orders of the defined types were placed in this encounter.       Romel Mccray MD

## 2019-06-28 ENCOUNTER — OFFICE VISIT (OUTPATIENT)
Dept: INTERNAL MEDICINE CLINIC | Age: 79
End: 2019-06-28

## 2019-06-28 VITALS
HEART RATE: 56 BPM | WEIGHT: 182.4 LBS | RESPIRATION RATE: 18 BRPM | DIASTOLIC BLOOD PRESSURE: 56 MMHG | OXYGEN SATURATION: 96 % | HEIGHT: 65 IN | TEMPERATURE: 97.7 F | SYSTOLIC BLOOD PRESSURE: 88 MMHG | BODY MASS INDEX: 30.39 KG/M2

## 2019-06-28 DIAGNOSIS — I10 HYPERTENSION, UNSPECIFIED TYPE: Primary | ICD-10-CM

## 2019-06-28 DIAGNOSIS — G20 PARKINSON'S DISEASE (HCC): ICD-10-CM

## 2019-06-28 NOTE — PROGRESS NOTES
Reviewed record in preparation for visit and have obtained necessary documentation. Identified pt with two pt identifiers(name and ). Chief Complaint   Patient presents with    Blood Pressure Check    Dizziness        Coordination of Care Questionnaire:  :     1) Have you been to an emergency room, urgent care clinic since your last visit? No     Hospitalized since your last visit? No               2) Have you seen or consulted any other health care providers outside of [address] [place] [address] since your last visit?  No

## 2019-06-28 NOTE — PROGRESS NOTES
Subjective:   Mathieu Sanabria is a 78 y.o. male      Chief Complaint   Patient presents with    Blood Pressure Check    Dizziness        History of present illness:     Mr. Gerardine Hodgkins returns for a blood pressure check. His blood pressure is again low now that he is on Amlodipine again, even though he is at lower doses. He is feeling dizzy again. He is mildly orthostatic. I suspect Amlodipine is just too vasodilatory, particularly in the phase of his Parkinson's. We are going to stop it all together. If his blood pressure returns elevated next time when he comes in we will simply adjust his Valsartan that time rather than restart the Amlodipine. He denies any new cardiorespiratory, GI or , TIA or stroke like symptoms.            Patient Active Problem List    Diagnosis Date Noted    Rectal bleeding 01/15/2019     Priority: 1 - One    Parkinson's disease ([facility] 01/30/2015     Priority: 1 - One    Hypertension      Priority: 1 - One    Stroke ([facility]      Priority: 1 - One    MCI (mild cognitive impairment)      Priority: 1 - One    Acute blood loss anemia 01/16/2019     Priority: 2 - Two    Aaron esophagus      Priority: 2 - Two    Hyperlipidemia      Priority: 2 - Two    Coronary artery disease 07/12/2017     Priority: 2 - Two    CKD (chronic kidney disease) stage 2, GFR 60-89 ml/min      Priority: 2 - Two    Long-term current use of high risk medication other than anticoagulant 06/13/2018     Priority: 3 - Three    Gastroesophageal reflux disease without esophagitis 09/30/2017     Priority: 3 - Three    Depression      Priority: 3 - Three    Tendinitis of left rotator cuff 07/12/2017     Priority: 3 - Three    DJD (degenerative joint disease) 07/12/2017     Priority: 3 - Three    Tremor 07/12/2017     Priority: 3 - Three    Snoring      Priority: 4 - Four    Sleep apnea in adult 07/12/2017     Priority: 4 - Four    Tendinitis 07/12/2017     Priority: 4 - Four    Gout 07/12/2017 Priority: 4 - Four    Restless leg syndrome 07/12/2017     Priority: 4 - Four    Benign nodular prostatic hyperplasia without lower urinary tract symptoms 07/12/2017     Priority: 4 - Four    Lumbar stenosis 01/29/2015     Priority: 4 - Four    Hearing difficulty of both ears 07/12/2017     Priority: 5 - Five    PE (physical exam), annual 09/30/2017      Past Surgical History:   Procedure Laterality Date    COLONOSCOPY N/A 1/16/2019    COLON[facility] performed by [facility]izio Garvey [facility] at [facility]COPY    COLONOSCOPY,FLEX,W/CONTROL, BLEEDING  1/16/2019         HX ORTHOPAEDIC  1/27/15    L4-L5 MICRODECOMPRESSION (Right    OK EGD TRANSORAL BIOPSY SINGLE/MULTIPLE  8/16/2011         OK EGD TRANSORAL BIOPSY SINGLE/MULTIPLE  10/1/2013           No Known Allergies   Family History   Problem Relation Age of Onset    Heart Disease Mother     Heart Disease Father       Social History     Socioeconomic History    Marital status:      Spouse name: Not on file    Number of children: Not on file    Years of education: Not on file    Highest education level: Not on file   Occupational History    Not on file   Social Needs    Financial resource strain: Not on file    Food insecurity:     Worry: Not on file     Inability: Not on file    Transportation needs:     Medical: Not on file     Non-medical: Not on file   Tobacco Use    Smoking status: Former Smoker    Smokeless tobacco: Never Used   Substance and Sexual Activity    Alcohol use: No    Drug use: No    Sexual activity: Not on file   Lifestyle    Physical activity:     Days per week: Not on file     Minutes per session: Not on file    Stress: Not on file   Relationships    Social connections:     Talks on phone: Not on file     Gets together: Not on file     Attends Religion service: Not on file     Active member of club or organization: Not on file     Attends meetings of clubs or organizations: Not on file     Relationship status: Not on file    Intimate partner violence:     Fear of current or ex partner: Not on file     Emotionally abused: Not on file     Physically abused: Not on file     Forced sexual activity: Not on file   Other Topics Concern    Not on file   Social History Narrative    Not on file     Outpatient Medications Marked as Taking for the 6/28/19 encounter (Office Visit) with Saima Donaldson MD   Medication Sig Dispense Refill    rOPINIRole (REQUIP) 1 mg tablet Take 1 mg by mouth daily.  esomeprazole (NEXIUM) 20 mg capsule TAKE 1 CAPSULE DAILY 90 Cap 3    valsartan (DIOVAN) 80 mg tablet Take 1 Tab by mouth daily. 90 Tab 3    atorvastatin (LIPITOR) 40 mg tablet TAKE 1 TABLET DAILY 90 Tab 3    carbidopa-levodopa (SINEMET)  mg per tablet 2 p.o. 4 times daily 720 Tab 3    gabapentin (NEURONTIN) 100 mg capsule Take 2 Caps by mouth two (2) times a day. 360 Cap 3    PARoxetine (PAXIL) 10 mg tablet TAKE 1 TABLET DAILY 90 Tab 3    acetaminophen (TYLENOL EXTRA STRENGTH) 500 mg tablet Take 1,000 mg by mouth three (3) times daily as needed for Pain.  senna-docusate (SENNA-S) 8.6-50 mg per tablet Take 1 Tab by mouth every other day.  aspirin delayed-release 81 mg tablet Take  by mouth daily.  vitamin e (E GEMS) 1,000 unit capsule Take 2,000 Units by mouth daily.  MULTIVITAMIN PO Take 1 Tab by mouth daily. Review of Systems              Constitutional:  He denies fever, weight loss, sweats or fatigue. HEENT:  No blurred or double vision, headache or dizziness. No difficulty with swallowing, taste, speech or smell. Respiratory:  No cough, wheezing or shortness of breath. No sputum production. Cardiac:  Denies chest pain, palpitations, unexplained indigestion, syncope, edema, PND or orthopnea. GI:  No changes in bowel movements, no abdominal pain, no bloating, anorexia, nausea, vomiting or heartburn. :  No frequency or dysuria. Denies incontinence.   Extremities:  No joint pain, stiffness or swelling. Skin:  No recent rashes or mole changes. Neurological:  Dizzy with Parkinson's gait    Objective:     Vitals:    06/28/19 1515 06/28/19 1525   BP: 104/60 (!) 88/56   Pulse: (!) 56    Resp: 18    Temp: 97.7 °F (36.5 °C)    TempSrc: Oral    SpO2: 96%    Weight: 182 lb 6.4 oz (82.7 kg)    Height: 5' 5\" (1.651 m)    PainSc:   0 - No pain        Body mass index is 30.35 kg/m². Physical Examination:              General Appearance:  Well-developed, well-nourished, no acute  distress. HEENT:     Ears:  The TMs and ear canals were clear. Eyes:  The pupillary responses were normal.  Extraocular muscle function intact. Lids and conjunctiva not injected. Neck:  Supple without thyromegaly or adenopathy. No JVD noted. Lungs:  Clear to auscultation and percussion. Cardiac:  Regular rate and rhythm without murmur. GI: nontender w/o mass. Normal BS's. Extremities:  No clubbing, cyanosis or edema. Skin:  No rash or unusual mole changes noted. Neurological:  Parkinson's gait, tremor and bradykinesia. Assessment/Plan:   Impressions:      ICD-10-CM ICD-9-CM    1. Hypertension, unspecified type I10 401.9    2. [facility] G20 332.0         Plan:  1. Continue present meds  2. Discussed lifestyle modifications including Na restriction, low carb/fat diet, weight reduction and exercise (at least a walking program). 3. Stop amlodipine          Follow-up and Dispositions    · Return in about 1 week (around 7/5/2019). No orders of the defined types were placed in this encounter.       Lupe Gomez MD

## 2019-07-05 ENCOUNTER — TELEPHONE (OUTPATIENT)
Dept: NEUROLOGY | Age: 79
End: 2019-07-05

## 2019-07-08 ENCOUNTER — OFFICE VISIT (OUTPATIENT)
Dept: INTERNAL MEDICINE CLINIC | Age: 79
End: 2019-07-08

## 2019-07-08 VITALS
DIASTOLIC BLOOD PRESSURE: 70 MMHG | HEIGHT: 65 IN | SYSTOLIC BLOOD PRESSURE: 108 MMHG | OXYGEN SATURATION: 96 % | BODY MASS INDEX: 30.22 KG/M2 | RESPIRATION RATE: 24 BRPM | WEIGHT: 181.4 LBS | HEART RATE: 58 BPM | TEMPERATURE: 98 F

## 2019-07-08 DIAGNOSIS — G31.84 MCI (MILD COGNITIVE IMPAIRMENT): ICD-10-CM

## 2019-07-08 DIAGNOSIS — G20 PARKINSON'S DISEASE (HCC): ICD-10-CM

## 2019-07-08 DIAGNOSIS — I10 HYPERTENSION, UNSPECIFIED TYPE: Primary | ICD-10-CM

## 2019-07-08 NOTE — PROGRESS NOTES
Subjective:   Sherie Little is a 78 y.o. male      Chief Complaint   Patient presents with    Blood Pressure Check     Follow up        History of present illness:  Mr. Beverley Cross returns in follow up. He is returning primarily for a blood pressure check. His blood pressure is still borderline on the low side, but not severely enough to alter medications at this point and is not quite as dizzy as he was previously. His diastolic blood pressure is 70, so he should not really be dizzy from a blood pressure standpoint at this point. I think his dizziness is more related to his Parkinson's. Apparently Dr. Lauryn Villatoro is having issues and is no longer seeing patients so we will try to schedule him a new appointment with Dr. Annemarie Maldonado relative to his neurologic status. I will ask him to see Serena Godoy in six weeks time to recheck his blood pressure. If it remains low at this level we will probably cut his Valsartan in half. The last time we made too many changes in his blood pressure, his blood pressure shot back up, however. He should continue his other medications as is. Once Serena Godoy has seen him she can refer him on to our Dr. Annemarie Maldonado for routine care.         Patient Active Problem List    Diagnosis Date Noted    Rectal bleeding 01/15/2019     Priority: 1 - One    Parkinson's disease ([facility] 01/30/2015     Priority: 1 - One    Hypertension      Priority: 1 - One    Stroke ([facility]      Priority: 1 - One    MCI (mild cognitive impairment)      Priority: 1 - One    Acute blood loss anemia 01/16/2019     Priority: 2 - Two    Aaron esophagus      Priority: 2 - Two    Hyperlipidemia      Priority: 2 - Two    Coronary artery disease 07/12/2017     Priority: 2 - Two    CKD (chronic kidney disease) stage 2, GFR 60-89 ml/min      Priority: 2 - Two    Long-term current use of high risk medication other than anticoagulant 06/13/2018     Priority: 3 - Three    Gastroesophageal reflux disease without esophagitis 09/30/2017 Priority: 3 - Three    Depression      Priority: 3 - Three    Tendinitis of left rotator cuff 07/12/2017     Priority: 3 - Three    DJD (degenerative joint disease) 07/12/2017     Priority: 3 - Three    Tremor 07/12/2017     Priority: 3 - Three    Snoring      Priority: 4 - Four    Sleep apnea in adult 07/12/2017     Priority: 4 - Four    Tendinitis 07/12/2017     Priority: 4 - Four    Gout 07/12/2017     Priority: 4 - Four    Restless leg syndrome 07/12/2017     Priority: 4 - Four    Benign nodular prostatic hyperplasia without lower urinary tract symptoms 07/12/2017     Priority: 4 - Four    Lumbar stenosis 01/29/2015     Priority: 4 - Four    Hearing difficulty of both ears 07/12/2017     Priority: 5 - Five    PE (physical exam), annual 09/30/2017      Past Surgical History:   Procedure Laterality Date    COLONOSCOPY N/A 1/16/2019    [facility]COPY    COLONOSCOPY,FLEX,W/CONTROL, BLEEDING  1/16/2019         HX ORTHOPAEDIC  1/27/15    L4-L5 MICRODECOMPRESSION (Right    ND EGD TRANSORAL BIOPSY SINGLE/MULTIPLE  8/16/2011         ND EGD TRANSORAL BIOPSY SINGLE/MULTIPLE  10/1/2013           No Known Allergies   Family History   Problem Relation Age of Onset    Heart Disease Mother     Heart Disease Father       Social History     Socioeconomic History    Marital status:      Spouse name: Not on file    Number of children: Not on file    Years of education: Not on file    Highest education level: Not on file   Occupational History    Not on file   Social Needs    Financial resource strain: Not on file    Food insecurity:     Worry: Not on file     Inability: Not on file    Transportation needs:     Medical: Not on file     Non-medical: Not on file   Tobacco Use    Smoking status: Former Smoker    Smokeless tobacco: Never Used   Substance and Sexual Activity    Alcohol use: No    Drug use: No    Sexual activity: Not on file   Lifestyle    Physical activity:     Days per week: Not on file     Minutes per session: Not on file    Stress: Not on file   Relationships    Social connections:     Talks on phone: Not on file     Gets together: Not on file     Attends Restorationism service: Not on file     Active member of club or organization: Not on file     Attends meetings of clubs or organizations: Not on file     Relationship status: Not on file    Intimate partner violence:     Fear of current or ex partner: Not on file     Emotionally abused: Not on file     Physically abused: Not on file     Forced sexual activity: Not on file   Other Topics Concern    Not on file   Social History Narrative    Not on file     Outpatient Medications Marked as Taking for the 7/8/19 encounter (Office Visit) with Kellie Merlos MD   Medication Sig Dispense Refill    rOPINIRole (REQUIP) 1 mg tablet Take 1 mg by mouth daily.  esomeprazole (NEXIUM) 20 mg capsule TAKE 1 CAPSULE DAILY 90 Cap 3    valsartan (DIOVAN) 80 mg tablet Take 1 Tab by mouth daily. 90 Tab 3    atorvastatin (LIPITOR) 40 mg tablet TAKE 1 TABLET DAILY 90 Tab 3    carbidopa-levodopa (SINEMET)  mg per tablet 2 p.o. 4 times daily 720 Tab 3    gabapentin (NEURONTIN) 100 mg capsule Take 2 Caps by mouth two (2) times a day. 360 Cap 3    PARoxetine (PAXIL) 10 mg tablet TAKE 1 TABLET DAILY 90 Tab 3    acetaminophen (TYLENOL EXTRA STRENGTH) 500 mg tablet Take 1,000 mg by mouth three (3) times daily as needed for Pain.  senna-docusate (SENNA-S) 8.6-50 mg per tablet Take 1 Tab by mouth every other day.  aspirin delayed-release 81 mg tablet Take  by mouth daily.  vitamin e (E GEMS) 1,000 unit capsule Take 2,000 Units by mouth daily.  MULTIVITAMIN PO Take 1 Tab by mouth daily. Review of Systems              Constitutional:  He denies fever, weight loss, sweats or fatigue. HEENT:  No blurred or double vision, headache or dizziness.   No difficulty with swallowing, taste, speech or smell. Respiratory:  No cough, wheezing or shortness of breath. No sputum production. Cardiac:  Denies chest pain, palpitations, unexplained indigestion, syncope, edema, PND or orthopnea. GI:  No changes in bowel movements, no abdominal pain, no bloating, anorexia, nausea, vomiting or heartburn. :  No frequency or dysuria. Denies incontinence. Extremities:  No joint pain, stiffness or swelling. Skin:  No recent rashes or mole changes. Neurological: gait issues and tremor and dizziness    Objective:     Vitals:    07/08/19 1550   BP: 108/70   Pulse: (!) 58   Resp: 24   Temp: 98 °F (36.7 °C)   TempSrc: Oral   SpO2: 96%   Weight: 181 lb 6.4 oz (82.3 kg)   Height: 5' 5\" (1.651 m)   PainSc:   5   PainLoc: Back       Body mass index is 30.19 kg/m². Physical Examination:              General Appearance:  Disheveled appearing well-nourished, no acute  distress. HEENT:     Ears:  The TMs and ear canals were clear. Eyes:  The pupillary responses were normal.  Extraocular muscle function intact. Lids and conjunctiva not injected. Neck:  Supple without thyromegaly or adenopathy. No JVD noted. Lungs:  Clear to auscultation and percussion. Cardiac:  Regular rate and rhythm without murmur. GI: nontender w/o mass. Normal BS's. Extremities:  No clubbing, cyanosis or edema. Skin:  No rash or unusual mole changes noted. Neurological:  Parkinson's gait, tremor and bradykinesia and increased tone. Assessment/Plan:   Impressions:      ICD-10-CM ICD-9-CM    1. Hypertension, unspecified type I10 401.9    2. [facility] G20 332.0 REFERRAL TO NEUROLOGY   3. MCI (mild cognitive impairment) G31.84 331.83 REFERRAL TO NEUROLOGY        Plan:  1. Continue present meds  2. Discussed lifestyle modifications including Na restriction, low carb/fat diet, weight reduction and exercise (at least a walking program).           Follow-up and Dispositions · Return in about 6 weeks (around 8/19/2019) for daniel 1 time.            Orders Placed This Encounter   Tamie Borjas Neurology ref ED [facility]     Referral Priority:   Routine     Referral Type:   Consultation     Referral Reason:   Specialty Services Required     Referred to Provider:   Dilip Canales MD     Number of Visits Requested:   1       Latha Mclean MD

## 2019-07-08 NOTE — PROGRESS NOTES
Anthony Valdivia  Identified pt with two pt identifiers(name and ). Chief Complaint   Patient presents with    Blood Pressure Check     Follow up       1. Have you been to the ER, urgent care clinic since your last visit? Hospitalized since your last visit? No    2. Have you seen or consulted any other health care providers outside of the [address] [place] [address] since your last visit? Include any pap smears or colon screening. No      Medication reconciliation up to date and corrected with patient at this time. Today's provider has been notified of reason for visit, vitals and flowsheets obtained on patients. Reviewed record in preparation for visit, huddled with provider and have obtained necessary documentation.         Depression Risk Factor Screening:     3 most recent PHQ Screens 2019   Little interest or pleasure in doing things Not at all   Feeling down, depressed, irritable, or hopeless Not at all   Total Score PHQ 2 0   Trouble falling or staying asleep, or sleeping too much Not at all   Feeling tired or having little energy Not at all   Poor appetite, weight loss, or overeating Not at all   Feeling bad about yourself - or that you are a failure or have let yourself or your family down Not at all   Trouble concentrating on things such as school, work, reading, or watching TV Not at all   Moving or speaking so slowly that other people could have noticed; or the opposite being so fidgety that others notice Nearly every day   Thoughts of being better off dead, or hurting yourself in some way Not at all   PHQ 9 Score 3       Functional Ability and Level of Safety:     Activities of Daily Living  ADL Assessment 2019   Feeding yourself No Help Needed   Getting from bed to chair No Help Needed   Getting dressed No Help Needed   Bathing or showering No Help Needed   Walk across the room (includes cane/walker) No Help Needed   Using the telphone No Help Needed   Taking your medications Help Needed   Preparing meals No Help Needed   Managing money (expenses/bills) No Help Needed   Moderately strenuous housework (laundry) No Help Needed   Shopping for personal items (toiletries/medicines) No Help Needed   Shopping for groceries No Help Needed   Driving Help Needed   Climbing a flight of stairs No Help Needed   Getting to places beyond walking distances No Help Needed       Fall Risk  Fall Risk Assessment, last 12 mths 6/6/2019   Able to walk? Yes   Fall in past 12 months? Yes   Fall with injury? No   Number of falls in past 12 months 2   Fall Risk Score 2       Abuse Screen  Abuse Screening Questionnaire 6/6/2019   Do you ever feel afraid of your partner? N   Are you in a relationship with someone who physically or mentally threatens you? N   Is it safe for you to go home?  Y           Health Maintenance Due   Topic    Shingrix Vaccine Age 49> (1 of 2)    GLAUCOMA SCREENING Q2Y        Wt Readings from Last 3 Encounters:   07/08/19 181 lb 6.4 oz (82.3 kg)   06/28/19 182 lb 6.4 oz (82.7 kg)   06/13/19 182 lb 3.2 oz (82.6 kg)     Temp Readings from Last 3 Encounters:   07/08/19 98 °F (36.7 °C) (Oral)   06/28/19 97.7 °F (36.5 °C) (Oral)   06/13/19 98.1 °F (36.7 °C) (Oral)     BP Readings from Last 3 Encounters:   07/08/19 108/70   06/28/19 (!) 88/56   06/13/19 162/74     Pulse Readings from Last 3 Encounters:   07/08/19 (!) 58   06/28/19 (!) 56   06/13/19 (!) 53     Vitals:    07/08/19 1550   BP: 108/70   Pulse: (!) 58   Resp: 24   Temp: 98 °F (36.7 °C)   TempSrc: Oral   SpO2: 96%   Weight: 181 lb 6.4 oz (82.3 kg)   Height: 5' 5\" (1.651 m)   PainSc:   5   PainLoc: Back         Patient Care Team   Patient Care Team:  Dewayne Wagner MD as PCP - General (Internal Medicine)  Maria De Jesus Gunderson MD as Physician (Neurology)  Sukhjinder Tucker MD (Neurology)

## 2019-07-29 ENCOUNTER — OFFICE VISIT (OUTPATIENT)
Dept: NEUROLOGY | Age: 79
End: 2019-07-29

## 2019-07-29 VITALS
OXYGEN SATURATION: 98 % | WEIGHT: 182 LBS | SYSTOLIC BLOOD PRESSURE: 124 MMHG | HEART RATE: 58 BPM | BODY MASS INDEX: 30.32 KG/M2 | HEIGHT: 65 IN | RESPIRATION RATE: 16 BRPM | DIASTOLIC BLOOD PRESSURE: 64 MMHG

## 2019-07-29 DIAGNOSIS — G20 DEMENTIA DUE TO PARKINSON'S DISEASE WITHOUT BEHAVIORAL DISTURBANCE (HCC): ICD-10-CM

## 2019-07-29 DIAGNOSIS — Z86.73 HISTORY OF STROKE: ICD-10-CM

## 2019-07-29 DIAGNOSIS — I63.9 CEREBROVASCULAR ACCIDENT (CVA), UNSPECIFIED MECHANISM (HCC): ICD-10-CM

## 2019-07-29 DIAGNOSIS — G20 PARKINSONISM, UNSPECIFIED PARKINSONISM TYPE (HCC): Primary | ICD-10-CM

## 2019-07-29 DIAGNOSIS — G31.84 MCI (MILD COGNITIVE IMPAIRMENT): ICD-10-CM

## 2019-07-29 DIAGNOSIS — F02.80 DEMENTIA DUE TO PARKINSON'S DISEASE WITHOUT BEHAVIORAL DISTURBANCE (HCC): ICD-10-CM

## 2019-07-29 DIAGNOSIS — I65.23 BILATERAL CAROTID ARTERY STENOSIS: ICD-10-CM

## 2019-07-29 DIAGNOSIS — G25.81 RESTLESS LEG SYNDROME: ICD-10-CM

## 2019-07-29 DIAGNOSIS — G31.84 MILD COGNITIVE IMPAIRMENT: ICD-10-CM

## 2019-07-29 RX ORDER — ENTACAPONE 200 MG/1
200 TABLET ORAL 3 TIMES DAILY
Qty: 270 TAB | Refills: 5 | Status: SHIPPED | OUTPATIENT
Start: 2019-07-29 | End: 2020-10-09

## 2019-07-29 RX ORDER — ROPINIROLE 1 MG/1
1 TABLET, FILM COATED ORAL 3 TIMES DAILY
Qty: 270 TAB | Refills: 5 | Status: ON HOLD | OUTPATIENT
Start: 2019-07-29 | End: 2020-12-03

## 2019-07-29 NOTE — PATIENT INSTRUCTIONS
A Healthy Lifestyle: Care Instructions  Your Care Instructions    A healthy lifestyle can help you feel good, stay at a healthy weight, and have plenty of energy for both work and play. A healthy lifestyle is something you can share with your whole family. A healthy lifestyle also can lower your risk for serious health problems, such as high blood pressure, heart disease, and diabetes. You can follow a few steps listed below to improve your health and the health of your family. Follow-up care is a key part of your treatment and safety. Be sure to make and go to all appointments, and call your doctor if you are having problems. It's also a good idea to know your test results and keep a list of the medicines you take. How can you care for yourself at home? · Do not eat too much sugar, fat, or fast foods. You can still have dessert and treats now and then. The goal is moderation. · Start small to improve your eating habits. Pay attention to portion sizes, drink less juice and soda pop, and eat more fruits and vegetables. ? Eat a healthy amount of food. A 3-ounce serving of meat, for example, is about the size of a deck of cards. Fill the rest of your plate with vegetables and whole grains. ? Limit the amount of soda and sports drinks you have every day. Drink more water when you are thirsty. ? Eat at least 5 servings of fruits and vegetables every day. It may seem like a lot, but it is not hard to reach this goal. A serving or helping is 1 piece of fruit, 1 cup of vegetables, or 2 cups of leafy, raw vegetables. Have an apple or some carrot sticks as an afternoon snack instead of a candy bar. Try to have fruits and/or vegetables at every meal.  · Make exercise part of your daily routine. You may want to start with simple activities, such as walking, bicycling, or slow swimming. Try to be active 30 to 60 minutes every day. You do not need to do all 30 to 60 minutes all at once.  For example, you can exercise 3 times a day for 10 or 20 minutes. Moderate exercise is safe for most people, but it is always a good idea to talk to your doctor before starting an exercise program.  · Keep moving. Lucia Palm the lawn, work in the garden, or [employer]. Take the stairs instead of the elevator at work. · If you smoke, quit. People who smoke have an increased risk for heart attack, stroke, cancer, and other lung illnesses. Quitting is hard, but there are ways to boost your chance of quitting tobacco for good. ? Use nicotine gum, patches, or lozenges. ? Ask your doctor about stop-smoking programs and medicines. ? Keep trying. In addition to reducing your risk of diseases in the future, you will notice some benefits soon after you stop using tobacco. If you have shortness of breath or asthma symptoms, they will likely get better within a few weeks after you quit. · Limit how much alcohol you drink. Moderate amounts of alcohol (up to 2 drinks a day for men, 1 drink a day for women) are okay. But drinking too much can lead to liver problems, high blood pressure, and other health problems. Family health  If you have a family, there are many things you can do together to improve your health. · Eat meals together as a family as often as possible. · Eat healthy foods. This includes fruits, vegetables, lean meats and dairy, and whole grains. · Include your family in your fitness plan. Most people think of activities such as jogging or tennis as the way to fitness, but there are many ways you and your family can be more active. Anything that makes you breathe hard and gets your heart pumping is exercise. Here are some tips:  ? Walk to do errands or to take your child to school or the bus.  ? Go for a family bike ride after dinner instead of watching TV. Where can you learn more? Go to http://aditi-angela.info/. Enter B352 in the search box to learn more about \"A Healthy Lifestyle: Care Instructions. \"  Current as of: September 11, 2018  Content Version: 12.1  © 2789-4733 [employer]. Care instructions adapted under license by [employer] (which disclaims liability or warranty for this information). If you have questions about a medical condition or this instruction, always ask your healthcare professional. [address] any warranty or liability for your use of this information.

## 2019-07-29 NOTE — PROGRESS NOTES
Consult  REFERRED BY:  Jyoti Schmid MD    CHIEF COMPLAINT: Sudden severe weakness and difficulty walking      Subjective:     Hussain Rush is a 78 y.o. right-handed  male we are seeing as a new patient to me today for evaluation at the request of Dr. Margaret Almodovar of new problem that began suddenly 2 weeks ago and the patient became very weak all over, cannot walk, does not seem to be able to move his feet, seems to have lost his balance, lost his coordination, without clear focal weakness or sensory loss, headache, fever, meningismus, or any trauma or other precipitating cause. The patient has a known history of Parkinson's disease has moderate severe and has been having some difficulty recently and was placed on Requip at last visit in March 2019 and was started on 1 mg 3 times a day, became lightheaded and dizzy, stopped the medication, but then had his blood pressure pills reduced and restarted Requip 1 mg once in the morning and is tolerating it okay so far. He had Parkinson's disease for many years, and is currently on Sinemet 25/100 mg taking 2 pills 4 times a day in addition to his Requip 1 mg every day. He takes gabapentin 200 mg twice a day for his restless leg and pain. He had a past history of stroke 27 years ago that left him with a mild residual right hemiplegia. He currently takes aspirin 81 mg a day and a statin because of his stroke and heart disease. He has a lot of problems first thing in the morning and at nighttime try to get out of bed when he has to urinate. He has sometimes episodes of freezing. He does have some mild cognitive impairment but is not on any cognitive enhancing agents so far. He has had no other new medication, and no new medical problems and knows of no other cause for his problems.   In 2013 he had a CT scan of the head that just showed atrophic changes, and old vascular disease, and enlarged ventricle on the left side from his previous stroke, and ventricles were a little generous. Patient denies any sudden deterioration in mental function, and denies any hallucinations delusions or abnormal behavior. Past Medical History:   Diagnosis Date    Aaron esophagus     Benign nodular prostatic hyperplasia without lower urinary tract symptoms 7/12/2017    CKD (chronic kidney disease) stage 2, GFR 60-89 ml/min     Constipation     Coronary artery disease 7/12/2017    Depression     DJD (degenerative joint disease) 7/12/2017    Falls     Gout 7/12/2017    Headache     Hearing difficulty of both ears 7/12/2017    Hyperlipidemia     Hypertension     Impaired cognition 7/12/2017    MCI (mild cognitive impairment)     Restless leg syndrome 7/12/2017    Sleep apnea in adult 7/12/2017    No longer on CPAP @13    Snoring     Stroke ([facility] [address]     Tendinitis 7/12/2017    ACHILLES    Tendinitis of left rotator cuff 07/12/2017    Tremor 7/12/2017    LEFT HAND    Unspecified sleep apnea     lost weight no longer has it      Past Surgical History:   Procedure Laterality Date    COLONOSCOPY N/A 1/16/2019    COL[facility]COPY    COLONOSCOPY,FLEX,W/CONTROL, BLEEDING  1/16/2019         HX ORTHOPAEDIC  1/27/15    L4-L5 MICRODECOMPRESSION (Right    WI EGD TRANSORAL BIOPSY SINGLE/MULTIPLE  8/16/2011         WI EGD TRANSORAL BIOPSY SINGLE/MULTIPLE  10/1/2013          Family History   Problem Relation Age of Onset    Heart Disease Mother     Heart Disease Father       Social History     Tobacco Use    Smoking status: Former Smoker    Smokeless tobacco: Never Used   Substance Use Topics    Alcohol use: No         Current Outpatient Medications:     rOPINIRole (REQUIP) 1 mg tablet, Take 1 Tab by mouth three (3) times daily. , Disp: 270 Tab, Rfl: 5    entacapone (COMTAN) 200 mg tablet, Take 1 Tab by mouth three (3) times daily. , Disp: 270 Tab, Rfl: 5    esomeprazole (NEXIUM) 20 mg capsule, TAKE 1 CAPSULE DAILY, Disp: 90 Cap, Rfl: 3    valsartan (DIOVAN) 80 mg tablet, Take 1 Tab by mouth daily. , Disp: 90 Tab, Rfl: 3    atorvastatin (LIPITOR) 40 mg tablet, TAKE 1 TABLET DAILY, Disp: 90 Tab, Rfl: 3    carbidopa-levodopa (SINEMET)  mg per tablet, 2 p.o. 4 times daily, Disp: 720 Tab, Rfl: 3    gabapentin (NEURONTIN) 100 mg capsule, Take 2 Caps by mouth two (2) times a day., Disp: 360 Cap, Rfl: 3    PARoxetine (PAXIL) 10 mg tablet, TAKE 1 TABLET DAILY, Disp: 90 Tab, Rfl: 3    acetaminophen (TYLENOL EXTRA STRENGTH) 500 mg tablet, Take 1,000 mg by mouth three (3) times daily as needed for Pain., Disp: , Rfl:     senna-docusate (SENNA-S) 8.6-50 mg per tablet, Take 1 Tab by mouth every other day., Disp: , Rfl:     aspirin delayed-release 81 mg tablet, Take  by mouth daily. , Disp: , Rfl:     vitamin e (E GEMS) 1,000 unit capsule, Take 2,000 Units by mouth daily. , Disp: , Rfl:     MULTIVITAMIN PO, Take 1 Tab by mouth daily. , Disp: , Rfl:         No Known Allergies   MRI Results (most recent):  Results from Hospital Encounter encounter on 12/13/14   MRI LUMB SPINE WO CONT    Narrative **Final Report**       ICD Codes / Adm. Diagnosis: 724.4  721.3 / Thoracic or lumbosacral neurit    Lumbosacral spondylosis with  Examination:  MR BRYANT SPINE WO CON  - [id number] - Dec 13 2014 11:48AM  Accession No:  [id number]  Reason:  724.03      REPORT:  EXAM:  MR BRYANT SPINE WO CON    INDICATION:  Low back pain. COMPARISON: MRI lumbar spine on 3/29/2012    TECHNIQUE: MR imaging of the lumbar spine was performed with sagittal T1,   T2, STIR;  axial T1, T2 performed on the 3 Mayra magnet. CONTRAST:  None. FINDINGS: Subtle rightward curvature of the mid lumbar spine. New minimal grade 1 anterolisthesis of L4-5. Unchanged grade 1   retrolisthesis of L1-2, L2-3, and L3-4. Multilevel facet arthrosis. No   evidence of spondylolysis. Vertebral body heights are maintained.    Degenerative bone marrow has increased and L5 but is decreased in the other   lumbar levels. The conus medullaris terminates at L2. Signal and caliber of the distal   spinal cord are within normal limits. Mild atrophy of the paraspinal musculature. Lower thoracic spine: No herniation or stenosis. L1-L2:  Asymmetric left diffuse disc bulge. Facet arthrosis. Mild central   spinal canal stenosis. Left subarticular zone compromise may affect the   descending left L2 nerve. Left foraminal stenosis. No change. L2-L3:  Diffuse disc bulge, facet arthrosis, and thickened ligamentum   flavum. Mild left foraminal stenosis. No change. L3-L4:  Diffuse disc bulge, facet arthrosis, and thickened ligamentum   flavum. Mild central spinal canal stenosis. Moderate left and mild right   foraminal stenosis. No significant change. L4-L5:  Diffuse disc bulge, facet arthrosis, and thickened ligamentum   flavum. Increased moderate central spinal canal stenosis. Unchanged mild   left and moderate right foraminal stenosis. L5-S1:  Diffuse disc osteophyte complex. Facet arthrosis. Mild central   spinal canal stenosis. Moderate right and mild left foraminal stenosis. No   change. IMPRESSION:    1. L4-5 increased moderate central spinal canal stenosis. 2. Other stenoses are unchanged. 3. New minimal grade 1 anterolisthesis of L4-5.  4. Increased degenerative bone marrow edema within L5 may contribute to pain. Signing/Reading Doctor: Sarthak Fairchild ([id number])    Approved: Sarthak Fairchild ([id number])  Dec 15 2014  8:32AM                                      Results from Hospital Encounter encounter on 12/13/14   MRI LUMB SPINE WO CONT    Narrative **Final Report**       ICD Codes / Adm. Diagnosis: 724.4  721.3 / Thoracic or lumbosacral neurit    Lumbosacral spondylosis with  Examination:  MR BRYANT SPINE WO CON  - [id number] - Dec 13 2014 11:48AM  Accession No:  [id number]  Reason:  724.03      REPORT:  EXAM:  MR BRYANT SPINE WO CON    INDICATION:  Low back pain. COMPARISON: MRI lumbar spine on 3/29/2012    TECHNIQUE: MR imaging of the lumbar spine was performed with sagittal T1,   T2, STIR;  axial T1, T2 performed on the 3 Mayra magnet. CONTRAST:  None. FINDINGS: Subtle rightward curvature of the mid lumbar spine. New minimal grade 1 anterolisthesis of L4-5. Unchanged grade 1   retrolisthesis of L1-2, L2-3, and L3-4. Multilevel facet arthrosis. No   evidence of spondylolysis. Vertebral body heights are maintained. Degenerative bone marrow has increased and L5 but is decreased in the other   lumbar levels. The conus medullaris terminates at L2. Signal and caliber of the distal   spinal cord are within normal limits. Mild atrophy of the paraspinal musculature. Lower thoracic spine: No herniation or stenosis. L1-L2:  Asymmetric left diffuse disc bulge. Facet arthrosis. Mild central   spinal canal stenosis. Left subarticular zone compromise may affect the   descending left L2 nerve. Left foraminal stenosis. No change. L2-L3:  Diffuse disc bulge, facet arthrosis, and thickened ligamentum   flavum. Mild left foraminal stenosis. No change. L3-L4:  Diffuse disc bulge, facet arthrosis, and thickened ligamentum   flavum. Mild central spinal canal stenosis. Moderate left and mild right   foraminal stenosis. No significant change. L4-L5:  Diffuse disc bulge, facet arthrosis, and thickened ligamentum   flavum. Increased moderate central spinal canal stenosis. Unchanged mild   left and moderate right foraminal stenosis. L5-S1:  Diffuse disc osteophyte complex. Facet arthrosis. Mild central   spinal canal stenosis. Moderate right and mild left foraminal stenosis. No   change. IMPRESSION:    1. L4-5 increased moderate central spinal canal stenosis. 2. Other stenoses are unchanged. 3. New minimal grade 1 anterolisthesis of L4-5.  4. Increased degenerative bone marrow edema within L5 may contribute to pain.               Signing/Reading Doctor: Army Kendrick ([id number])    Approved: Army Kendrick ([id number])  Dec 15 2014  8:32AM                                    Review of Systems:  A comprehensive review of systems was negative except for: Constitutional: positive for fatigue and malaise  Ears, nose, mouth, throat, and face: positive for hearing loss  Genitourinary: positive for frequency and urinary incontinence  Musculoskeletal: positive for myalgias, arthralgias and stiff joints  Neurological: positive for dizziness, memory problems, coordination problems, gait problems, tremor and weakness  Behvioral/Psych: positive for Memory loss, anxiety and depression   Vitals:    07/29/19 1557   BP: 124/64   Pulse: (!) 58   Resp: 16   SpO2: 98%   Weight: 182 lb (82.6 kg)   Height: 5' 5\" (1.651 m)     Objective:     I      NEUROLOGICAL EXAM:    Appearance: The patient is well developed, well nourished, provides a fair history and is in no acute distress. Mental Status: Oriented to place and person, and the president, not the date, but knows his children and grandchildren, cognitive function is abnormal and speech is fluent and no aphasia but patient has mild dysarthria. Mood and affect appropriate. Cranial Nerves:   Intact visual fields. Fundi are benign, disc are flat, no lesions seen on funduscopy. BERNARDO, EOM's full, no nystagmus, no ptosis. Facial sensation is normal. Corneal reflexes are not tested. Facial movement is symmetric but a little masklike. Hearing is abnormal bilaterally. Palate is midline with normal sternocleidomastoid and trapezius muscles are normal. Tongue is midline. Neck without meningismus or bruits  Temporal arteries are not tender or enlarged  TMJ areas are not tender on palpation   Motor:  4/5 strength in upper and lower proximal and distal muscles, with perhaps a mild right-sided weakness, slightly more prominent in the right leg.   Reduced bulk and increased tone in a generalized fashion, with cogwheeling and rigidity and bradykinesia. No fasciculations. Rapid alternating movement is symmetric and but reduced bilaterally   Reflexes:   Deep tendon reflexes 1+/4 and symmetrical.  No babinski or clonus present   Sensory:   Normal to touch, pinprick and vibration and temperature. DSS is intact   Gait:  Abnormal gait as patient walks slightly stooped over, with a shuffling gait and has difficulty lifting his feet off the floor and needs assistance or a walker. Tremor:    Minimal rest tremor noted in both upper extremities. Cerebellar: Moderately abnormal cerebellar signs present on Romberg and tandem testing and finger-nose-finger exam is slow without intention tremor. Neurovascular:  Normal heart sounds and regular rhythm, peripheral pulses decreased, and no carotid bruits. Assessment:       ICD-10-CM ICD-9-CM    1. Parkinson[facility], [facility] G20 332.0 REFERRAL TO NEUROLOGY      REFERRAL TO HOME HEALTH      rOPINIRole (REQUIP) 1 mg tablet      entacapone (COMTAN) 200 mg tablet      CT HEAD WO CONT      DUPLEX CAROTID BILATERAL   2. Dementia due to Parkinson's disease without behavioral disturbance (HCC) G20 332.0 REFERRAL TO NEUROLOGY    F02.80 294.10 REFERRAL TO HOME HEALTH      rOPINIRole (REQUIP) 1 mg tablet      entacapone (COMTAN) 200 mg tablet      CT HEAD WO CONT      DUPLEX CAROTID BILATERAL   3. Mild cognitive impairment G31.84 331.83 REFERRAL TO NEUROLOGY      REFERRAL TO HOME HEALTH      rOPINIRole (REQUIP) 1 mg tablet      entacapone (COMTAN) 200 mg tablet      CT HEAD WO CONT      DUPLEX CAROTID BILATERAL   4. Cerebrovascular accident (CVA), unspecified mechanism ([facility] I63.9 434.91 REFERRAL TO NEUROLOGY      REFERRAL TO HOME HEALTH      rOPINIRole (REQUIP) 1 mg tablet      entacapone (COMTAN) 200 mg tablet      CT HEAD WO CONT      DUPLEX CAROTID BILATERAL   5.  Bilateral carotid artery stenosis I65.23 433.10 REFERRAL TO NEUROLOGY     433.30 REFERRAL TO HOME HEALTH      rOPINIRole (REQUIP) 1 mg tablet      entacapone (COMTAN) 200 mg tablet      CT HEAD WO CONT      DUPLEX CAROTID BILATERAL   6. History of stroke Z86.73 V12.54 REFERRAL TO NEUROLOGY      REFERRAL TO HOME HEALTH      rOPINIRole (REQUIP) 1 mg tablet      entacapone (COMTAN) 200 mg tablet      CT HEAD WO CONT      DUPLEX CAROTID BILATERAL   7. MCI (mild cognitive impairment) G31.84 331.83    8. Restless leg syndrome G25.81 333.94      Active Problems:    * No active hospital problems. *      Plan:     Patient appears to have a sudden deterioration in his status, we will check a CT scan of the head to rule out acute stroke, rule out normal pressure hydrocephalus, rule out any other structural lesion for his problem. We will also check a carotid Doppler study to make sure there is no recurrent carotid stenosis because of his past history of vascular disease and sudden change in his condition  Patient obviously is having a very slow gait, not responsive to high-dose Sinemet, and I will refer him to Dr. Chitra Bourgeois to consider deep brain stimulation as a possible modality if he is not to cognitively impaired  Patient denies any hallucinations, and the family does not think he has had any major deterioration in his mental function. We will try to place him on Comtan because of his difficulty freezing and difficulty getting out of bed to extend the life of his dopamine, and could consider Sinemet CR in the future in addition to  Also a trial of dopamine inhaler may be another therapeutic option in the future. We will also increase his Requip back to 1 mg 3 times a day slowly, trying twice a day for a week or 2 and then 3 times a day if tolerated to see if that will help. We will also have home health see him for gait training exercise program strengthening, and mobility.   Difficult case, patient advanced Parkinson's disease we discussed other therapeutic options like ultrasound therapy at [facility], or dopamine infusion pump into the jejunum, but I think DBS would be the way to go  We will see him again in 3 months time or earlier as need be. They will call if any problem in the side effects of the medication as far as compulsive behavior, dyskinesias, sleep attacks, with GI side effects, syncope or orthostasis, or any side effect patient will call. We discussed his condition with the patient his wife and his daughter all in detail. Time of visit was 45 minutes today going over all his new problems, discussing therapeutic options, and discussing plans and therapy. Signed By: Ji Dooley MD     July 29, 2019       CC: Artist Crigler, MD  FAX: [phone]    This note will not be viewable in [address]

## 2019-07-29 NOTE — LETTER
7/29/2019 7:44 PM 
 
Patient:  Weston Boucher YOB: 1940 Date of Visit: 7/29/2019 Dear No Recipients: Thank you for referring Mr. Turner Gasca to me for evaluation/treatment. Below are the relevant portions of my assessment and plan of care. Consult REFERRED BY: 
Vignesh Kevin MD 
 
CHIEF COMPLAINT: Sudden severe weakness and difficulty walking Subjective:  
 
Weston Boucher is a 78 y.o. right-handed  male we are seeing as a new patient to me today for evaluation at the request of Dr. Arthur Schmidt of new problem that began suddenly 2 weeks ago and the patient became very weak all over, cannot walk, does not seem to be able to move his feet, seems to have lost his balance, lost his coordination, without clear focal weakness or sensory loss, headache, fever, meningismus, or any trauma or other precipitating cause. The patient has a known history of Parkinson's disease has moderate severe and has been having some difficulty recently and was placed on Requip at last visit in March 2019 and was started on 1 mg 3 times a day, became lightheaded and dizzy, stopped the medication, but then had his blood pressure pills reduced and restarted Requip 1 mg once in the morning and is tolerating it okay so far. He had Parkinson's disease for many years, and is currently on Sinemet 25/100 mg taking 2 pills 4 times a day in addition to his Requip 1 mg every day. He takes gabapentin 200 mg twice a day for his restless leg and pain. He had a past history of stroke 27 years ago that left him with a mild residual right hemiplegia. He currently takes aspirin 81 mg a day and a statin because of his stroke and heart disease. He has a lot of problems first thing in the morning and at nighttime try to get out of bed when he has to urinate. He has sometimes episodes of freezing. He does have some mild cognitive impairment but is not on any cognitive enhancing agents so far. He has had no other new medication, and no new medical problems and knows of no other cause for his problems. In 2013 he had a CT scan of the head that just showed atrophic changes, and old vascular disease, and enlarged ventricle on the left side from his previous stroke, and ventricles were a little generous. Patient denies any sudden deterioration in mental function, and denies any hallucinations delusions or abnormal behavior. Past Medical History:  
Diagnosis Date  Aaron esophagus  Benign nodular prostatic hyperplasia without lower urinary tract symptoms 7/12/2017  CKD (chronic kidney disease) stage 2, GFR 60-89 ml/min  Constipation  Coronary artery disease 7/12/2017  Depression  DJD (degenerative joint disease) 7/12/2017 [address]  Gout 7/12/2017  Headache   
 Hearing difficulty of both ears 7/12/2017  Hyperlipidemia  Hypertension  Impaired cognition 7/12/2017  MCI (mild cognitive impairment)  Restless leg syndrome 7/12/2017  Sleep apnea in adult 7/12/2017 No longer on CPAP @13  Snoring  Stroke ([address]  Tendinitis 7/12/2017 [facility]ES  Tendinitis of left rotator cuff 07/12/2017  Tremor 7/12/2017 LEFT HAND  Unspecified sleep apnea   
 lost weight no longer has it Past Surgical History:  
Procedure Laterality Date  COLONOSCOPY N/A 1/16/2019 COLONOSCOPY performed by Marielos Garibay MD at [facility] ENDOSCOPY  COLONOSCOPY,FLEX,W/CONTROL, BLEEDING  1/16/2019  HX ORTHOPAEDIC  1/27/15 L4-L5 MICRODECOMPRESSION (Right  KS EGD TRANSORAL BIOPSY SINGLE/MULTIPLE  8/16/2011  KS EGD TRANSORAL BIOPSY SINGLE/MULTIPLE  10/1/2013 Family History Problem Relation Age of Onset  Heart Disease Mother  Heart Disease Father Social History Tobacco Use  Smoking status: Former Smoker  Smokeless tobacco: Never Used Substance Use Topics  Alcohol use:  No  
   
 
 Current Outpatient Medications:  
  rOPINIRole (REQUIP) 1 mg tablet, Take 1 Tab by mouth three (3) times daily. , Disp: 270 Tab, Rfl: 5 
  entacapone (COMTAN) 200 mg tablet, Take 1 Tab by mouth three (3) times daily. , Disp: 270 Tab, Rfl: 5 
  esomeprazole (NEXIUM) 20 mg capsule, TAKE 1 CAPSULE DAILY, Disp: 90 Cap, Rfl: 3 
  valsartan (DIOVAN) 80 mg tablet, Take 1 Tab by mouth daily. , Disp: 90 Tab, Rfl: 3 
  atorvastatin (LIPITOR) 40 mg tablet, TAKE 1 TABLET DAILY, Disp: 90 Tab, Rfl: 3 
  carbidopa-levodopa (SINEMET)  mg per tablet, 2 p.o. 4 times daily, Disp: 720 Tab, Rfl: 3 
  gabapentin (NEURONTIN) 100 mg capsule, Take 2 Caps by mouth two (2) times a day., Disp: 360 Cap, Rfl: 3 
  PARoxetine (PAXIL) 10 mg tablet, TAKE 1 TABLET DAILY, Disp: 90 Tab, Rfl: 3 
  acetaminophen (TYLENOL EXTRA STRENGTH) 500 mg tablet, Take 1,000 mg by mouth three (3) times daily as needed for Pain., Disp: , Rfl:  
  senna-docusate (SENNA-S) 8.6-50 mg per tablet, Take 1 Tab by mouth every other day., Disp: , Rfl:  
  aspirin delayed-release 81 mg tablet, Take  by mouth daily. , Disp: , Rfl:  
  vitamin e (E GEMS) 1,000 unit capsule, Take 2,000 Units by mouth daily. , Disp: , Rfl:  
  MULTIVITAMIN PO, Take 1 Tab by mouth daily. , Disp: , Rfl:  
 
 
 
No Known Allergies MRI Results (most recent): 
Results from Hospital Encounter encounter on 12/13/14 MRI LUMB SPINE WO CONT Narrative **Final Report** 
  
 
ICD Codes / Adm. Diagnosis: 724.4  721.3 / Thoracic or lumbosacral neurit Lumbosacral spondylosis with Examination:  MR BRYANT SPINE WO CON  - [id number] - Dec 13 2014 11:48AM 
Accession No:  [id number] Reason:  724.03 
 
 
REPORT: 
EXAM:  MR BRYANT SPINE WO CON 
 
INDICATION:  Low back pain. COMPARISON: MRI lumbar spine on 3/29/2012 TECHNIQUE: MR imaging of the lumbar spine was performed with sagittal T1,  
T2, STIR;  axial T1, T2 performed on the 3 Mayra magnet. CONTRAST:  None. FINDINGS: Subtle rightward curvature of the mid lumbar spine. New minimal grade 1 anterolisthesis of L4-5. Unchanged grade 1  
retrolisthesis of L1-2, L2-3, and L3-4. Multilevel facet arthrosis. No  
evidence of spondylolysis. Vertebral body heights are maintained. Degenerative bone marrow has increased and L5 but is decreased in the other  
lumbar levels. The conus medullaris terminates at L2. Signal and caliber of the distal  
spinal cord are within normal limits. Mild atrophy of the paraspinal musculature. Lower thoracic spine: No herniation or stenosis. L1-L2:  Asymmetric left diffuse disc bulge. Facet arthrosis. Mild central  
spinal canal stenosis. Left subarticular zone compromise may affect the  
descending left L2 nerve. Left foraminal stenosis. No change. L2-L3:  Diffuse disc bulge, facet arthrosis, and thickened ligamentum  
flavum. Mild left foraminal stenosis. No change. L3-L4:  Diffuse disc bulge, facet arthrosis, and thickened ligamentum  
flavum. Mild central spinal canal stenosis. Moderate left and mild right  
foraminal stenosis. No significant change. L4-L5:  Diffuse disc bulge, facet arthrosis, and thickened ligamentum  
flavum. Increased moderate central spinal canal stenosis. Unchanged mild  
left and moderate right foraminal stenosis. L5-S1:  Diffuse disc osteophyte complex. Facet arthrosis. Mild central  
spinal canal stenosis. Moderate right and mild left foraminal stenosis. No  
change. IMPRESSION: 
 
1. L4-5 increased moderate central spinal canal stenosis. 2. Other stenoses are unchanged. 3. New minimal grade 1 anterolisthesis of L4-5. 
4. Increased degenerative bone marrow edema within L5 may contribute to pain. Signing/Reading Doctor: Vanessa Vazquez ([id number]) Bal Jeffries ([id number])  Dec 15 2014  8:32AM                         
 
 
   
 
 
[facility] LUMB SPINE WO CONT Narrative **Final Report** 
  
 
ICD Codes / Adm. Diagnosis: 724.4  721.3 / Thoracic or lumbosacral neurit Lumbosacral spondylosis with Examination:  MR ANNETTE HOWE  - [id number] - Dec 13 2014 11:48AM 
Accession No:  [id number] Reason:  724.03 
 
 
REPORT: 
EXAM:  MR ANNETTE HOWE 
 
INDICATION:  Low back pain. COMPARISON: MRI lumbar spine on 3/29/2012 TECHNIQUE: MR imaging of the lumbar spine was performed with sagittal T1,  
T2, STIR;  axial T1, T2 performed on the 3 Mayra magnet. CONTRAST:  None. FINDINGS: Subtle rightward curvature of the mid lumbar spine. New minimal grade 1 anterolisthesis of L4-5. Unchanged grade 1  
retrolisthesis of L1-2, L2-3, and L3-4. Multilevel facet arthrosis. No  
evidence of spondylolysis. Vertebral body heights are maintained. Degenerative bone marrow has increased and L5 but is decreased in the other  
lumbar levels. The conus medullaris terminates at L2. Signal and caliber of the distal  
spinal cord are within normal limits. Mild atrophy of the paraspinal musculature. Lower thoracic spine: No herniation or stenosis. L1-L2:  Asymmetric left diffuse disc bulge. Facet arthrosis. Mild central  
spinal canal stenosis. Left subarticular zone compromise may affect the  
descending left L2 nerve. Left foraminal stenosis. No change. L2-L3:  Diffuse disc bulge, facet arthrosis, and thickened ligamentum  
flavum. Mild left foraminal stenosis. No change. L3-L4:  Diffuse disc bulge, facet arthrosis, and thickened ligamentum  
flavum. Mild central spinal canal stenosis. Moderate left and mild right  
foraminal stenosis. No significant change. L4-L5:  Diffuse disc bulge, facet arthrosis, and thickened ligamentum  
flavum. Increased moderate central spinal canal stenosis. Unchanged mild  
left and moderate right foraminal stenosis. L5-S1:  Diffuse disc osteophyte complex. Facet arthrosis.  Mild central  
 spinal canal stenosis. Moderate right and mild left foraminal stenosis. No  
change. IMPRESSION: 
 
1. L4-5 increased moderate central spinal canal stenosis. 2. Other stenoses are unchanged. 3. New minimal grade 1 anterolisthesis of L4-5. 
4. Increased degenerative bone marrow edema within L5 may contribute to pain. Signing/Reading Doctor: Carmel Garcia ([id number]) Dinh Nichols ([id number])  Dec 15 2014  8:32AM                         
 
 
   
 
Review of Systems: A comprehensive review of systems was negative except for: Constitutional: positive for fatigue and malaise Ears, nose, mouth, throat, and face: positive for hearing loss Genitourinary: positive for frequency and urinary incontinence Musculoskeletal: positive for myalgias, arthralgias and stiff joints Neurological: positive for dizziness, memory problems, coordination problems, gait problems, tremor and weakness Behvioral/Psych: positive for Memory loss, anxiety and depression Vitals:  
 07/29/19 1557 BP: 124/64 Pulse: (!) 58 Resp: 16 SpO2: 98% Weight: 182 lb (82.6 kg) Height: 5' 5\" (1.651 m) Objective: I 
 
 
NEUROLOGICAL EXAM: 
 
Appearance: The patient is well developed, well nourished, provides a fair history and is in no acute distress. Mental Status: Oriented to place and person, and the president, not the date, but knows his children and grandchildren, cognitive function is abnormal and speech is fluent and no aphasia but patient has mild dysarthria. Mood and affect appropriate. Cranial Nerves:   Intact visual fields. Fundi are benign, disc are flat, no lesions seen on funduscopy. BERNARDO, EOM's full, no nystagmus, no ptosis. Facial sensation is normal. Corneal reflexes are not tested. Facial movement is symmetric but a little masklike. Hearing is abnormal bilaterally. Palate is midline with normal sternocleidomastoid and trapezius muscles are normal. Tongue is midline. Neck without meningismus or bruits Temporal arteries are not tender or enlarged TMJ areas are not tender on palpation Motor:  4/5 strength in upper and lower proximal and distal muscles, with perhaps a mild right-sided weakness, slightly more prominent in the right leg. Reduced bulk and increased tone in a generalized fashion, with cogwheeling and rigidity and bradykinesia. No fasciculations. Rapid alternating movement is symmetric and but reduced bilaterally Reflexes:   Deep tendon reflexes 1+/4 and symmetrical. 
No babinski or clonus present Sensory:   Normal to touch, pinprick and vibration and temperature. DSS is intact Gait:  Abnormal gait as patient walks slightly stooped over, with a shuffling gait and has difficulty lifting his feet off the floor and needs assistance or a walker. Tremor:    Minimal rest tremor noted in both upper extremities. Cerebellar: Moderately abnormal cerebellar signs present on Romberg and tandem testing and finger-nose-finger exam is slow without intention tremor. Neurovascular:  Normal heart sounds and regular rhythm, peripheral pulses decreased, and no carotid bruits. Assessment: ICD-10-CM ICD-9-CM 1. Parkinsonism, unspecified [facility] G20 332.0 REFERRAL TO NEUROLOGY REFERRAL TO HOME HEALTH  
   rOPINIRole (REQUIP) 1 mg tablet  
   entacapone (COMTAN) 200 mg tablet CT HEAD WO CONT  
   DUPLEX CAROTID BILATERAL 2. Dementia due to Parkinson's disease without behavioral disturbance (HCC) G20 332.0 REFERRAL TO NEUROLOGY  
 F02.80 294.10 REFERRAL TO HOME HEALTH  
   rOPINIRole (REQUIP) 1 mg tablet  
   entacapone (COMTAN) 200 mg tablet CT HEAD WO CONT  
   DUPLEX CAROTID BILATERAL 3. Mild cognitive impairment G31.84 331.83 REFERRAL TO NEUROLOGY REFERRAL TO HOME HEALTH  
   rOPINIRole (REQUIP) 1 mg tablet  
   entacapone (COMTAN) 200 mg tablet    CT HEAD WO CONT  
   DUPLEX CAROTID BILATERAL  
 4. Cerebrovascular accident (CVA), unspecified mechanism ([facility] I63.9 434.91 REFERRAL TO NEUROLOGY REFERRAL TO HOME HEALTH  
   rOPINIRole (REQUIP) 1 mg tablet  
   entacapone (COMTAN) 200 mg tablet CT HEAD WO CONT  
   DUPLEX CAROTID BILATERAL 5. Bilateral carotid artery stenosis I65.23 433.10 REFERRAL TO NEUROLOGY  
  433.30 REFERRAL TO HOME HEALTH  
   rOPINIRole (REQUIP) 1 mg tablet  
   entacapone (COMTAN) 200 mg tablet CT HEAD WO CONT  
   DUPLEX CAROTID BILATERAL 6. History of stroke Z86.73 V12.54 REFERRAL TO NEUROLOGY REFERRAL TO HOME HEALTH  
   rOPINIRole (REQUIP) 1 mg tablet  
   entacapone (COMTAN) 200 mg tablet CT HEAD WO CONT  
   DUPLEX CAROTID BILATERAL 7. MCI (mild cognitive impairment) G31.84 331.83   
8. Restless leg syndrome G25.81 333.94 Active Problems: * No active hospital problems. * 
 
 
Plan:  
 
Patient appears to have a sudden deterioration in his status, we will check a CT scan of the head to rule out acute stroke, rule out normal pressure hydrocephalus, rule out any other structural lesion for his problem. Patient obviously is having a very slow gait, not responsive to high-dose Sinemet, and I will refer him to Dr. Nemo Mosher to consider deep brain stimulation as a possible modality if he is not to cognitively impaired Patient denies any hallucinations, and the family does not think he has had any major deterioration in his mental function. We will try to place him on Comtan because of his difficulty freezing and difficulty getting out of bed to extend the life of his dopamine, and could consider Sinemet CR in the future in addition to Also a trial of dopamine inhaler may be another therapeutic option in the future. We will also increase his Requip back to 1 mg 3 times a day slowly, trying twice a day for a week or 2 and then 3 times a day if tolerated to see if that will help. We will also have home health see him for gait training exercise program strengthening, and mobility. Difficult case, patient advanced Parkinson's disease we discussed other therapeutic options like ultrasound therapy at [facility], or dopamine infusion pump into the jejunum, but I think DBS would be the way to go We will see him again in 3 months time or earlier as need be. They will call if any problem in the side effects of the medication as far as compulsive behavior, dyskinesias, sleep attacks, with GI side effects, syncope or orthostasis, or any side effect patient will call. We discussed his condition with the patient his wife and his daughter all in detail. Time of visit was 45 minutes today going over all his new problems, discussing therapeutic options, and discussing plans and therapy. Signed By: Catia Huang MD   
 July 29, 2019 CC: Clinton Laguna MD 
FAX: [phone] This note will not be viewable in [address] If you have questions, please do not hesitate to call me. I look forward to following  Jennie Reyes along with you. Sincerely, Catia Huang MD

## 2019-07-31 ENCOUNTER — TELEPHONE (OUTPATIENT)
Dept: NEUROLOGY | Age: 79
End: 2019-07-31

## 2019-08-05 DIAGNOSIS — M48.062 SPINAL STENOSIS OF LUMBAR REGION WITH NEUROGENIC CLAUDICATION: Primary | ICD-10-CM

## 2019-08-05 RX ORDER — GABAPENTIN 100 MG/1
200 CAPSULE ORAL 2 TIMES DAILY
Qty: 360 CAP | Refills: 3 | Status: SHIPPED | OUTPATIENT
Start: 2019-08-05 | End: 2020-04-27

## 2019-08-05 NOTE — TELEPHONE ENCOUNTER
PCP: Gricelda Duenas MD    Last appt: 7/8/2019  Future Appointments   Date Time Provider Rox Randhawa   8/7/2019  1:00 PM [facility]   9/10/2019 11:00 AM Belinda Turner, NP [facility]       Requested Prescriptions     Pending Prescriptions Disp Refills    gabapentin (NEURONTIN) 100 mg capsule 360 Cap 3     Sig: Take 2 Caps by mouth two (2) times a day.        Prior labs and Blood pressures:  BP Readings from Last 3 Encounters:   07/29/19 124/64   07/08/19 108/70   06/28/19 (!) 88/56     Lab Results   Component Value Date/Time    Sodium 147 (H) 05/30/2019 09:37 AM    Potassium 5.1 05/30/2019 09:37 AM    Chloride 108 (H) 05/30/2019 09:37 AM    CO2 24 05/30/2019 09:37 AM    Anion gap 4 (L) 01/16/2019 01:32 AM    Glucose 97 05/30/2019 09:37 AM    BUN 20 05/30/2019 09:37 AM    Creatinine 0.98 05/30/2019 09:37 AM    BUN/Creatinine ratio 20 05/30/2019 09:37 AM    GFR est AA 83 05/30/2019 09:37 AM    GFR est non-AA 72 05/30/2019 09:37 AM    Calcium 9.9 05/30/2019 09:37 AM     Lab Results   Component Value Date/Time    Hemoglobin A1c 5.6 01/20/2015 03:30 PM     Lab Results   Component Value Date/Time    Cholesterol, total 118 05/30/2019 09:37 AM    Cholesterol (POC) 114.0 01/08/2019 10:40 AM    HDL Cholesterol 47 05/30/2019 09:37 AM    HDL Cholesterol (POC) 47.0 01/08/2019 10:40 AM    LDL Cholesterol (POC) 46.6 01/08/2019 10:40 AM    LDL, calculated 52 05/30/2019 09:37 AM    VLDL, calculated 19 05/30/2019 09:37 AM    Triglyceride 95 05/30/2019 09:37 AM    Triglycerides (POC) 102.0 01/08/2019 10:40 AM     Lab Results   Component Value Date/Time    Vitamin D 25-Hydroxy 23.3 (L) 01/31/2015 05:45 AM    VITAMIN D, 25-HYDROXY 34.8 05/30/2019 09:37 AM       Lab Results   Component Value Date/Time    TSH 1.620 05/30/2019 09:37 AM     .

## 2019-08-07 ENCOUNTER — HOSPITAL ENCOUNTER (OUTPATIENT)
Dept: CT IMAGING | Age: 79
Discharge: HOME | End: 2019-08-07
Attending: PSYCHIATRY & NEUROLOGY
Payer: MEDICARE

## 2019-08-07 ENCOUNTER — TELEPHONE (OUTPATIENT)
Dept: NEUROLOGY | Age: 79
End: 2019-08-07

## 2019-08-07 DIAGNOSIS — F02.80 DEMENTIA DUE TO PARKINSON'S DISEASE WITHOUT BEHAVIORAL DISTURBANCE (HCC): ICD-10-CM

## 2019-08-07 DIAGNOSIS — I65.23 BILATERAL CAROTID ARTERY STENOSIS: ICD-10-CM

## 2019-08-07 DIAGNOSIS — G31.84 MILD COGNITIVE IMPAIRMENT: ICD-10-CM

## 2019-08-07 DIAGNOSIS — G20 PARKINSONISM, UNSPECIFIED PARKINSONISM TYPE (HCC): ICD-10-CM

## 2019-08-07 DIAGNOSIS — G20 DEMENTIA DUE TO PARKINSON'S DISEASE WITHOUT BEHAVIORAL DISTURBANCE (HCC): ICD-10-CM

## 2019-08-07 DIAGNOSIS — I63.9 CEREBROVASCULAR ACCIDENT (CVA), UNSPECIFIED MECHANISM (HCC): ICD-10-CM

## 2019-08-07 DIAGNOSIS — Z86.73 HISTORY OF STROKE: ICD-10-CM

## 2019-08-07 PROCEDURE — 70450 CT HEAD/BRAIN W/O DYE: CPT

## 2019-08-07 RX ORDER — PAROXETINE 10 MG/1
TABLET, FILM COATED ORAL
Qty: 90 TAB | Refills: 3 | Status: SHIPPED | OUTPATIENT
Start: 2019-08-07 | End: 2020-10-09 | Stop reason: SDUPTHER

## 2019-08-07 NOTE — TELEPHONE ENCOUNTER
PCP: Damion Barbosa MD    Last appt: 7/8/2019  Future Appointments   Date Time Provider Rox Randhawa   8/7/2019  1:00 PM [address] 2 [facility]   9/10/2019 11:00 AM Tona Turner, NP [facility]       Requested Prescriptions     Pending Prescriptions Disp Refills    PARoxetine (PAXIL) 10 mg tablet 90 Tab 3     Sig: TAKE 1 TABLET BY MOUTH DAILY       Prior labs and Blood pressures:  BP Readings from Last 3 Encounters:   07/29/19 124/64   07/08/19 108/70   06/28/19 (!) 88/56     Lab Results   Component Value Date/Time    Sodium 147 (H) 05/30/2019 09:37 AM    Potassium 5.1 05/30/2019 09:37 AM    Chloride 108 (H) 05/30/2019 09:37 AM    CO2 24 05/30/2019 09:37 AM    Anion gap 4 (L) 01/16/2019 01:32 AM    Glucose 97 05/30/2019 09:37 AM    BUN 20 05/30/2019 09:37 AM    Creatinine 0.98 05/30/2019 09:37 AM    BUN/Creatinine ratio 20 05/30/2019 09:37 AM    GFR est AA 83 05/30/2019 09:37 AM    GFR est non-AA 72 05/30/2019 09:37 AM    Calcium 9.9 05/30/2019 09:37 AM     Lab Results   Component Value Date/Time    Hemoglobin A1c 5.6 01/20/2015 03:30 PM     Lab Results   Component Value Date/Time    Cholesterol, total 118 05/30/2019 09:37 AM    Cholesterol (POC) 114.0 01/08/2019 10:40 AM    HDL Cholesterol 47 05/30/2019 09:37 AM    HDL Cholesterol (POC) 47.0 01/08/2019 10:40 AM    LDL Cholesterol (POC) 46.6 01/08/2019 10:40 AM    LDL, calculated 52 05/30/2019 09:37 AM    VLDL, calculated 19 05/30/2019 09:37 AM    Triglyceride 95 05/30/2019 09:37 AM    Triglycerides (POC) 102.0 01/08/2019 10:40 AM     Lab Results   Component Value Date/Time    Vitamin D 25-Hydroxy 23.3 (L) 01/31/2015 05:45 AM    VITAMIN D, 25-HYDROXY 34.8 05/30/2019 09:37 AM       Lab Results   Component Value Date/Time    TSH 1.620 05/30/2019 09:37 AM

## 2019-08-07 NOTE — TELEPHONE ENCOUNTER
Requested Prescriptions     Pending Prescriptions Disp Refills    PARoxetine (PAXIL) 10 mg tablet 90 Tab 3     Sig: TAKE 1 TABLET DAILY

## 2019-08-08 ENCOUNTER — TELEPHONE (OUTPATIENT)
Dept: NEUROLOGY | Age: 79
End: 2019-08-08

## 2019-08-12 RX ORDER — PAROXETINE 10 MG/1
TABLET, FILM COATED ORAL
Qty: 90 TAB | Refills: 3 | Status: CANCELLED | COMMUNITY
Start: 2019-08-12

## 2019-08-28 ENCOUNTER — TELEPHONE (OUTPATIENT)
Dept: NEUROLOGY | Age: 79
End: 2019-08-28

## 2019-08-28 NOTE — TELEPHONE ENCOUNTER
Spoke to [facility]   Patient had fall on 8/27/19   No injuries noted   No pain/scrapes or brushing

## 2019-09-04 ENCOUNTER — HOSPITAL ENCOUNTER (EMERGENCY)
Age: 79
Discharge: HOME | End: 2019-09-04
Attending: EMERGENCY MEDICINE
Payer: MEDICARE

## 2019-09-04 ENCOUNTER — TELEPHONE (OUTPATIENT)
Dept: NEUROLOGY | Age: 79
End: 2019-09-04

## 2019-09-04 VITALS
HEART RATE: 64 BPM | SYSTOLIC BLOOD PRESSURE: 183 MMHG | TEMPERATURE: 97.1 F | RESPIRATION RATE: 13 BRPM | WEIGHT: 175 LBS | DIASTOLIC BLOOD PRESSURE: 89 MMHG | BODY MASS INDEX: 25.05 KG/M2 | HEIGHT: 70 IN | OXYGEN SATURATION: 94 %

## 2019-09-04 DIAGNOSIS — I10 HYPERTENSION, UNSPECIFIED TYPE: Primary | ICD-10-CM

## 2019-09-04 LAB
APPEARANCE UR: CLEAR
BACTERIA URNS QL MICRO: NEGATIVE /HPF
BILIRUB UR QL: NEGATIVE
COLOR UR: ABNORMAL
EPITH CASTS URNS QL MICRO: ABNORMAL /LPF
GLUCOSE UR STRIP.AUTO-MCNC: NEGATIVE MG/DL
HGB UR QL STRIP: NEGATIVE
HYALINE CASTS URNS QL MICRO: ABNORMAL /LPF (ref 0–5)
KETONES UR QL STRIP.AUTO: NEGATIVE MG/DL
LEUKOCYTE ESTERASE UR QL STRIP.AUTO: NEGATIVE
NITRITE UR QL STRIP.AUTO: NEGATIVE
PH UR STRIP: 6 [PH] (ref 5–8)
PROT UR STRIP-MCNC: ABNORMAL MG/DL
RBC #/AREA URNS HPF: ABNORMAL /HPF (ref 0–5)
SP GR UR REFRACTOMETRY: 1.01 (ref 1–1.03)
UA: UC IF INDICATED,UAUC: ABNORMAL
UROBILINOGEN UR QL STRIP.AUTO: 1 EU/DL (ref 0.2–1)
WBC URNS QL MICRO: ABNORMAL /HPF (ref 0–4)

## 2019-09-04 PROCEDURE — 74011250637 HC RX REV CODE- 250/637: Performed by: EMERGENCY MEDICINE

## 2019-09-04 PROCEDURE — 81001 URINALYSIS AUTO W/SCOPE: CPT

## 2019-09-04 PROCEDURE — 99285 EMERGENCY DEPT VISIT HI MDM: CPT

## 2019-09-04 PROCEDURE — 93005 ELECTROCARDIOGRAM TRACING: CPT

## 2019-09-04 RX ORDER — AMLODIPINE BESYLATE 5 MG/1
5 TABLET ORAL
Status: COMPLETED | OUTPATIENT
Start: 2019-09-04 | End: 2019-09-04

## 2019-09-04 RX ORDER — CLONIDINE HYDROCHLORIDE 0.1 MG/1
0.1 TABLET ORAL
Status: COMPLETED | OUTPATIENT
Start: 2019-09-04 | End: 2019-09-04

## 2019-09-04 RX ADMIN — CLONIDINE HYDROCHLORIDE 0.1 MG: 0.1 TABLET ORAL at 18:12

## 2019-09-04 RX ADMIN — AMLODIPINE BESYLATE 5 MG: 5 TABLET ORAL at 15:23

## 2019-09-04 RX ADMIN — AMLODIPINE BESYLATE 5 MG: 5 TABLET ORAL at 16:50

## 2019-09-04 NOTE — ED PROVIDER NOTES
EMERGENCY DEPARTMENT HISTORY AND PHYSICAL EXAM      Date: 9/4/2019  Patient Name: Thais Brar    History of Presenting Illness     Chief Complaint   Patient presents with    Hypertension     Pt coming from home, had 2 consistent BP over 604 systolic. Therapist from [facility] alerted  rescue squad. History Provided By: Patient, Patient's Wife and Patient's Daughter    HPI: Thais Brar, 78 y.o. male with PMHx significant for Parkinson's disease, hypertension, hyperlipidemia, CAD, who presents with elevated blood pressure. Apparently, patient's speech therapist was at the house today and took his blood pressure noted to be elevated. Patient was taken off of his amlodipine as of 6/28 as he was having orthostatic hypotension causing him to pass out. Continues to take valsartan. Patient is symptom-free. He denies any chest pain, shortness breath, abdominal pain, one-sided numbness or weakness. PCP: Kellie Merlos MD    There are no other complaints, changes, or physical findings at this time. Current Outpatient Medications   Medication Sig Dispense Refill    PARoxetine (PAXIL) 10 mg tablet TAKE 1 TABLET BY MOUTH DAILY 90 Tab 3    gabapentin (NEURONTIN) 100 mg capsule Take 2 Caps by mouth two (2) times a day. 360 Cap 3    rOPINIRole (REQUIP) 1 mg tablet Take 1 Tab by mouth three (3) times daily. 270 Tab 5    entacapone (COMTAN) 200 mg tablet Take 1 Tab by mouth three (3) times daily. 270 Tab 5    esomeprazole (NEXIUM) 20 mg capsule TAKE 1 CAPSULE DAILY 90 Cap 3    valsartan (DIOVAN) 80 mg tablet Take 1 Tab by mouth daily. 90 Tab 3    atorvastatin (LIPITOR) 40 mg tablet TAKE 1 TABLET DAILY 90 Tab 3    carbidopa-levodopa (SINEMET)  mg per tablet 2 p.o. 4 times daily 720 Tab 3    acetaminophen (TYLENOL EXTRA STRENGTH) 500 mg tablet Take 1,000 mg by mouth three (3) times daily as needed for Pain.       senna-docusate (SENNA-S) 8.6-50 mg per tablet Take 1 Tab by mouth every other day.      aspirin delayed-release 81 mg tablet Take  by mouth daily.  vitamin e (E GEMS) 1,000 unit capsule Take 2,000 Units by mouth daily.  MULTIVITAMIN PO Take 1 Tab by mouth daily. Past History     Past Medical History:  Past Medical History:   Diagnosis Date    Aaron esophagus     Benign nodular prostatic hyperplasia without lower urinary tract symptoms 7/12/2017    CKD (chronic kidney disease) stage 2, GFR 60-89 ml/min     Constipation     Coronary artery disease 7/12/2017    Depression     DJD (degenerative joint disease) 7/12/2017    Falls     Gout 7/12/2017    Headache     Hearing difficulty of both ears 7/12/2017    Hyperlipidemia     Hypertension     Impaired cognition 7/12/2017    MCI (mild cognitive impairment)     Restless leg syndrome 7/12/2017    Sleep apnea in adult 7/12/2017    No longer on CPAP @13    Snoring     Stroke ([facility] [address]     Tendinitis 7/12/2017    ACHILLES    Tendinitis of left rotator cuff 07/12/2017    Tremor 7/12/2017    LEFT HAND    Unspecified sleep apnea     lost weight no longer has it     Past Surgical History:  Past Surgical History:   Procedure Laterality Date    COLONOSCOPY N/A 1/16/2019    COLONOSCOPY performed by Beryle Simpler., [facility] END[facility]COPY    COLONOSCOPY,FLEX,W/CONTROL, BLEEDING  1/16/2019         HX ORTHOPAEDIC  1/27/15    L4-L5 MICRODECOMPRESSION (Right    SD EGD TRANSORAL BIOPSY SINGLE/MULTIPLE  8/16/2011         SD EGD TRANSORAL BIOPSY SINGLE/MULTIPLE  10/1/2013          Family History:  Family History   Problem Relation Age of Onset    Heart Disease Mother     Heart Disease Father      Social History:  Social History     Tobacco Use    Smoking status: Former Smoker    Smokeless tobacco: Never Used   Substance Use Topics    Alcohol use: No    Drug use: No     Allergies:  No Known Allergies  Review of Systems   Review of Systems   Constitutional: Negative for chills and fever.    HENT: Negative for congestion, rhinorrhea and sore throat. Respiratory: Negative for cough and shortness of breath. Cardiovascular: Negative for chest pain. Gastrointestinal: Negative for abdominal pain, nausea and vomiting. Genitourinary: Negative for dysuria and urgency. Skin: Negative for rash. Neurological: Negative for dizziness, light-headedness and headaches. All other systems reviewed and are negative. Physical Exam   Physical Exam   Constitutional: He is oriented to person, place, and time. He appears well-developed and well-nourished. No distress. HENT:   Head: Normocephalic and atraumatic. Eyes: Pupils are equal, round, and reactive to light. Conjunctivae and EOM are normal.   Neck: Normal range of motion. Cardiovascular: Normal rate, regular rhythm and intact distal pulses. Pulmonary/Chest: Effort normal and breath sounds normal. No stridor. No respiratory distress. Abdominal: Soft. He exhibits no distension. There is no tenderness. Musculoskeletal: Normal range of motion. Neurological: He is alert and oriented to person, place, and time. Skin: Skin is warm and dry. Psychiatric: He has a normal mood and affect. Nursing note and vitals reviewed.     Diagnostic Study Results   Labs -     Recent Results (from the past 12 hour(s))   URINALYSIS W/ REFLEX CULTURE    Collection Time: 09/04/19  2:45 PM   Result Value Ref Range    Color YELLOW/STRAW      Appearance CLEAR CLEAR      Specific gravity 1.014 1.003 - 1.030      pH (UA) 6.0 5.0 - 8.0      Protein TRACE (A) NEG mg/dL    Glucose NEGATIVE  NEG mg/dL    Ketone NEGATIVE  NEG mg/dL    Bilirubin NEGATIVE  NEG      Blood NEGATIVE  NEG      Urobilinogen 1.0 0.2 - 1.0 EU/dL    Nitrites NEGATIVE  NEG      Leukocyte Esterase NEGATIVE  NEG      WBC 0-4 0 - 4 /hpf    RBC 0-5 0 - 5 /hpf    Epithelial cells FEW FEW /lpf    Bacteria NEGATIVE  NEG /hpf    UA:UC IF INDICATED CULTURE NOT INDICATED BY UA RESULT CNI      Hyaline cast 0-2 0 - 5 /lpf Radiologic Studies -   No orders to display     No results found. Medical Decision Making   I am the first provider for this patient. I reviewed the vital signs, available nursing notes, past medical history, past surgical history, family history and social history. Vital Signs-Reviewed the patient's vital signs. Patient Vitals for the past 12 hrs:   Temp Pulse Resp BP SpO2   09/04/19 1900  64 13 183/89 94 %   09/04/19 1812  62  (!) 217/93    09/04/19 1730  (!) 59 19 (!) 222/101 95 %   09/04/19 1700  63 19 (!) 234/102 96 %   09/04/19 1651  60  (!) 243/95 95 %   09/04/19 1650  60  (!) 243/95    09/04/19 1630  (!) 59 20 (!) 243/95 95 %   09/04/19 1523  60  (!) 212/71    09/04/19 1500  (!) 56 15 (!) 212/71 98 %   09/04/19 1430    (!) 224/74 97 %   09/04/19 1424    (!) 230/73 97 %   09/04/19 1423 97.1 °F (36.2 °C) 60 19         Pulse Oximetry Analysis - 94% on ra    Cardiac Monitor:   Rate: 62 bpm  Rhythm: Normal Sinus Rhythm      ED EKG interpretation:  Rhythm: sinus bradycardia; and regular . Rate (approx.): 57; Axis: left axis deviation; P wave: normal; QRS interval: normal ; ST/T wave: normal; Other findings: borderline ekg. This EKG was interpreted by JAYDON Ritchie MD,ED Provider. Records Reviewed: Nursing Notes and Old Medical Records    Provider Notes (Medical Decision Making):   Patient presents due to elevated blood pressure readings. He has no complaints of any symptoms at this time. No chest pain, shortness of breath, one-sided numbness or weakness, nausea, or vomiting to suggest hypertensive emergency. Presentation is consistent with asymptomatic hypertension. Suspect likely related to going off of his blood pressure medications. We will treat his blood pressure in the emergency department, however given that he has no symptoms, do not feel any lab work is needed at this time. ED Course:   Initial assessment performed.  The patients presenting problems have been discussed, and they are in agreement with the care plan formulated and outlined with them. I have encouraged them to ask questions as they arise throughout their visit. Blood pressure improved, patient remains asymptomatic. Of note, he is bradycardic however family says this is chronic. Patient still has his blood pressure medication at home, advised to restart the medication and call the primary care office for follow-up. Critical Care:  none    Disposition:  Discharge Note:  7:16 PM  The patient has been re-evaluated and is ready for discharge. Reviewed available results with patient. Counseled patient on diagnosis and care plan. Patient has expressed understanding, and all questions have been answered. Patient agrees with plan and agrees to follow up as recommended, or to return to the ED if their symptoms worsen. Discharge instructions have been provided and explained to the patient, along with reasons to return to the ED. PLAN:  1. Discharge Medication List as of 9/4/2019  7:16 PM        2. Follow-up Information     Follow up With Specialties Details Why Contact Zahra Dietrich MD Internal Medicine Schedule an appointment as soon as possible for a visit  [address] [place] [address]  [phone]      [facility] EMERGENCY DEPT Emergency Medicine  As needed, If symptoms worsen [address] [place] [address]  [address] [facility]  [phone]        Return to ED if worse     Diagnosis     Clinical Impression:   1. Hypertension, unspecified type        This note will not be viewable in [employer]. Please note that this dictation was completed with [employer] the computer voice recognition software. Quite often unanticipated grammatical, syntax, homophones, and other interpretive errors are inadvertently transcribed by the computer software. Please disregard these errors.   Please excuse any errors that have escaped final proofreading

## 2019-09-04 NOTE — DISCHARGE INSTRUCTIONS
Patient Education   Please re-start your amlodipine at home. Call your PCP for a follow up appointment for a blood pressure check     DASH Diet: Care Instructions  Your Care Instructions    The DASH diet is an eating plan that can help lower your blood pressure. DASH stands for Dietary Approaches to Stop Hypertension. Hypertension is high blood pressure. The DASH diet focuses on eating foods that are high in calcium, potassium, and magnesium. These nutrients can lower blood pressure. The foods that are highest in these nutrients are fruits, vegetables, low-fat dairy products, nuts, seeds, and legumes. But taking calcium, potassium, and magnesium supplements instead of eating foods that are high in those nutrients does not have the same effect. The DASH diet also includes whole grains, fish, and poultry. The DASH diet is one of several lifestyle changes your doctor may recommend to lower your high blood pressure. Your doctor may also want you to decrease the amount of sodium in your diet. Lowering sodium while following the DASH diet can lower blood pressure even further than just the DASH diet alone. Follow-up care is a key part of your treatment and safety. Be sure to make and go to all appointments, and call your doctor if you are having problems. It's also a good idea to know your test results and keep a list of the medicines you take. How can you care for yourself at home? Following the DASH diet  · Eat 4 to 5 servings of fruit each day. A serving is 1 medium-sized piece of fruit, ½ cup chopped or canned fruit, 1/4 cup dried fruit, or 4 ounces (½ cup) of fruit juice. Choose fruit more often than fruit juice. · Eat 4 to 5 servings of vegetables each day. A serving is 1 cup of lettuce or raw leafy vegetables, ½ cup of chopped or cooked vegetables, or 4 ounces (½ cup) of vegetable juice. Choose vegetables more often than vegetable juice. · Get 2 to 3 servings of low-fat and fat-free dairy each day.  A serving is 8 ounces of milk, 1 cup of yogurt, or 1 ½ ounces of cheese. · Eat 6 to 8 servings of grains each day. A serving is 1 slice of bread, 1 ounce of dry cereal, or ½ cup of cooked rice, pasta, or cooked cereal. Try to choose whole-grain products as much as possible. · Limit lean meat, poultry, and fish to 2 servings each day. A serving is 3 ounces, about the size of a deck of cards. · Eat 4 to 5 servings of nuts, seeds, and legumes (cooked dried beans, lentils, and split peas) each week. A serving is 1/3 cup of nuts, 2 tablespoons of seeds, or ½ cup of cooked beans or peas. · Limit fats and oils to 2 to 3 servings each day. A serving is 1 teaspoon of vegetable oil or 2 tablespoons of salad dressing. · Limit sweets and added sugars to 5 servings or less a week. A serving is 1 tablespoon jelly or jam, ½ cup sorbet, or 1 cup of lemonade. · Eat less than 2,300 milligrams (mg) of sodium a day. If you limit your sodium to 1,500 mg a day, you can lower your blood pressure even more. Tips for success  · Start small. Do not try to make dramatic changes to your diet all at once. You might feel that you are missing out on your favorite foods and then be more likely to not follow the plan. Make small changes, and stick with them. Once those changes become habit, add a few more changes. · Try some of the following:  ? Make it a goal to eat a fruit or vegetable at every meal and at snacks. This will make it easy to get the recommended amount of fruits and vegetables each day. ? Try yogurt topped with fruit and nuts for a snack or healthy dessert. ? Add lettuce, tomato, cucumber, and onion to sandwiches. ? Combine a ready-made pizza crust with low-fat mozzarella cheese and lots of vegetable toppings. Try using tomatoes, squash, spinach, broccoli, carrots, cauliflower, and onions. ? Have a variety of cut-up vegetables with a low-fat dip as an appetizer instead of chips and dip. ?  Sprinkle sunflower seeds or chopped almonds over salads. Or try adding chopped walnuts or almonds to cooked vegetables. ? Try some vegetarian meals using beans and peas. Add garbanzo or kidney beans to salads. Make burritos and tacos with mashed norris beans or black beans. Where can you learn more? Go to http://aditi-angela.info/. Enter F991 in the search box to learn more about \"DASH Diet: Care Instructions. \"  Current as of: July 22, 2018  Content Version: 12.1  © 5965-2470 [employer]. Care instructions adapted under license by [employer] (which disclaims liability or warranty for this information). If you have questions about a medical condition or this instruction, always ask your healthcare professional. [address] any warranty or liability for your use of this information.

## 2019-09-04 NOTE — TELEPHONE ENCOUNTER
Speech pathologist, Alexa Huang, called and noted that the patient is consistent with a BP around 202/84. All other vitals are within normal limits. Patient has had normal to low blood pressures as noted in Dr. Bharti Huertas note on 7/8/19. Patient is only on Diovan 80 daily. It is noted in the chart that the patient fell on 8/27/19 with no injuries noted. His blood pressures have been increasing noted since 8/27/19. He has had a few on other occasions, but this has been continuous incline instead of going back to normal.  She does note that he has pain as well as fatigue, but vague and not new. I advised that he go to ER because there are questions that will need further evaluation. She acknowledged understanding.  I also advised I would send to the provider as well

## 2019-09-04 NOTE — ED TRIAGE NOTES
Pt states that he was taken off his BP medication approx a month ago due to \"not needing them anymore\". Pt was receiving home therapy, when the therapist noted 3 consecutive high BP's and called rescue squad. Pt has had back sx in past, only pain is located in that region.

## 2019-09-04 NOTE — ROUTINE PROCESS
Dr. Mandi Nelson has reviewed discharge instructions with the patient and spouse. The patient and spouse verbalized understanding.

## 2019-09-05 LAB
ATRIAL RATE: 57 BPM
CALCULATED P AXIS, ECG09: -3 DEGREES
CALCULATED R AXIS, ECG10: -44 DEGREES
DIAGNOSIS, 93000: NORMAL
P-R INTERVAL, ECG05: 162 MS
Q-T INTERVAL, ECG07: 428 MS
QRS DURATION, ECG06: 82 MS
QTC CALCULATION (BEZET), ECG08: 416 MS
VENTRICULAR RATE, ECG03: 57 BPM

## 2019-09-05 NOTE — PROGRESS NOTES
Chief Complaint   Patient presents with    Hypertension     elevated /145 [facility] on Wednesday       SUBJECTIVE:    Sarkis Barrow is a 78 y.o. male who presents transition to care follow-up from a emergency room visit to [facility] on 9/4/2019 for evaluation of accelerated hypertension with a blood pressure greater than 200 on 2 occasions at home by the occupational therapist and a blood pressure of 322 systolic in the emergency room. He does have a long history of hypertension and had a problem in June where he became hypotensive at which time his Norvasc was decreased from 10 mg to 5 mg and then eventually discontinued and then resumed but he had problems with hypotension again and Norvasc was discontinued altogether. He has been continued on Diovan although he was previously on losartan before it was recalled. He noted no headaches when he was in the emergency room and has had none since that time. He has had no bleeding including nosebleeds. In the emergency room he did receive Norvasc 5 mg with repeat 5 mg an hour later and then given clonidine 0.1 at which time his blood pressure did fall to 060 systolic before he was discharged. A urinalysis was obtained in the emergency room and review of that reveals that there was only trace protein otherwise unremarkable. Since returning home his daughter is giving him Norvasc 10 mg yesterday and today. He has tolerated that quite well without any symptoms of lightheadedness or dizziness. He is getting around with a walker mostly at home but is getting more difficult to get around with a walker and sometimes it is easier to use a wheelchair. He denies any headaches, or other complaints. Current Outpatient Medications   Medication Sig Dispense Refill    amLODIPine (NORVASC) 10 mg tablet Take 0.5 Tabs by mouth daily.  45 Tab 0    PARoxetine (PAXIL) 10 mg tablet TAKE 1 TABLET BY MOUTH DAILY 90 Tab 3    gabapentin (NEURONTIN) 100 mg capsule Take 2 Caps by mouth two (2) times a day. 360 Cap 3    rOPINIRole (REQUIP) 1 mg tablet Take 1 Tab by mouth three (3) times daily. 270 Tab 5    entacapone (COMTAN) 200 mg tablet Take 1 Tab by mouth three (3) times daily. 270 Tab 5    esomeprazole (NEXIUM) 20 mg capsule TAKE 1 CAPSULE DAILY 90 Cap 3    valsartan (DIOVAN) 80 mg tablet Take 1 Tab by mouth daily. 90 Tab 3    atorvastatin (LIPITOR) 40 mg tablet TAKE 1 TABLET DAILY 90 Tab 3    carbidopa-levodopa (SINEMET)  mg per tablet 2 p.o. 4 times daily 720 Tab 3    acetaminophen (TYLENOL EXTRA STRENGTH) 500 mg tablet Take 1,000 mg by mouth three (3) times daily as needed for Pain.  senna-docusate (SENNA-S) 8.6-50 mg per tablet Take 1 Tab by mouth every other day.  aspirin delayed-release 81 mg tablet Take  by mouth daily.  vitamin e (E GEMS) 1,000 unit capsule Take 2,000 Units by mouth daily.  MULTIVITAMIN PO Take 1 Tab by mouth daily.        Past Medical History:   Diagnosis Date    Aaron esophagus     Benign nodular prostatic hyperplasia without lower urinary tract symptoms 7/12/2017    CKD (chronic kidney disease) stage 2, GFR 60-89 ml/min     Constipation     Coronary artery disease 7/12/2017    Depression     DJD (degenerative joint disease) 7/12/2017    Falls     Gout 7/12/2017    Headache     Hearing difficulty of both ears 7/12/2017    Hyperlipidemia     Hypertension     Impaired cognition 7/12/2017    MCI (mild cognitive impairment)     Restless leg syndrome 7/12/2017    Sleep apnea in adult 7/12/2017    No longer on CPAP @13    Snoring     Stroke ([facility] [address]     Tendinitis 7/12/2017    ACHILLES    Tendinitis of left rotator cuff 07/12/2017    Tremor 7/12/2017    LEFT HAND    Unspecified sleep apnea     lost weight no longer has it     Past Surgical History:   Procedure Laterality Date    COLONOSCOPY N/A 1/16/2019    COLONOSCOPY performed by Saleem Doll MD at [facility] ENDOSCOPY  COLONOSCOPY,FLEX,W/CONTROL, BLEEDING  1/16/2019         HX ORTHOPAEDIC  1/27/15    L4-L5 MICRODECOMPRESSION (Right    DC EGD TRANSORAL BIOPSY SINGLE/MULTIPLE  8/16/2011         DC EGD TRANSORAL BIOPSY SINGLE/MULTIPLE  10/1/2013          No Known Allergies    REVIEW OF SYSTEMS:  General: negative for - chills or fever, or weight loss or gain  ENT: negative for - headaches, nasal congestion or tinnitus  Eyes: no blurred or visual changes  Neck: No stiffness or swollen nodes  Respiratory: negative for - cough, hemoptysis, shortness of breath or wheezing  Cardiovascular : negative for - chest pain, edema, palpitations or shortness of breath  Gastrointestinal: negative for - abdominal pain, blood in stools, heartburn or nausea/vomiting  Genito-Urinary: no dysuria, trouble voiding, or hematuria  Musculoskeletal: negative for - gait disturbance, joint pain, joint stiffness or joint swelling  Neurological: no TIA or stroke symptoms  Hematologic: no bruises, no bleeding  Lymphatic: no swollen glands  Integument: no lumps, mole changes, nail changes or rash  Endocrine:no malaise/lethargy poly uria or polydipsia or unexpected weight changes        Social History     Socioeconomic History    Marital status:      Spouse name: Not on file    Number of children: Not on file    Years of education: Not on file    Highest education level: Not on file   Tobacco Use    Smoking status: Former Smoker    Smokeless tobacco: Never Used   Substance and Sexual Activity    Alcohol use: No    Drug use: No     Family History   Problem Relation Age of Onset    Heart Disease Mother     Heart Disease Father        OBJECTIVE:     Visit Vitals  /68 (BP 1 Location: Left arm, BP Patient Position: Sitting)   Pulse (!) 51   Temp 97.9 °F (36.6 °C) (Oral)   Resp 14   Ht 5' 10\" (1.778 m)   Wt 175 lb (79.4 kg)   SpO2 95%   BMI 25.11 kg/m²     CONSTITUTIONAL:   well nourished, appears age appropriate  EYES: sclera anicteric, PERRL, EOMI  ENMT:nares clear, moist mucous membranes, pharynx clear  NECK: supple. Thyroid normal, No JVD or bruits  RESPIRATORY: Chest: clear to ascultation and percussion, normal inspiratory effort  CARDIOVASCULAR: Heart: regular rate and rhythm no murmurs, rubs or gallops, PMI not displaced, No thrills  GASTROINTESTINAL: Abdomen: non distended, soft, non tender, bowel sounds normal  HEMATOLOGIC: no purpura, petechiae or bruising  LYMPHATIC: No lymph node enlargemant  MUSCULOSKELETAL: Extremities: no edema or active synovitis, pulse 1+   INTEGUMENT: No unusual rashes or suspicious skin lesions noted. Nails appear normal.  PERIPHERAL VASCULAR: normal pulses femoral, PT and DP  NEUROLOGIC: Alert sitting in wheelchair with mild resting tremor and generalized nonfocal weakness. PSYCHIATRIC:, appropriate affect     ASSESSMENT:   1. Essential hypertension    2. Parkinson's disease ([facility] [address]      Impression  1. Hypertension with a previous problem with orthostatic hypotension which I am sure which exacerbated by his Parkinson's. He has had some change in his Parkinson's medicines since he had that problem. Now his blood pressure has become accelerated so I think at this point blood pressure today 136/68 after taking Norvasc 10 mg the last 2 days I will decrease his Norvasc to 5 mg and his daughter will continue to monitor his blood pressure closely. We may find it that we may need to go back to 10 mg and possibly even after lowered to 2-1/2 mg.  Currently she has a 10 mg tablet that she is going to cut in half. 2.  Parkinson's disease that is progressively getting worse and he may get to a point where he becomes wheelchair dependent as he is having more difficulty getting around with a walker. He does have tentative follow-up with Dr. Mone Ontiveros in November although we may have to see him again before then depending on how his blood pressure response to the treatment. His daughter will be in touch with us.   35 minutes spent in direct care of this high complexity patient today. Complete ER note reviewed. Including labs which only consisted of urine. PLAN:  .  Orders Placed This Encounter    amLODIPine (NORVASC) 10 mg tablet         ATTENTION:   This medical record was transcribed using an electronic medical records system. Although proofread, it may and can contain electronic and spelling errors. Other human spelling and other errors may be present. Corrections may be executed at a later time. Please feel free to contact us for any clarifications as needed. Follow-up and Dispositions    · Return TBD. No results found for any visits on 09/06/19. Carolina Bender MD    The patient verbalized understanding of the problems and plans as explained.

## 2019-09-06 ENCOUNTER — OFFICE VISIT (OUTPATIENT)
Dept: INTERNAL MEDICINE CLINIC | Age: 79
End: 2019-09-06

## 2019-09-06 VITALS
RESPIRATION RATE: 14 BRPM | DIASTOLIC BLOOD PRESSURE: 68 MMHG | HEIGHT: 70 IN | SYSTOLIC BLOOD PRESSURE: 136 MMHG | BODY MASS INDEX: 25.05 KG/M2 | OXYGEN SATURATION: 95 % | HEART RATE: 51 BPM | TEMPERATURE: 97.9 F | WEIGHT: 175 LBS

## 2019-09-06 DIAGNOSIS — I10 ESSENTIAL HYPERTENSION: Primary | ICD-10-CM

## 2019-09-06 DIAGNOSIS — G20 PARKINSON'S DISEASE (HCC): ICD-10-CM

## 2019-09-06 RX ORDER — AMLODIPINE BESYLATE 10 MG/1
5 TABLET ORAL DAILY
Qty: 45 TAB | Refills: 0
Start: 2019-09-06 | End: 2019-09-20 | Stop reason: ALTCHOICE

## 2019-09-06 NOTE — PROGRESS NOTES
Chief Complaint   Patient presents with    Hypertension     elevated /145 [facility] on Wednesday           1. Have you been to the ER, urgent care clinic since your last visit? Hospitalized since your last visit? Yes ER at [facility] for elevated BP on Wednesday    2. Have you seen or consulted any other health care providers outside of the [address] [place] [address] since your last visit? Include any pap smears or colon screening.   no

## 2019-09-06 NOTE — PATIENT INSTRUCTIONS
Learning About Diuretics for High Blood Pressure  Introduction  Diuretics help to lower blood pressure. This reduces your risk of a heart attack and stroke. It also reduces your risk of kidney disease. Diuretics cause your kidneys to remove sodium and water. They also relax the blood vessel walls. These help lower your blood pressure. Examples  · Chlorthalidone  · Hydrochlorothiazide  Possible side effects  There are some common side effects. They are:  · Too little potassium. · Feeling dizzy. · Rash. · Urinating a lot. · High blood sugar. (But this is not common.)  You may have other side effects. Check the information that comes with your medicine. What to know about taking this medicine  · You may take other medicines for blood pressure. Diuretics can help those work better. They can also prevent extra fluid in your body. · You may need to take potassium pills. Or you may have to watch how much potassium is in your food. Ask your doctor about this. · You may need blood tests to check your kidneys and your potassium level. · Take your medicines exactly as prescribed. Call your doctor if you think you are having a problem with your medicine. · Check with your doctor or pharmacist before you use any other medicines. This includes over-the-counter medicines. Make sure your doctor knows all of the medicines, vitamins, herbal products, and supplements you take. Taking some medicines together can cause problems. Where can you learn more? Go to http://aditi-angela.info/. Enter G833 in the search box to learn more about \"Learning About Diuretics for High Blood Pressure. \"  Current as of: July 22, 2018  Content Version: 12.1  © 1907-1946 [employer]. Care instructions adapted under license by [employer] (which disclaims liability or warranty for this information).  If you have questions about a medical condition or this instruction, always ask your healthcare professional. [address] any warranty or liability for your use of this information.

## 2019-09-19 ENCOUNTER — TELEPHONE (OUTPATIENT)
Dept: INTERNAL MEDICINE CLINIC | Age: 79
End: 2019-09-19

## 2019-09-20 ENCOUNTER — OFFICE VISIT (OUTPATIENT)
Dept: INTERNAL MEDICINE CLINIC | Age: 79
End: 2019-09-20

## 2019-09-20 VITALS
TEMPERATURE: 97.9 F | RESPIRATION RATE: 18 BRPM | HEART RATE: 57 BPM | OXYGEN SATURATION: 92 % | DIASTOLIC BLOOD PRESSURE: 52 MMHG | SYSTOLIC BLOOD PRESSURE: 94 MMHG

## 2019-09-20 DIAGNOSIS — I10 ESSENTIAL HYPERTENSION: Primary | ICD-10-CM

## 2019-09-20 DIAGNOSIS — G20 PARKINSON'S DISEASE (HCC): ICD-10-CM

## 2019-09-20 NOTE — PATIENT INSTRUCTIONS
Home Blood Pressure Test: About This Test  What is it? A home blood pressure test allows you to keep track of your blood pressure at home. Blood pressure is a measure of the force of blood against the walls of your arteries. Blood pressure readings include two numbers, such as 130/80 (say \"130 over 80\"). The first number is the systolic pressure. The second number is the diastolic pressure. Why is this test done? You may do this test at home to:  · Find out if you have high blood pressure. · Track your blood pressure if you have high blood pressure. · Track how well medicine is working to reduce high blood pressure. · Check how lifestyle changes, such as weight loss and exercise, are affecting blood pressure. How can you prepare for the test?  · Do not use caffeine, tobacco, or medicines known to raise blood pressure (such as nasal decongestant sprays) for at least 30 minutes before taking your blood pressure. · Do not exercise for at least 30 minutes before taking your blood pressure. What happens before the test?  Take your blood pressure while you feel comfortable and relaxed. Sit quietly with both feet on the floor for at least 5 minutes before the test.  What happens during the test?  · Sit with your arm slightly bent and resting on a table so that your upper arm is at the same level as your heart. · Roll up your sleeve or take off your shirt to expose your upper arm. · Wrap the blood pressure cuff around your upper arm so that the lower edge of the cuff is about 1 inch above the bend of your elbow. Proceed with the following steps depending on if you are using an automatic or manual pressure monitor. Automatic blood pressure monitors  · Press the on/off button on the automatic monitor and wait until the ready-to-measure \"heart\" symbol appears next to zero in the display window. · Press the start button. The cuff will inflate and deflate by itself.   · Your blood pressure numbers will appear on the screen. · Write your numbers in your log book, along with the date and time. Manual blood pressure monitors  · Place the earpieces of a stethoscope in your ears, and place the bell of the stethoscope over the artery, just below the cuff. · Close the valve on the rubber inflating bulb. · Squeeze the bulb rapidly with your opposite hand to inflate the cuff until the dial or column of mercury reads about 30 mm Hg higher than your usual systolic pressure. If you do not know your usual pressure, inflate the cuff to 210 mm Hg or until the pulse at your wrist disappears. · Open the pressure valve just slightly by twisting or pressing the valve on the bulb. · As you watch the pressure slowly fall, note the level on the dial at which you first start to hear a pulsing or tapping sound through the stethoscope. This is your systolic blood pressure. · Continue letting the air out slowly. The sounds will become muffled and will finally disappear. Note the pressure when the sounds completely disappear. This is your diastolic blood pressure. Let out all the remaining air. · Write your numbers in your log book, along with the date and time. What else should you know about the test?  It is more accurate to take the average of several readings made throughout the day than to rely on a single reading. It's normal for blood pressure to go up and down throughout the day. Follow-up care is a key part of your treatment and safety. Be sure to make and go to all appointments, and call your doctor if you are having problems. It's also a good idea to keep a list of the medicines you take. Where can you learn more? Go to http://aditi-angela.info/. Enter C427 in the search box to learn more about \"Home Blood Pressure Test: About This Test.\"  Current as of: April 9, 2019  Content Version: 12.2  © 5455-3493 [employer].  Care instructions adapted under license by [employer] (which disclaims liability or warranty for this information). If you have questions about a medical condition or this instruction, always ask your healthcare professional. [address] any warranty or liability for your use of this information.

## 2019-09-20 NOTE — PROGRESS NOTES
Chief Complaint   Patient presents with    Hypotension     pts daughter has been having low bp readings       SUBJECTIVE:    Nick Dawson is a 78 y.o. male with hypertension advanced Parkinson's disease who comes in today for evaluation of hypotension. He recently was seen for uncontrolled blood pressure and at that point he was resumed on his Norvasc 5 mg daily. His daughter who is a nurse has been monitoring his blood pressure closely and she notes that his blood pressure has now gone up again and that she has held his Norvasc the last 3 days and his blood pressure remains low. He does note that he was a little dizzy a couple days ago but that seems to have resolved now. He denies any chest pain, shortness breath, palpitations, PND, orthopnea or other cardiorespiratory complaints. There are no GI or  complaints. He notes no headaches or other neurologic complaints other than that related to his Parkinson's disease which limits his mobility to the point where he is pretty much wheelchair-bound. Current Outpatient Medications   Medication Sig Dispense Refill    PARoxetine (PAXIL) 10 mg tablet TAKE 1 TABLET BY MOUTH DAILY 90 Tab 3    gabapentin (NEURONTIN) 100 mg capsule Take 2 Caps by mouth two (2) times a day. 360 Cap 3    rOPINIRole (REQUIP) 1 mg tablet Take 1 Tab by mouth three (3) times daily. 270 Tab 5    entacapone (COMTAN) 200 mg tablet Take 1 Tab by mouth three (3) times daily. 270 Tab 5    esomeprazole (NEXIUM) 20 mg capsule TAKE 1 CAPSULE DAILY 90 Cap 3    valsartan (DIOVAN) 80 mg tablet Take 1 Tab by mouth daily. 90 Tab 3    atorvastatin (LIPITOR) 40 mg tablet TAKE 1 TABLET DAILY 90 Tab 3    carbidopa-levodopa (SINEMET)  mg per tablet 2 p.o. 4 times daily 720 Tab 3    acetaminophen (TYLENOL EXTRA STRENGTH) 500 mg tablet Take 1,000 mg by mouth three (3) times daily as needed for Pain.       senna-docusate (SENNA-S) 8.6-50 mg per tablet Take 1 Tab by mouth every other day.      aspirin delayed-release 81 mg tablet Take  by mouth daily.  vitamin e (E GEMS) 1,000 unit capsule Take 2,000 Units by mouth daily.  MULTIVITAMIN PO Take 1 Tab by mouth daily.        Past Medical History:   Diagnosis Date    Aaron esophagus     Benign nodular prostatic hyperplasia without lower urinary tract symptoms 7/12/2017    CKD (chronic kidney disease) stage 2, GFR 60-89 ml/min     Constipation     Coronary artery disease 7/12/2017    Depression     DJD (degenerative joint disease) 7/12/2017    Falls     Gout 7/12/2017    Headache     Hearing difficulty of both ears 7/12/2017    Hyperlipidemia     Hypertension     Impaired cognition 7/12/2017    MCI (mild cognitive impairment)     Restless leg syndrome 7/12/2017    Sleep apnea in adult 7/12/2017    No longer on CPAP @13    Snoring     Stroke ([address]     Tendinitis 7/12/2017    ACHILLES    Tendinitis of left rotator cuff 07/12/2017    Tremor 7/12/2017    LEFT HAND    Unspecified sleep apnea     lost weight no longer has it     Past Surgical History:   Procedure Laterality Date    COLONOSCOPY N/A 1/16/2019    COLON[facility] performed by [facility]reguin MD at [facility] ENDOSCOPY    COLONOSCOPY,FLEX,W/CONTROL, BLEEDING  1/16/2019         HX ORTHOPAEDIC  1/27/15    L4-L5 MICRODECOMPRESSION (Right    [place] EGD TRANSORAL BIOPSY SINGLE/MULTIPLE  8/16/2011         [place] EGD TRANSORAL BIOPSY SINGLE/MULTIPLE  10/1/2013          No Known Allergies    REVIEW OF SYSTEMS:  General: negative for - chills or fever, or weight loss or gain  ENT: negative for - headaches, nasal congestion or tinnitus  Eyes: no blurred or visual changes  Neck: No stiffness or swollen nodes  Respiratory: negative for - cough, hemoptysis, shortness of breath or wheezing  Cardiovascular : negative for - chest pain, edema, palpitations or shortness of breath  Gastrointestinal: negative for - abdominal pain, blood in stools, heartburn or nausea/vomiting  Genito-Urinary: no dysuria, trouble voiding, or hematuria  Musculoskeletal: negative for - gait disturbance, joint pain, joint stiffness or joint swelling  Neurological: no TIA or stroke symptoms. Limited mobility due to his Parkinson  Hematologic: no bruises, no bleeding  Lymphatic: no swollen glands  Integument: no lumps, mole changes, nail changes or rash  Endocrine:no malaise/lethargy poly uria or polydipsia or unexpected weight changes        Social History     Socioeconomic History    Marital status:      Spouse name: Not on file    Number of children: Not on file    Years of education: Not on file    Highest education level: Not on file   Tobacco Use    Smoking status: Former Smoker    Smokeless tobacco: Never Used   Substance and Sexual Activity    Alcohol use: No    Drug use: No     Family History   Problem Relation Age of Onset    Heart Disease Mother     Heart Disease Father        OBJECTIVE:     Visit Vitals  BP 94/52 (BP 1 Location: Left arm, BP Patient Position: Sitting)   Pulse (!) 57   Temp 97.9 °F (36.6 °C) (Oral)   Resp 18   SpO2 92%     CONSTITUTIONAL:   well nourished, appears age appropriate  EYES: sclera anicteric, PERRL, EOMI  ENMT:nares clear, moist mucous membranes, pharynx clear  NECK: supple. Thyroid normal, No JVD or bruits  RESPIRATORY: Chest: clear to ascultation and percussion, normal inspiratory effort  CARDIOVASCULAR: Heart: regular rate and rhythm no murmurs, rubs or gallops, PMI not displaced, No thrills  GASTROINTESTINAL: Abdomen: non distended, soft, non tender, bowel sounds normal  HEMATOLOGIC: no purpura, petechiae or bruising  LYMPHATIC: No lymph node enlargemant  MUSCULOSKELETAL: Extremities: no edema or active synovitis, pulse 1+   INTEGUMENT: No unusual rashes or suspicious skin lesions noted. Nails appear normal.  PERIPHERAL VASCULAR: normal pulses femoral, PT and DP  NEUROLOGIC: non-focal exam, A & O X 3.   Mobility limited to wheelchair here in office  PSYCHIATRIC:, appropriate affect     ASSESSMENT:   1. Essential hypertension    2. Parkinson's disease ([address]      Impression  1. Hypertension now he is overtreated and I think Parkinson's playing a role in this. At this point since his blood pressure is on the low side have asked his daughter to decrease his valsartan to 40 mg daily. He will stay off the Norvasc. If his blood pressure goes too high with the valsartan 40 and will go back to 80 mg. And if that is not effective before going back to Norvasc I would increase valsartan to 160 mg. Further follow-up to be determined. PLAN:  . No orders of the defined types were placed in this encounter. ATTENTION:   This medical record was transcribed using an electronic medical records system. Although proofread, it may and can contain electronic and spelling errors. Other human spelling and other errors may be present. Corrections may be executed at a later time. Please feel free to contact us for any clarifications as needed. Follow-up and Dispositions    · Return TBD. No results found for any visits on 09/20/19. Italo Lamas MD    The patient verbalized understanding of the problems and plans as explained.

## 2019-09-20 NOTE — PROGRESS NOTES
Chief Complaint   Patient presents with    Hypotension     pts daughter has been having low bp readings     Visit Vitals  BP 94/52 (BP 1 Location: Left arm, BP Patient Position: Sitting)   Pulse (!) 57   Temp 97.9 °F (36.6 °C) (Oral)   Resp 18   SpO2 92%     1. Have you been to the ER, urgent care clinic since your last visit? Hospitalized since your last visit? No    2. Have you seen or consulted any other health care providers outside of the [address] [place] [address] since your last visit? Include any pap smears or colon screening.  No

## 2019-09-27 ENCOUNTER — TELEPHONE (OUTPATIENT)
Dept: NEUROLOGY | Age: 79
End: 2019-09-27

## 2019-09-27 NOTE — TELEPHONE ENCOUNTER
----- Message from Digna Candelaria sent at 9/26/2019  4:09 PM EDT -----  Regarding: Dr Ignacio Ibarra Message/    Caller's first and last name:[place] from [facility]      Reason for call: they have been seeing pt for Physical therapy they are at the end of his [facility] certification and they would like to get him re certified to continue seeing him 2x a week for 3 weeks      Callback required yes/no and why:  Yes, to get the extended time approved     Best contact number(s):[phone]      Details to clarify the request: pt's [facility] certification runs out next week so would like to get it approved before then , they can get a verbal approval       Digna Candelaria

## 2019-11-19 ENCOUNTER — TELEPHONE (OUTPATIENT)
Dept: INTERNAL MEDICINE CLINIC | Age: 79
End: 2019-11-19

## 2019-11-19 DIAGNOSIS — I10 ESSENTIAL HYPERTENSION: Primary | ICD-10-CM

## 2019-11-19 RX ORDER — VALSARTAN 80 MG/1
160 TABLET ORAL DAILY
Qty: 90 TAB | Refills: 0 | Status: SHIPPED | OUTPATIENT
Start: 2019-11-19 | End: 2019-12-19

## 2019-11-19 NOTE — TELEPHONE ENCOUNTER
Patient phoned states her fathers BP has been spiking randomly again he has recently seen Dr. Linda Reyes for this and has adjustments made to his medications. His BP yesterday @ 3pm was 203/90 and at 9pm was 139/70. This morning at 7am was 213/80 aftertaking his mediacton at 9am was checked again by his daughter who is an RN and is was 139/74. He denies any headaches and states he feels fine. Daughter concerned please advise.   NOV: with you 1/15/2020

## 2019-11-19 NOTE — TELEPHONE ENCOUNTER
Robinson Mccarthy MD  You 1 hour ago (1:57 PM)       I reviewed previous encounters of the patient his meds and labs. I recommend increasing the valsartan to 180 mg.  Please give this bed in a.m. continue to monitor blood pressure over the next 2 weeks      I will send prescriptions for the same. He will continue to hold off of amlodipine.  I will place an order for BMP to be done in the next 2 weeks.

## 2019-11-19 NOTE — TELEPHONE ENCOUNTER
Spoke with patients daughter and she notified me that he has been taking two 80 mg tabs every morning since 10/4/2019 per Dr. Radha Mcwilliams. Please advise.

## 2019-11-20 ENCOUNTER — TELEPHONE (OUTPATIENT)
Dept: INTERNAL MEDICINE CLINIC | Age: 79
End: 2019-11-20

## 2019-11-20 RX ORDER — AMLODIPINE BESYLATE 5 MG/1
5 TABLET ORAL DAILY
Qty: 30 TAB | Refills: 0 | Status: SHIPPED | OUTPATIENT
Start: 2019-11-20 | End: 2020-01-23

## 2019-11-20 NOTE — TELEPHONE ENCOUNTER
Patients daughter notified needs a Rx for Norvasc 5 mg          Requested Prescriptions     Pending Prescriptions Disp Refills    amLODIPine (NORVASC) 5 mg tablet 30 Tab 0     Sig: Take 1 Tab by mouth daily.      ]

## 2019-11-20 NOTE — TELEPHONE ENCOUNTER
Tavares Marie MD  You 14 hours ago (8:46 PM)      Please call patient. Please give Diovan 160 mg in a.m. and Norvasc 5 mg in p.m. please make sure to space out the medications. Continue to check blood pressures for 1 week and let me know the readings.

## 2019-11-20 NOTE — TELEPHONE ENCOUNTER
Joe Guadalupe returning Sheree's call letting her know that Mateus Myrick has been on Norvasc and his BP had been dropping lower. Please return her call at [phone]. She said she will probably start him on this medication.

## 2019-11-21 NOTE — TELEPHONE ENCOUNTER
Patients daughter was calling to notify that she had  [id number] mg tabs at home she was going to cut in half and start her father on and call with his BP readings

## 2019-12-02 ENCOUNTER — TELEPHONE (OUTPATIENT)
Dept: INTERNAL MEDICINE CLINIC | Age: 79
End: 2019-12-02

## 2019-12-02 NOTE — TELEPHONE ENCOUNTER
Dr Trejo Nurse patient  BP has been up and down - when he takes the medicine of .25 it goes up and when he takes the medicine of .50 it goes down  Dr Neil Serrato wanted to know how it was working

## 2019-12-04 ENCOUNTER — LAB ONLY (OUTPATIENT)
Dept: INTERNAL MEDICINE CLINIC | Age: 79
End: 2019-12-04

## 2019-12-04 DIAGNOSIS — I10 ESSENTIAL HYPERTENSION: Primary | ICD-10-CM

## 2019-12-04 LAB
ANION GAP SERPL CALC-SCNC: 9 MMOL/L
BUN SERPL-MCNC: 14 MG/DL (ref 9–20)
CALCIUM SERPL-MCNC: 9.5 MG/DL (ref 8.4–10.2)
CHLORIDE SERPL-SCNC: 105 MMOL/L (ref 98–107)
CO2 SERPL-SCNC: 28 MMOL/L (ref 22–32)
CREAT SERPL-MCNC: 0.9 MG/DL (ref 0.8–1.5)
GLUCOSE SERPL-MCNC: 99 MG/DL (ref 75–110)
POTASSIUM SERPL-SCNC: 4.2 MMOL/L (ref 3.6–5)
SODIUM SERPL-SCNC: 142 MMOL/L (ref 137–145)

## 2019-12-04 NOTE — TELEPHONE ENCOUNTER
Spoke with patients daughter and she states patient has been taking Norvac 5mg everyday for the last five days and seems to have better BP readings. She tried the 2.5mg but his numbers were high. She will keep you up to date with his reading if he has any issues.

## 2020-01-06 RX ORDER — CARBIDOPA AND LEVODOPA 25; 100 MG/1; MG/1
TABLET ORAL
Qty: 720 TAB | Refills: 0 | Status: SHIPPED | OUTPATIENT
Start: 2020-01-06 | End: 2020-04-27 | Stop reason: SDUPTHER

## 2020-01-06 NOTE — TELEPHONE ENCOUNTER
Last Refill: 19  Last visit:2019    NOV: 1/15/2020    Requested Prescriptions     Pending Prescriptions Disp Refills    carbidopa-levodopa (SINEMET)  mg per tablet 720 Tab 0     Si p.o. 4 times daily  Indications: Parkinson's Disease

## 2020-01-15 ENCOUNTER — OFFICE VISIT (OUTPATIENT)
Dept: INTERNAL MEDICINE CLINIC | Age: 80
End: 2020-01-15

## 2020-01-15 VITALS
BODY MASS INDEX: 25.11 KG/M2 | HEART RATE: 52 BPM | DIASTOLIC BLOOD PRESSURE: 74 MMHG | SYSTOLIC BLOOD PRESSURE: 140 MMHG | HEIGHT: 70 IN | RESPIRATION RATE: 14 BRPM

## 2020-01-15 DIAGNOSIS — R35.0 BENIGN PROSTATIC HYPERPLASIA WITH URINARY FREQUENCY: ICD-10-CM

## 2020-01-15 DIAGNOSIS — I10 ESSENTIAL HYPERTENSION: Primary | ICD-10-CM

## 2020-01-15 DIAGNOSIS — G31.84 MCI (MILD COGNITIVE IMPAIRMENT): ICD-10-CM

## 2020-01-15 DIAGNOSIS — E78.5 HYPERLIPIDEMIA, UNSPECIFIED HYPERLIPIDEMIA TYPE: ICD-10-CM

## 2020-01-15 DIAGNOSIS — G20 DEMENTIA DUE TO PARKINSON'S DISEASE WITHOUT BEHAVIORAL DISTURBANCE (HCC): ICD-10-CM

## 2020-01-15 DIAGNOSIS — K21.9 GASTROESOPHAGEAL REFLUX DISEASE WITHOUT ESOPHAGITIS: ICD-10-CM

## 2020-01-15 DIAGNOSIS — N40.1 BENIGN PROSTATIC HYPERPLASIA WITH URINARY FREQUENCY: ICD-10-CM

## 2020-01-15 DIAGNOSIS — R35.0 URINARY FREQUENCY: ICD-10-CM

## 2020-01-15 DIAGNOSIS — F02.80 DEMENTIA DUE TO PARKINSON'S DISEASE WITHOUT BEHAVIORAL DISTURBANCE (HCC): ICD-10-CM

## 2020-01-15 RX ORDER — VALSARTAN 80 MG/1
80 TABLET ORAL DAILY
COMMUNITY
End: 2020-01-27 | Stop reason: SDUPTHER

## 2020-01-15 RX ORDER — TAMSULOSIN HYDROCHLORIDE 0.4 MG/1
0.4 CAPSULE ORAL DAILY
Qty: 30 CAP | Refills: 0 | Status: SHIPPED | OUTPATIENT
Start: 2020-01-15 | End: 2020-02-18 | Stop reason: SDUPTHER

## 2020-01-15 NOTE — PROGRESS NOTES
This note will not be viewable in [address] Mike Church is a 78 y.o. male and presents with Hypertension (6 mo fu)      Subjective:  Patient has a history of dementia and Parkinson's disease. He comes in today in attendance with his wife and daughter who is an RN at [employer]. Patient has a history of stroke, Parkinson's disease and dementia. He is already undergone physical and Occupational Therapy. At baseline he uses a walker to ambulate at home and a wheelchair for all his office visits. Per daughter, she reports she has been doing fairly well. He has a stationary bike at home which he uses for exercise. He is on Sinemet and Comtan. He follows up with neurologist Dr. Iesha Cain. His blood pressure has been labile in the past 1 year. Several medications were discontinued due to hypotension and now has been added back. Currently he is on Norvasc 5 mg every afternoon and Diovan 80 mg every morning. Blood pressure is well controlled today. He also reports increased urinary frequency and hesitancy and nocturia. His last PSA was normal.  He is unsure whether he has a history of BPH or not. He is never tried Flomax. Also reports a strong color to his urine. Denies fever or chills, lower abdominal pain.       Past Medical History:   Diagnosis Date    Aaron esophagus     Benign nodular prostatic hyperplasia without lower urinary tract symptoms 7/12/2017    CKD (chronic kidney disease) stage 2, GFR 60-89 ml/min     Constipation     Coronary artery disease 7/12/2017    Depression     DJD (degenerative joint disease) 7/12/2017    Falls     Gout 7/12/2017    Headache     Hearing difficulty of both ears 7/12/2017    Hyperlipidemia     Hypertension     Impaired cognition 7/12/2017    MCI (mild cognitive impairment)     Restless leg syndrome 7/12/2017    Sleep apnea in adult 7/12/2017    No longer on CPAP @13    Snoring     Stroke (HCC)     Tendinitis 7/12/2017    ACHILLES    Tendinitis of left rotator cuff 07/12/2017    Tremor 7/12/2017    LEFT HAND    Unspecified sleep apnea     lost weight no longer has it     Past Surgical History:   Procedure Laterality Date    COLONOSCOPY N/A 1/16/2019    COLONOSCOPY performed by Helena Montague [facility] at [facility] ENDOSCOPY    COLONOSCOPY,FLEX,W/CONTROL, BLEEDING  1/16/2019         HX ORTHOPAEDIC  1/27/15    L4-L5 MICRODECOMPRESSION (Right    PA EGD TRANSORAL BIOPSY SINGLE/MULTIPLE  8/16/2011         PA EGD TRANSORAL BIOPSY SINGLE/MULTIPLE  10/1/2013          No Known Allergies  Current Outpatient Medications   Medication Sig Dispense Refill    valsartan (DIOVAN) 80 mg tablet Take 80 mg by mouth daily. 2 tabs qam      tamsulosin (FLOMAX) 0.4 mg capsule Take 1 Cap by mouth daily for 30 days. Indications: enlarged prostate with urination problem 30 Cap 0    carbidopa-levodopa (SINEMET)  mg per tablet 2 p.o. 4 times daily  Indications: Parkinson's Disease 720 Tab 0    amLODIPine (NORVASC) 5 mg tablet Take 1 Tab by mouth daily. (Patient taking differently: Take 5 mg by mouth nightly.) 30 Tab 0    PARoxetine (PAXIL) 10 mg tablet TAKE 1 TABLET BY MOUTH DAILY 90 Tab 3    gabapentin (NEURONTIN) 100 mg capsule Take 2 Caps by mouth two (2) times a day. 360 Cap 3    rOPINIRole (REQUIP) 1 mg tablet Take 1 Tab by mouth three (3) times daily. 270 Tab 5    entacapone (COMTAN) 200 mg tablet Take 1 Tab by mouth three (3) times daily. 270 Tab 5    esomeprazole (NEXIUM) 20 mg capsule TAKE 1 CAPSULE DAILY 90 Cap 3    atorvastatin (LIPITOR) 40 mg tablet TAKE 1 TABLET DAILY 90 Tab 3    acetaminophen (TYLENOL EXTRA STRENGTH) 500 mg tablet Take 1,000 mg by mouth three (3) times daily as needed for Pain.  senna-docusate (SENNA-S) 8.6-50 mg per tablet Take 1 Tab by mouth every other day.  aspirin delayed-release 81 mg tablet Take  by mouth daily.  vitamin e (E GEMS) 1,000 unit capsule Take 2,000 Units by mouth daily.       MULTIVITAMIN PO Take 1 Tab by mouth daily. Social History     Socioeconomic History    Marital status:      Spouse name: Not on file    Number of children: Not on file    Years of education: Not on file    Highest education level: Not on file   Tobacco Use    Smoking status: Former Smoker    Smokeless tobacco: Never Used   Substance and Sexual Activity    Alcohol use: No    Drug use: No     Family History   Problem Relation Age of Onset    Heart Disease Mother     Heart Disease Father        Review of Systems  ROS is unremarkable except for ones highlighted in bold.    Constitutional: negative for fevers, chills, anorexia and weight loss  Eyes:   negative for visual disturbance and irritation  ENT:   negative for tinnitus,sore throat,nasal congestion,ear pain,hoarseness  Respiratory:  negative for cough, hemoptysis, dyspnea,wheezing  CV:   negative for chest pain, palpitations, lower extremity edema  GI:   negative for nausea, vomiting, diarrhea, abdominal pain,melena  Endo:               negative for polyuria,polydipsia,polyphagia,heat intolerance  Genitourinary: negative for frequency, dysuria and hematuria  Integumentary: negative for rash and pruritus  Hematologic:  negative for easy bruising and gum/nose bleeding  Musculoskel: negative for myalgias, arthralgias, back pain, muscle weakness, joint pain  Neurological:  negative for headaches, dizziness, vertigo, memory problems and gait   Behavl/Psych: negative for feelings of anxiety, depression, mood changes, dementia  ROS otherwise negative     Objective:  Visit Vitals  /74 (BP 1 Location: Left arm, BP Patient Position: Sitting)   Pulse (!) 52   Resp 14   Ht 5' 10\" (1.778 m)   BMI 25.11 kg/m²     Physical Exam:   General appearance - alert, well appearing, and in no distress  Mental status - alert, oriented to person, place, and time  EYE-BERNARDO, EOMI, fundi normal, corneas normal, no foreign bodies  ENT-ENT exam normal, no neck nodes or sinus tenderness  Nose - normal and patent, no erythema, discharge or polyps  Mouth - mucous membranes moist, pharynx normal without lesions  Neck - supple, no significant adenopathy   Chest - clear to auscultation, no wheezes, rales or rhonchi, symmetric air entry   Heart - normal rate, regular rhythm, normal S1, S2, no murmurs, rubs, clicks or gallops   Abdomen - soft, nontender, nondistended, no masses or organomegaly  Lymph- no adenopathy palpable  Ext-peripheral pulses normal, no pedal edema, no clubbing or cyanosis  Skin-Warm and dry. no hyperpigmentation, vitiligo, or suspicious lesions  Neuro -alert, oriented, normal speech, no focal findings or movement disorder noted, mild dementia noted. Musculoskeletal- FROM, no bony abnormalities, no point tenderness    Lab Review:  Results for orders placed or performed in visit on 58/61/99   METABOLIC PANEL, BASIC   Result Value Ref Range    Glucose 99 75 - 110 mg/dL    BUN 14.0 9.0 - 20.0 mg/dL    Creatinine 0.9 0.8 - 1.5 mg/dL    Sodium 142 137 - 145 mmol/L    Potassium 4.2 3.6 - 5.0 mmol/L    Chloride 105 98 - 107 mmol/L    CO2 28.0 22.0 - 32.0 mmol/L    Calcium 9.5 8.4 - 10.2 mg/dl    Anion gap 9 mmol/L    GFR est AA >60 mL/min/1.73m2    GFR est non-AA >60 mL/min/1.73m2        Documenation Review:    Assessment/Plan:    Diagnoses and all orders for this visit:    1. Essential hypertension  -     CBC W/O DIFF  -     METABOLIC PANEL, COMPREHENSIVE    2. Hyperlipidemia, unspecified hyperlipidemia type    3. Dementia due to Parkinson's disease without behavioral disturbance ([facility] [address]    4. MCI (mild cognitive impairment)    5. Gastroesophageal reflux disease without esophagitis    6. Benign prostatic hyperplasia with urinary frequency  -     tamsulosin (FLOMAX) 0.4 mg capsule; Take 1 Cap by mouth daily for 30 days. Indications: enlarged prostate with urination problem    7. Urinary frequency  -     URINALYSIS W/ RFLX MICROSCOPIC      Discussed with daughter.   We will start him on Flomax and will check a urine with microscopy today. Continue current meds. Continue to follow-up with your neurologist   I will call with lab results and make further recommendations or adjustments if necessary. We will follow-up in 1 month        ICD-10-CM ICD-9-CM    1. Essential hypertension I10 401.9 CBC W/O DIFF      METABOLIC PANEL, COMPREHENSIVE   2. Hyperlipidemia, unspecified hyperlipidemia type E78.5 272.4    3. Dementia due to Parkinson's disease without behavioral disturbance ([facility] [address] G20 332.0     F02.80 294.10    4. MCI (mild cognitive impairment) G31.84 331.83    5. Gastroesophageal reflux disease without esophagitis K21.9 530.81    6. Benign prostatic hyperplasia with urinary frequency N40.1 600.01 tamsulosin (FLOMAX) 0.4 mg capsule    R35.0 788.41    7. Urinary frequency R35.0 788.41 URINALYSIS W/ RFLX MICROSCOPIC         Follow-up and Dispositions    · Return in about 4 weeks (around 2/12/2020) for follow up. I have reviewed with the patient details of the assessment and plan and all questions were answered. Relevent patient education was performed. Verbal and/or written instructions (see AVS) provided. The most recent lab findings were reviewed with the patient. Plan was discussed with patient who verbally expressed understanding. An After Visit Summary was printed and given to the patient.     Lenis Schilder, MD

## 2020-01-15 NOTE — PATIENT INSTRUCTIONS
Benign Prostatic Hyperplasia: Care Instructions  Your Care Instructions    Benign prostatic hyperplasia, or BPH, is an enlarged prostate gland. The prostate is a small gland that makes some of the fluid in semen. Prostate enlargement happens to almost all men as they age. It is usually not serious. BPH does not cause prostate cancer. As the prostate gets bigger, it may partly block the flow of urine. You may have a hard time getting a urine stream started or completely stopped. BPH can cause dribbling. You may have a weak urine stream, or you may have to urinate more often than you used to, especially at night. Most men find these problems easy to manage. You do not need treatment unless your symptoms bother you a lot or you have other problems, such as bladder infections or stones. In these cases, medicines may help. Surgery is not needed unless the urine flow is blocked or the symptoms do not get better with medicine. Follow-up care is a key part of your treatment and safety. Be sure to make and go to all appointments, and call your doctor if you are having problems. It's also a good idea to know your test results and keep a list of the medicines you take. How can you care for yourself at home? · Take plenty of time to urinate. Try to relax. · Try \"double voiding. \" Urinate as much you can, relax for a few moments, and then try to urinate again. · Sit on the toilet to urinate. · Read or think of other things while you are waiting. · Turn on a faucet, or try to picture running water. Some men find that this helps get their urine flowing. · If dribbling is a problem, wash your penis daily to avoid skin irritation and infection. · Avoid caffeine and alcohol. These drinks will increase how often you need to urinate. Spread your fluid intake throughout the day. If the urge to urinate often wakes you at night, limit your fluid intake in the evening. Urinate right before you go to bed.   · Many over-the-counter cold and allergy medicines can make the symptoms of BPH worse. Avoid antihistamines, decongestants, and allergy pills, if you can. Read the warnings on the package. · If you take any prescription medicines, especially tranquilizers or antidepressants, ask your doctor or pharmacist whether they can cause urination problems. There may be other medicines you can use that do not cause urinary problems. · Be safe with medicines. Take your medicines exactly as prescribed. Call your doctor if you think you are having a problem with your medicine. When should you call for help? Call your doctor now or seek immediate medical care if:    · You cannot urinate at all.     · You have symptoms of a urinary infection. For example:  ? You have blood or pus in your urine. ? You have pain in your back just below your rib cage. This is called flank pain. ? You have a fever, chills, or body aches. ? It hurts to urinate. ? You have groin or belly pain.    Watch closely for changes in your health, and be sure to contact your doctor if:    · It hurts when you ejaculate.     · Your urinary problems get a lot worse or bother you a lot. Where can you learn more? Go to http://aditi-angela.info/. Enter J988 in the search box to learn more about \"Benign Prostatic Hyperplasia: Care Instructions. \"  Current as of: May 28, 2019  Content Version: 12.2  © 9358-0427 [employer]. Care instructions adapted under license by [employer] (which disclaims liability or warranty for this information). If you have questions about a medical condition or this instruction, always ask your healthcare professional. [address] any warranty or liability for your use of this information.

## 2020-01-15 NOTE — PROGRESS NOTES
Abel Alvarado is a 78 y.o. male presenting for Hypertension (6 mo fu)  . 1. Have you been to the ER, urgent care clinic since your last visit? Hospitalized since your last visit? Yes When: 9/2019 Where: [facility] Reason for visit: hypertension    2. Have you seen or consulted any other health care providers outside of the [address] [place] [address] since your last visit? Include any pap smears or colon screening. No    Fall Risk Assessment, last 12 mths 9/20/2019   Able to walk? No   Fall in past 12 months? -   Fall with injury? -   Number of falls in past 12 months -   Fall Risk Score -         Abuse Screening Questionnaire 6/6/2019   Do you ever feel afraid of your partner? N   Are you in a relationship with someone who physically or mentally threatens you? N   Is it safe for you to go home? Y       3 most recent PHQ Screens 9/20/2019   Little interest or pleasure in doing things Not at all   Feeling down, depressed, irritable, or hopeless Not at all   Total Score PHQ 2 0   Trouble falling or staying asleep, or sleeping too much -   Feeling tired or having little energy -   Poor appetite, weight loss, or overeating -   Feeling bad about yourself - or that you are a failure or have let yourself or your family down -   Trouble concentrating on things such as school, work, reading, or watching TV -   Moving or speaking so slowly that other people could have noticed; or the opposite being so fidgety that others notice -   Thoughts of being better off dead, or hurting yourself in some way -   PHQ 9 Score -       There are no discontinued medications.

## 2020-01-16 LAB
ALBUMIN SERPL-MCNC: 4.5 G/DL (ref 3.5–4.7)
ALBUMIN/GLOB SERPL: 1.7 {RATIO} (ref 1.2–2.2)
ALP SERPL-CCNC: 73 IU/L (ref 39–117)
ALT SERPL-CCNC: 11 IU/L (ref 0–44)
APPEARANCE UR: CLEAR
AST SERPL-CCNC: 18 IU/L (ref 0–40)
BACTERIA #/AREA URNS HPF: ABNORMAL /[HPF]
BILIRUB SERPL-MCNC: 0.3 MG/DL (ref 0–1.2)
BILIRUB UR QL STRIP: NEGATIVE
BUN SERPL-MCNC: 20 MG/DL (ref 8–27)
BUN/CREAT SERPL: 19 (ref 10–24)
CALCIUM SERPL-MCNC: 9.6 MG/DL (ref 8.6–10.2)
CASTS URNS MICRO: ABNORMAL
CASTS URNS QL MICRO: PRESENT /LPF
CHLORIDE SERPL-SCNC: 104 MMOL/L (ref 96–106)
CO2 SERPL-SCNC: 22 MMOL/L (ref 20–29)
COLOR UR: YELLOW
CREAT SERPL-MCNC: 1.05 MG/DL (ref 0.76–1.27)
EPI CELLS #/AREA URNS HPF: ABNORMAL /HPF (ref 0–10)
ERYTHROCYTE [DISTWIDTH] IN BLOOD BY AUTOMATED COUNT: 12.1 % (ref 11.6–15.4)
GLOBULIN SER CALC-MCNC: 2.7 G/DL (ref 1.5–4.5)
GLUCOSE SERPL-MCNC: 100 MG/DL (ref 65–99)
GLUCOSE UR QL: NEGATIVE
HCT VFR BLD AUTO: 36.5 % (ref 37.5–51)
HGB BLD-MCNC: 12.3 G/DL (ref 13–17.7)
HGB UR QL STRIP: NEGATIVE
KETONES UR QL STRIP: ABNORMAL
LEUKOCYTE ESTERASE UR QL STRIP: NEGATIVE
MCH RBC QN AUTO: 33.8 PG (ref 26.6–33)
MCHC RBC AUTO-ENTMCNC: 33.7 G/DL (ref 31.5–35.7)
MCV RBC AUTO: 100 FL (ref 79–97)
MICRO URNS: ABNORMAL
MUCOUS THREADS URNS QL MICRO: PRESENT
NITRITE UR QL STRIP: NEGATIVE
PH UR STRIP: 5 [PH] (ref 5–7.5)
PLATELET # BLD AUTO: 201 X10E3/UL (ref 150–450)
POTASSIUM SERPL-SCNC: 4.6 MMOL/L (ref 3.5–5.2)
PROT SERPL-MCNC: 7.2 G/DL (ref 6–8.5)
PROT UR QL STRIP: ABNORMAL
RBC # BLD AUTO: 3.64 X10E6/UL (ref 4.14–5.8)
RBC #/AREA URNS HPF: ABNORMAL /HPF (ref 0–2)
SODIUM SERPL-SCNC: 144 MMOL/L (ref 134–144)
SP GR UR: 1.03 (ref 1–1.03)
UROBILINOGEN UR STRIP-MCNC: 0.2 MG/DL (ref 0.2–1)
WBC # BLD AUTO: 7.5 X10E3/UL (ref 3.4–10.8)
WBC #/AREA URNS HPF: ABNORMAL /HPF (ref 0–5)

## 2020-01-23 DIAGNOSIS — I10 ESSENTIAL HYPERTENSION: ICD-10-CM

## 2020-01-23 RX ORDER — AMLODIPINE BESYLATE 5 MG/1
TABLET ORAL
Qty: 30 TAB | Refills: 0 | Status: SHIPPED | OUTPATIENT
Start: 2020-01-23 | End: 2020-02-18 | Stop reason: SDUPTHER

## 2020-01-27 RX ORDER — VALSARTAN 80 MG/1
80 TABLET ORAL DAILY
Qty: 90 TAB | Refills: 2 | Status: SHIPPED | OUTPATIENT
Start: 2020-01-27 | End: 2020-01-27 | Stop reason: SDUPTHER

## 2020-01-27 RX ORDER — VALSARTAN 80 MG/1
TABLET ORAL
Qty: 180 TAB | Refills: 2 | Status: SHIPPED | OUTPATIENT
Start: 2020-01-27 | End: 2020-04-28 | Stop reason: SDUPTHER

## 2020-01-27 NOTE — TELEPHONE ENCOUNTER
Last Refill: ? Last visit:1/15/2020    NOV: 2/18/2020    Requested Prescriptions     Pending Prescriptions Disp Refills    valsartan (DIOVAN) 80 mg tablet 90 Tab 2     Sig: Take 1 Tab by mouth daily.  2 tabs qam

## 2020-01-27 NOTE — TELEPHONE ENCOUNTER
Recently sent in for 1 tab every day # 90 should read 2 tabs qam and #180 tabs  Requested Prescriptions     Pending Prescriptions Disp Refills    valsartan (DIOVAN) 80 mg tablet 180 Tab 2     Si tabs qam

## 2020-02-17 PROBLEM — N40.0 BENIGN NODULAR PROSTATIC HYPERPLASIA WITHOUT LOWER URINARY TRACT SYMPTOMS: Status: RESOLVED | Noted: 2017-07-12 | Resolved: 2020-02-17

## 2020-02-17 PROBLEM — N40.1 BENIGN PROSTATIC HYPERPLASIA WITH NOCTURIA: Status: ACTIVE | Noted: 2020-02-17

## 2020-02-17 PROBLEM — R35.1 BENIGN PROSTATIC HYPERPLASIA WITH NOCTURIA: Status: ACTIVE | Noted: 2020-02-17

## 2020-02-18 ENCOUNTER — OFFICE VISIT (OUTPATIENT)
Dept: INTERNAL MEDICINE CLINIC | Age: 80
End: 2020-02-18

## 2020-02-18 VITALS
WEIGHT: 177 LBS | HEART RATE: 59 BPM | BODY MASS INDEX: 25.34 KG/M2 | DIASTOLIC BLOOD PRESSURE: 60 MMHG | HEIGHT: 70 IN | OXYGEN SATURATION: 93 % | RESPIRATION RATE: 14 BRPM | SYSTOLIC BLOOD PRESSURE: 100 MMHG | TEMPERATURE: 98.1 F

## 2020-02-18 DIAGNOSIS — G20 DEMENTIA DUE TO PARKINSON'S DISEASE WITHOUT BEHAVIORAL DISTURBANCE (HCC): ICD-10-CM

## 2020-02-18 DIAGNOSIS — I63.9 CEREBROVASCULAR ACCIDENT (CVA), UNSPECIFIED MECHANISM (HCC): ICD-10-CM

## 2020-02-18 DIAGNOSIS — I10 ESSENTIAL HYPERTENSION: ICD-10-CM

## 2020-02-18 DIAGNOSIS — I65.23 BILATERAL CAROTID ARTERY STENOSIS: ICD-10-CM

## 2020-02-18 DIAGNOSIS — R35.1 BENIGN PROSTATIC HYPERPLASIA WITH NOCTURIA: Primary | ICD-10-CM

## 2020-02-18 DIAGNOSIS — N40.1 BENIGN PROSTATIC HYPERPLASIA WITH URINARY FREQUENCY: ICD-10-CM

## 2020-02-18 DIAGNOSIS — N40.1 BENIGN PROSTATIC HYPERPLASIA WITH NOCTURIA: Primary | ICD-10-CM

## 2020-02-18 DIAGNOSIS — R35.0 BENIGN PROSTATIC HYPERPLASIA WITH URINARY FREQUENCY: ICD-10-CM

## 2020-02-18 DIAGNOSIS — F02.80 DEMENTIA DUE TO PARKINSON'S DISEASE WITHOUT BEHAVIORAL DISTURBANCE (HCC): ICD-10-CM

## 2020-02-18 RX ORDER — AMLODIPINE BESYLATE 5 MG/1
TABLET ORAL
Qty: 90 TAB | Refills: 3 | Status: SHIPPED | OUTPATIENT
Start: 2020-02-18 | End: 2020-10-11 | Stop reason: SDUPTHER

## 2020-02-18 RX ORDER — TAMSULOSIN HYDROCHLORIDE 0.4 MG/1
0.4 CAPSULE ORAL DAILY
Qty: 30 CAP | Refills: 3 | Status: SHIPPED | OUTPATIENT
Start: 2020-02-18 | End: 2020-03-19

## 2020-02-18 NOTE — PATIENT INSTRUCTIONS
High Blood Pressure: Care Instructions Overview It's normal for blood pressure to go up and down throughout the day. But if it stays up, you have high blood pressure. Another name for high blood pressure is hypertension. Despite what a lot of people think, high blood pressure usually doesn't cause headaches or make you feel dizzy or lightheaded. It usually has no symptoms. But it does increase your risk of stroke, heart attack, and other problems. You and your doctor will talk about your risks of these problems based on your blood pressure. Your doctor will give you a goal for your blood pressure. Your goal will be based on your health and your age. Lifestyle changes, such as eating healthy and being active, are always important to help lower blood pressure. You might also take medicine to reach your blood pressure goal. 
Follow-up care is a key part of your treatment and safety. Be sure to make and go to all appointments, and call your doctor if you are having problems. It's also a good idea to know your test results and keep a list of the medicines you take. How can you care for yourself at home? Medical treatment · If you stop taking your medicine, your blood pressure will go back up. You may take one or more types of medicine to lower your blood pressure. Be safe with medicines. Take your medicine exactly as prescribed. Call your doctor if you think you are having a problem with your medicine. · Talk to your doctor before you start taking aspirin every day. Aspirin can help certain people lower their risk of a heart attack or stroke. But taking aspirin isn't right for everyone, because it can cause serious bleeding. · See your doctor regularly. You may need to see the doctor more often at first or until your blood pressure comes down. · If you are taking blood pressure medicine, talk to your doctor before you take decongestants or anti-inflammatory medicine, such as ibuprofen. Some of these medicines can raise blood pressure. · Learn how to check your blood pressure at home. Lifestyle changes · Stay at a healthy weight. This is especially important if you put on weight around the waist. Losing even 10 pounds can help you lower your blood pressure. · If your doctor recommends it, get more exercise. Walking is a good choice. Bit by bit, increase the amount you walk every day. Try for at least 30 minutes on most days of the week. You also may want to swim, bike, or do other activities. · Avoid or limit alcohol. Talk to your doctor about whether you can drink any alcohol. · Try to limit how much sodium you eat to less than 2,300 milligrams (mg) a day. Your doctor may ask you to try to eat less than 1,500 mg a day. · Eat plenty of fruits (such as bananas and oranges), vegetables, legumes, whole grains, and low-fat dairy products. · Lower the amount of saturated fat in your diet. Saturated fat is found in animal products such as milk, cheese, and meat. Limiting these foods may help you lose weight and also lower your risk for heart disease. · Do not smoke. Smoking increases your risk for heart attack and stroke. If you need help quitting, talk to your doctor about stop-smoking programs and medicines. These can increase your chances of quitting for good. When should you call for help? Call  911 anytime you think you may need emergency care. This may mean having symptoms that suggest that your blood pressure is causing a serious heart or blood vessel problem. Your blood pressure may be over 180/120. 
 For example, call  911 if: 
  · You have symptoms of a heart attack. These may include: 
? Chest pain or pressure, or a strange feeling in the chest. 
? Sweating. ? Shortness of breath. ? Nausea or vomiting. ? Pain, pressure, or a strange feeling in the back, neck, jaw, or upper belly or in one or both shoulders or arms. ? Lightheadedness or sudden weakness. ? A fast or irregular heartbeat.  
  · You have symptoms of a stroke. These may include: 
? Sudden numbness, tingling, weakness, or loss of movement in your face, arm, or leg, especially on only one side of your body. ? Sudden vision changes. ? Sudden trouble speaking. ? Sudden confusion or trouble understanding simple statements. ? Sudden problems with walking or balance. ? A sudden, severe headache that is different from past headaches.  
  · You have severe back or belly pain.  
 Do not wait until your blood pressure comes down on its own. Get help right away. 
 Call your doctor now or seek immediate care if: 
  · Your blood pressure is much higher than normal (such as 180/120 or higher), but you don't have symptoms.  
  · You think high blood pressure is causing symptoms, such as: 
? Severe headache. 
? Blurry vision.  
 Watch closely for changes in your health, and be sure to contact your doctor if: 
  · Your blood pressure measures higher than your doctor recommends at least 2 times. That means the top number is higher or the bottom number is higher, or both.  
  · You think you may be having side effects from your blood pressure medicine. Where can you learn more? Go to http://aditi-angela.info/. Enter S498 in the search box to learn more about \"High Blood Pressure: Care Instructions. \" Current as of: April 9, 2019 Content Version: 12.2 © 9841-7685 [employer]. Care instructions adapted under license by [employer] (which disclaims liability or warranty for this information). If you have questions about a medical condition or this instruction, always ask your healthcare professional. [place] [address] any warranty or liability for your use of this information.

## 2020-02-18 NOTE — PROGRESS NOTES
This note will not be viewable in [address] Karen Veliz is a 78 y.o. male and presents with Hypertension (1 mo fu) Subjective: 
Patient comes in today for follow-up of his blood pressure and BPH symptoms. He is in attendance with his daughter who is a school nurse. Last visit he reported nocturia, increased urinary frequency urgency and dark-colored urine. He was started on Flomax a month back. He reports he did not see much of a difference however he was taking the Flomax in the morning and he would like to give a trial at night. He has noticed that his urine has more clear. He denies fever, chills. He has had issues with labile hypertension in the past he was hypotensive following which all his medications was discontinued and then he developed hypertensive urgency and few of the medications were readjusted. Currently he is on Diovan 160 mg and Norvasc 5 mg.  daughter did have some blood pressure readings which I reviewed and most of them We will are at goal in the morning. There was some low borderline readings in the evening. He denies headache, blurred vision. He does report intermittent dizziness and has been trying to keep up with his water intake. He has a history of stroke and remains on aspirin and statin. Recap- 
Patient has a history of dementia and Parkinson's disease. He comes in today in attendance with his wife and daughter who is an RN at [address] [place] [address]. Patient has a history of stroke, Parkinson's disease and dementia. He is already undergone physical and Occupational Therapy. At baseline he uses a walker to ambulate at home and a wheelchair for all his office visits. Per daughter, she reports she has been doing fairly well. He has a stationary bike at home which he uses for exercise. He is on Sinemet and Comtan. He follows up with neurologist Dr. Sonia Sandra. His blood pressure has been labile in the past 1 year.   Several medications were discontinued due to hypotension and now has been added back. Currently he is on Norvasc 5 mg every afternoon and Diovan 80 mg every morning. Blood pressure is well controlled today. He also reports increased urinary frequency and hesitancy and nocturia. His last PSA was normal.  He is unsure whether he has a history of BPH or not. He is never tried Flomax. Also reports a strong color to his urine. Denies fever or chills, lower abdominal pain. Past Medical History:  
Diagnosis Date  Aaron esophagus  Benign nodular prostatic hyperplasia without lower urinary tract symptoms 7/12/2017  CKD (chronic kidney disease) stage 2, GFR 60-89 ml/min  Constipation  Coronary artery disease 7/12/2017  Depression  DJD (degenerative joint disease) 7/12/2017 [address]  Gout 7/12/2017  Headache   
 Hearing difficulty of both ears 7/12/2017  Hyperlipidemia  Hypertension  Impaired cognition 7/12/2017  MCI (mild cognitive impairment)  Restless leg syndrome 7/12/2017  Sleep apnea in adult 7/12/2017 No longer on CPAP @13  Snoring  Stroke ([facility]  Tendinitis 7/12/2017 ACHILLES  Tendinitis of left rotator cuff 07/12/2017  Tremor 7/12/2017 LEFT HAND  Unspecified sleep apnea   
 lost weight no longer has it Past Surgical History:  
Procedure Laterality Date  COLONOSCOPY N/A 1/16/2019 COLONOSCOPY performed by Sharyle Goldstein., MD at [facility] ENDOSCOPY  COLONOSCOPY,FLEX,W/CONTROL, BLEEDING  1/16/2019  HX ORTHOPAEDIC  1/27/15 L4-L5 MICRODECOMPRESSION (Right  CO EGD TRANSORAL BIOPSY SINGLE/MULTIPLE  8/16/2011  CO EGD TRANSORAL BIOPSY SINGLE/MULTIPLE  10/1/2013 No Known Allergies Current Outpatient Medications Medication Sig Dispense Refill  amLODIPine (NORVASC) 5 mg tablet 0ne tab daily 90 Tab 3  
 tamsulosin (FLOMAX) 0.4 mg capsule Take 1 Cap by mouth daily for 30 days. Indications: enlarged prostate with urination problem 30 Cap 3  
 valsartan (DIOVAN) 80 mg tablet 2 tabs qam 180 Tab 2  carbidopa-levodopa (SINEMET)  mg per tablet 2 p.o. 4 times daily  Indications: Parkinson's Disease 720 Tab 0  
 PARoxetine (PAXIL) 10 mg tablet TAKE 1 TABLET BY MOUTH DAILY 90 Tab 3  
 gabapentin (NEURONTIN) 100 mg capsule Take 2 Caps by mouth two (2) times a day. 360 Cap 3  
 rOPINIRole (REQUIP) 1 mg tablet Take 1 Tab by mouth three (3) times daily. 270 Tab 5  
 entacapone (COMTAN) 200 mg tablet Take 1 Tab by mouth three (3) times daily. 270 Tab 5  esomeprazole (NEXIUM) 20 mg capsule TAKE 1 CAPSULE DAILY 90 Cap 3  
 atorvastatin (LIPITOR) 40 mg tablet TAKE 1 TABLET DAILY 90 Tab 3  
 acetaminophen (TYLENOL EXTRA STRENGTH) 500 mg tablet Take 1,000 mg by mouth three (3) times daily as needed for Pain.  senna-docusate (SENNA-S) 8.6-50 mg per tablet Take 1 Tab by mouth every other day.  aspirin delayed-release 81 mg tablet Take  by mouth daily.  vitamin e (E GEMS) 1,000 unit capsule Take 2,000 Units by mouth daily.  MULTIVITAMIN PO Take 1 Tab by mouth daily. Social History Socioeconomic History  Marital status:  Spouse name: Not on file  Number of children: Not on file  Years of education: Not on file  Highest education level: Not on file Tobacco Use  Smoking status: Former Smoker  Smokeless tobacco: Never Used Substance and Sexual Activity  Alcohol use: No  
 Drug use: No  
 
Family History Problem Relation Age of Onset  Heart Disease Mother  Heart Disease Father Review of Systems ROS is unremarkable except for ones highlighted in bold. Constitutional: negative for fevers, chills, anorexia and weight loss Eyes:   negative for visual disturbance and irritation ENT:   negative for tinnitus,sore throat,nasal congestion,ear pain,hoarseness Respiratory:  negative for cough, hemoptysis, dyspnea,wheezing CV:   negative for chest pain, palpitations, lower extremity edema GI:   negative for nausea, vomiting, diarrhea, abdominal pain,melena Endo:               negative for polyuria,polydipsia,polyphagia,heat intolerance Genitourinary: negative for frequency, dysuria and hematuria Integumentary: negative for rash and pruritus Hematologic:  negative for easy bruising and gum/nose bleeding Musculoskel: negative for myalgias, arthralgias, back pain, muscle weakness, joint pain Neurological:  negative for headaches, dizziness, vertigo, memory problems and gait Behavl/Psych: negative for feelings of anxiety, depression, mood changes, dementia ROS otherwise negative Objective: 
Visit Vitals /60 (BP 1 Location: Left arm, BP Patient Position: Sitting) Pulse (!) 59 Temp 98.1 °F (36.7 °C) (Oral) Resp 14 Ht 5' 10\" (1.778 m) Wt 177 lb (80.3 kg) SpO2 93% BMI 25.40 kg/m² Physical Exam:  
General appearance - alert, well appearing, and in no distress Mental status - alert, oriented to person, place, and time EYE-BERNARDO, EOMI, fundi normal, corneas normal, no foreign bodies ENT-ENT exam normal, no neck nodes or sinus tenderness Nose - normal and patent, no erythema, discharge or polyps Mouth - mucous membranes moist, pharynx normal without lesions Neck - supple, no significant adenopathy Chest - clear to auscultation, no wheezes, rales or rhonchi, symmetric air entry Heart - normal rate, regular rhythm, normal S1, S2, no murmurs, rubs, clicks or gallops Abdomen - soft, nontender, nondistended, no masses or organomegaly Lymph- no adenopathy palpable Ext-peripheral pulses normal, no pedal edema, no clubbing or cyanosis Skin-Warm and dry. no hyperpigmentation, vitiligo, or suspicious lesions Neuro -alert, oriented, normal speech, no focal findings or movement disorder noted, mild dementia noted. Musculoskeletal- FROM, no bony abnormalities, no point tenderness Lab Review: 
Results for orders placed or performed in visit on 01/15/20 CBC W/O DIFF Result Value Ref Range WBC 7.5 3.4 - 10.8 x10E3/uL  
 RBC 3.64 (L) 4.14 - 5.80 x10E6/uL HGB 12.3 (L) 13.0 - 17.7 g/dL HCT 36.5 (L) 37.5 - 51.0 %  (H) 79 - 97 fL  
 MCH 33.8 (H) 26.6 - 33.0 pg  
 MCHC 33.7 31.5 - 35.7 g/dL  
 RDW 12.1 11.6 - 15.4 % PLATELET 795 987 - 332 x10E3/uL METABOLIC PANEL, COMPREHENSIVE Result Value Ref Range Glucose 100 (H) 65 - 99 mg/dL BUN 20 8 - 27 mg/dL Creatinine 1.05 0.76 - 1.27 mg/dL GFR est non-AA 66 >59 mL/min/1.73 GFR est AA 77 >59 mL/min/1.73  
 BUN/Creatinine ratio 19 10 - 24 Sodium 144 134 - 144 mmol/L Potassium 4.6 3.5 - 5.2 mmol/L Chloride 104 96 - 106 mmol/L  
 CO2 22 20 - 29 mmol/L Calcium 9.6 8.6 - 10.2 mg/dL Protein, total 7.2 6.0 - 8.5 g/dL Albumin 4.5 3.5 - 4.7 g/dL GLOBULIN, TOTAL 2.7 1.5 - 4.5 g/dL A-G Ratio 1.7 1.2 - 2.2 Bilirubin, total 0.3 0.0 - 1.2 mg/dL Alk. phosphatase 73 39 - 117 IU/L  
 AST (SGOT) 18 0 - 40 IU/L  
 ALT (SGPT) 11 0 - 44 IU/L  
URINALYSIS W/ RFLX MICROSCOPIC Result Value Ref Range Specific Gravity 1.027 1.005 - 1.030  
 pH (UA) 5.0 5.0 - 7.5 Color Yellow Yellow Appearance Clear Clear Leukocyte Esterase Negative Negative Protein 1+ (A) Negative/Trace Glucose Negative Negative Ketone Trace (A) Negative Blood Negative Negative Bilirubin Negative Negative Urobilinogen 0.2 0.2 - 1.0 mg/dL Nitrites Negative Negative Microscopic Examination See additional order MICROSCOPIC EXAMINATION Result Value Ref Range WBC 0-5 0 - 5 /hpf  
 RBC 0-2 0 - 2 /hpf Epithelial cells None seen 0 - 10 /hpf Casts Present (A) None seen /lpf Cast type Hyaline casts N/A Mucus Present Not Estab. Bacteria Few None seen/Few Documenation Review: 
 
Assessment/Plan: Diagnoses and all orders for this visit: 1. Benign prostatic hyperplasia with nocturia 2. Dementia due to Parkinson's disease without behavioral disturbance ([facility] 3. Cerebrovascular accident (CVA), unspecified mechanism ([facility] 4. Bilateral carotid artery stenosis 5. Essential hypertension 
-     amLODIPine (NORVASC) 5 mg tablet; 0ne tab daily 6. Benign prostatic hyperplasia with urinary frequency -     tamsulosin (FLOMAX) 0.4 mg capsule; Take 1 Cap by mouth daily for 30 days. Indications: enlarged prostate with urination problem Continue Flomax and take it at the night. We will see if it makes a difference of the next 1 month, if not we can discontinue it at next visit. Continue current meds. Advised to take Diovan in the morning and Norvasc in the afternoon. Prior to next follow-up visit I have asked him to take a blood pressure log for 2 weeks and bring it to my attention. ICD-10-CM ICD-9-CM 1. Benign prostatic hyperplasia with nocturia N40.1 600.01   
 R35.1 788.43 2. Dementia due to Parkinson's disease without behavioral disturbance ([facility] G20 332.0 F02.80 294.10 3. Cerebrovascular accident (CVA), unspecified mechanism ([facility] I63.9 434.91   
4. Bilateral carotid artery stenosis I65.23 433.10   
  433.30   
5. Essential hypertension I10 401.9 amLODIPine (NORVASC) 5 mg tablet 6. Benign prostatic hyperplasia with urinary frequency N40.1 600.01 tamsulosin (FLOMAX) 0.4 mg capsule R35.0 788.41 Follow-up and Dispositions · Return in about 3 months (around 5/18/2020) for follow up. I have reviewed with the patient details of the assessment and plan and all questions were answered. Relevent patient education was performed. Verbal and/or written instructions (see AVS) provided. The most recent lab findings were reviewed with the patient. Plan was discussed with patient who verbally expressed understanding. An After Visit Summary was printed and given to the patient.  
 
Faith Justice MD

## 2020-02-18 NOTE — PROGRESS NOTES
Tomas Babin is a 78 y.o. male presenting for Hypertension (1 mo fu) Brittney Jara 1. Have you been to the ER, urgent care clinic since your last visit? Hospitalized since your last visit? No 
 
2. Have you seen or consulted any other health care providers outside of the [address] [place] [address] since your last visit? Include any pap smears or colon screening. No 
 
Fall Risk Assessment, last 12 mths 2/18/2020 Able to walk? Yes Fall in past 12 months? No  
Fall with injury? -  
Number of falls in past 12 months - Fall Risk Score -  
 
 
 
Abuse Screening Questionnaire 2/18/2020 Do you ever feel afraid of your partner? Sweta Cantu Are you in a relationship with someone who physically or mentally threatens you? Sweta Cantu Is it safe for you to go home? Y  
 
 
3 most recent PHQ Screens 2/18/2020 Little interest or pleasure in doing things Not at all Feeling down, depressed, irritable, or hopeless Not at all Total Score PHQ 2 0 Trouble falling or staying asleep, or sleeping too much - Feeling tired or having little energy - Poor appetite, weight loss, or overeating - Feeling bad about yourself - or that you are a failure or have let yourself or your family down - Trouble concentrating on things such as school, work, reading, or watching TV - Moving or speaking so slowly that other people could have noticed; or the opposite being so fidgety that others notice - Thoughts of being better off dead, or hurting yourself in some way -  
PHQ 9 Score - There are no discontinued medications.

## 2020-03-16 RX ORDER — ATORVASTATIN CALCIUM 40 MG/1
TABLET, FILM COATED ORAL
Qty: 90 TAB | Refills: 3 | Status: SHIPPED | OUTPATIENT
Start: 2020-03-16

## 2020-03-16 NOTE — TELEPHONE ENCOUNTER
Requested Prescriptions     Pending Prescriptions Disp Refills    atorvastatin (LIPITOR) 40 mg tablet 90 Tab 3

## 2020-03-16 NOTE — TELEPHONE ENCOUNTER
PCP: Mireya Hannah MD    Last appt: 2/18/2020  Future Appointments   Date Time Provider Rox Randhawa   4/20/2020  3:00 PM Mireya Hannah MD 3 Dhruv Garcia   7/8/2020 10:00 AM Mireya Hannah MD 3 Dhruv Garcia       Requested Prescriptions     Pending Prescriptions Disp Refills    atorvastatin (LIPITOR) 40 mg tablet 90 Tab 3       Prior labs and Blood pressures:  BP Readings from Last 3 Encounters:   02/18/20 100/60   01/15/20 140/74   09/20/19 94/52     Lab Results   Component Value Date/Time    Sodium 144 01/15/2020 03:22 PM    Potassium 4.6 01/15/2020 03:22 PM    Chloride 104 01/15/2020 03:22 PM    CO2 22 01/15/2020 03:22 PM    Anion gap 9 12/04/2019 08:44 AM    Glucose 100 (H) 01/15/2020 03:22 PM    BUN 20 01/15/2020 03:22 PM    Creatinine 1.05 01/15/2020 03:22 PM    BUN/Creatinine ratio 19 01/15/2020 03:22 PM    GFR est AA 77 01/15/2020 03:22 PM    GFR est non-AA 66 01/15/2020 03:22 PM    Calcium 9.6 01/15/2020 03:22 PM     Lab Results   Component Value Date/Time    Hemoglobin A1c 5.6 01/20/2015 03:30 PM     Lab Results   Component Value Date/Time    Cholesterol, total 118 05/30/2019 09:37 AM    Cholesterol (POC) 114.0 01/08/2019 10:40 AM    HDL Cholesterol 47 05/30/2019 09:37 AM    HDL Cholesterol (POC) 47.0 01/08/2019 10:40 AM    LDL Cholesterol (POC) 46.6 01/08/2019 10:40 AM    LDL, calculated 52 05/30/2019 09:37 AM    VLDL, calculated 19 05/30/2019 09:37 AM    Triglyceride 95 05/30/2019 09:37 AM    Triglycerides (POC) 102.0 01/08/2019 10:40 AM     Lab Results   Component Value Date/Time    Vitamin D 25-Hydroxy 23.3 (L) 01/31/2015 05:45 AM    VITAMIN D, 25-HYDROXY 34.8 05/30/2019 09:37 AM       Lab Results   Component Value Date/Time    TSH 1.620 05/30/2019 09:37 AM

## 2020-04-08 NOTE — PROGRESS NOTES
HISTORY OF PRESENT ILLNESS  Clair Boston is a 76 y.o. male. HPI Comments: This patient returns in follow-up of his Parkinson's disease. He is taking Sinemet 25 102 p.o. a.m. one at lunch 1 at dinner and 1 before bed. He goes to the gym and rides the stationary bicycle. He is taking Sinemet 25 102 tablets in the morning 1 at lunch and one at dinner. He is walking some outside with his walker and uses a stationary bicycle for exercise. Not having any visual hallucinations. He is not having freezing episodes. Review of Systems   Constitutional: Negative. HENT: Positive for hearing loss. .    Skin: Negative. Neurological: Positive for tremors. Negative for speech change. Endo/Heme/Allergies: Negative. Psychiatric/Behavioral: Negative. Current Outpatient Prescriptions on File Prior to Visit   Medication Sig Dispense Refill    losartan (COZAAR) 50 mg tablet Take 1 Tab by mouth daily. 90 Tab 3    carbidopa-levodopa (SINEMET)  mg per tablet TAKE 2 TABLETS IN THE MORNING, 1 TABLET AT LUNCH, 1 TABLET AT DINNER, AND 1 TABLET PRIOR TO  Tab 1    acetaminophen (TYLENOL EXTRA STRENGTH) 500 mg tablet Take 1,000 mg by mouth three (3) times daily as needed for Pain.  amLODIPine (NORVASC) 10 mg tablet TAKE 1 TABLET DAILY 90 Tab 2    PARoxetine (PAXIL) 10 mg tablet Take 1 Tab by mouth daily. 90 Tab 3    senna-docusate (SENNA-S) 8.6-50 mg per tablet Take 1 Tab by mouth daily as needed.  aspirin delayed-release 81 mg tablet Take  by mouth daily.  vitamin e (E GEMS) 1,000 unit capsule Take 2,000 Units by mouth daily.  gabapentin (NEURONTIN) 100 mg capsule Take 2 capsules by mouth two (2) times a day. 1 capsule 0    warfarin (COUMADIN) 5 mg tablet Take 5 mg by mouth. 5 mg thur/Fri; 2.5 mg Sat-Wed      esomeprazole (NEXIUM) 20 mg capsule Take 20 mg by mouth daily.  MULTIVITAMIN PO Take 1 Tab by mouth daily.       atorvastatin (LIPITOR) 40 mg tablet Take  by mouth daily. No current facility-administered medications on file prior to visit. Past Medical History:   Diagnosis Date    Aaron esophagus     Benign nodular prostatic hyperplasia without lower urinary tract symptoms 7/12/2017    CKD (chronic kidney disease) stage 2, GFR 60-89 ml/min     Constipation     Coronary artery disease 7/12/2017    Depression     DJD (degenerative joint disease) 7/12/2017    Falls     Gout 7/12/2017    Headache     Hearing difficulty of both ears 7/12/2017    Hyperlipidemia     Hypertension     Impaired cognition 7/12/2017    MCI (mild cognitive impairment)     Restless leg syndrome 7/12/2017    Sleep apnea in adult 7/12/2017    No longer on CPAP @13    Snoring     St[facility] ([facility]     Tendinitis 7/12/2017    ACHILLES    Tendinitis of left rotator cuff 07/12/2017    Tremor 7/12/2017    LEFT HAND    Unspecified sleep apnea     lost weight no longer has it     /80  Pulse 87  Ht 5' 4\" (1.626 m)  Wt 187 lb (84.8 kg)  SpO2 98%  BMI 32.1 kg/m2  Physical Exam   Constitutional: He is oriented to person, place, and time. He appears well-developed and well-nourished. HENT:   Head: Normocephalic and atraumatic. Eyes: Conjunctivae and EOM are normal. Pupils are equal, round, and reactive to light. Cardiovascular: Normal rate, regular rhythm and normal heart sounds. Musculoskeletal: Normal range of motion. He exhibits no edema. Neurological: He is alert and oriented to person, place, and time. He has normal strength and normal reflexes. He displays no atrophy. No cranial nerve deficit or sensory deficit. He exhibits normal muscle tone. He displays no Babinski's sign on the right side. He displays no Babinski's sign on the left side. He has a mild resting tremor in his hands, he has minimal facial masking.   Speech, language and mentation appear to be normal  Walking with the walker from the door of room 6 to the end of the robles by my office at back to 33 seconds. Skin: Skin is warm and dry. Psychiatric: He has a normal mood and affect. His behavior is normal. Judgment and thought content normal.       ASSESSMENT and PLAN    Parkinson's disease  His symptoms appear to be under moderate control on Sinemet 25 102 tablets in the a.m., 1 tablet at lunch, 1 tablet at dinner and 1 tablet prior to bed. I did check to see that they had a support chair over the commode in the bathroom, they do not I recommended they get one as that is a high risk area as everything his heart is a rock and the patient has to turn and move backwards to sit down on the toilet. I am not sure I got through. He continues to take his Paxil prescribed by his primary care doctor. I will see him back in 6 months.y. History of stroke  I see no evidence of it on my exam however he will continue to take his aspirin 81 mg a day. Mild cognitive impairment  I suspect this secondary to of his Parkinson's disease however time will tell if we are dealing with a significant progressive dementia. His last head CT scan was done 2 years ago and did show volume loss and periventricular white matter changes but no evidence of stroke. This note will not be viewable in [address] Home

## 2020-04-27 DIAGNOSIS — M48.062 SPINAL STENOSIS OF LUMBAR REGION WITH NEUROGENIC CLAUDICATION: ICD-10-CM

## 2020-04-27 RX ORDER — GABAPENTIN 100 MG/1
CAPSULE ORAL
Qty: 360 CAP | Refills: 2 | Status: SHIPPED | OUTPATIENT
Start: 2020-04-27 | End: 2020-09-14 | Stop reason: SDUPTHER

## 2020-04-27 NOTE — TELEPHONE ENCOUNTER
Last Refill: 20  Last visit:2020    NOV: 2020    Requested Prescriptions     Pending Prescriptions Disp Refills    carbidopa-levodopa (Sinemet)  mg per tablet 720 Tab 2     Si p.o. 4 times daily  Indications: Parkinson's disease

## 2020-04-28 DIAGNOSIS — I10 ESSENTIAL HYPERTENSION: Primary | ICD-10-CM

## 2020-04-28 RX ORDER — CARBIDOPA AND LEVODOPA 25; 100 MG/1; MG/1
TABLET ORAL
Qty: 720 TAB | Refills: 2 | Status: SHIPPED | OUTPATIENT
Start: 2020-04-28

## 2020-04-28 RX ORDER — LOSARTAN POTASSIUM 50 MG/1
50 TABLET ORAL DAILY
Qty: 90 TAB | Refills: 0 | Status: SHIPPED | OUTPATIENT
Start: 2020-04-28 | End: 2020-07-08

## 2020-04-28 RX ORDER — VALSARTAN 160 MG/1
TABLET ORAL
Qty: 90 TAB | Refills: 2 | Status: SHIPPED | OUTPATIENT
Start: 2020-04-28 | End: 2020-04-28 | Stop reason: ALTCHOICE

## 2020-04-28 NOTE — TELEPHONE ENCOUNTER
Requested Prescriptions     Pending Prescriptions Disp Refills    valsartan (DIOVAN) 80 mg tablet 180 Tab 2     Si tabs qam     *Patient would like to get the 160mg taking 1 for  90 days

## 2020-04-29 ENCOUNTER — TELEPHONE (OUTPATIENT)
Dept: INTERNAL MEDICINE CLINIC | Age: 80
End: 2020-04-29

## 2020-04-29 RX ORDER — VALSARTAN 160 MG/1
TABLET ORAL DAILY
COMMUNITY
End: 2020-08-14 | Stop reason: SDUPTHER

## 2020-04-29 NOTE — TELEPHONE ENCOUNTER
Patients daughter phoned with questions regarding her dads Valsartan refill. [facility] drug store did not have the 80 mg dose to be given 2 tabs daily or the 160 mg dose that he could take once daily. You changed it yesterday to Losartan. Daughter was able to find the Valsartin 160 mg at [facility] and you also had approved that yesterday. Daughter states she would prefer to keep him on the Valsartan as he has done so well on it.  Please advise

## 2020-04-29 NOTE — TELEPHONE ENCOUNTER
Alfredia Jeans, MD  You 17 minutes ago (9:06 AM)      If the patient wants to remain on valsartan that is definitely fine.  We had substituted to losartan since valsartan was not available at his preferred pharmacy.     Routing comment

## 2020-07-08 ENCOUNTER — OFFICE VISIT (OUTPATIENT)
Dept: INTERNAL MEDICINE CLINIC | Age: 80
End: 2020-07-08

## 2020-07-08 VITALS
HEIGHT: 70 IN | HEART RATE: 69 BPM | WEIGHT: 178 LBS | OXYGEN SATURATION: 94 % | DIASTOLIC BLOOD PRESSURE: 74 MMHG | TEMPERATURE: 98.2 F | BODY MASS INDEX: 25.48 KG/M2 | RESPIRATION RATE: 14 BRPM | SYSTOLIC BLOOD PRESSURE: 102 MMHG

## 2020-07-08 DIAGNOSIS — R35.0 INCREASED URINARY FREQUENCY: ICD-10-CM

## 2020-07-08 DIAGNOSIS — E78.5 HYPERLIPIDEMIA, UNSPECIFIED HYPERLIPIDEMIA TYPE: ICD-10-CM

## 2020-07-08 DIAGNOSIS — Z00.00 ENCOUNTER FOR ANNUAL PHYSICAL EXAM: Primary | ICD-10-CM

## 2020-07-08 DIAGNOSIS — N39.0 URINARY TRACT INFECTION WITHOUT HEMATURIA, SITE UNSPECIFIED: ICD-10-CM

## 2020-07-08 DIAGNOSIS — R73.02 IMPAIRED GLUCOSE TOLERANCE: ICD-10-CM

## 2020-07-08 DIAGNOSIS — G20 DEMENTIA DUE TO PARKINSON'S DISEASE WITHOUT BEHAVIORAL DISTURBANCE (HCC): ICD-10-CM

## 2020-07-08 DIAGNOSIS — I63.9 CEREBROVASCULAR ACCIDENT (CVA), UNSPECIFIED MECHANISM (HCC): ICD-10-CM

## 2020-07-08 DIAGNOSIS — F02.80 DEMENTIA DUE TO PARKINSON'S DISEASE WITHOUT BEHAVIORAL DISTURBANCE (HCC): ICD-10-CM

## 2020-07-08 DIAGNOSIS — E55.9 VITAMIN D DEFICIENCY: ICD-10-CM

## 2020-07-08 DIAGNOSIS — I10 ESSENTIAL HYPERTENSION: ICD-10-CM

## 2020-07-08 DIAGNOSIS — N18.2 CKD (CHRONIC KIDNEY DISEASE) STAGE 2, GFR 60-89 ML/MIN: ICD-10-CM

## 2020-07-08 DIAGNOSIS — R35.1 BENIGN PROSTATIC HYPERPLASIA WITH NOCTURIA: ICD-10-CM

## 2020-07-08 DIAGNOSIS — Z12.5 PROSTATE CANCER SCREENING: ICD-10-CM

## 2020-07-08 DIAGNOSIS — N40.1 BENIGN PROSTATIC HYPERPLASIA WITH NOCTURIA: ICD-10-CM

## 2020-07-08 LAB
25(OH)D3 SERPL-MCNC: 28 NG/ML (ref 30–96)
A-G RATIO,AGRAT: 1.6 RATIO
ALBUMIN SERPL-MCNC: 4.4 G/DL (ref 3.9–5.4)
ALP SERPL-CCNC: 71 U/L (ref 38–126)
ALT SERPL-CCNC: 6 U/L (ref 0–50)
ANION GAP SERPL CALC-SCNC: 7 MMOL/L
AST SERPL W P-5'-P-CCNC: 21 U/L (ref 14–36)
BILIRUB SERPL-MCNC: 0.7 MG/DL (ref 0.2–1.3)
BILIRUB UR QL: NEGATIVE
BUN SERPL-MCNC: 22 MG/DL (ref 9–20)
BUN/CREATININE RATIO,BUCR: 20 RATIO
CALCIUM SERPL-MCNC: 9.9 MG/DL (ref 8.4–10.2)
CHLORIDE SERPL-SCNC: 107 MMOL/L (ref 98–107)
CHOL/HDL RATIO,CHHD: 2 RATIO (ref 0–4)
CHOLEST SERPL-MCNC: 119 MG/DL (ref 0–200)
CLARITY: CLEAR
CO2 SERPL-SCNC: 27 MMOL/L (ref 22–32)
COLOR UR: ABNORMAL
CREAT SERPL-MCNC: 1.1 MG/DL (ref 0.8–1.5)
ERYTHROCYTE [DISTWIDTH] IN BLOOD BY AUTOMATED COUNT: 13.2 %
GLOBULIN,GLOB: 2.7
GLUCOSE 24H UR-MRATE: NEGATIVE G/(24.H)
GLUCOSE SERPL-MCNC: 94 MG/DL (ref 75–110)
HCT VFR BLD AUTO: 37.4 % (ref 37–51)
HDLC SERPL-MCNC: 51 MG/DL (ref 35–130)
HGB BLD-MCNC: 12.5 G/DL (ref 12–18)
HGB UR QL STRIP: ABNORMAL
KETONES UR QL STRIP.AUTO: NEGATIVE
LDL/HDL RATIO,LDHD: 1 RATIO
LDLC SERPL CALC-MCNC: 47 MG/DL (ref 0–130)
LEUKOCYTE ESTERASE: NEGATIVE
MCH RBC QN AUTO: 35.3 PG (ref 26–32)
MCHC RBC AUTO-ENTMCNC: 33.4 G/DL (ref 30–36)
MCV RBC AUTO: 105.7 FL (ref 80–97)
NITRITE UR QL STRIP.AUTO: NEGATIVE
PH UR STRIP: 5 [PH] (ref 5–7)
PLATELET # BLD AUTO: 195 K/UL (ref 140–440)
POTASSIUM SERPL-SCNC: 4.6 MMOL/L (ref 3.6–5)
PROT SERPL-MCNC: 7.1 G/DL (ref 6.3–8.2)
PROT UR STRIP-MCNC: ABNORMAL MG/DL
PSA, TEST22: 0.6 NG/ML (ref 0–4)
RBC # BLD AUTO: 3.54 M/UL (ref 4.2–6.3)
RBC #/AREA URNS HPF: ABNORMAL #/HPF
SODIUM SERPL-SCNC: 141 MMOL/L (ref 137–145)
SP GR UR REFRACTOMETRY: 1.01 (ref 1–1.03)
TRIGL SERPL-MCNC: 103 MG/DL (ref 0–200)
TSH SERPL DL<=0.05 MIU/L-ACNC: 1.39 UIU/ML (ref 0.34–5.6)
UROBILINOGEN UR QL STRIP.AUTO: NEGATIVE
VLDLC SERPL CALC-MCNC: 21 MG/DL
WBC # BLD AUTO: 7.2 K/UL (ref 4.1–10.9)
WBC URNS QL MICRO: 0 #/HPF

## 2020-07-08 NOTE — PROGRESS NOTES
Chief Complaint Patient presents with [place] Annual Wellness Visit Depression Risk Factor Screening:  
 
3 most recent PHQ Screens 2/18/2020 Little interest or pleasure in doing things Not at all Feeling down, depressed, irritable, or hopeless Not at all Total Score PHQ 2 0 Trouble falling or staying asleep, or sleeping too much - Feeling tired or having little energy - Poor appetite, weight loss, or overeating - Feeling bad about yourself - or that you are a failure or have let yourself or your family down - Trouble concentrating on things such as school, work, reading, or watching TV - Moving or speaking so slowly that other people could have noticed; or the opposite being so fidgety that others notice - Thoughts of being better off dead, or hurting yourself in some way -  
PHQ 9 Score - Functional Ability and Level of Safety: Activities of Daily Living ADL Assessment 2/18/2020 Feeding yourself No Help Needed Getting from bed to chair No Help Needed Getting dressed No Help Needed Bathing or showering No Help Needed Walk across the room (includes cane/walker) No Help Needed Using the telphone No Help Needed Taking your medications No Help Needed Preparing meals Help Needed Managing money (expenses/bills) Help Needed Moderately strenuous housework (laundry) Help Needed Shopping for personal items (toiletries/medicines) Help Needed Shopping for groceries Help Needed Driving Help Needed Climbing a flight of stairs Help Needed Getting to places beyond walking distances Help Needed Fall Risk Fall Risk Assessment, last 12 mths 2/18/2020 Able to walk? Yes Fall in past 12 months? No  
Fall with injury? -  
Number of falls in past 12 months - Fall Risk Score -  
 
 
Abuse Screen Abuse Screening Questionnaire 2/18/2020 Do you ever feel afraid of your partner? Gabriel Poser Are you in a relationship with someone who physically or mentally threatens you? Dontae Agarwal Is it safe for you to go home? Jean Schilling Patient Care Team  
Patient Care Team: 
You Clarke MD as PCP - General (Hospitalist) You Clarke MD as PCP - REHABILITATION [facility] Empaneled Provider Livingston Lesch, MD as Physician (Neurology) Ami Goltz, MD (Neurology)

## 2020-07-08 NOTE — PATIENT INSTRUCTIONS
The best way to stay healthy is to live a healthy lifestyle. A healthy lifestyle includes regular exercise, eating a well-balanced diet, keeping a healthy weight and not smoking. Regular physical exams and screening tests are another important way to take care of yourself. Preventive exams provided by health care providers can find health problems early when treatment works best and can keep you from getting certain diseases or illnesses. Preventive services include exams, lab tests, screenings, shots, monitoring and information to help you take care of your own health. All people over 65 should have a pneumonia shot. Pneumonia shots are usually only needed once in a lifetime unless your doctor decides differently. In addition to your physical exam, some screening tests are recommended: 
 
All people over 65 should have a yearly flu shot. People over 65 are at medium to high risk for Hepatitis B. Three shots are needed for complete protection. Bone mass measurement (dexa scan) is recommended every two years. Diabetes Mellitus screening is recommended every year. Glaucoma is an eye disease caused by high pressure in the eye. An eye exam is recommended every year. Cardiovascular screening tests that check your cholesterol and other blood fat (lipid) levels are recommended every five years. Colorectal Cancer screening tests help to find pre-cancerous polyps (growths in the colon) so they can be removed before they turn into cancer. Tests ordered for screening depend on your personal and family history risk factors. Prostate Cancer Screening (annually up to age 76) Screening for breast cancer is recommended yearly with a Mammogram. 
 
Screening for cervical and vaginal cancer is recommended with a pelvic and Pap test every two years.  However if you have had an abnormal pap in the past  three years or at high risk for cervical or vaginal cancer Medicare will cover a pap test and a pelvic exam every year. Here is a list of your current Health Maintenance items with a due date: 
Health Maintenance Due Topic Date Due  Shingles Vaccine (1 of 2) 02/10/1988  Glaucoma Screening   02/10/2003  Cholesterol Test   05/30/2020 [address] [place] [address] Annual Well Visit  06/06/2020

## 2020-07-08 NOTE — PROGRESS NOTES
This note will not be viewable in [address] Mj Aguilera is a 78 y.o. male and presents with Annual Wellness Visit Subjective: 
Patient comes in today with his daughter Walt Almazan who is also a school nurse. For his annual Medicare wellness and follow-up of his chronic medical problems. Reports he is been doing fairly well. He does have some lightheaded/dizziness episodes early in the morning when he gets up occasionally. Daughter attributes them to low blood pressures. Today his blood pressure is 102/74. He has a history of labile hypertension and his blood pressure varies from systolic 518-9 73F. He was taken off his antihypertensive in the past but then he had an episode of hypertensive urgency and had to resume his BP meds. He was started on Flomax since he had complaints of urinary retention however he quit taking it because that did not seem to help and now he has increased frequency. Denies suprapubic discomfort, fever, chills, lower abdominal pain. Denies hesitancy or poor stream. 
Does have occasional constipation for which he takes stool softeners as needed. Recap 2/20-Patient comes in today for follow-up of his blood pressure and BPH symptoms. He is in attendance with his daughter who is a school nurse. Last visit he reported nocturia, increased urinary frequency urgency and dark-colored urine. He was started on Flomax a month back. He reports he did not see much of a difference however he was taking the Flomax in the morning and he would like to give a trial at night. He has noticed that his urine has more clear. He denies fever, chills. He has had issues with labile hypertension in the past he was hypotensive following which all his medications was discontinued and then he developed hypertensive urgency and few of the medications were readjusted.   Currently he is on Diovan 160 mg and Norvasc 5 mg.  daughter did have some blood pressure readings which I reviewed and most of them We will are at goal in the morning. There was some low borderline readings in the evening. He denies headache, blurred vision. He does report intermittent dizziness and has been trying to keep up with his water intake. He has a history of stroke and remains on aspirin and statin. Recap- 
Patient has a history of dementia and Parkinson's disease. He comes in today in attendance with his wife and daughter who is an RN at [employer]. Patient has a history of stroke, Parkinson's disease and dementia. He is already undergone physical and Occupational Therapy. At baseline he uses a walker to ambulate at home and a wheelchair for all his office visits. Per daughter, she reports she has been doing fairly well. He has a stationary bike at home which he uses for exercise. He is on Sinemet and Comtan. He follows up with neurologist Dr. Joey Hernandez. His blood pressure has been labile in the past 1 year. Several medications were discontinued due to hypotension and now has been added back. Currently he is on Norvasc 5 mg every afternoon and Diovan 80 mg every morning. Blood pressure is well controlled today. He also reports increased urinary frequency and hesitancy and nocturia. His last PSA was normal.  He is unsure whether he has a history of BPH or not. He is never tried Flomax. Also reports a strong color to his urine. Denies fever or chills, lower abdominal pain. Past Medical History:  
Diagnosis Date  Aaron esophagus  Benign nodular prostatic hyperplasia without lower urinary tract symptoms 7/12/2017  CKD (chronic kidney disease) stage 2, GFR 60-89 ml/min  Constipation  Coronary artery disease 7/12/2017  Depression  DJD (degenerative joint disease) 7/12/2017 [address]  Gout 7/12/2017  Headache   
 Hearing difficulty of both ears 7/12/2017  Hyperlipidemia  Hypertension  Impaired cognition 7/12/2017  MCI (mild cognitive impairment)  Restless leg syndrome 7/12/2017  Sleep apnea in adult 7/12/2017 No longer on CPAP @13  Snoring  Stroke ([address]  Tendinitis 7/12/2017 ACHILLES  Tendinitis of left rotator cuff 07/12/2017  Tremor 7/12/2017 LEFT HAND  Unspecified sleep apnea   
 lost weight no longer has it Past Surgical History:  
Procedure Laterality Date  COLONOSCOPY N/A 1/16/2019 COLONOSCOPY performed by Sofie Taveras MD at [facility] ENDOSCOPY  COLONOSCOPY,FLEX,W/CONTROL, BLEEDING  1/16/2019  HX ORTHOPAEDIC  1/27/15 L4-L5 MICRODECOMPRESSION (Right  [place] EGD TRANSORAL BIOPSY SINGLE/MULTIPLE  8/16/2011  IL EGD TRANSORAL BIOPSY SINGLE/MULTIPLE  10/1/2013 No Known Allergies Current Outpatient Medications Medication Sig Dispense Refill  valsartan (DIOVAN) 160 mg tablet Take  by mouth daily.  carbidopa-levodopa (Sinemet)  mg per tablet 2 p.o. 4 times daily  Indications: Parkinson's disease 720 Tab 2  
 gabapentin (NEURONTIN) 100 mg capsule T2C  Cap 2  
 atorvastatin (LIPITOR) 40 mg tablet TAKE 1 TABLET DAILY 90 Tab 3  
 amLODIPine (NORVASC) 5 mg tablet 0ne tab daily 90 Tab 3  
 PARoxetine (PAXIL) 10 mg tablet TAKE 1 TABLET BY MOUTH DAILY 90 Tab 3  
 rOPINIRole (REQUIP) 1 mg tablet Take 1 Tab by mouth three (3) times daily. 270 Tab 5  
 entacapone (COMTAN) 200 mg tablet Take 1 Tab by mouth three (3) times daily. 270 Tab 5  esomeprazole (NEXIUM) 20 mg capsule TAKE 1 CAPSULE DAILY 90 Cap 3  
 acetaminophen (TYLENOL EXTRA STRENGTH) 500 mg tablet Take 1,000 mg by mouth three (3) times daily as needed for Pain.  senna-docusate (SENNA-S) 8.6-50 mg per tablet Take 1 Tab by mouth every other day.  aspirin delayed-release 81 mg tablet Take  by mouth daily.  vitamin e (E GEMS) 1,000 unit capsule Take 2,000 Units by mouth daily.  MULTIVITAMIN PO Take 1 Tab by mouth daily. Social History Socioeconomic History  Marital status:  Spouse name: Not on file  Number of children: Not on file  Years of education: Not on file  Highest education level: Not on file Tobacco Use  Smoking status: Former Smoker  Smokeless tobacco: Never Used Substance and Sexual Activity  Alcohol use: No  
 Drug use: No  
 
Family History Problem Relation Age of Onset  Heart Disease Mother  Heart Disease Father Review of Systems ROS is unremarkable except for ones highlighted in bold. Constitutional: negative for fevers, chills, anorexia and weight loss Eyes:   negative for visual disturbance and irritation ENT:   negative for tinnitus,sore throat,nasal congestion,ear pain,hoarseness Respiratory:  negative for cough, hemoptysis, dyspnea,wheezing CV:   negative for chest pain, palpitations, lower extremity edema GI:   negative for nausea, vomiting, diarrhea, abdominal pain,melena Endo:               negative for polyuria,polydipsia,polyphagia,heat intolerance Genitourinary: negative for frequency, dysuria and hematuria Integumentary: negative for rash and pruritus Hematologic:  negative for easy bruising and gum/nose bleeding Musculoskel: negative for myalgias, arthralgias, back pain, muscle weakness, joint pain Neurological:  negative for headaches, dizziness, vertigo, memory problems and gait Behavl/Psych: negative for feelings of anxiety, depression, mood changes, dementia ROS otherwise negative Objective: 
Visit Vitals /74 (BP 1 Location: Left arm, BP Patient Position: Sitting) Pulse 69 Temp 98.2 °F (36.8 °C) Resp 14 Ht 5' 10\" (1.778 m) Wt 178 lb (80.7 kg) SpO2 94% BMI 25.54 kg/m² Physical Exam:  
General appearance - alert, well appearing, and in no distress Mental status - alert, oriented to person, place, and time EYE-BERNARDO, EOMI, fundi normal, corneas normal, no foreign bodies ENT-ENT exam normal, no neck nodes or sinus tenderness Nose - normal and patent, no erythema, discharge or polyps Mouth - mucous membranes moist, pharynx normal without lesions Neck - supple, no significant adenopathy Chest - clear to auscultation, no wheezes, rales or rhonchi, symmetric air entry Heart - normal rate, regular rhythm, normal S1, S2, no murmurs, rubs, clicks or gallops Abdomen - soft, nontender, nondistended, no masses or organomegaly Lymph- no adenopathy palpable Ext-peripheral pulses normal, no pedal edema, no clubbing or cyanosis Skin-Warm and dry. no hyperpigmentation, vitiligo, or suspicious lesions Neuro -alert, oriented, normal speech, no focal findings or movement disorder noted, mild dementia noted. Musculoskeletal- FROM, no bony abnormalities, no point tenderness Lab Review: 
Results for orders placed or performed in visit on 01/15/20 CBC W/O DIFF Result Value Ref Range WBC 7.5 3.4 - 10.8 x10E3/uL  
 RBC 3.64 (L) 4.14 - 5.80 x10E6/uL HGB 12.3 (L) 13.0 - 17.7 g/dL HCT 36.5 (L) 37.5 - 51.0 %  (H) 79 - 97 fL  
 MCH 33.8 (H) 26.6 - 33.0 pg  
 MCHC 33.7 31.5 - 35.7 g/dL  
 RDW 12.1 11.6 - 15.4 % PLATELET 333 500 - 358 x10E3/uL METABOLIC PANEL, COMPREHENSIVE Result Value Ref Range Glucose 100 (H) 65 - 99 mg/dL BUN 20 8 - 27 mg/dL Creatinine 1.05 0.76 - 1.27 mg/dL GFR est non-AA 66 >59 mL/min/1.73 GFR est AA 77 >59 mL/min/1.73  
 BUN/Creatinine ratio 19 10 - 24 Sodium 144 134 - 144 mmol/L Potassium 4.6 3.5 - 5.2 mmol/L Chloride 104 96 - 106 mmol/L  
 CO2 22 20 - 29 mmol/L Calcium 9.6 8.6 - 10.2 mg/dL Protein, total 7.2 6.0 - 8.5 g/dL Albumin 4.5 3.5 - 4.7 g/dL GLOBULIN, TOTAL 2.7 1.5 - 4.5 g/dL A-G Ratio 1.7 1.2 - 2.2 Bilirubin, total 0.3 0.0 - 1.2 mg/dL Alk.  phosphatase 73 39 - 117 IU/L  
 AST (SGOT) 18 0 - 40 IU/L  
 ALT (SGPT) 11 0 - 44 IU/L  
URINALYSIS W/ RFLX MICROSCOPIC Result Value Ref Range Specific Gravity 1.027 1.005 - 1.030  
 pH (UA) 5.0 5.0 - 7.5 Color Yellow Yellow Appearance Clear Clear Leukocyte Esterase Negative Negative Protein 1+ (A) Negative/Trace Glucose Negative Negative Ketone Trace (A) Negative Blood Negative Negative Bilirubin Negative Negative Urobilinogen 0.2 0.2 - 1.0 mg/dL Nitrites Negative Negative Microscopic Examination See additional order MICROSCOPIC EXAMINATION Result Value Ref Range WBC 0-5 0 - 5 /hpf  
 RBC 0-2 0 - 2 /hpf Epithelial cells None seen 0 - 10 /hpf Casts Present (A) None seen /lpf Cast type Hyaline casts N/A Mucus Present Not Estab. Bacteria Few None seen/Few Documenation Review: More than 30 minutes of time was spent in reviewing records of patient Parkinson's disease with some tremor, gait disturbance, bradykinesia and weakness. He is followed by Dr. Tang Topete as well. CAD by EBT heart scan without symptomatic angina or symptoms of CHF. Hyperlipidemia. Hypertension that appears to be currently over-treated and she is actually mildly orthostatic today. I am sure PD isease contributes as well to the orthostasis. Lumbar spinal stenosis, S/P laminectomy at L4-5. He has residual back pain and occasionally some lung pain. Markedly diminished auditory acuity. He has tried hearing aids, but rejected them. Gout. History of depression. DREAD, no longer using CPAP. History of Aaron's esophagus, S/P dilatation of a stricture. It does not appear as though he has been looked at recently, however. His last EGD and biopsy did not reveal significant specialized metaplasia, so after discussion with Dr. Magali Patel last year it was elected not to proceed with EGD. 
GERD. CKD stage 2. BPH. 
DJD. Restless leg syndrome. Left rotator cuff tendinitis, currently quiescent. Cognitive impairment. 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 Health Maintenance Issues and Ancillary Studies: 1. TDAP (pertussis and tetanus) vaccine was given in December, 2012. 2. Pneumovax 23 (PPSV23) was given in November, 2005. 3. Seasonal influenza vaccine was given in October, 2019. 4. Zostavax was given in April, 2007. 5. Prevnar 13 (PCV13) was given in December, 2014. 6. Prescription for Shingrix ? Still due 7. Colonoscopy was negative except for bleeding hemorrhoids in January, 2019. 
8. Modified barium swallow revealed a stricture in April, 2007. 9. Endoscopy in October, 2013 revealed some evidence for Aaron's esophagus without significant metaplasia. 10. Lexiscan stress testing was negative in May, 2014. 11. EBT heart scan in April, 2014 revealed a calcium score of 994. 
12. CT scan of head in July, 2015 was negative. 13. MRI of the lumbosacral spine revealed L4-5 stenosis in December, 2014. He did undergo a small laminectomy in January, 2015 at that level. 14. EKG in the office was essentially normal. 
15. Health screening assessments were essentially normal. 
 
 
Assessment/Plan: 
 
Diagnoses and all orders for this visit: 1. Encounter for annual physical exam 
 
2. Essential hypertension 
-     CBC W/O DIFF 
-     METABOLIC PANEL, COMPREHENSIVE 3. Hyperlipidemia, unspecified hyperlipidemia type -     LIPID PANEL 4. Benign prostatic hyperplasia with nocturia 5. Dementia due to Parkinson's disease without behavioral disturbance (HCC) 
-     TSH 3RD GENERATION 6. Cerebrovascular accident (CVA), unspecified mechanism ([facility] [address] 7. CKD (chronic kidney disease) stage 2, GFR 60-89 ml/min 8. Impaired glucose tolerance 9. Vitamin D deficiency 
-     VITAMIN D, 25 HYDROXY 10. Prostate cancer screening -     PSA SCREENING (SCREENING) 11.  Increased urinary frequency 
-     URINALYSIS W/ RFLX MICROSCOPIC 
 
 
 Is up-to-date on all health screenings and immunization. Eye examination is current. Continue current meds. Advised to take Diovan in the morning (cut back to 80 mg and see if his blood pressures are better controlled) and Norvasc in the afternoon. Prior to next follow-up visit I have asked him to take a blood pressure log for 2 weeks and bring it to my attention. I will call with lab results and make further recommendations or adjustments if necessary. ICD-10-CM ICD-9-CM 1. Encounter for annual physical exam Z00.00 V70.0 2. Essential hypertension I10 401.9 CBC W/O DIFF  
   METABOLIC PANEL, COMPREHENSIVE 3. Hyperlipidemia, unspecified hyperlipidemia type E78.5 272.4 LIPID PANEL 4. Benign prostatic hyperplasia with nocturia N40.1 600.01   
 R35.1 788.43   
5. Dementia due to Parkinson's disease without behavioral disturbance (HCC) G20 332.0 TSH 3RD GENERATION  
 F02.80 294.10 6. Cerebrovascular accident (CVA), unspecified mechanism ([facility] [address] I63.9 434.91   
7. CKD (chronic kidney disease) stage 2, GFR 60-89 ml/min N18.2 585.2 8. Impaired glucose tolerance R73.02 790.22   
9. Vitamin D deficiency E55.9 268.9 VITAMIN D, 25 HYDROXY 10. Prostate cancer screening Z12.5 V76.44 PSA SCREENING (SCREENING) 11. Increased urinary frequency R35.0 788.41 URINALYSIS W/ RFLX MICROSCOPIC Follow-up and Dispositions · Return in about 3 months (around 10/8/2020) for follow up. I have reviewed with the patient details of the assessment and plan and all questions were answered. Relevent patient education was performed. Verbal and/or written instructions (see AVS) provided. The most recent lab findings were reviewed with the patient. Plan was discussed with patient who verbally expressed understanding. An After Visit Summary was printed and given to the patient.  
 
Erica Hernandez MD

## 2020-07-10 LAB — BACTERIA UR CULT: NORMAL

## 2020-08-05 RX ORDER — HYDROGEN PEROXIDE 3 %
SOLUTION, NON-ORAL MISCELLANEOUS
Qty: 90 CAP | Refills: 3 | Status: SHIPPED | OUTPATIENT
Start: 2020-08-05

## 2020-08-05 NOTE — TELEPHONE ENCOUNTER
Last Refill: 5/13/2019  Last Visit: 7/8/2020   Next Visit:  10/7/2020    Requested Prescriptions     Pending Prescriptions Disp Refills    esomeprazole (NEXIUM) 20 mg capsule 90 Cap 3

## 2020-08-13 ENCOUNTER — TELEPHONE (OUTPATIENT)
Dept: INTERNAL MEDICINE CLINIC | Age: 80
End: 2020-08-13

## 2020-08-13 NOTE — TELEPHONE ENCOUNTER
Patients daughter called stating the patients caregiver has tested positive for COVID. They wanted to know if they should get tested because its been 7 days since he was near the caregiver. I told then they could go to [facility] urgent care on [address] if they wanted but the wait is 5-7 days.  They are going to call other places to see if they can get a rapid test.   CB: [phone]

## 2020-08-14 RX ORDER — VALSARTAN 160 MG/1
160 TABLET ORAL DAILY
Qty: 90 TAB | Refills: 3 | Status: SHIPPED | OUTPATIENT
Start: 2020-08-14 | End: 2020-10-09 | Stop reason: SDUPTHER

## 2020-08-14 NOTE — TELEPHONE ENCOUNTER
Last Refill: ? Last Visit: 7/8/2020   Next Visit: 10/7/2020    Only has enough to get to monday    Requested Prescriptions     Pending Prescriptions Disp Refills    valsartan (DIOVAN) 160 mg tablet 90 Tab 3     Sig: Take 1 Tab by mouth daily.

## 2020-08-14 NOTE — TELEPHONE ENCOUNTER
Renee Barksdale MD  You 1 hour ago (2:58 PM)       [facility] recommendation [employer]

## 2020-08-31 LAB — SARS-COV-2, NAA: NOT DETECTED

## 2020-09-03 ENCOUNTER — TELEPHONE (OUTPATIENT)
Dept: INTERNAL MEDICINE CLINIC | Age: 80
End: 2020-09-03

## 2020-09-03 NOTE — TELEPHONE ENCOUNTER
Patients daughter called saying the patient and her  are getting a place at [facility] across from the hospital. They need Dr. Izaiah Ortega to fill out some paper work and have a check up before they can move in. The daughter would like to know if Dr. Izaiah Ortega wants to see them again before she fills out the paperwork or if she wants to go by July's visit. Mr. Santi Castañeda has tested negative twice. The daughter is off on September 14th if the Crews need to be seen again.   CB: [phone]

## 2020-09-03 NOTE — TELEPHONE ENCOUNTER
Jermaine Sepulveda MD  You 30 minutes ago (2:12 PM)       The Ree Camille will need to be seen again in office.     Message text      Patient scheduled for 9/14/2020 at 1pm.

## 2020-09-14 ENCOUNTER — OFFICE VISIT (OUTPATIENT)
Dept: INTERNAL MEDICINE CLINIC | Age: 80
End: 2020-09-14
Payer: MEDICARE

## 2020-09-14 VITALS
WEIGHT: 179 LBS | SYSTOLIC BLOOD PRESSURE: 104 MMHG | RESPIRATION RATE: 12 BRPM | BODY MASS INDEX: 25.62 KG/M2 | HEART RATE: 52 BPM | HEIGHT: 70 IN | DIASTOLIC BLOOD PRESSURE: 58 MMHG | OXYGEN SATURATION: 98 % | TEMPERATURE: 97.8 F

## 2020-09-14 DIAGNOSIS — G20 DEMENTIA DUE TO PARKINSON'S DISEASE WITHOUT BEHAVIORAL DISTURBANCE (HCC): ICD-10-CM

## 2020-09-14 DIAGNOSIS — I10 ESSENTIAL HYPERTENSION: Primary | ICD-10-CM

## 2020-09-14 DIAGNOSIS — Z11.1 SCREENING FOR TUBERCULOSIS: ICD-10-CM

## 2020-09-14 DIAGNOSIS — R35.1 BENIGN PROSTATIC HYPERPLASIA WITH NOCTURIA: ICD-10-CM

## 2020-09-14 DIAGNOSIS — N40.1 BENIGN PROSTATIC HYPERPLASIA WITH NOCTURIA: ICD-10-CM

## 2020-09-14 DIAGNOSIS — M48.062 SPINAL STENOSIS OF LUMBAR REGION WITH NEUROGENIC CLAUDICATION: ICD-10-CM

## 2020-09-14 DIAGNOSIS — E78.5 HYPERLIPIDEMIA, UNSPECIFIED HYPERLIPIDEMIA TYPE: ICD-10-CM

## 2020-09-14 DIAGNOSIS — F02.80 DEMENTIA DUE TO PARKINSON'S DISEASE WITHOUT BEHAVIORAL DISTURBANCE (HCC): ICD-10-CM

## 2020-09-14 PROCEDURE — 99214 OFFICE O/P EST MOD 30 MIN: CPT | Performed by: INTERNAL MEDICINE

## 2020-09-14 RX ORDER — DEXTRAN 70 AND HYPROMELLOSE 2910 1; 3 MG/ML; MG/ML
1 SOLUTION/ DROPS OPHTHALMIC
COMMUNITY

## 2020-09-14 RX ORDER — GABAPENTIN 100 MG/1
CAPSULE ORAL
Qty: 360 CAP | Refills: 2 | Status: SHIPPED | OUTPATIENT
Start: 2020-09-14 | End: 2020-10-09 | Stop reason: SDUPTHER

## 2020-09-14 NOTE — PATIENT INSTRUCTIONS
High Blood Pressure: Care Instructions Overview It's normal for blood pressure to go up and down throughout the day. But if it stays up, you have high blood pressure. Another name for high blood pressure is hypertension. Despite what a lot of people think, high blood pressure usually doesn't cause headaches or make you feel dizzy or lightheaded. It usually has no symptoms. But it does increase your risk of stroke, heart attack, and other problems. You and your doctor will talk about your risks of these problems based on your blood pressure. Your doctor will give you a goal for your blood pressure. Your goal will be based on your health and your age. Lifestyle changes, such as eating healthy and being active, are always important to help lower blood pressure. You might also take medicine to reach your blood pressure goal. 
Follow-up care is a key part of your treatment and safety. Be sure to make and go to all appointments, and call your doctor if you are having problems. It's also a good idea to know your test results and keep a list of the medicines you take. How can you care for yourself at home? Medical treatment · If you stop taking your medicine, your blood pressure will go back up. You may take one or more types of medicine to lower your blood pressure. Be safe with medicines. Take your medicine exactly as prescribed. Call your doctor if you think you are having a problem with your medicine. · Talk to your doctor before you start taking aspirin every day. Aspirin can help certain people lower their risk of a heart attack or stroke. But taking aspirin isn't right for everyone, because it can cause serious bleeding. · See your doctor regularly. You may need to see the doctor more often at first or until your blood pressure comes down. · If you are taking blood pressure medicine, talk to your doctor before you take decongestants or anti-inflammatory medicine, such as ibuprofen. Some of these medicines can raise blood pressure. · Learn how to check your blood pressure at home. Lifestyle changes · Stay at a healthy weight. This is especially important if you put on weight around the waist. Losing even 10 pounds can help you lower your blood pressure. · If your doctor recommends it, get more exercise. Walking is a good choice. Bit by bit, increase the amount you walk every day. Try for at least 30 minutes on most days of the week. You also may want to swim, bike, or do other activities. · Avoid or limit alcohol. Talk to your doctor about whether you can drink any alcohol. · Try to limit how much sodium you eat to less than 2,300 milligrams (mg) a day. Your doctor may ask you to try to eat less than 1,500 mg a day. · Eat plenty of fruits (such as bananas and oranges), vegetables, legumes, whole grains, and low-fat dairy products. · Lower the amount of saturated fat in your diet. Saturated fat is found in animal products such as milk, cheese, and meat. Limiting these foods may help you lose weight and also lower your risk for heart disease. · Do not smoke. Smoking increases your risk for heart attack and stroke. If you need help quitting, talk to your doctor about stop-smoking programs and medicines. These can increase your chances of quitting for good. When should you call for help? Call  911 anytime you think you may need emergency care. This may mean having symptoms that suggest that your blood pressure is causing a serious heart or blood vessel problem. Your blood pressure may be over 180/120. For example, call 911 if: 
  · You have symptoms of a heart attack. These may include: 
? Chest pain or pressure, or a strange feeling in the chest. 
? Sweating. ? Shortness of breath. ? Nausea or vomiting. ? Pain, pressure, or a strange feeling in the back, neck, jaw, or upper belly or in one or both shoulders or arms. ? Lightheadedness or sudden weakness. ? A fast or irregular heartbeat.  
  · You have symptoms of a stroke. These may include: 
? Sudden numbness, tingling, weakness, or loss of movement in your face, arm, or leg, especially on only one side of your body. ? Sudden vision changes. ? Sudden trouble speaking. ? Sudden confusion or trouble understanding simple statements. ? Sudden problems with walking or balance. ? A sudden, severe headache that is different from past headaches.  
  · You have severe back or belly pain. Do not wait until your blood pressure comes down on its own. Get help right away. Call your doctor now or seek immediate care if: 
  · Your blood pressure is much higher than normal (such as 180/120 or higher), but you don't have symptoms.  
  · You think high blood pressure is causing symptoms, such as: 
? Severe headache. 
? Blurry vision. Watch closely for changes in your health, and be sure to contact your doctor if: 
  · Your blood pressure measures higher than your doctor recommends at least 2 times. That means the top number is higher or the bottom number is higher, or both.  
  · You think you may be having side effects from your blood pressure medicine. Where can you learn more? Go to http://aditi-angela.info/ Enter G831 in the search box to learn more about \"High Blood Pressure: Care Instructions. \" Current as of: December 16, 2019               Content Version: 12.6 © 7625-9896 [employer]. Care instructions adapted under license by [employer] (which disclaims liability or warranty for this information). If you have questions about a medical condition or this instruction, always ask your healthcare professional. [place] [address] any warranty or liability for your use of this information.

## 2020-09-14 NOTE — PROGRESS NOTES
Marylin Chaparro is a 78 y.o. male presenting for Form Completion ([facility]) Alexander Queen 1. Have you been to the ER, urgent care clinic since your last visit? Hospitalized since your last visit? No 
 
2. Have you seen or consulted any other health care providers outside of the [address] [place] [address] since your last visit? Include any pap smears or colon screening. No 
 
Fall Risk Assessment, last 12 mths 2/18/2020 Able to walk? Yes Fall in past 12 months? No  
Fall with injury? -  
Number of falls in past 12 months - Fall Risk Score -  
 
 
 
Abuse Screening Questionnaire 2/18/2020 Do you ever feel afraid of your partner? Ethan Lek Are you in a relationship with someone who physically or mentally threatens you? Ethan Lek Is it safe for you to go home? Y  
 
 
3 most recent PHQ Screens 2/18/2020 Little interest or pleasure in doing things Not at all Feeling down, depressed, irritable, or hopeless Not at all Total Score PHQ 2 0 Trouble falling or staying asleep, or sleeping too much - Feeling tired or having little energy - Poor appetite, weight loss, or overeating - Feeling bad about yourself - or that you are a failure or have let yourself or your family down - Trouble concentrating on things such as school, work, reading, or watching TV - Moving or speaking so slowly that other people could have noticed; or the opposite being so fidgety that others notice - Thoughts of being better off dead, or hurting yourself in some way -  
PHQ 9 Score - There are no discontinued medications.

## 2020-09-14 NOTE — PROGRESS NOTES
Roxane Elliott is a 78 y.o. male and presents with Form Completion ([facility] living [facility]) Subjective: 
Patient comes in today with his daughter Vincent Marrero who is also a school nurse. Patient comes in today for a physical before his admission to [facility] living. His daughter Vincent Marrero has been taking care of him. She reports she has been doing well. His wife did sustain COVID-19 however his tests were negative. He denies any symptoms of fever, chills, shortness of breath, cough. His blood pressure remains low. He denies lightheadedness or dizziness today. I did discuss with the daughter and we have tried changing his regimens in the past however that resulted in hypertensive urgency and further increased blood pressure. Daughter reports she has been taking the blood pressure at home and they have been at goal.  Systolics range between 345941N. Patient has a history of dementia and Parkinson's disease. Patient has a history of stroke, Parkinson's disease and dementia. He is already undergone physical and Occupational Therapy. At baseline he uses a walker to ambulate at home and a wheelchair for all his office visits. Per daughter, she reports she has been doing fairly well. He has a stationary bike at home which he uses for exercise. He is on Sinemet and Comtan. He follows up with neurologist Dr. Joey Hernandez. He has a history of stroke and remains on aspirin and statin. Recap-He was started on Flomax since he had complaints of urinary retention however he quit taking it because that did not seem to help and now he has increased frequency. Denies suprapubic discomfort, fever, chills, lower abdominal pain. Denies hesitancy or poor stream. 
Does have occasional constipation for which he takes stool softeners as needed.  
Recap 2/20-He has had issues with labile hypertension in the past he was hypotensive following which all his medications was discontinued and then he developed hypertensive urgency and few of the medications were readjusted. Currently he is on Diovan 160 mg and Norvasc 5 mg.  daughter did have some blood pressure readings which I reviewed and most of them We will are at goal in the morning. There was some low borderline readings in the evening. He denies headache, blurred vision. He does report intermittent dizziness and has been trying to keep up with his water intake. Past Medical History:  
Diagnosis Date  Aaron esophagus  Benign nodular prostatic hyperplasia without lower urinary tract symptoms 7/12/2017  CKD (chronic kidney disease) stage 2, GFR 60-89 ml/min  Constipation  Coronary artery disease 7/12/2017  Depression  DJD (degenerative joint disease) 7/12/2017 [address]  Gout 7/12/2017  Headache   
 Hearing difficulty of both ears 7/12/2017  Hyperlipidemia  Hypertension  Impaired cognition 7/12/2017  MCI (mild cognitive impairment)  Restless leg syndrome 7/12/2017  Sleep apnea in adult 7/12/2017 No longer on CPAP @13  Snoring  Stroke ([facility] [address]  Tendinitis 7/12/2017 ACHILLES  Tendinitis of left rotator cuff 07/12/2017  Tremor 7/12/2017 LEFT HAND  Unspecified sleep apnea   
 lost weight no longer has it Past Surgical History:  
Procedure Laterality Date  COLONOSCOPY N/A 1/16/2019 COLONOSCOPY performed by Liliane Galvan MD at [facility] ENDOSCOPY  COLONOSCOPY,FLEX,W/CONTROL, BLEEDING  1/16/2019  HX ORTHOPAEDIC  1/27/15 L4-L5 MICRODECOMPRESSION (Right  CO EGD TRANSORAL BIOPSY SINGLE/MULTIPLE  8/16/2011  CO EGD TRANSORAL BIOPSY SINGLE/MULTIPLE  10/1/2013 No Known Allergies Current Outpatient Medications Medication Sig Dispense Refill  artificial tears, dextran 70-hypromellose, (GenTeal Tears Mild) 0.1-0.3 % ophthalmic solution Administer 1 Drop to both eyes nightly.  carboxymethylcellulose sodium (THERATEARS OP) Apply  to eye. 1 -2 drops both eyes bid  gabapentin (NEURONTIN) 100 mg capsule T2C  Cap 2  valsartan (DIOVAN) 160 mg tablet Take 1 Tab by mouth daily. 90 Tab 3  
 esomeprazole (NEXIUM) 20 mg capsule TAKE 1 CAPSULE DAILY 90 Cap 3  carbidopa-levodopa (Sinemet)  mg per tablet 2 p.o. 4 times daily  Indications: Parkinson's disease 720 Tab 2  
 atorvastatin (LIPITOR) 40 mg tablet TAKE 1 TABLET DAILY 90 Tab 3  
 amLODIPine (NORVASC) 5 mg tablet 0ne tab daily (Patient taking differently: 0ne tab daily qhs) 90 Tab 3  
 PARoxetine (PAXIL) 10 mg tablet TAKE 1 TABLET BY MOUTH DAILY 90 Tab 3  
 rOPINIRole (REQUIP) 1 mg tablet Take 1 Tab by mouth three (3) times daily. (Patient taking differently: Take 1 mg by mouth two (2) times a day.) 270 Tab 5  
 entacapone (COMTAN) 200 mg tablet Take 1 Tab by mouth three (3) times daily. 270 Tab 5  
 acetaminophen (TYLENOL EXTRA STRENGTH) 500 mg tablet Take 1,000 mg by mouth three (3) times daily as needed for Pain. Rapid release  senna-docusate (SENNA-S) 8.6-50 mg per tablet Take 1 Tab by mouth every other day.  aspirin delayed-release 81 mg tablet Take  by mouth daily.  vitamin e (E GEMS) 1,000 unit capsule Take 2,000 Units by mouth daily.  MULTIVITAMIN PO Take 1 Tab by mouth daily. Social History Socioeconomic History  Marital status:  Spouse name: Not on file  Number of children: Not on file  Years of education: Not on file  Highest education level: Not on file Tobacco Use  Smoking status: Former Smoker  Smokeless tobacco: Never Used Substance and Sexual Activity  Alcohol use: No  
 Drug use: No  
 
Family History Problem Relation Age of Onset  Heart Disease Mother  Heart Disease Father Review of Systems ROS is unremarkable except for ones highlighted in bold. Constitutional: negative for fevers, chills, anorexia and weight loss Eyes:   negative for visual disturbance and irritation ENT:   negative for tinnitus,sore throat,nasal congestion,ear pain,hoarseness Respiratory:  negative for cough, hemoptysis, dyspnea,wheezing CV:   negative for chest pain, palpitations, lower extremity edema GI:   negative for nausea, vomiting, diarrhea, abdominal pain,melena Endo:               negative for polyuria,polydipsia,polyphagia,heat intolerance Genitourinary: negative for frequency, dysuria and hematuria Integumentary: negative for rash and pruritus Hematologic:  negative for easy bruising and gum/nose bleeding Musculoskel: negative for myalgias, arthralgias, back pain, muscle weakness, joint pain Neurological:  negative for headaches, dizziness, vertigo, memory problems and gait Behavl/Psych: negative for feelings of anxiety, depression, mood changes, dementia ROS otherwise negative Objective: 
Visit Vitals BP (!) 104/58 (BP 1 Location: Left arm, BP Patient Position: Sitting) Pulse (!) 52 Temp 97.8 °F (36.6 °C) Resp 12 Ht 5' 10\" (1.778 m) Wt 179 lb (81.2 kg) SpO2 98% BMI 25.68 kg/m² Physical Exam:  
General appearance - alert, well appearing, and in no distress Mental status - alert, oriented to person, place, and time EYE-BERNARDO, EOMI, fundi normal, corneas normal, no foreign bodies ENT-ENT exam normal, no neck nodes or sinus tenderness Nose - normal and patent, no erythema, discharge or polyps Mouth - mucous membranes moist, pharynx normal without lesions Neck - supple, no significant adenopathy Chest - clear to auscultation, no wheezes, rales or rhonchi, symmetric air entry Heart - normal rate, regular rhythm, normal S1, S2, no murmurs, rubs, clicks or gallops Abdomen - soft, nontender, nondistended, no masses or organomegaly Lymph- no adenopathy palpable Ext-peripheral pulses normal, no pedal edema, no clubbing or cyanosis Skin-Warm and dry. no hyperpigmentation, vitiligo, or suspicious lesions Neuro -alert, oriented, normal speech, no focal findings or movement disorder noted, mild dementia noted. Musculoskeletal- FROM, no bony abnormalities, no point tenderness Lab Review: 
Results for orders placed or performed in visit on 07/08/20 CULTURE, URINE Specimen: Urine Result Value Ref Range Urine Culture, Routine Culture shows less than 10,000 colony forming units of bacteria per 
milliliter of urine. This colony count is not generally considered 
to be clinically significant. CBC W/O DIFF Result Value Ref Range WBC 7.2 4.1 - 10.9 K/uL  
 RBC 3.54 (L) 4.20 - 6.30 M/uL  
 HGB 12.5 12.0 - 18.0 g/dL HCT 37.4 37.0 - 51.0 % MCH 35.3 (H) 26.0 - 32.0 pg  
 MCHC 33.4 30.0 - 36.0 g/dL .7 (H) 80.0 - 97.0 fL  
 RDW 13.2 % PLATELET 811.9 474.9 - 440.0 K/uL LIPID PANEL Result Value Ref Range Cholesterol, total 119 0 - 200 mg/dL Triglyceride 103 0 - 200 mg/dL HDL Cholesterol 51 35 - 130 mg/dL VLDL 21 mg/dL LDL, calculated 47 0 - 130 mg/dL CHOL/HDL Ratio 2 0 - 4 Ratio LDL/HDL Ratio 1 Ratio METABOLIC PANEL, COMPREHENSIVE Result Value Ref Range Glucose 94 75 - 110 mg/dL BUN 22.0 (H) 9.0 - 20.0 mg/dL Creatinine 1.1 0.8 - 1.5 mg/dL Sodium 141 137 - 145 mmol/L Potassium 4.6 3.6 - 5.0 mmol/L Chloride 107 98 - 107 mmol/L  
 CO2 27.0 22.0 - 32.0 mmol/L Calcium 9.9 8.4 - 10.2 mg/dl Protein, total 7.1 6.3 - 8.2 g/dL Albumin 4.4 3.9 - 5.4 g/dL ALT (SGPT) 6 0 - 50 U/L  
 AST (SGOT) 21.0 14.0 - 36.0 U/L Alk. phosphatase 71 38 - 126 U/L Bilirubin, total 0.7 0.2 - 1.3 mg/dL BUN/Creatinine ratio 20 Ratio GFR est AA >60 mL/min/1.73m2 GFR est non-AA >60 mL/min/1.73m2 Globulin 2.70 A-G Ratio 1.6 Ratio Anion gap 7 mmol/L  
TSH 3RD GENERATION Result Value Ref Range TSH, 3rd generation 1.39 0.34 - 5.60 uIU/mL VITAMIN D, 25 HYDROXY Result Value Ref Range VITAMIN D, 25-HYDROXY 28 (L) 30 - 96 ng/mL PSA SCREENING (SCREENING) Result Value Ref Range PSA 0.6 0.0 - 4.0 ng/mL URINALYSIS W/MICROSCOPIC Result Value Ref Range Color brown (A) Pale Yellow - Yellow CLARITY clear Clear Glucose urine, 24 hr Negative Negative Ketone Negative Negative Bilirubin Negative Negative Specific gravity 1.015 1.000 - 1.030  
 pH (UA) 5 5 - 7 Blood 1+ (A) Negative Protein 2+ (A) Negative Urobilinogen Negative Negative Nitrites Negative Negative Leukocyte Esterase Negative Negative WBC 0 0 #/HPF  
 RBC 2-5 (A) 0 #/HPF Documenation Review: 
Parkinson's disease with some tremor, gait disturbance, bradykinesia and weakness. He is followed by Dr. Adalid Moss as well. CAD by EBT heart scan without symptomatic angina or symptoms of CHF. Hyperlipidemia. Hypertension that appears to be currently over-treated and she is actually mildly orthostatic today. I am sure PD isease contributes as well to the orthostasis. Lumbar spinal stenosis, S/P laminectomy at L4-5. He has residual back pain and occasionally some lung pain. Markedly diminished auditory acuity. He has tried hearing aids, but rejected them. Gout. History of depression. DREAD, no longer using CPAP. History of Aaron's esophagus, S/P dilatation of a stricture. It does not appear as though he has been looked at recently, however. His last EGD and biopsy did not reveal significant specialized metaplasia, so after discussion with Dr. Prem Dennis last year it was elected not to proceed with EGD. 
GERD. CKD stage 2. BPH. 
DJD. Restless leg syndrome. Left rotator cuff tendinitis, currently quiescent. Cognitive impairment. 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 Assessment/Plan: 
 
Diagnoses and all orders for this visit: 1. Essential hypertension 2. Hyperlipidemia, unspecified hyperlipidemia type 3. Benign prostatic hyperplasia with nocturia 4. Dementia due to Parkinson's disease without behavioral disturbance ([facility] [address] 5. Spinal stenosis of lumbar region with neurogenic claudication 
-     gabapentin (NEURONTIN) 100 mg capsule; T2C BID Continue current meds. Advised to take Diovan in the morning (cut back to 80 mg and see if his blood pressures are better controlled) and Norvasc in the night. Prior to next follow-up visit I have asked him to take a blood pressure log for 2 weeks and bring it to my attention. I will call with lab results and make further recommendations or adjustments if necessary. ICD-10-CM ICD-9-CM 1. Essential hypertension  I10 401.9 2. Hyperlipidemia, unspecified hyperlipidemia type  E78.5 272.4 3. Benign prostatic hyperplasia with nocturia  N40.1 600.01   
 R35.1 788.43   
4. Dementia due to Parkinson's disease without behavioral disturbance ([facility] [address]  G20 332.0 F02.80 294.10   
5. Spinal stenosis of lumbar region with neurogenic claudication  M48.062 724.03 gabapentin (NEURONTIN) 100 mg capsule I have reviewed with the patient details of the assessment and plan and all questions were answered. Relevent patient education was performed. Verbal and/or written instructions (see AVS) provided. The most recent lab findings were reviewed with the patient. Plan was discussed with patient who verbally expressed understanding. An After Visit Summary was printed and given to the patient.  
 
Jacklynn Collet, MD

## 2020-10-09 ENCOUNTER — TELEPHONE (OUTPATIENT)
Dept: INTERNAL MEDICINE CLINIC | Age: 80
End: 2020-10-09

## 2020-10-09 DIAGNOSIS — M48.062 SPINAL STENOSIS OF LUMBAR REGION WITH NEUROGENIC CLAUDICATION: ICD-10-CM

## 2020-10-09 DIAGNOSIS — G20 PARKINSONISM, UNSPECIFIED PARKINSONISM TYPE (HCC): ICD-10-CM

## 2020-10-09 DIAGNOSIS — F02.80 DEMENTIA DUE TO PARKINSON'S DISEASE WITHOUT BEHAVIORAL DISTURBANCE (HCC): ICD-10-CM

## 2020-10-09 DIAGNOSIS — I65.23 BILATERAL CAROTID ARTERY STENOSIS: ICD-10-CM

## 2020-10-09 DIAGNOSIS — G31.84 MILD COGNITIVE IMPAIRMENT: ICD-10-CM

## 2020-10-09 DIAGNOSIS — I63.9 CEREBROVASCULAR ACCIDENT (CVA), UNSPECIFIED MECHANISM (HCC): ICD-10-CM

## 2020-10-09 DIAGNOSIS — G20 DEMENTIA DUE TO PARKINSON'S DISEASE WITHOUT BEHAVIORAL DISTURBANCE (HCC): ICD-10-CM

## 2020-10-09 DIAGNOSIS — Z86.73 HISTORY OF STROKE: ICD-10-CM

## 2020-10-09 RX ORDER — ENTACAPONE 200 MG/1
TABLET ORAL
Qty: 270 TAB | Refills: 3 | Status: SHIPPED | OUTPATIENT
Start: 2020-10-09

## 2020-10-09 RX ORDER — PAROXETINE 10 MG/1
TABLET, FILM COATED ORAL
Qty: 90 TAB | Refills: 3 | Status: SHIPPED | OUTPATIENT
Start: 2020-10-09

## 2020-10-09 RX ORDER — GABAPENTIN 100 MG/1
CAPSULE ORAL
Qty: 360 CAP | Refills: 2 | Status: ON HOLD | OUTPATIENT
Start: 2020-10-09 | End: 2020-12-18 | Stop reason: SDUPTHER

## 2020-10-09 RX ORDER — VALSARTAN 160 MG/1
160 TABLET ORAL DAILY
Qty: 90 TAB | Refills: 3 | Status: SHIPPED | OUTPATIENT
Start: 2020-10-09 | End: 2020-10-11 | Stop reason: SDUPTHER

## 2020-10-09 NOTE — TELEPHONE ENCOUNTER
Patients daughter called to see if we can change the patient instructions for the patients amlodipine and valsartan. The patient takes his amlodipine at night and his valsartan in the morning. The daughter is worried if we don't specify the nursing home will give him both at the same time. The prescriptions are through [facility].   CB: [phone]

## 2020-10-09 NOTE — TELEPHONE ENCOUNTER
Last Refill:    Gabapentin: 9/14/2020   Paroxetine: 8/7/2019   Valsartan: 8/14/2020  Last Visit: 9/14/2020   Next Visit: 12/15/2020    Requested Prescriptions     Pending Prescriptions Disp Refills    gabapentin (NEURONTIN) 100 mg capsule 360 Cap 2     Sig: T2C BID    PARoxetine (PAXIL) 10 mg tablet 90 Tab 3     Sig: TAKE 1 TABLET BY MOUTH DAILY    valsartan (DIOVAN) 160 mg tablet 90 Tab 3     Sig: Take 1 Tab by mouth daily.

## 2020-10-11 DIAGNOSIS — I10 ESSENTIAL HYPERTENSION: ICD-10-CM

## 2020-10-11 RX ORDER — AMLODIPINE BESYLATE 5 MG/1
TABLET ORAL
Qty: 90 TAB | Refills: 3 | Status: SHIPPED | OUTPATIENT
Start: 2020-10-11 | End: 2020-12-18

## 2020-10-11 RX ORDER — VALSARTAN 160 MG/1
160 TABLET ORAL EVERY MORNING
Qty: 90 TAB | Refills: 3 | Status: SHIPPED | OUTPATIENT
Start: 2020-10-11 | End: 2020-12-18

## 2020-10-12 NOTE — TELEPHONE ENCOUNTER
Hannah Calvin MD  You 14 hours ago (7:02 PM)       Acknowledged      Patients daughter notified Rx sent to [facility]

## 2020-11-17 ENCOUNTER — TELEPHONE (OUTPATIENT)
Dept: INTERNAL MEDICINE CLINIC | Age: 80
End: 2020-11-17

## 2020-11-17 DIAGNOSIS — M48.062 SPINAL STENOSIS OF LUMBAR REGION WITH NEUROGENIC CLAUDICATION: Primary | ICD-10-CM

## 2020-11-17 NOTE — TELEPHONE ENCOUNTER
Patients daughter called in regards to paperwork to get Mr. Tu Sheriff a wheelchair. The patients occupational therapist said the patient needs a wheelchair and in order for medicare to pay a portion they need documentation of the patients need. Patients daughter would like advice on what to do from here. Does he need to be seen or can we just do a letter?   CB: [phone]

## 2020-12-02 ENCOUNTER — HOSPITAL ENCOUNTER (INPATIENT)
Age: 80
LOS: 15 days | Discharge: NURSING, REHAB OR LONG-TERM CARE | DRG: 871 | End: 2020-12-18
Attending: EMERGENCY MEDICINE | Admitting: INTERNAL MEDICINE
Payer: MEDICARE

## 2020-12-02 ENCOUNTER — APPOINTMENT (OUTPATIENT)
Dept: GENERAL RADIOLOGY | Age: 80
DRG: 871 | End: 2020-12-02
Attending: EMERGENCY MEDICINE
Payer: MEDICARE

## 2020-12-02 DIAGNOSIS — D62 ACUTE BLOOD LOSS ANEMIA: ICD-10-CM

## 2020-12-02 DIAGNOSIS — Z79.899 LONG-TERM CURRENT USE OF HIGH RISK MEDICATION OTHER THAN ANTICOAGULANT: ICD-10-CM

## 2020-12-02 DIAGNOSIS — M48.062 SPINAL STENOSIS OF LUMBAR REGION WITH NEUROGENIC CLAUDICATION: ICD-10-CM

## 2020-12-02 DIAGNOSIS — J18.9 PNEUMONIA OF RIGHT LOWER LOBE DUE TO INFECTIOUS ORGANISM: Primary | ICD-10-CM

## 2020-12-02 LAB
ALBUMIN SERPL-MCNC: 3.4 G/DL (ref 3.5–5)
ALBUMIN/GLOB SERPL: 0.9 {RATIO} (ref 1.1–2.2)
ALP SERPL-CCNC: 59 U/L (ref 45–117)
ALT SERPL-CCNC: 7 U/L (ref 12–78)
ANION GAP SERPL CALC-SCNC: 8 MMOL/L (ref 5–15)
AST SERPL-CCNC: 23 U/L (ref 15–37)
BASOPHILS # BLD: 0 K/UL (ref 0–0.1)
BASOPHILS NFR BLD: 0 % (ref 0–1)
BILIRUB SERPL-MCNC: 0.6 MG/DL (ref 0.2–1)
BNP SERPL-MCNC: 830 PG/ML
BUN SERPL-MCNC: 35 MG/DL (ref 6–20)
BUN/CREAT SERPL: 22 (ref 12–20)
CALCIUM SERPL-MCNC: 8.8 MG/DL (ref 8.5–10.1)
CHLORIDE SERPL-SCNC: 108 MMOL/L (ref 97–108)
CO2 SERPL-SCNC: 24 MMOL/L (ref 21–32)
COMMENT, HOLDF: NORMAL
CREAT SERPL-MCNC: 1.59 MG/DL (ref 0.7–1.3)
DIFFERENTIAL METHOD BLD: ABNORMAL
EOSINOPHIL # BLD: 0 K/UL (ref 0–0.4)
EOSINOPHIL NFR BLD: 0 % (ref 0–7)
ERYTHROCYTE [DISTWIDTH] IN BLOOD BY AUTOMATED COUNT: 12.5 % (ref 11.5–14.5)
GLOBULIN SER CALC-MCNC: 3.7 G/DL (ref 2–4)
GLUCOSE SERPL-MCNC: 115 MG/DL (ref 65–100)
HCT VFR BLD AUTO: 33.4 % (ref 36.6–50.3)
HGB BLD-MCNC: 11.3 G/DL (ref 12.1–17)
IMM GRANULOCYTES # BLD AUTO: 0 K/UL
IMM GRANULOCYTES NFR BLD AUTO: 0 %
LACTATE SERPL-SCNC: 2 MMOL/L (ref 0.4–2)
LYMPHOCYTES # BLD: 0.9 K/UL (ref 0.8–3.5)
LYMPHOCYTES NFR BLD: 12 % (ref 12–49)
MCH RBC QN AUTO: 34 PG (ref 26–34)
MCHC RBC AUTO-ENTMCNC: 33.8 G/DL (ref 30–36.5)
MCV RBC AUTO: 100.6 FL (ref 80–99)
MONOCYTES # BLD: 0.6 K/UL (ref 0–1)
MONOCYTES NFR BLD: 8 % (ref 5–13)
NEUTS BAND NFR BLD MANUAL: 8 % (ref 0–6)
NEUTS SEG # BLD: 6.4 K/UL (ref 1.8–8)
NEUTS SEG NFR BLD: 72 % (ref 32–75)
NRBC # BLD: 0 K/UL (ref 0–0.01)
NRBC BLD-RTO: 0 PER 100 WBC
PLATELET # BLD AUTO: 241 K/UL (ref 150–400)
PMV BLD AUTO: 10.3 FL (ref 8.9–12.9)
POTASSIUM SERPL-SCNC: 4.1 MMOL/L (ref 3.5–5.1)
PROT SERPL-MCNC: 7.1 G/DL (ref 6.4–8.2)
RBC # BLD AUTO: 3.32 M/UL (ref 4.1–5.7)
RBC MORPH BLD: ABNORMAL
SAMPLES BEING HELD,HOLD: NORMAL
SODIUM SERPL-SCNC: 140 MMOL/L (ref 136–145)
TOTAL CELLS COUNTED SPEC: 25
TROPONIN I SERPL-MCNC: <0.05 NG/ML
WBC # BLD AUTO: 7.9 K/UL (ref 4.1–11.1)

## 2020-12-02 PROCEDURE — 84145 PROCALCITONIN (PCT): CPT

## 2020-12-02 PROCEDURE — 80053 COMPREHEN METABOLIC PANEL: CPT

## 2020-12-02 PROCEDURE — 85025 COMPLETE CBC W/AUTO DIFF WBC: CPT

## 2020-12-02 PROCEDURE — 87040 BLOOD CULTURE FOR BACTERIA: CPT

## 2020-12-02 PROCEDURE — 83880 ASSAY OF NATRIURETIC PEPTIDE: CPT

## 2020-12-02 PROCEDURE — 93005 ELECTROCARDIOGRAM TRACING: CPT

## 2020-12-02 PROCEDURE — 83605 ASSAY OF LACTIC ACID: CPT

## 2020-12-02 PROCEDURE — 71045 X-RAY EXAM CHEST 1 VIEW: CPT

## 2020-12-02 PROCEDURE — 84484 ASSAY OF TROPONIN QUANT: CPT

## 2020-12-02 PROCEDURE — 36415 COLL VENOUS BLD VENIPUNCTURE: CPT

## 2020-12-02 PROCEDURE — 99285 EMERGENCY DEPT VISIT HI MDM: CPT

## 2020-12-02 RX ORDER — LEVOFLOXACIN 5 MG/ML
750 INJECTION, SOLUTION INTRAVENOUS
Status: COMPLETED | OUTPATIENT
Start: 2020-12-02 | End: 2020-12-03

## 2020-12-03 PROBLEM — J18.9 PNEUMONIA: Status: ACTIVE | Noted: 2020-12-03

## 2020-12-03 LAB
ANION GAP SERPL CALC-SCNC: 6 MMOL/L (ref 5–15)
ATRIAL RATE: 70 BPM
B PERT DNA SPEC QL NAA+PROBE: NOT DETECTED
BORDETELLA PARAPERTUSSIS PCR, BORPAR: NOT DETECTED
BUN SERPL-MCNC: 39 MG/DL (ref 6–20)
BUN/CREAT SERPL: 25 (ref 12–20)
C PNEUM DNA SPEC QL NAA+PROBE: NOT DETECTED
CALCIUM SERPL-MCNC: 8.8 MG/DL (ref 8.5–10.1)
CALCULATED P AXIS, ECG09: 25 DEGREES
CALCULATED R AXIS, ECG10: -44 DEGREES
CALCULATED T AXIS, ECG11: 14 DEGREES
CHLORIDE SERPL-SCNC: 107 MMOL/L (ref 97–108)
CO2 SERPL-SCNC: 26 MMOL/L (ref 21–32)
CREAT SERPL-MCNC: 1.53 MG/DL (ref 0.7–1.3)
DIAGNOSIS, 93000: NORMAL
ERYTHROCYTE [DISTWIDTH] IN BLOOD BY AUTOMATED COUNT: 12.3 % (ref 11.5–14.5)
FLUAV H1 2009 PAND RNA SPEC QL NAA+PROBE: NOT DETECTED
FLUAV H1 RNA SPEC QL NAA+PROBE: NOT DETECTED
FLUAV H3 RNA SPEC QL NAA+PROBE: NOT DETECTED
FLUAV SUBTYP SPEC NAA+PROBE: NOT DETECTED
FLUBV RNA SPEC QL NAA+PROBE: NOT DETECTED
GLUCOSE SERPL-MCNC: 107 MG/DL (ref 65–100)
HADV DNA SPEC QL NAA+PROBE: NOT DETECTED
HCOV 229E RNA SPEC QL NAA+PROBE: NOT DETECTED
HCOV HKU1 RNA SPEC QL NAA+PROBE: NOT DETECTED
HCOV NL63 RNA SPEC QL NAA+PROBE: NOT DETECTED
HCOV OC43 RNA SPEC QL NAA+PROBE: NOT DETECTED
HCT VFR BLD AUTO: 34.2 % (ref 36.6–50.3)
HEALTH STATUS, XMCV2T: NORMAL
HGB BLD-MCNC: 11.4 G/DL (ref 12.1–17)
HMPV RNA SPEC QL NAA+PROBE: NOT DETECTED
HPIV1 RNA SPEC QL NAA+PROBE: NOT DETECTED
HPIV2 RNA SPEC QL NAA+PROBE: NOT DETECTED
HPIV3 RNA SPEC QL NAA+PROBE: NOT DETECTED
HPIV4 RNA SPEC QL NAA+PROBE: NOT DETECTED
M PNEUMO DNA SPEC QL NAA+PROBE: NOT DETECTED
MCH RBC QN AUTO: 34 PG (ref 26–34)
MCHC RBC AUTO-ENTMCNC: 33.3 G/DL (ref 30–36.5)
MCV RBC AUTO: 102.1 FL (ref 80–99)
NRBC # BLD: 0 K/UL (ref 0–0.01)
NRBC BLD-RTO: 0 PER 100 WBC
P-R INTERVAL, ECG05: 176 MS
PLATELET # BLD AUTO: 208 K/UL (ref 150–400)
PMV BLD AUTO: 9.7 FL (ref 8.9–12.9)
POTASSIUM SERPL-SCNC: 4.4 MMOL/L (ref 3.5–5.1)
PROCALCITONIN SERPL-MCNC: 12.03 NG/ML
Q-T INTERVAL, ECG07: 400 MS
QRS DURATION, ECG06: 82 MS
QTC CALCULATION (BEZET), ECG08: 432 MS
RBC # BLD AUTO: 3.35 M/UL (ref 4.1–5.7)
RSV RNA SPEC QL NAA+PROBE: NOT DETECTED
RV+EV RNA SPEC QL NAA+PROBE: NOT DETECTED
SARS-COV-2, COV2: NOT DETECTED
SODIUM SERPL-SCNC: 139 MMOL/L (ref 136–145)
SOURCE, COVRS: NORMAL
SPECIMEN SOURCE, FCOV2M: NORMAL
SPECIMEN TYPE, XMCV1T: NORMAL
VENTRICULAR RATE, ECG03: 70 BPM
WBC # BLD AUTO: 14.7 K/UL (ref 4.1–11.1)

## 2020-12-03 PROCEDURE — 97530 THERAPEUTIC ACTIVITIES: CPT

## 2020-12-03 PROCEDURE — 74011250637 HC RX REV CODE- 250/637: Performed by: INTERNAL MEDICINE

## 2020-12-03 PROCEDURE — 97161 PT EVAL LOW COMPLEX 20 MIN: CPT

## 2020-12-03 PROCEDURE — 77010033678 HC OXYGEN DAILY

## 2020-12-03 PROCEDURE — 74011250636 HC RX REV CODE- 250/636: Performed by: INTERNAL MEDICINE

## 2020-12-03 PROCEDURE — 74011250636 HC RX REV CODE- 250/636: Performed by: EMERGENCY MEDICINE

## 2020-12-03 PROCEDURE — 0100U RESPIRATORY PANEL,PCR,NASOPHARYNGEAL: CPT

## 2020-12-03 PROCEDURE — 85027 COMPLETE CBC AUTOMATED: CPT

## 2020-12-03 PROCEDURE — 87635 SARS-COV-2 COVID-19 AMP PRB: CPT

## 2020-12-03 PROCEDURE — 65660000000 HC RM CCU STEPDOWN

## 2020-12-03 PROCEDURE — 80048 BASIC METABOLIC PNL TOTAL CA: CPT

## 2020-12-03 PROCEDURE — 36415 COLL VENOUS BLD VENIPUNCTURE: CPT

## 2020-12-03 PROCEDURE — 74011000258 HC RX REV CODE- 258: Performed by: INTERNAL MEDICINE

## 2020-12-03 RX ORDER — ACETAMINOPHEN 650 MG/1
650 SUPPOSITORY RECTAL
Status: DISCONTINUED | OUTPATIENT
Start: 2020-12-03 | End: 2020-12-18 | Stop reason: HOSPADM

## 2020-12-03 RX ORDER — SODIUM CHLORIDE 0.9 % (FLUSH) 0.9 %
5-40 SYRINGE (ML) INJECTION AS NEEDED
Status: DISCONTINUED | OUTPATIENT
Start: 2020-12-03 | End: 2020-12-18 | Stop reason: HOSPADM

## 2020-12-03 RX ORDER — SODIUM CHLORIDE 0.9 % (FLUSH) 0.9 %
5-40 SYRINGE (ML) INJECTION EVERY 8 HOURS
Status: DISCONTINUED | OUTPATIENT
Start: 2020-12-03 | End: 2020-12-18 | Stop reason: HOSPADM

## 2020-12-03 RX ORDER — ATORVASTATIN CALCIUM 20 MG/1
40 TABLET, FILM COATED ORAL
Status: DISCONTINUED | OUTPATIENT
Start: 2020-12-03 | End: 2020-12-18 | Stop reason: HOSPADM

## 2020-12-03 RX ORDER — ASPIRIN 81 MG/1
81 TABLET ORAL DAILY
Status: DISCONTINUED | OUTPATIENT
Start: 2020-12-03 | End: 2020-12-18 | Stop reason: HOSPADM

## 2020-12-03 RX ORDER — PAROXETINE HYDROCHLORIDE 20 MG/1
10 TABLET, FILM COATED ORAL DAILY
Status: DISCONTINUED | OUTPATIENT
Start: 2020-12-03 | End: 2020-12-18 | Stop reason: HOSPADM

## 2020-12-03 RX ORDER — POLYETHYLENE GLYCOL 3350 17 G/17G
17 POWDER, FOR SOLUTION ORAL DAILY PRN
Status: DISCONTINUED | OUTPATIENT
Start: 2020-12-03 | End: 2020-12-18 | Stop reason: HOSPADM

## 2020-12-03 RX ORDER — ENTACAPONE 200 MG/1
200 TABLET ORAL 3 TIMES DAILY
Status: DISCONTINUED | OUTPATIENT
Start: 2020-12-03 | End: 2020-12-18 | Stop reason: HOSPADM

## 2020-12-03 RX ORDER — PROMETHAZINE HYDROCHLORIDE 25 MG/1
12.5 TABLET ORAL
Status: DISCONTINUED | OUTPATIENT
Start: 2020-12-03 | End: 2020-12-18 | Stop reason: HOSPADM

## 2020-12-03 RX ORDER — CARBIDOPA AND LEVODOPA 25; 100 MG/1; MG/1
1 TABLET ORAL 4 TIMES DAILY
Status: DISCONTINUED | OUTPATIENT
Start: 2020-12-03 | End: 2020-12-05

## 2020-12-03 RX ORDER — ROPINIROLE 1 MG/1
1 TABLET, FILM COATED ORAL 2 TIMES DAILY
Status: DISCONTINUED | OUTPATIENT
Start: 2020-12-03 | End: 2020-12-18 | Stop reason: HOSPADM

## 2020-12-03 RX ORDER — FUROSEMIDE 10 MG/ML
20 INJECTION INTRAMUSCULAR; INTRAVENOUS ONCE
Status: COMPLETED | OUTPATIENT
Start: 2020-12-03 | End: 2020-12-03

## 2020-12-03 RX ORDER — GUAIFENESIN 100 MG/5ML
400 SOLUTION ORAL
COMMUNITY

## 2020-12-03 RX ORDER — ONDANSETRON 2 MG/ML
4 INJECTION INTRAMUSCULAR; INTRAVENOUS
Status: DISCONTINUED | OUTPATIENT
Start: 2020-12-03 | End: 2020-12-18 | Stop reason: HOSPADM

## 2020-12-03 RX ORDER — ACETAMINOPHEN 325 MG/1
650 TABLET ORAL
Status: DISCONTINUED | OUTPATIENT
Start: 2020-12-03 | End: 2020-12-18 | Stop reason: HOSPADM

## 2020-12-03 RX ORDER — ROPINIROLE 1 MG/1
1 TABLET, FILM COATED ORAL 2 TIMES DAILY
COMMUNITY

## 2020-12-03 RX ORDER — MAGNESIUM SULFATE HEPTAHYDRATE 40 MG/ML
2 INJECTION, SOLUTION INTRAVENOUS ONCE
Status: COMPLETED | OUTPATIENT
Start: 2020-12-03 | End: 2020-12-03

## 2020-12-03 RX ORDER — PANTOPRAZOLE SODIUM 20 MG/1
20 TABLET, DELAYED RELEASE ORAL
Status: DISCONTINUED | OUTPATIENT
Start: 2020-12-03 | End: 2020-12-18 | Stop reason: HOSPADM

## 2020-12-03 RX ORDER — GABAPENTIN 100 MG/1
200 CAPSULE ORAL 2 TIMES DAILY
Status: DISCONTINUED | OUTPATIENT
Start: 2020-12-03 | End: 2020-12-18 | Stop reason: HOSPADM

## 2020-12-03 RX ADMIN — Medication 10 ML: at 06:35

## 2020-12-03 RX ADMIN — MAGNESIUM SULFATE IN WATER 2 G: 40 INJECTION, SOLUTION INTRAVENOUS at 10:03

## 2020-12-03 RX ADMIN — PANTOPRAZOLE SODIUM 20 MG: 20 TABLET, DELAYED RELEASE ORAL at 10:00

## 2020-12-03 RX ADMIN — Medication 10 ML: at 23:26

## 2020-12-03 RX ADMIN — ROPINIROLE HYDROCHLORIDE 1 MG: 1 TABLET, FILM COATED ORAL at 18:32

## 2020-12-03 RX ADMIN — ENTACAPONE 200 MG: 200 TABLET, FILM COATED ORAL at 18:35

## 2020-12-03 RX ADMIN — Medication 81 MG: at 09:19

## 2020-12-03 RX ADMIN — AMPICILLIN SODIUM AND SULBACTAM SODIUM 1.5 G: 1; .5 INJECTION, POWDER, FOR SOLUTION INTRAMUSCULAR; INTRAVENOUS at 22:18

## 2020-12-03 RX ADMIN — ATORVASTATIN CALCIUM 40 MG: 40 TABLET, FILM COATED ORAL at 22:22

## 2020-12-03 RX ADMIN — GABAPENTIN 200 MG: 100 CAPSULE ORAL at 18:32

## 2020-12-03 RX ADMIN — PAROXETINE HYDROCHLORIDE 10 MG: 20 TABLET, FILM COATED ORAL at 10:00

## 2020-12-03 RX ADMIN — AMPICILLIN SODIUM AND SULBACTAM SODIUM 1.5 G: 1; .5 INJECTION, POWDER, FOR SOLUTION INTRAMUSCULAR; INTRAVENOUS at 13:41

## 2020-12-03 RX ADMIN — POLYETHYLENE GLYCOL 3350 17 G: 17 POWDER, FOR SOLUTION ORAL at 22:17

## 2020-12-03 RX ADMIN — CARBIDOPA AND LEVODOPA 1 TABLET: 25; 100 TABLET ORAL at 13:27

## 2020-12-03 RX ADMIN — AMPICILLIN SODIUM AND SULBACTAM SODIUM 1.5 G: 1; .5 INJECTION, POWDER, FOR SOLUTION INTRAMUSCULAR; INTRAVENOUS at 09:30

## 2020-12-03 RX ADMIN — ENTACAPONE 200 MG: 200 TABLET, FILM COATED ORAL at 23:26

## 2020-12-03 RX ADMIN — Medication 10 ML: at 13:27

## 2020-12-03 RX ADMIN — CARBIDOPA AND LEVODOPA 1 TABLET: 25; 100 TABLET ORAL at 18:32

## 2020-12-03 RX ADMIN — CARBIDOPA AND LEVODOPA 1 TABLET: 25; 100 TABLET ORAL at 22:17

## 2020-12-03 RX ADMIN — GABAPENTIN 200 MG: 100 CAPSULE ORAL at 09:19

## 2020-12-03 RX ADMIN — LEVOFLOXACIN 750 MG: 5 INJECTION, SOLUTION INTRAVENOUS at 00:51

## 2020-12-03 RX ADMIN — ROPINIROLE HYDROCHLORIDE 1 MG: 1 TABLET, FILM COATED ORAL at 10:00

## 2020-12-03 RX ADMIN — FUROSEMIDE 20 MG: 10 INJECTION, SOLUTION INTRAMUSCULAR; INTRAVENOUS at 02:48

## 2020-12-03 RX ADMIN — ENTACAPONE 200 MG: 200 TABLET, FILM COATED ORAL at 09:19

## 2020-12-03 RX ADMIN — CARBIDOPA AND LEVODOPA 1 TABLET: 25; 100 TABLET ORAL at 09:19

## 2020-12-03 NOTE — PROGRESS NOTES
Pt seen and examined,   79y/o male with Parkinson's admitted for acute hypoxic resp failure seocndary to likely aspiration     - agree with continuing antibiotics  - COVID pending  - Speech pending   - May consider GI consult, will see how progresses with diet. Called daughter, Joanne Zarate and discussed plan of care with her.      Rest per Dr. Ej Felix

## 2020-12-03 NOTE — PROGRESS NOTES
CRAIG:  1. RUR-13%  2. PT/OT/SLP consults. 3. Return to [facility] when stable with S transport. Care Management Interventions  PCP Verified by CM: Yes  Palliative Care Criteria Met (RRAT>21 & CHF Dx)?: No  Mode of Transport at Discharge: BLS  Transition of Care Consult (CM Consult): Assisted Living  MyChart Signup: No  Discharge Durable Medical Equipment: No  Health Maintenance Reviewed: Yes  Physical Therapy Consult: Yes  Occupational Therapy Consult: Yes  Speech Therapy Consult: No  Current Support Network: Assisted Living, Lives with Spouse  Confirm Follow Up Transport: Family  The Plan for Transition of Care is Related to the Following Treatment Goals : PT/OT and SLP consult   The Patient and/or Patient Representative was Provided with a Choice of Provider and Agrees with the Discharge Plan?: Yes  [place] Resource Information Provided?: No  Discharge Location  Discharge Placement: Assisted Living      Reason for Admission:   Pneumonia                    RUR Score:          13%           Plan for utilizing home health:    Patient will return to [facility] when stable. PCP: First and Last name:  Dr. Jocelyn Yuen   Name of Practice: [address] [place] [address]    Are you a current patient: Yes/No:  Yes    Approximate date of last visit: 11/17/20   Can you participate in a virtual visit with your PCP:  Patient can do telephone visits. Current Advanced Directive/Advance Care Plan: On file, and ACP discussion completed. Transition of Care Plan:                      CM noted patient on contact precautions for covid rule out, and patient has dementia. CM spoke to his daughter, Osmany Rizo [phone] who confirmed that her mother also has dementia. Elizabeth confirmed patient recently moved in to the [facility] and uses a rollator for assistance, and is in the process of obtaining a wheelchair. She stated the staff provide assistance with ADLs.  Marilee provided cm with [facility] information to contact, [phone] and fax number is [phone]. The patient has used PT/OT services at The [address] [place] [address] in the past. CM spoke to [place] at the [address] [place] [address] who is appreciative of the update and willing to receive clinical fax information. This patient will need S transport to return to The [address] [place] [address].     Urbano Reed [facility]

## 2020-12-03 NOTE — H&P
[address] [facility] Adult  Hospitalist Group  History and Physical    Primary Care Provider: Maxim Cisneros MD  Date of Service:  12/3/2020    Subjective:     Ghassan Jackson is a 78 y.o. male past medical history significant for Parkinson, dementia, CAD, CKD stage III presented emergency room with respiratory distress and hypoxia. Patient lives at [address] [place] [address] as per report he had an episode today of choking during dinner afterward he became hypoxic with O2 sats in the upper 80s and found to have a \" low-grade fever\" of 99.9. Per daughter report he had a similar episode of choking last week again when he was eating by his respiratory symptoms were not as severe and he recovered quickly. He does have significant Parkinson disease and he has been having difficulty eating and swallowing. Family also is concerned that patient has been gaining weight. Has gained about 18 pounds in the last 2 having bilateral lower extremity swelling. Daughter denies history of heart failure and patient on diuretic. In the ED today patient was found to be hypoxic with O2 sats of 89%. Put on 2 L nasal cannula with significant improvement of his O2 saturation. He was given a dose of Levaquin. Hospitalist consulted for admission. Review of Systems:    Review of systems not obtained due to patient factors.      Past Medical History:   Diagnosis Date    Aaron esophagus     Benign nodular prostatic hyperplasia without lower urinary tract symptoms 7/12/2017    CKD (chronic kidney disease) stage 2, GFR 60-89 ml/min     Constipation     Coronary artery disease 7/12/2017    Depression     DJD (degenerative joint disease) 7/12/2017    Falls     Gout 7/12/2017    Headache     Hearing difficulty of both ears 7/12/2017    Hyperlipidemia     Hypertension     Impaired cognition 7/12/2017    MCI (mild cognitive impairment)     Restless leg syndrome 7/12/2017    Sleep apnea in adult 7/12/2017    No longer on CPAP @13  Snoring     Stroke ([facility] [address]     Tendinitis 7/12/2017    ACHILLES    Tendinitis of left rotator cuff 07/12/2017    Tremor 7/12/2017    LEFT HAND    Unspecified sleep apnea     lost weight no longer has it      Past Surgical History:   Procedure Laterality Date    COLONOSCOPY N/A 1/16/2019    [facility]COPY    COLONOSCOPY,FLEX,W/CONTROL, BLEEDING  1/16/2019         HX ORTHOPAEDIC  1/27/15    L4-L5 MICRODECOMPRESSION (Right    [place] EGD TRANSORAL BIOPSY SINGLE/MULTIPLE  8/16/2011         [place] EGD TRANSORAL BIOPSY SINGLE/MULTIPLE  10/1/2013          Prior to Admission medications    Medication Sig Start Date End Date Taking? Authorizing Provider   valsartan (DIOVAN) 160 mg tablet Take 1 Tab by mouth Every morning for 90 days. Indications: high blood pressure 10/11/20 1/9/21  Manoj Sanchez MD   amLODIPine (NORVASC) 5 mg tablet 0ne tab daily qhs  Indications: high blood pressure 10/11/20   Manoj Sanchez MD   gabapentin (NEURONTIN) 100 mg capsule T2C BID 10/9/20   Manoj Sanchez MD   PARoxetine (PAXIL) 10 mg tablet TAKE 1 TABLET BY MOUTH DAILY 10/9/20   Manoj Sanchez MD   entacapone (COMTAN) 200 mg tablet TAKE 1 TABLET THREE TIMES A DAY 10/9/20   Luis Enrique Green MD   artificial tears, dextran 70-hypromellose, (GenTeal Tears Mild) 0.1-0.3 % ophthalmic solution Administer 1 Drop to both eyes nightly. Provider, Historical   carboxymethylcellulose sodium (THERATEARS OP) Apply  to eye. 1 -2 drops both eyes bid    Provider, Historical   esomeprazole (NEXIUM) 20 mg capsule TAKE 1 CAPSULE DAILY 8/5/20   Manoj Sanchez MD   carbidopa-levodopa (Sinemet)  mg per tablet 2 p.o. 4 times daily  Indications: Parkinson's disease 4/28/20   Manoj Sanchez MD   atorvastatin (LIPITOR) 40 mg tablet TAKE 1 TABLET DAILY 3/16/20   Manoj Sanchez MD   rOPINIRole (REQUIP) 1 mg tablet Take 1 Tab by mouth three (3) times daily.   Patient taking differently: Take 1 mg by mouth two (2) times a day. 7/29/19   Shital Lomax MD   acetaminophen (TYLENOL EXTRA STRENGTH) 500 mg tablet Take 1,000 mg by mouth three (3) times daily as needed for Pain. Rapid release    Provider, Historical   senna-docusate (SENNA-S) 8.6-50 mg per tablet Take 1 Tab by mouth every other day. Provider, Historical   aspirin delayed-release 81 mg tablet Take  by mouth daily. Provider, Historical   vitamin e (E GEMS) 1,000 unit capsule Take 2,000 Units by mouth daily. Provider, Historical   MULTIVITAMIN PO Take 1 Tab by mouth daily. Provider, Historical     No Known Allergies   Family History   Problem Relation Age of Onset    Heart Disease Mother     Heart Disease Father         SOCIAL HISTORY:  Patient resides at facility  Patient ambulates with assistance  Smoking history: None. Alcohol history: None. Objective:       Physical Exam:   Patient Vitals for the past 12 hrs:   Temp Pulse Resp BP SpO2   12/03/20 0130  66 21 (!) 123/57    12/03/20 0115  70 22 131/65 96 %   12/03/20 0100  67 23 129/63    12/03/20 0045  61 20 (!) 111/54    12/03/20 0015  66 21 103/82 99 %   12/03/20 0000  68 22 (!) 120/46    12/02/20 2345  64 21 126/60 100 %   12/02/20 2330  66 27 113/75 93 %   12/02/20 2315  67 24 111/62    12/02/20 2300  70 23 112/64 96 %   12/02/20 2259  68 27  (!) 89 %   12/02/20 2245  68 27 109/66 94 %   12/02/20 2213 98.5 °F (36.9 °C)           GEN APPEARANCE: Ill appearing  HEENT: Conjunctiva Clear  CVS: RRR,  No M/G/R  LUNGS: Diffuse ronchi R>L  ABD: Soft; No TTP; + Normoactive BS  EXT: 2+ pitting edema   Skin exam: No gross lesions noted on exposed skin surfaces  MENTAL STATUS:Lethargic but arousable. Nuero: limited exam but moves all extremities.      Data Review:   Recent Results (from the past 24 hour(s))   CBC WITH AUTOMATED DIFF    Collection Time: 12/02/20 10:22 PM   Result Value Ref Range    WBC 7.9 4.1 - 11.1 K/uL    RBC 3.32 (L) 4.10 - 5.70 M/uL    HGB 11.3 (L) 12.1 - 17.0 g/dL    HCT 33.4 (L) 36.6 - 50.3 %    .6 (H) 80.0 - 99.0 FL    MCH 34.0 26.0 - 34.0 PG    MCHC 33.8 30.0 - 36.5 g/dL    RDW 12.5 11.5 - 14.5 %    PLATELET 840 915 - 432 K/uL    MPV 10.3 8.9 - 12.9 FL    NRBC 0.0 0  WBC    ABSOLUTE NRBC 0.00 0.00 - 0.01 K/uL    NEUTROPHILS 72 32 - 75 %    BAND NEUTROPHILS 8 (H) 0 - 6 %    LYMPHOCYTES 12 12 - 49 %    MONOCYTES 8 5 - 13 %    EOSINOPHILS 0 0 - 7 %    BASOPHILS 0 0 - 1 %    IMMATURE GRANULOCYTES 0 %    TOTAL CELLS COUNTED 25      ABS. NEUTROPHILS 6.4 1.8 - 8.0 K/UL    ABS. LYMPHOCYTES 0.9 0.8 - 3.5 K/UL    ABS. MONOCYTES 0.6 0.0 - 1.0 K/UL    ABS. EOSINOPHILS 0.0 0.0 - 0.4 K/UL    ABS. BASOPHILS 0.0 0.0 - 0.1 K/UL    ABS. IMM. GRANS. 0.0 K/UL    DF MANUAL      RBC COMMENTS NORMOCYTIC, NORMOCHROMIC     METABOLIC PANEL, COMPREHENSIVE    Collection Time: 12/02/20 10:22 PM   Result Value Ref Range    Sodium 140 136 - 145 mmol/L    Potassium 4.1 3.5 - 5.1 mmol/L    Chloride 108 97 - 108 mmol/L    CO2 24 21 - 32 mmol/L    Anion gap 8 5 - 15 mmol/L    Glucose 115 (H) 65 - 100 mg/dL    BUN 35 (H) 6 - 20 MG/DL    Creatinine 1.59 (H) 0.70 - 1.30 MG/DL    BUN/Creatinine ratio 22 (H) 12 - 20      GFR est AA 51 (L) >60 ml/min/1.73m2    GFR est non-AA 42 (L) >60 ml/min/1.73m2    Calcium 8.8 8.5 - 10.1 MG/DL    Bilirubin, total 0.6 0.2 - 1.0 MG/DL    ALT (SGPT) 7 (L) 12 - 78 U/L    AST (SGOT) 23 15 - 37 U/L    Alk. phosphatase 59 45 - 117 U/L    Protein, total 7.1 6.4 - 8.2 g/dL    Albumin 3.4 (L) 3.5 - 5.0 g/dL    Globulin 3.7 2.0 - 4.0 g/dL    A-G Ratio 0.9 (L) 1.1 - 2.2     SAMPLES BEING HELD    Collection Time: 12/02/20 10:36 PM   Result Value Ref Range    SAMPLES BEING HELD 1RED,1BLUE,2BC BOTTLES     COMMENT        Add-on orders for these samples will be processed based on acceptable specimen integrity and analyte stability, which may vary by analyte.    LACTIC ACID    Collection Time: 12/02/20 10:38 PM   Result Value Ref Range    Lactic acid 2.0 0.4 - 2. 0 MMOL/L   NT-PRO BNP    Collection Time: 12/02/20 10:45 PM   Result Value Ref Range    NT pro- (H) <450 PG/ML   TROPONIN I    Collection Time: 12/02/20 10:45 PM   Result Value Ref Range    Troponin-I, Qt. <0.05 <0.05 ng/mL   EKG, 12 LEAD, INITIAL    Collection Time: 12/02/20 10:59 PM   Result Value Ref Range    Ventricular Rate 70 BPM    Atrial Rate 70 BPM    P-R Interval 176 ms    QRS Duration 82 ms    Q-T Interval 400 ms    QTC Calculation (Bezet) 432 ms    Calculated P Axis 25 degrees    Calculated R Axis -44 degrees    Calculated T Axis 14 degrees    Diagnosis       Sinus rhythm with premature atrial complexes  Left axis deviation  Inferior infarct , age undetermined  When compared with ECG of 04-SEP-2019 14:33,  premature atrial complexes are now present       Chest x-ray: Pending. Assessment:     Active Problems:    Pneumonia (12/3/2020)        Plan:     1. Acute hypoxic respiratory failure: likely from aspiration pna vs fluid overload. Noted to have an elevated proBNP. No history of heart failure. We will try small dose of Lasix 20 mg IV x1.  Will check echocardiogram.  Concern for aspiration pneumonia will place patient on Unasyn. Received Levaquin in the ED. COVID-19 test sent in the emergency room given patient is coming from facility. However, low suspicion not working  [facility] procalcitonin  - Supplemental O2 to keep O2 saturations greater than 92%. - Placed on droplet precaution plus contact      2. MARI: mild azotemia. likely prerenal.  -Monitor urine output. - Monitor renal function. 3. Anemia; No active sign of bleeding  - check Iron panel, b12, folate    4. Parkinson: at baseline as per family  - continue with home medications    5. Hypertension: bp soft today. Will hold antihypertensive for now.     DVT prophylaxis: Lovenox  CODE STATUS: Full code  Plan discussed with daughters:  First contact Marshall Morgan [phone]  Second contact Margaret Poretr [phone]  FUNCTIONAL STATUS PRIOR TO HOSPITALIZATION Wheelchair Bound     Signed By: Oni Powell MD     December 3, 2020

## 2020-12-03 NOTE — ED PROVIDER NOTES
HPI     78-year-old male with a history of Aaron's esophagus, chronic kidney disease, coronary artery disease, Parkinson's disease, high cholesterol, hypertension, presents emergency department with low-grade temperature, hypoxia, and episodes of choking. Per the patient's daughter last week he had an episode of choking while he was eating, seemed to have a low-grade temperature and generalized weakness but improved within 24 hours. Tonight again at dinner he was eating and had a choking episode. This evening he started with increased work of breathing they noted a pulse ox in the upper 80s and again a temperature of 99.9. Patient states he feels a little short of breath but has no chest pain. He has not been coughing. They do report about 1 weeks worth of lower extremity edema which is new. He has never had a blood clot or congestive heart failure. He does live at [address] [place] [address] but there is no known COVID-19 at the house and he was tested negative last week. Patient has no known underlying lung disease. This evening his daughter states he is unable to stand or ambulate unassisted due to generalized weakness.     Past Medical History:   Diagnosis Date    Aaron esophagus     Benign nodular prostatic hyperplasia without lower urinary tract symptoms 7/12/2017    CKD (chronic kidney disease) stage 2, GFR 60-89 ml/min     Constipation     Coronary artery disease 7/12/2017    Depression     DJD (degenerative joint disease) 7/12/2017    Falls     Gout 7/12/2017    Headache     Hearing difficulty of both ears 7/12/2017    Hyperlipidemia     Hypertension     Impaired cognition 7/12/2017    MCI (mild cognitive impairment)     Restless leg syndrome 7/12/2017    Sleep apnea in adult 7/12/2017    No longer on CPAP @13    Snoring     Stroke ([facility] [address]     Tendinitis 7/12/2017    ACHILLES    Tendinitis of left rotator cuff 07/12/2017    Tremor 7/12/2017    LEFT HAND    Unspecified sleep apnea lost weight no longer has it       Past Surgical History:   Procedure Laterality Date    COLONOSCOPY N/A 1/16/2019    COLONOSCOPY performed by Melisa Negron MD at \[facility]"" ENDOSCOPY    COLONOSCOPY,FLEX,W/CONTROL, BLEEDING  1/16/2019         HX ORTHOPAEDIC  1/27/15    L4-L5 MICRODECOMPRESSION (Right    [place] E[place] TRANSORAL BIOPSY SINGLE/MULTIPLE  8/16/2011         [place] E[place] TRANSORAL BIOPSY SINGLE/MULTIPLE  10/1/2013              Family History:   Problem Relation Age of Onset    Heart Disease Mother     Heart Disease Father        Social History     Socioeconomic History    Marital status:      Spouse name: Not on file    Number of children: Not on file    Years of education: Not on file    Highest education level: Not on file   Occupational History    Not on file   Social Needs    Financial resource strain: Not on file    Food insecurity     Worry: Not on file     Inability: Not on file    Transportation needs     Medical: Not on file     Non-medical: Not on file   Tobacco Use    Smoking status: Former Smoker    Smokeless tobacco: Never Used   Substance and Sexual Activity    Alcohol use: No    Drug use: No    Sexual activity: Not on file   Lifestyle    Physical activity     Days per week: Not on file     Minutes per session: Not on file    Stress: Not on file   Relationships    Social connections     Talks on phone: Not on file     Gets together: Not on file     Attends [employer] service: Not on file     Active member of club or organization: Not on file     Attends meetings of clubs or organizations: Not on file     Relationship status: Not on file    Intimate partner violence     Fear of current or ex partner: Not on file     Emotionally abused: Not on file     Physically abused: Not on file     Forced sexual activity: Not on file   Other Topics Concern    Not on file   Social History Narrative    Not on file         ALLERGIES: Patient has no known allergies.     Review of Systems   Constitutional: Positive for fever. HENT: Positive for congestion. Negative for sore throat. Eyes: Negative for visual disturbance. Respiratory: Positive for shortness of breath. Negative for cough and chest tightness. Cardiovascular: Negative for chest pain. Gastrointestinal: Negative for abdominal pain, nausea and vomiting. Endocrine: Negative for polyuria. Genitourinary: Negative for dysuria. Musculoskeletal: Positive for gait problem. Neurological: Negative for headaches. Psychiatric/Behavioral: Negative for dysphoric mood. Vitals:    12/02/20 2213   Temp: 98.5 °F (36.9 °C)            Physical Exam  Constitutional:       General: He is not in acute distress. Appearance: He is well-developed. HENT:      Head: Normocephalic and atraumatic. Mouth/Throat:      Pharynx: No oropharyngeal exudate. Eyes:      General: No scleral icterus. Right eye: No discharge. Left eye: No discharge. Pupils: Pupils are equal, round, and reactive to light. Neck:      Musculoskeletal: Normal range of motion and neck supple. Vascular: No JVD. Cardiovascular:      Rate and Rhythm: Normal rate and regular rhythm. Heart sounds: Normal heart sounds. No murmur. Pulmonary:      Effort: Pulmonary effort is normal. Tachypnea present. No respiratory distress. Breath sounds: Normal breath sounds. No stridor. No wheezing or rales. Chest:      Chest wall: No tenderness. Abdominal:      General: Bowel sounds are normal. There is no distension. Palpations: Abdomen is soft. There is no mass. Tenderness: There is no abdominal tenderness. There is no guarding or rebound. Musculoskeletal: Normal range of motion. Right lower leg: Edema present. Left lower leg: Edema present. Skin:     General: Skin is warm and dry. Capillary Refill: Capillary refill takes less than 2 seconds. Findings: No rash.    Neurological:      Mental Status: He is oriented to person, place, and time. Psychiatric:         Behavior: Behavior normal.         Thought Content: Thought content normal.         Judgment: Judgment normal.          MDM       Procedures      ED EKG interpretation:  Rhythm: normal sinus rhythm; and regular . Rate (approx.): 70; Axis: left axis deviation; P wave: normal; QRS interval: normal ; ST/T wave: non-specific changes; This EKG was interpreted by Keke Apodaca MD,ED Provider. labs ok including lactate. CXR c/w RLL infiltrate. Will start antibiotics, doing well on nasal canula. Will check covid given coming from faciltiy. Admit. Perfect Serve Consult for Admission  11:44 PM    ED Room Number: ER25/25  Patient Name and age:  Garth Wooten 78 y.o.  male  Working Diagnosis:   1. Pneumonia of right lower lobe due to infectious organism        COVID-19 Suspicion:  Other  Sepsis present:  no  Reassessment needed: no  Code Status:  Full Code  Readmission: no  Isolation Requirements:  yes  Recommended Level of Care:  telemetry  Department:[facility] Adult ED - 21   Other:  78year old male with parkinsons, presents with cough, hypoxia. Has had 2 choking episodes with eating-one last week, one today. His sats are in the upper 80's on room air. Temp of 99 at facility per daughter. CXR shows RLL on my read. Labs ok. He's been cultured and I've ordered LEvaquin.  Covid sent since coming from a nursing home (No known covid  there per daughter)

## 2020-12-03 NOTE — ACP (ADVANCE CARE PLANNING)
Advance Care Planning     Advance Care Planning Activator (Inpatient)  Conversation Note      Date of ACP Conversation: 12/03/20     Conversation Conducted with:   Healthcare Decision Maker: Named in Advance Directive or Healthcare Power of     ACP Activator: Mckenna Telles    . Health Care Decision Maker:    Current Designated Health Care Decision Maker:   Primary Decision Maker: Matias Jamison [facility] - [phone]      Care Preferences    Ventilation: \"If you were in your present state of health and suddenly became very ill and were unable to breathe on your own, what would your preference be about the use of a ventilator (breathing machine) if it were available to you? \"      If patient would desire the use of a ventilator (breathing machine), answer \"yes\", if not \"no\":yes    \"If your health worsens and it becomes clear that your chance of recovery is unlikely, what would your preference be about the use of a ventilator (breathing machine) if it were available to you? \"     Would the patient desire the use of a ventilator (breathing machine)? NO      Resuscitation  \"CPR works best to restart the heart when there is a sudden event, like a heart attack, in someone who is otherwise healthy. Unfortunately, CPR does not typically restart the heart for people who have serious health conditions or who are very sick. \"    \"In the event your heart stopped as a result of an underlying serious health condition, would you want attempts to be made to restart your heart (answer \"yes\" for attempt to resuscitate) or would you prefer a natural death (answer \"no\" for do not attempt to resuscitate)? \" yes        [] Yes  [] No   Educated Patient / Phillip Luis regarding differences between Advance Directives and portable DNR orders.     Length of ACP Conversation in minutes:      Conversation Outcomes:  [x] ACP discussion completed  [] Existing advance directive reviewed with patient; no changes to patient's previously recorded wishes     [] New Advance Directive completed   [] Portable Do Not Resuscitate prepared for Provider review and signature  [] POLST/POST/MOLST/MOST prepared for Provider review and signature      Follow-up plan:    [] Schedule follow-up conversation to continue planning  [] Referred individual to Provider for additional questions/concerns   [] Advised patient/agent/surrogate to review completed ACP document and update if needed with changes in condition, patient preferences or care setting     [] This note routed to one or more involved healthcare providers      Beck Flores [facility]

## 2020-12-03 NOTE — ED NOTES
TRANSFER - OUT REPORT:    Verbal report given to Travis Tolentino RN (name) on Luis Iba  being transferred to  (unit) for routine progression of care       Report consisted of patients Situation, Background, Assessment and   Recommendations(SBAR). Information from the following report(s) SBAR, ED Summary, [facility] and Recent Results was reviewed with the receiving nurse. Lines:   Peripheral IV 12/02/20 Left Antecubital (Active)   Site Assessment Clean, dry, & intact 12/02/20 2235   Phlebitis Assessment 0 12/02/20 2235   Infiltration Assessment 0 12/02/20 2235   Dressing Status Clean, dry, & intact 12/02/20 2235   Hub Color/Line Status Pink 12/02/20 2235       Peripheral IV 12/02/20 Right Antecubital (Active)   Site Assessment Clean, dry, & intact 12/02/20 2236   Phlebitis Assessment 0 12/02/20 2236   Infiltration Assessment 0 12/02/20 2236   Dressing Status Clean, dry, & intact 12/02/20 2236   Hub Color/Line Status Pink 12/02/20 2236        Opportunity for questions and clarification was provided.

## 2020-12-03 NOTE — PROGRESS NOTES
TRANSFER - IN REPORT:    Verbal report received from Sheree(name) on Dimple Babinski  being received from ED(unit) for routine progression of care      Report consisted of patients Situation, Background, Assessment and   Recommendations(SBAR). Information from the following report(s) SBAR and Kardex was reviewed with the receiving nurse. Opportunity for questions and clarification was provided. Assessment completed upon patients arrival to unit and care assumed.

## 2020-12-03 NOTE — PROGRESS NOTES
SPEECH PATHOLOGY BEDSIDE SWALLOW EVALUATION/DISCHARGE  Patient: Brittni Prater (89 y.o. male)  Date: 12/3/2020  Primary Diagnosis: Pneumonia [J18.9]       Precautions:   Fall    ASSESSMENT :  Based on the objective data described below, the patient presents with no oropharyngeal dysphagia with no overt s/s of aspiration evident nor complaint of difficulty. I suspect, given history of Aaron's esophagus, KELI, and multiple esophageal dilatations, patient has a component of esophageal dysphagia which is likely leading to episodes of \"choking\" at meals. This appears to be occurring with solid foods only. May benefit from further GI assessment. Skilled acute therapy provided by a speech-language pathologist is not indicated at this time. PLAN :  Recommendations:  1. Mechanical soft consistencies; avoid hard, tough solids like steak/chicken and crusty breads  2. Safe swallowing strategies (upright for all PO, small bites/sips, slow rate)  3. Straws OK  4. Pills whole one at a time with water  5. Consider further GI w/u. Saw Dr. Chris Oliveira in the past.    Discharge Recommendations: None     SUBJECTIVE:   Patient stated I try not to cough in the dining room while I'm eating with other people. \" Spoke with daughter by phone who reports a history of KELI and multiple dilatations in the past but it's been \"years\" since the last one. Patient reports no issues with liquids, only solids and choking does not occur at every meal but at least 1x/daily. Patient takes 5+ pills at a time with water without difficulty.     OBJECTIVE:     Past Medical History:   Diagnosis Date    Aaron esophagus     Benign nodular prostatic hyperplasia without lower urinary tract symptoms 7/12/2017    CKD (chronic kidney disease) stage 2, GFR 60-89 ml/min     Constipation     Coronary artery disease 7/12/2017    Depression     DJD (degenerative joint disease) 7/12/2017    Falls     Gout 7/12/2017    Headache     Hearing difficulty of both ears 7/12/2017    Hyperlipidemia     Hypertension     Impaired cognition 7/12/2017    MCI (mild cognitive impairment)     Restless leg syndrome 7/12/2017    Sleep apnea in adult 7/12/2017    No longer on CPAP @13    Snoring     Stroke ([address]     Tendinitis 7/12/2017    ACHILLES    Tendinitis of left rotator cuff 07/12/2017    Tremor 7/12/2017    LEFT HAND    Unspecified sleep apnea     lost weight no longer has it     Past Surgical History:   Procedure Laterality Date    COLONOSCOPY N/A 1/16/2019    COLONOSCOPY performed by Rhoda Ace [facility] at [facility] ENDOSCOPY    COLONOSCOPY,FLEX,W/CONTROL, BLEEDING  1/16/2019         HX ORTHOPAEDIC  1/27/15    L4-L5 MICRODECOMPRESSION (Right    TN EGD TRANSORAL BIOPSY SINGLE/MULTIPLE  8/16/2011         TN EGD TRANSORAL BIOPSY SINGLE/MULTIPLE  10/1/2013          Prior Level of Function/Home Situation:   Home Situation  Home Environment: Private residence  # Steps to Enter: 3  Rails to Enter: Yes  Hand Rails : Bilateral  One/Two Story Residence: One story  Living Alone: No  Support Systems: Spouse/Significant Other/Partner  Current DME Used/Available at Home: Mala Moellers, rollator, Wheelchair  Diet prior to admission: Regular/thin  Current Diet:  NPO  Cognitive and Communication Status:  Neurologic State: Alert  Orientation Level: Oriented to person, Oriented to place  Cognition: Memory loss, Follows commands  Perception: Appears intact  Perseveration: No perseveration noted  Safety/Judgement: Decreased insight into deficits  Oral Assessment:  Oral Assessment  Labial: No impairment  Dentition: Natural  Oral Hygiene: moist oral mucosa  Lingual: No impairment  Velum: No impairment  Mandible: No impairment  P.O. Trials:  Patient Position: Upright in bed  Vocal quality prior to P.O.: No impairment  Consistency Presented: Puree; Solid; Thin liquid  How Presented: Spoon;Straw;Cup/gulp; Successive swallows;SLP-fed/presented;Self-fed/presented     Bolus Acceptance: No impairment  Bolus Formation/Control: No impairment     Propulsion: No impairment  Oral Residue: None  Initiation of Swallow: No impairment  Laryngeal Elevation: Functional  Aspiration Signs/Symptoms: None  Pharyngeal Phase Characteristics: No impairment, issues, or problems              Oral Phase Severity: No impairment  Pharyngeal Phase Severity : No impairment  NOMS:   The NOMS functional outcome measure was used to quantify this patient's level of swallowing impairment. Based on the NOMS, the patient was determined to be at level 7 for swallow function     NOMS Swallowing Levels:  Level 1 (CN): NPO  Level 2 (CM): NPO but takes consistency in therapy  Level 3 (CL): Takes less than 50% of nutrition p.o. and continues with nonoral feedings; and/or safe with mod cues; and/or max diet restriction  Level 4 (CK): Safe swallow but needs mod cues; and/or mod diet restriction; and/or still requires some nonoral feeding/supplements  Level 5 (CJ): Safe swallow with min diet restriction; and/or needs min cues  Level 6 (CI): Independent with p.o.; rare cues; usually self cues; may need to avoid some foods or needs extra time  Level 7 ([address] [place] [address]): Independent for all p.o.  CARINA. (2003). National Outcomes Measurement System (NOMS): Adult Speech-Language Pathology User's Guide. Pain:  Pain Scale 1: Numeric (0 - 10)  Pain Intensity 1: 0     After treatment:   Patient left in no apparent distress in bed, Call bell within reach and Nursing notified    COMMUNICATION/EDUCATION:     The patient's plan of care including recommendations, planned interventions, and recommended diet changes were discussed with: Registered nurse. Thank you for this referral.  Rodrigo Bentley.  Berna London MS, CCC-SLP, BCS-S  Time Calculation: 15 mins

## 2020-12-03 NOTE — PROGRESS NOTES
Problem: Mobility Impaired (Adult and Pediatric)  Goal: *Acute Goals and Plan of Care (Insert Text)  Description: FUNCTIONAL STATUS PRIOR TO ADMISSION: Patient was modified independent using a rollator for functional mobility (approx 15 ft). Patient required minimal assistance for basic and instrumental ADLs. HOME SUPPORT PRIOR TO ADMISSION: The patient lived with wife. Physical Therapy Goals  Initiated 12/3/2020  1. Patient will move from supine to sit and sit to supine , scoot up and down, and roll side to side in bed with modified independence within 7 day(s). 2.  Patient will transfer from bed to chair and chair to bed with supervision/set-up using the least restrictive device within 7 day(s). 3.  Patient will perform sit to stand with supervision/set-up within 7 day(s). 4.  Patient will ambulate with supervision/set-up for 30 feet with the least restrictive device within 7 day(s). 5.  Patient will ascend/descend 3 stairs with bilateral handrail(s) with minimal assistance/contact guard assist within 7 day(s). Outcome: Not Met     PHYSICAL THERAPY EVALUATION  Patient: Blessing Lu (85 y.o. male)  Date: 12/3/2020  Primary Diagnosis: Pneumonia [J18.9]       Precautions:   Fall      ASSESSMENT  Based on the objective data described below, the patient presents with decreased activity tolerance, balance deficits, and weakness. Per daughter, pt with stiffness and has good and bad days, yet very active when younger. Pt tolerated sitting EOB for approx 8 minutes presenting with posterior trunk leaning requiring intermittent mod A. Pt assisted back to supine and required 2 person A for repositioning. Pt appears to be below functional baseline, will trial standing next session. Recommend chair position 3x/day to prevent deconditioning and promote upright sitting tolerance.      Current Level of Function Impacting Discharge (mobility/balance): mod-max A for bed mobility     Functional Outcome Measure: The patient scored Total: 15/100 on the Barthel Index which is indicative of 85% impaired ability to care for basic self needs/dependency on others. Other factors to consider for discharge: unable to stand, fall risk, ambulatory at baseline (short distance), PUI     Patient will benefit from skilled therapy intervention to address the above noted impairments. PLAN :  Recommendations and Planned Interventions: bed mobility training, transfer training, gait training, therapeutic exercises, neuromuscular re-education, patient and family training/education and therapeutic activities      Frequency/Duration: Patient will be followed by physical therapy:  5 times a week to address goals. Recommendation for discharge: (in order for the patient to meet his/her long term goals)  Therapy up to 5 days/week in SNF setting  If pt were to d/c home, would benefit from HHPT and require assistance/supervision for 2 person A for all bed mobility, transfers, and ambulation as pt is a fall risk    This discharge recommendation:  Has not yet been discussed the attending provider and/or case management    IF patient discharges home will need the following DME: hospital bed and mechanical lift         SUBJECTIVE:   Patient stated I used to run.     OBJECTIVE DATA SUMMARY:   HISTORY:    Past Medical History:   Diagnosis Date    Aaron esophagus     Benign nodular prostatic hyperplasia without lower urinary tract symptoms 7/12/2017    CKD (chronic kidney disease) stage 2, GFR 60-89 ml/min     Constipation     Coronary artery disease 7/12/2017    Depression     DJD (degenerative joint disease) 7/12/2017    Falls     Gout 7/12/2017    Headache     Hearing difficulty of both ears 7/12/2017    Hyperlipidemia     Hypertension     Impaired cognition 7/12/2017    MCI (mild cognitive impairment)     Restless leg syndrome 7/12/2017    Sleep apnea in adult 7/12/2017    No longer on CPAP @13    Snoring     [facility] ([facility]     Tendinitis 2017    ACHILLES    Tendinitis of left rotator cuff 2017    Tremor 2017    LEFT HAND    Unspecified sleep apnea     lost weight no longer has it     Past Surgical History:   Procedure Laterality Date    COLONOSCOPY N/A 2019    [facility]    COLONOSCOPY,FLEX,W/CONTROL, BLEEDING  2019         HX ORTHOPAEDIC  1/27/15    L4-L5 MICRODECOMPRESSION (Right    TN EGD TRANSORAL BIOPSY SINGLE/MULTIPLE  2011         TN EGD TRANSORAL BIOPSY SINGLE/MULTIPLE  10/1/2013            Personal factors and/or comorbidities impacting plan of care: PMH, PUI    Home Situation  Home Environment: Private residence  # Steps to Enter: 3  Rails to Enter: Yes  Hand Rails : Bilateral  One/Two Story Residence: One story  Living Alone: No  Support Systems: Spouse/Significant Other/Partner  Current DME Used/Available at Home: [address], rollator, Wheelchair    EXAMINATION/PRESENTATION/DECISION MAKING:   Critical Behavior:     Orientation Level: Oriented to person        Hearing:     Skin:  intact  Edema: none noted  Range Of Motion:  AROM: Generally decreased, functional           PROM: Generally decreased, functional           Strength:    Strength: Grossly decreased, non-functional                    Tone & Sensation:   Tone: Abnormal              Sensation: Impaired               Coordination:  Coordination: Generally decreased, functional  Vision:      Functional Mobility:  Bed Mobility:     Supine to Sit: Moderate assistance  Sit to Supine: Maximum assistance  Scooting: Maximum assistance;Assist x2  Transfers:                             Balance:   Sitting: Impaired; Without support  Sitting - Static: Fair (occasional)  Sitting - Dynamic: Fair (occasional)      Functional Measure:  Barthel Index:    Bathin  Bladder: 0  Bowels: 0  Groomin  Dressin  Feeding: 10  Mobility: 0  Stairs: 0  Toilet Use: 0  Transfer (Bed to Chair and Back): 5  Total: 15/100       The Barthel ADL Index: Guidelines  1. The index should be used as a record of what a patient does, not as a record of what a patient could do. 2. The main aim is to establish degree of independence from any help, physical or verbal, however minor and for whatever reason. 3. The need for supervision renders the patient not independent. 4. A patient's performance should be established using the best available evidence. Asking the patient, friends/relatives and nurses are the usual sources, but direct observation and common sense are also important. However direct testing is not needed. 5. Usually the patient's performance over the preceding 24-48 hours is important, but occasionally longer periods will be relevant. 6. Middle categories imply that the patient supplies over 50 per cent of the effort. 7. Use of aids to be independent is allowed. Johnathan Kc, Barthel, D.W. (6024). Functional evaluation: the Barthel Index. [address] [employer] (14)2. Juan Diego Covarrubias lorena SUSIE Matthews, Brittany Plascencia., Lizabeth Johns., [place], [address] [place] [address] (1999). Measuring the change indisability after inpatient rehabilitation; comparison of the responsiveness of the Barthel Index and Functional [place] Measure. Journal of Neurology, Neurosurgery, and Psychiatry, 66(4), 978-251. Waldemar Louis, [email or web address], CHESTER Navas, & Chung Torres, M.A. (2004.) Assessment of post-stroke quality of life in cost-effectiveness studies: The usefulness of the Barthel Index and the EuroQoL-5D.  Quality of Life Research, 15, 887-72        Physical Therapy Evaluation Charge Determination   History Examination Presentation Decision-Making   HIGH Complexity :3+ comorbidities / personal factors will impact the outcome/ POC  LOW Complexity : 1-2 Standardized tests and measures addressing body structure, function, activity limitation and / or participation in recreation  MEDIUM Complexity : Evolving with changing characteristics Other outcome measures barthel  HIGH       Based on the above components, the patient evaluation is determined to be of the following complexity level: LOW       Activity Tolerance:   Fair and requires rest breaks    After treatment patient left in no apparent distress:   Supine in bed, Call bell within reach and Bed / chair alarm activated    COMMUNICATION/EDUCATION:   The patients plan of care was discussed with: Occupational therapist, Speech therapist and Registered nurse. Fall prevention education was provided and the patient/caregiver indicated understanding., Patient/family have participated as able in goal setting and plan of care. and Patient/family agree to work toward stated goals and plan of care.     Thank you for this referral.  Rodriguez Branch, PT, DPT   Time Calculation: 20 mins

## 2020-12-03 NOTE — PROGRESS NOTES
Verbal shift change report given to Nathaniel Reddy (oncoming nurse) by Niharika Berumen (offgoing nurse). Report included the following information SBAR, Kardex and Recent Results.

## 2020-12-03 NOTE — PROGRESS NOTES
Problem: Pressure Injury - Risk of  Goal: *Prevention of pressure injury  Description: Document Elier Scale and appropriate interventions in the flowsheet. Outcome: Progressing Towards Goal  Note: Pressure Injury Interventions:       Moisture Interventions: Apply protective barrier, creams and emollients, Absorbent underpads, Check for incontinence Q2 hours and as needed    Activity Interventions: Pressure redistribution bed/mattress(bed type)    Mobility Interventions: HOB 30 degrees or less    Nutrition Interventions: Document food/fluid/supplement intake    Friction and Shear Interventions: Apply protective barrier, creams and emollients, HOB 30 degrees or less                Problem: Patient Education: Go to Patient Education Activity  Goal: Patient/Family Education  Outcome: Progressing Towards Goal     Problem: Falls - Risk of  Goal: *Absence of Falls  Description: Document Glen Fall Risk and appropriate interventions in the flowsheet.   Outcome: Progressing Towards Goal  Note: Fall Risk Interventions:  Mobility Interventions: Bed/chair exit alarm    Mentation Interventions: Eyeglasses and hearing aids, More frequent rounding                        Problem: Patient Education: Go to Patient Education Activity  Goal: Patient/Family Education  Outcome: Progressing Towards Goal

## 2020-12-03 NOTE — PROGRESS NOTES
Bedside shift change report given to Kasandra Noe (oncoming nurse) by Edmund Lanes (offgoing nurse). Report included the following information SBAR, Kardex, MAR and Recent Results.

## 2020-12-03 NOTE — ED TRIAGE NOTES
Pt coming from [facility] assisted living for low ox sat's 85% RA. Per EMS family stated during lunch time pt choked on some food and a XRAY was taken at [facility] that showed \"small spot\" they were thinking bronchitis. Per EMS Hx stoke and Parkinson. Pt denies CP, N/V, and abd pain. Pt speech is slightly impaired but within pt normal per family. Pt on 3L O2 NC  Sat's 93% and doesn't use home O2.

## 2020-12-03 NOTE — PROGRESS NOTES
Pharmacy Renal dosing protocol:  Day #1 of Unasyn  Indication:  aspiration PNA  Current regimen:  1.5 g q8h    Recent Labs     20  2222   WBC 7.9   CREA 1.59*   BUN 35*     Est CrCl: 40 ml/min; UO: n/a ml/kg/hr  Temp (24hrs), Av.9 °F (37.2 °C), Min:98.5 °F (36.9 °C), Max:99.2 °F (37.3 °C)    Cultures: none    Plan: Change to 1.5g IV q6h for CrCl greater than 30 ml/min

## 2020-12-03 NOTE — PROGRESS NOTES
Physician Progress Note      Eli Gonzalez  CSN #:                  [id number]  :                       1940  ADMIT DATE:       2020 10:01 PM  [address] DATE:  RESPONDING  PROVIDER #:        Elana Spencer MD          QUERY TEXT:      Dear attending physician,  Patient admitted with SOB. Noted documentation of Acute Kidney Injury with a history of CKD2. Currently the patient does not meet KDIGO criteria for the MARI portion of this diagnosis. If you are using another criteria to support this diagnosis, please document this in your progress note. Otherwise, please document in the progress notes the clinical indicators that support this diagnosis or can the condition be further clarified as: The medical record reflects the following:  Risk Factors: Hx. CKD 2  Clinical Indicators: -Cr 1.1, GFR >60, -Cr 1.59, GFR 42, 12/3-Cr 1.53, GFR 44  -Cr 1.53, GFR 40  Per H&P- MARI: mild azotemia. likely prerenal.  Treatment: Monitor labwork, I&O, avoid nephrotoxins,      Reference: freee. com  In patients with chronic kidney disease (CKD), MARI is defined according to CKD stage:    STAGE 1: (CrCl >90) 0.3 mg/dl increase in Creatinine over 24hr or 0.5 mg/dl increase over 48hr  STAGE 2: (CrCl 60-89) 0.5 mg/dl increase in Creatinine over 24hr or 1.0 mg/dl increase over 48hr  STAGE 3: (CrCl 30-59) 1.0 mg/dl increase in Creatinine over 24hr or 1.5 mg/dl increase over 48hr  STAGE 4: (CrCl 15-29) Based on research limits of original study, use stage 3 parameters    Thank you,  Princess Lepe RN, BSN  Clinical documentation Improvement  [phone]  Options provided:  -- Acute kidney injury evidenced by, Please document evidence as well as baseline creatinine, if known.   -- Acute kidney injury ruled out after study  -- CKD 2 only  -- Other - I will add my own diagnosis  -- Disagree - Not applicable / Not valid  -- Disagree - Clinically unable to determine / Unknown  -- Refer to Clinical Documentation Reviewer    PROVIDER RESPONSE TEXT:    This patient has an acute kidney injury as evidenced by Rise in Cr over 0.3 in 24-48 hours    Query created by: Brandi De Guzman on 12/3/2020 10:50 AM      QUERY TEXT:    Dear attending physician,    Pt admitted with SOB. Pt noted to have documentation of acute respiratory failure. If possible, please document in the progress notes and discharge summary if you are evaluating and/or treating any of the following: The medical record reflects the following:  Risk Factors: Hx. Parkinson?s  Clinical Indicators: pox 89% on Ra x 1 with RR max 27 in ED  Per ED triage RN- Pt coming from [facility] assisted living for low ox sat's 85% RA. Pt on 3L O2 NC  Sat's 93% and doesn't use home O2. Per ED MD- 78year old male with parkinsons, presents with cough, hypoxia. Has had 2 choking episodes with eating-one last week, one today. His sats are in the upper 80's on room air. 12/2-CXR- Impression: Right lower lobe infiltrate. Per H&P- Acute hypoxic respiratory failure: likely from aspiration pna vs fluid overload. Treatment: IV Lasix 20 mg, Monitor vital signs, pulse oximetry, labwork, imaging, oxygen 2 liters nasal cannula    Thank you,  Rosaura Ko RN, BSN  Clinical documentation Improvement  [phone]  Options provided:  -- Acute Respiratory Failure, POA as evidenced by, Please document evidence.   -- Acute Respiratory Failure ruled out after study  -- Other - I will add my own diagnosis  -- Disagree - Not applicable / Not valid  -- Disagree - Clinically unable to determine / Unknown  -- Refer to Clinical Documentation Reviewer    PROVIDER RESPONSE TEXT:    This patient was in acute respiratory failure, POA as evidenced by Requiring 2L NC to keep saturations over 90%    Query created by: Brandi De Guzman on 12/3/2020 11:04 AM      Electronically signed by:  Phu Jewell MD 12/3/2020 4:01 PM

## 2020-12-03 NOTE — PROGRESS NOTES
Admission Medication Reconciliation:    Information obtained from:  medication list from [facility] scanned 12/3/20   RxQuery data available¹:  YES    Comments/Recommendations: Updated PTA meds/reviewed patient's allergies. 1)  Utilized med list scanned in patient's media file to confirm PTA med list    2)  Medication changes (since last review): Added  - tussin    Adjusted  - ropinirole    Removed  - none     ¹RxQuery pharmacy benefit data reflects medications filled and processed through the patient's insurance, however   this data does NOT capture whether the medication was picked up or is currently being taken by the patient. Allergies:  Patient has no known allergies. Significant PMH/Disease States:   Past Medical History:   Diagnosis Date    Aaron esophagus     Benign nodular prostatic hyperplasia without lower urinary tract symptoms 7/12/2017    CKD (chronic kidney disease) stage 2, GFR 60-89 ml/min     Constipation     Coronary artery disease 7/12/2017    Depression     DJD (degenerative joint disease) 7/12/2017    Falls     Gout 7/12/2017    Headache     Hearing difficulty of both ears 7/12/2017    Hyperlipidemia     Hypertension     Impaired cognition 7/12/2017    MCI (mild cognitive impairment)     Restless leg syndrome 7/12/2017    Sleep apnea in adult 7/12/2017    No longer on CPAP @13    Snoring     Stroke ([facility] [address]     Tendinitis 7/12/2017    ACHILLES    Tendinitis of left rotator cuff 07/12/2017    Tremor 7/12/2017    LEFT HAND    Unspecified sleep apnea     lost weight no longer has it     Chief Complaint for this Admission:    Chief Complaint   Patient presents with    Shortness of Breath     Prior to Admission Medications:   Prior to Admission Medications   Prescriptions Last Dose Informant Taking? MULTIVITAMIN PO   No   Sig: Take 1 Tab by mouth daily.    PARoxetine (PAXIL) 10 mg tablet   No   Sig: TAKE 1 TABLET BY MOUTH DAILY   acetaminophen (TYLENOL EXTRA STRENGTH) 500 mg tablet   No Sig: Take 1,000 mg by mouth three (3) times daily as needed for Pain. Rapid release   amLODIPine (NORVASC) 5 mg tablet   No   Sine tab daily qhs  Indications: high blood pressure   artificial tears, dextran 70-hypromellose, (GenTeal Tears Mild) 0.1-0.3 % ophthalmic solution   No   Sig: Administer 1 Drop to both eyes nightly. aspirin delayed-release 81 mg tablet   No   Sig: Take 81 mg by mouth daily. atorvastatin (LIPITOR) 40 mg tablet   No   Sig: TAKE 1 TABLET DAILY   carbidopa-levodopa (Sinemet)  mg per tablet   No   Si p.o. 4 times daily  Indications: Parkinson's disease   carboxymethylcellulose sodium (THERATEARS OP)   No   Sig: Apply  to eye. 1 -2 drops both eyes bid   entacapone (COMTAN) 200 mg tablet   No   Sig: TAKE 1 TABLET THREE TIMES A DAY   esomeprazole (NEXIUM) 20 mg capsule   No   Sig: TAKE 1 CAPSULE DAILY   gabapentin (NEURONTIN) 100 mg capsule   No   Sig: T2C BID   guaiFENesin (Tussin) 100 mg/5 mL liquid   Yes   Sig: Take 400 mg by mouth daily as needed for Cough. rOPINIRole (REQUIP) 1 mg tablet   Yes   Sig: Take 1 mg by mouth two (2) times a day. senna-docusate (SENNA-S) 8.6-50 mg per tablet   No   Sig: Take 1 Tab by mouth every other day. valsartan (DIOVAN) 160 mg tablet   No   Sig: Take 1 Tab by mouth Every morning for 90 days. Indications: high blood pressure   vitamin e (E GEMS) 1,000 unit capsule   No   Sig: Take 2,000 Units by mouth daily. Facility-Administered Medications: None       Please contact the main inpatient pharmacy with any questions or concerns at [phone] and we will direct you to the clinical pharmacist covering this patient's care while in-house.    Doris Nugent

## 2020-12-04 LAB
ANION GAP SERPL CALC-SCNC: 6 MMOL/L (ref 5–15)
BASOPHILS # BLD: 0 K/UL (ref 0–0.1)
BASOPHILS NFR BLD: 0 % (ref 0–1)
BUN SERPL-MCNC: 36 MG/DL (ref 6–20)
BUN/CREAT SERPL: 25 (ref 12–20)
CALCIUM SERPL-MCNC: 8.9 MG/DL (ref 8.5–10.1)
CHLORIDE SERPL-SCNC: 107 MMOL/L (ref 97–108)
CO2 SERPL-SCNC: 27 MMOL/L (ref 21–32)
CREAT SERPL-MCNC: 1.43 MG/DL (ref 0.7–1.3)
DIFFERENTIAL METHOD BLD: ABNORMAL
EOSINOPHIL # BLD: 0.1 K/UL (ref 0–0.4)
EOSINOPHIL NFR BLD: 1 % (ref 0–7)
ERYTHROCYTE [DISTWIDTH] IN BLOOD BY AUTOMATED COUNT: 12.6 % (ref 11.5–14.5)
GLUCOSE SERPL-MCNC: 104 MG/DL (ref 65–100)
HCT VFR BLD AUTO: 29.5 % (ref 36.6–50.3)
HGB BLD-MCNC: 9.8 G/DL (ref 12.1–17)
IMM GRANULOCYTES # BLD AUTO: 0.1 K/UL (ref 0–0.04)
IMM GRANULOCYTES NFR BLD AUTO: 1 % (ref 0–0.5)
LYMPHOCYTES # BLD: 0.7 K/UL (ref 0.8–3.5)
LYMPHOCYTES NFR BLD: 6 % (ref 12–49)
MAGNESIUM SERPL-MCNC: 1.8 MG/DL (ref 1.6–2.4)
MCH RBC QN AUTO: 33.6 PG (ref 26–34)
MCHC RBC AUTO-ENTMCNC: 33.2 G/DL (ref 30–36.5)
MCV RBC AUTO: 101 FL (ref 80–99)
MONOCYTES # BLD: 0.5 K/UL (ref 0–1)
MONOCYTES NFR BLD: 4 % (ref 5–13)
NEUTS SEG # BLD: 10.7 K/UL (ref 1.8–8)
NEUTS SEG NFR BLD: 88 % (ref 32–75)
NRBC # BLD: 0 K/UL (ref 0–0.01)
NRBC BLD-RTO: 0 PER 100 WBC
PHOSPHATE SERPL-MCNC: 2.7 MG/DL (ref 2.6–4.7)
PLATELET # BLD AUTO: 179 K/UL (ref 150–400)
PMV BLD AUTO: 9.6 FL (ref 8.9–12.9)
POTASSIUM SERPL-SCNC: 3.9 MMOL/L (ref 3.5–5.1)
RBC # BLD AUTO: 2.92 M/UL (ref 4.1–5.7)
RBC MORPH BLD: ABNORMAL
SODIUM SERPL-SCNC: 140 MMOL/L (ref 136–145)
WBC # BLD AUTO: 12.1 K/UL (ref 4.1–11.1)

## 2020-12-04 PROCEDURE — 97530 THERAPEUTIC ACTIVITIES: CPT

## 2020-12-04 PROCEDURE — 74011250636 HC RX REV CODE- 250/636: Performed by: INTERNAL MEDICINE

## 2020-12-04 PROCEDURE — 74011000258 HC RX REV CODE- 258: Performed by: INTERNAL MEDICINE

## 2020-12-04 PROCEDURE — 65270000032 HC RM SEMIPRIVATE

## 2020-12-04 PROCEDURE — 97535 SELF CARE MNGMENT TRAINING: CPT

## 2020-12-04 PROCEDURE — 36415 COLL VENOUS BLD VENIPUNCTURE: CPT

## 2020-12-04 PROCEDURE — 83735 ASSAY OF MAGNESIUM: CPT

## 2020-12-04 PROCEDURE — 80048 BASIC METABOLIC PNL TOTAL CA: CPT

## 2020-12-04 PROCEDURE — 74011250637 HC RX REV CODE- 250/637: Performed by: INTERNAL MEDICINE

## 2020-12-04 PROCEDURE — 85025 COMPLETE CBC W/AUTO DIFF WBC: CPT

## 2020-12-04 PROCEDURE — 84100 ASSAY OF PHOSPHORUS: CPT

## 2020-12-04 PROCEDURE — 97165 OT EVAL LOW COMPLEX 30 MIN: CPT

## 2020-12-04 RX ORDER — ALBUTEROL SULFATE 0.83 MG/ML
2.5 SOLUTION RESPIRATORY (INHALATION)
Status: DISCONTINUED | OUTPATIENT
Start: 2020-12-04 | End: 2020-12-18 | Stop reason: HOSPADM

## 2020-12-04 RX ADMIN — PAROXETINE HYDROCHLORIDE 10 MG: 20 TABLET, FILM COATED ORAL at 10:49

## 2020-12-04 RX ADMIN — Medication 10 ML: at 06:21

## 2020-12-04 RX ADMIN — ROPINIROLE HYDROCHLORIDE 1 MG: 1 TABLET, FILM COATED ORAL at 10:48

## 2020-12-04 RX ADMIN — ACETAMINOPHEN 650 MG: 325 TABLET ORAL at 10:49

## 2020-12-04 RX ADMIN — CARBIDOPA AND LEVODOPA 1 TABLET: 25; 100 TABLET ORAL at 13:59

## 2020-12-04 RX ADMIN — GABAPENTIN 200 MG: 100 CAPSULE ORAL at 17:27

## 2020-12-04 RX ADMIN — GABAPENTIN 200 MG: 100 CAPSULE ORAL at 10:48

## 2020-12-04 RX ADMIN — Medication 10 ML: at 16:55

## 2020-12-04 RX ADMIN — AMPICILLIN SODIUM AND SULBACTAM SODIUM 1.5 G: 1; .5 INJECTION, POWDER, FOR SOLUTION INTRAMUSCULAR; INTRAVENOUS at 16:55

## 2020-12-04 RX ADMIN — ENTACAPONE 200 MG: 200 TABLET, FILM COATED ORAL at 22:09

## 2020-12-04 RX ADMIN — CARBIDOPA AND LEVODOPA 1 TABLET: 25; 100 TABLET ORAL at 22:09

## 2020-12-04 RX ADMIN — PANTOPRAZOLE SODIUM 20 MG: 20 TABLET, DELAYED RELEASE ORAL at 06:35

## 2020-12-04 RX ADMIN — CARBIDOPA AND LEVODOPA 1 TABLET: 25; 100 TABLET ORAL at 17:27

## 2020-12-04 RX ADMIN — AMPICILLIN SODIUM AND SULBACTAM SODIUM 1.5 G: 1; .5 INJECTION, POWDER, FOR SOLUTION INTRAMUSCULAR; INTRAVENOUS at 03:41

## 2020-12-04 RX ADMIN — ENTACAPONE 200 MG: 200 TABLET, FILM COATED ORAL at 17:26

## 2020-12-04 RX ADMIN — Medication 10 ML: at 22:10

## 2020-12-04 RX ADMIN — Medication 81 MG: at 10:49

## 2020-12-04 RX ADMIN — ENTACAPONE 200 MG: 200 TABLET, FILM COATED ORAL at 10:49

## 2020-12-04 RX ADMIN — AMPICILLIN SODIUM AND SULBACTAM SODIUM 1.5 G: 1; .5 INJECTION, POWDER, FOR SOLUTION INTRAMUSCULAR; INTRAVENOUS at 22:08

## 2020-12-04 RX ADMIN — Medication 10 ML: at 14:00

## 2020-12-04 RX ADMIN — AMPICILLIN SODIUM AND SULBACTAM SODIUM 1.5 G: 1; .5 INJECTION, POWDER, FOR SOLUTION INTRAMUSCULAR; INTRAVENOUS at 10:49

## 2020-12-04 RX ADMIN — ATORVASTATIN CALCIUM 40 MG: 40 TABLET, FILM COATED ORAL at 22:09

## 2020-12-04 RX ADMIN — ROPINIROLE HYDROCHLORIDE 1 MG: 1 TABLET, FILM COATED ORAL at 17:26

## 2020-12-04 RX ADMIN — CARBIDOPA AND LEVODOPA 1 TABLET: 25; 100 TABLET ORAL at 10:49

## 2020-12-04 NOTE — PROGRESS NOTES
Problem: Pressure Injury - Risk of  Goal: *Prevention of pressure injury  Description: Document Elier Scale and appropriate interventions in the flowsheet. Outcome: Progressing Towards Goal  Note: Pressure Injury Interventions:  Sensory Interventions: Avoid rigorous massage over bony prominences, Keep linens dry and wrinkle-free    Moisture Interventions: Apply protective barrier, creams and emollients, Absorbent underpads    Activity Interventions: Assess need for specialty bed, PT/OT evaluation    Mobility Interventions: Assess need for specialty bed, PT/OT evaluation    Nutrition Interventions: Document food/fluid/supplement intake, Offer support with meals,snacks and hydration    Friction and Shear Interventions: Apply protective barrier, creams and emollients                Problem: Falls - Risk of  Goal: *Absence of Falls  Description: Document Glen Fall Risk and appropriate interventions in the flowsheet. Outcome: Progressing Towards Goal  Note: Fall Risk Interventions:  Mobility Interventions: Bed/chair exit alarm    Mentation Interventions: Adequate sleep, hydration, pain control, Bed/chair exit alarm    Medication Interventions: Bed/chair exit alarm    Elimination Interventions:  Toileting schedule/hourly rounds, Bed/chair exit alarm

## 2020-12-04 NOTE — ACP (ADVANCE CARE PLANNING)
[address] [facility] Adult  Hospitalist Group                                      Advance Care Planning Note    Name: Shwetha Owens  YOB: 1940  MRN: [id number]  Admission Date: 12/2/2020 10:01 PM    Date of discussion: 12/4/2020    Active Diagnoses:    Hospital Problems  Date Reviewed: 9/14/2020          Codes Class Noted POA    Pneumonia ICD-10-CM: J18.9  ICD-9-CM: 682  12/3/2020 Unknown              These active diagnoses are of sufficient risk that focused discussion on advance care planning is indicated in order to allow the patient to thoughtfully consider personal goals of care, and if situations arise that prevent the ability to personally give input, to ensure appropriate representation of their personal desires for different levels and aggressiveness of care. Discussion:     Persons present and participating in discussion: , Yazmin Nunez MD, Pt's daughter Amira Peng. Topics Discussed:  Patient's medical condition and diagnosis: [  X] yes [  ] no   Surrogate decision maker: [  Nguyen Fuelling yes [  ] no   Patient's current physical function/cognitive function/frailty: Shyloh.  ] yes [  ] no   Code Status: Shyloh.  ] yes [  ] no   Artificial Nutrition / Dialysis / Non-Invasive Ventilation / Blood Transfusion: [  ] yes [ X ] no  Potential Resources for home (durable medical equipment, home nursing, home O2): [  ] yes [ X ] no    Overview of Discussion:     Reviewed current hospital course, the patient has a negative COVID-19, and likely has aspiration pneumonia. Reviewed the patient does have progressive Parkinson's, however has remained relatively stable on his home medications. He had one episode of prior aspiration. Reviewed that at this time we will go with speech therapy recommendations, he will be able to take in a mechanical soft diet.   Reviewed that unfortunately the natural course of Parkinson's likely he will have recurrent aspiration, and worsening oral skills over time.  It is difficult to know how long that process will take. Discussed whether the patient has an advanced medical directive, he had one done previously which named his wife is his power of . She herself has frontal lobe dementia, and currently is unable to make decisions. Therefore at this time power of  would fall to all 3 children. Discussed with Elizabeth that she should have a discussion with her father in the future when he is well about his wishes moving forward. Whether he would ever want a feeding tube, and to discuss his CODE STATUS. Elizabeth is an RN, we reviewed the difference between full code and DNR she is well versed in these. She states that for now he is full code, however she knows that if he were to have a cardiac arrest he would likely not have a good outcome. Time Spent:     Total time spent face-to-face in education and discussion: 18 minutes.      Georgina Alvarez MD  Date of Service:  12/4/2020  2:46 PM

## 2020-12-04 NOTE — PROGRESS NOTES
Problem: Mobility Impaired (Adult and Pediatric)  Goal: *Acute Goals and Plan of Care (Insert Text)  Description: FUNCTIONAL STATUS PRIOR TO ADMISSION: Patient was modified independent using a rollator for functional mobility (approx 15 ft). Patient required minimal assistance for basic and instrumental ADLs. HOME SUPPORT PRIOR TO ADMISSION: The patient lived with wife. Physical Therapy Goals  Initiated 12/3/2020  1. Patient will move from supine to sit and sit to supine , scoot up and down, and roll side to side in bed with modified independence within 7 day(s). 2.  Patient will transfer from bed to chair and chair to bed with supervision/set-up using the least restrictive device within 7 day(s). 3.  Patient will perform sit to stand with supervision/set-up within 7 day(s). 4.  Patient will ambulate with supervision/set-up for 30 feet with the least restrictive device within 7 day(s). 5.  Patient will ascend/descend 3 stairs with bilateral handrail(s) with minimal assistance/contact guard assist within 7 day(s). Outcome: Progressing Towards Goal       PHYSICAL THERAPY TREATMENT  Patient: Marlys Simon (54 y.o. male)  Date: 12/4/2020  Diagnosis: Pneumonia [J18.9]   <principal problem not specified>       Precautions: Fall  Chart, physical therapy assessment, plan of care and goals were reviewed. ASSESSMENT  Patient continues with skilled PT services and is progressing towards goals. Pt sleeping soundly on arrival. Pt was able to awake with v.c and tapping. Pt was able to get EOB with mod. Pt with slow initiation with noted tremors. Unable to advance mobility. PTA pt lived at assisted living. Pt could benefit from HHPT and increase assistance. Per discussion with CM pt has variable mobility. Current Level of Function Impacting Discharge (mobility/balance):  Mod A to EOB    Other factors to consider for discharge: cognition, variable assistance          PLAN :  Patient continues to benefit from skilled intervention to address the above impairments. Continue treatment per established plan of care. to address goals. Recommendation for discharge: (in order for the patient to meet his/her long term goals)  Physical therapy at least 2 days/week in the home at assisted living if unable then SNF    This discharge recommendation:  Has not yet been discussed the attending provider and/or case management    IF patient discharges home will need the following DME: patient owns DME required for discharge       SUBJECTIVE:   Patient stated yes.     OBJECTIVE DATA SUMMARY:   Critical Behavior:  Neurologic State: Alert  Orientation Level: (\"[facility]?\" \"January? \" \"[facility]\" \"2020?\")  Cognition: Impaired decision making  Safety/Judgement: Decreased insight into deficits  Functional Mobility Training:  Bed Mobility:     Supine to Sit: Moderate assistance  Sit to Supine: Moderate assistance           Transfers:                                   Balance:  Sitting: Impaired  Sitting - Static: Fair (occasional)  Sitting - Dynamic: Poor (constant support)  Ambulation/Gait Training:                                                        Stairs: Therapeutic Exercises:     Pain Rating:  No complaints     Activity Tolerance:   Poor    After treatment patient left in no apparent distress:   Supine in bed, Heels elevated for pressure relief, Patient positioned in right sidelying for pressure relief, and Call bell within reach    COMMUNICATION/COLLABORATION:   The patients plan of care was discussed with: Registered nurse.      May Collier PTA   Time Calculation: 16 mins

## 2020-12-04 NOTE — PROGRESS NOTES
Occupational Therapy    Orders received, chart reviewed and patient evaluated by occupational therapy. Pending progression with skilled acute occupational therapy, recommend:  Occupational therapy at least 2 days/week in the home AND ensure assist and/or supervision for safety with ADLs and functional mobility; If unable, recommend SNF rehab     Recommend with nursing patient to complete as able in order to maintain strength, endurance and independence: chair position in bed for meals and encourage max participation in ADLs with increased time allowed for processing and verbal/motor response of simple one step cues. Thank you for your assistance. Full evaluation to follow.      Tania Lipscomb OTR/L

## 2020-12-04 NOTE — PROGRESS NOTES
Problem: Self Care Deficits Care Plan (Adult)  Goal: *Acute Goals and Plan of Care (Insert Text)  Description:   FUNCTIONAL STATUS PRIOR TO ADMISSION: Patient is an unreliable historian 2/2 baseline dementia. Per chart review, patient was modified independent using a rollator for short distance functional mobility and received minimal(?) assistance from staff. This date, patient reporting he self-feeds and occasionally needs help with lower body dressing. HOME SUPPORT: Patient recently moved to [facility]. Occupational Therapy Goals  Initiated 12/4/2020  1. Patient will perform seated ADL task with material set-up and minimal assistance within 7 day(s). 2.  Patient will perform upper body dressing with minimal assistance within 7 day(s). 3.  Patient will perform lower body dressing with moderate assistance within 7 day(s). 4.  Patient will perform toilet transfers to [facility] with moderate assistance and RW within 7 day(s). 5.  Patient will perform all aspects of toileting with moderate assistance and RW within 7 day(s). Outcome: Progressing Towards Goal   OCCUPATIONAL THERAPY EVALUATION  Patient: Kim Cabrera (1 y.o. male)  Date: 12/4/2020  Primary Diagnosis: Pneumonia [J18.9]        Precautions:  Fall    ASSESSMENT  Based on the objective data described below, the patient presents with generalized weakness, impaired functional mobility and balance, decreased activity tolerance, impaired fine motor skills with baseline tremors 2/2 Parkinson's Disease, and cognitive deficits 2/2 baseline dementia. Patient resting in bed on RA but easily roused and agreeable to session. Noted A&O x3 with increased time and breakdown of questions for response. Patient able to come to EOB with moderate assistance - tolerated sitting EOB for approx 15 minutes with CGA - min assist for balance.  Patient agreeable to attempting donning socks at EOB, however noted much difficulty with problem-solving, sequencing, and with bimanual integration required for task - total assist ultimately required as patient unable to complete despite additional time and max cues. Patient able to stand from elevated EOB with minimal assistance using RW for BUE support once standing - unable to take steps but tolerated standing for approx 30 seconds before patient requiring seated rest break 2/2 fatigue and B knee buckling. Observed good control with return to sitting. Patient tolerating 2 additional trials of sit<>stand with up to mod assist required to sustain position due to fatigue. Note patient requiring manual cues for hand placement on RW with cognition and tremors impeding patient accuracy of movements. Patient is below reported baseline at this time - due to dementia, anticipate patient would perform best in a familiar environment. If increased supervision/assist available, recommend return to jail with [facility] services. If not, anticipate patient will require SNF rehab. *SpO2 93-95% with EOB activity, as low as 92% with standing - received and left on room air    Current Level of Function Impacting Discharge (ADLs/self-care): minimal assist - total assist    Functional Outcome Measure: The patient scored 15/100 on the Barthel Index. Other factors to consider for discharge: baseline dementia, from jail, fall risk     Patient will benefit from skilled therapy intervention to address the above noted impairments. PLAN :  Recommendations and Planned Interventions: self care training, functional mobility training, therapeutic exercise, balance training, therapeutic activities, endurance activities, and patient education    Frequency/Duration: Patient will be followed by occupational therapy 5 times a week to address goals.     Recommendation for discharge: (in order for the patient to meet his/her long term goals)  Occupational therapy at least 2 days/week in the home AND ensure assist and/or supervision for safety with all mobility and self-care    This discharge recommendation:  Has not yet been discussed the attending provider and/or case management    IF patient discharges home will need the following DME: TBD       SUBJECTIVE:   Patient stated if you tell me what to do, I'll try it and do my best.    OBJECTIVE DATA SUMMARY:   HISTORY:   Past Medical History:   Diagnosis Date    Aaron esophagus     Benign nodular prostatic hyperplasia without lower urinary tract symptoms 7/12/2017    CKD (chronic kidney disease) stage 2, GFR 60-89 ml/min     Constipation     Coronary artery disease 7/12/2017    Depression     DJD (degenerative joint disease) 7/12/2017    Falls     Gout 7/12/2017    Headache     Hearing difficulty of both ears 7/12/2017    Hyperlipidemia     Hypertension     Impaired cognition 7/12/2017    MCI (mild cognitive impairment)     Restless leg syndrome 7/12/2017    Sleep apnea in adult 7/12/2017    No longer on CPAP @13    Snoring     Stroke ([address]     Tendinitis 7/12/2017    ACHILLES    Tendinitis of left rotator cuff 07/12/2017    Tremor 7/12/2017    LEFT HAND    Unspecified sleep apnea     lost weight no longer has it     Past Surgical History:   Procedure Laterality Date    COLONOSCOPY N/A 1/16/2019    COL[facility]OSCOPY    COLONOSCOPY,FLEX,W/CONTROL, BLEEDING  1/16/2019         HX ORTHOPAEDIC  1/27/15    L4-L5 MICRODECOMPRESSION (Right    MI EGD TRANSORAL BIOPSY SINGLE/MULTIPLE  8/16/2011         MI EGD TRANSORAL BIOPSY SINGLE/MULTIPLE  10/1/2013            Expanded or extensive additional review of patient history:     Home Situation  Home Environment: Private residence  # Steps to Enter: 3  Rails to Enter: Yes  Hand Rails : Bilateral  One/Two Story Residence: One story  Living Alone: No  Support Systems: Spouse/Significant Other/Partner  Current DME Used/Available at Home: Walker, rollator, Wheelchair    Hand dominance: Right    EXAMINATION OF PERFORMANCE DEFICITS:  Cognitive/Behavioral Status:  Neurologic State: Alert  Orientation Level: Oriented X4  Cognition: Decreased command following;Poor safety awareness  Perception: Appears intact  Perseveration: No perseveration noted  Safety/Judgement: Decreased insight into deficits; Decreased awareness of need for safety;Decreased awareness of need for assistance; Fall prevention    Vision/Perceptual:     Patient initially keeping eyes largely closed - suspect drowsy. Tracking appropriately with improved alertness following transfer to EOB. Range of Motion:  AROM: Generally decreased, functional  PROM: Generally decreased, functional    Strength:  Strength: Generally decreased, functional    Coordination:  Coordination: Generally decreased, functional  Fine Motor Skills-Upper: Left Impaired;Right Impaired    Gross Motor Skills-Upper: Left Impaired;Right Impaired    Balance:  Sitting: Impaired  Sitting - Static: Good (unsupported)  Sitting - Dynamic: Fair (occasional)  Standing: Impaired; With support  Standing - Static: Poor;Constant support  Standing - Dynamic : Poor;Constant support    Functional Mobility and Transfers for ADLs:  Bed Mobility:  Supine to Sit: Moderate assistance  Sit to Supine: Moderate assistance  Scooting: Maximum assistance    Transfers:  Sit to Stand: Moderate assistance  Stand to Sit: Contact guard assistance    ADL Assessment:  Feeding: Setup;Minimum assistance    Oral Facial Hygiene/Grooming: Setup;Minimum assistance    Upper Body Dressing: Setup;Minimum assistance    Lower Body Dressing: Total assistance    Toileting: Maximum assistance(infer 2/2 balance, cognition, endurance)    ADL Intervention and task modifications:  Cognitive Retraining  Orientation Retraining: Reorienting; Awareness of environment;Situation(need for safety)  Problem Solving: Identifying the problem  Executive Functions: Executing cognitive plans  Organizing/Sequencing: Breaking task down  Safety/Judgement: Decreased insight into deficits; Decreased awareness of need for safety;Decreased awareness of need for assistance; Fall prevention    Functional Measure:  Barthel Index:    Bathin  Bladder: 0  Bowels: 0  Groomin  Dressin  Feedin  Mobility: 0  Stairs: 0  Toilet Use: 5  Transfer (Bed to Chair and Back): 5  Total: 15/100        The Barthel ADL Index: Guidelines  1. The index should be used as a record of what a patient does, not as a record of what a patient could do. 2. The main aim is to establish degree of independence from any help, physical or verbal, however minor and for whatever reason. 3. The need for supervision renders the patient not independent. 4. A patient's performance should be established using the best available evidence. Asking the patient, friends/relatives and nurses are the usual sources, but direct observation and common sense are also important. However direct testing is not needed. 5. Usually the patient's performance over the preceding 24-48 hours is important, but occasionally longer periods will be relevant. 6. Middle categories imply that the patient supplies over 50 per cent of the effort. 7. Use of aids to be independent is allowed. [facility]., Barthel, DMoisésW. ([id number]). Functional evaluation: the Barthel Index. [address] [facility] (14)2. SUSIE Carson, Kevan Mims., Cinthya Peter., [place], [address] [place] [address] (). Measuring the change indisability after inpatient rehabilitation; comparison of the responsiveness of the Barthel Index and Functional [place] Measure. Journal of Neurology, Neurosurgery, and Psychiatry, 66(4), 993-219. TABBY Meier.A, CHESTER Navas, & Yuriy Fleming, M.A. (2004.) Assessment of post-stroke quality of life in cost-effectiveness studies: The usefulness of the Barthel Index and the EuroQoL-5D.  Quality of Life Research, 15, 423-01     Occupational Therapy Evaluation Charge Determination   History Examination Decision-Making   LOW Complexity : Brief history review  MEDIUM Complexity : 3-5 performance deficits relating to physical, cognitive , or psychosocial skils that result in activity limitations and / or participation restrictions MEDIUM Complexity : Patient may present with comorbidities that affect occupational performnce. Miniml to moderate modification of tasks or assistance (eg, physical or verbal ) with assesment(s) is necessary to enable patient to complete evaluation       Based on the above components, the patient evaluation is determined to be of the following complexity level: LOW   Pain Rating:  No reports. Activity Tolerance:   Fair and requires rest breaks    After treatment patient left in no apparent distress: In stretcher with RN and transport staff, about to change rooms    COMMUNICATION/EDUCATION:   The patients plan of care was discussed with: Physical therapist and Registered nurse. Home safety education was provided and the patient/caregiver indicated understanding., Patient/family have participated as able in goal setting and plan of care. , and Patient/family agree to work toward stated goals and plan of care.     Thank you for this referral.  Magdaleno West OT  Time Calculation: 32 mins

## 2020-12-04 NOTE — PROGRESS NOTES
Bedside and Verbal shift change report given to Benjie Schmitz RN (oncoming nurse) by Dina Carbajal RN (offgoing nurse). Report included the following information SBAR, Kardex, Intake/Output, MAR, Recent Results and Med Rec Status.

## 2020-12-04 NOTE — PROGRESS NOTES
[address] [facility] Adult  Hospitalist Group                                                                                          Hospitalist Progress Note  Gema Lezama MD  Answering service: [phone] or [phone] from in house phone        Date of Service:  2020  NAME:  Lesa Brody  :  1940  MRN:  [id number]      Admission Summary:   Lesa Brody is a 78 y.o. male past medical history significant for Parkinson, dementia, CAD, CKD stage III presented emergency room with respiratory distress and hypoxia. Patient [facility] as per report he had an episode today of choking during dinner afterward he became hypoxic with O2 sats in the upper 80s and found to have a \" low-grade fever\" of 99.9. Per daughter report he had a similar episode of choking last week again when he was eating by his respiratory symptoms were not as severe and he recovered quickly. Interval history / Subjective:   Pt sleepy today, arousing to voice and then falling asleep quickly. No increased WOB. Assessment & Plan:     Acute hypoxic respiratory failure: likely from aspiration pna +/- fluid overload   Noted to have an elevated proBNP. No history of heart failure. We will try small dose of Lasix 20 mg IV x1.  Echo pending   - Cotninue unasyn, can transition to augmentin on DC for total 7 day course  - Procalcitonin elevated at 12.0  - Supplemental O2 to keep O2 saturations greater than 92%, now on 2L        MARI: mild azotemia. likely prerenal. Improving.   -Monitor urine output. - Monitor renal function.     Anemia; No active sign of bleeding, decreased counts today suspect dilutional  - monitor closely and recheck hemoglobin in am   - Transfuse for hemoglobin less than 7      Parkinson: at baseline as per family  - continue with home medications     Hypertension: bp soft today.  Will hold antihypertensive for now.     Code status: FULL  DVT prophylaxis: SCDs    Care Plan discussed with: Patient/Family  Anticipated Disposition: Home w/Family  Anticipated Discharge: 24 hours to 48 hours     Hospital Problems  Date Reviewed: 9/14/2020          Codes Class Noted POA    Pneumonia ICD-10-CM: J18.9  ICD-9-CM: 620  12/3/2020 Unknown                Review of Systems:   A comprehensive review of systems was negative except for that written in the HPI. Vital Signs:    Last 24hrs VS reviewed since prior progress note. Most recent are:  Visit Vitals  /68 (BP 1 Location: Right arm, BP Patient Position: At rest)   Pulse 64   Temp 100.3 °F (37.9 °C)   Resp 20   Ht 5' 10\" (1.778 m)   Wt 88.4 kg (194 lb 14.2 oz)   SpO2 90%   BMI 27.96 kg/m²         Intake/Output Summary (Last 24 hours) at 12/4/2020 1126  Last data filed at 12/4/2020 0217  Gross per 24 hour   Intake 440 ml   Output 350 ml   Net 90 ml        Physical Examination:     I had a face to face encounter with this patient and independently examined them on 12/4/2020 as outlined below:          Constitutional:  No acute distress, sleepy    ENT:  Oral mucosa moist, oropharynx benign. Resp:  Course bilaterally. No wheezing/rhonchi/rales. No accessory muscle use   CV:  Regular rhythm, normal rate, no murmurs, gallops, rubs    GI:  Soft, non distended, non tender. normoactive bowel sounds, no hepatosplenomegaly     Musculoskeletal:  No edema, warm, 2+ pulses throughout    Neurologic:  Moves all extremities.   Sleepy but arousable, not able to stay awake for me to answer questions or participate in exam.      Skin:  Good turgor, no rashes or ulcers       Data Review:    Review and/or order of clinical lab test  Review and/or order of tests in the radiology section of CPT  Review and/or order of tests in the medicine section of CPT      Labs:     Recent Labs     12/04/20  0629 12/03/20  0733   WBC 12.1* 14.7*   HGB 9.8* 11.4*   HCT 29.5* 34.2*    208     Recent Labs     12/04/20  0629 12/03/20  0733 12/02/20  2222    139 140   K 3.9 4.4 4.1    107 108   CO2 27 26 24   BUN 36* 39* 35*   CREA 1.43* 1.53* 1.59*   * 107* 115*   CA 8.9 8.8 8.8   MG 1.8  --   --    PHOS 2.7  --   --      Recent Labs     12/02/20  2222   ALT 7*   AP 59   TBILI 0.6   TP 7.1   ALB 3.4*   GLOB 3.7     No results for input(s): INR, PTP, APTT, INREXT in the last 72 hours. No results for input(s): FE, TIBC, PSAT, FERR in the last 72 hours. No results found for: FOL, RBCF   No results for input(s): PH, PCO2, PO2 in the last 72 hours.   Recent Labs     12/02/20  2245   TROIQ <0.05     Lab Results   Component Value Date/Time    Cholesterol, total 119 07/08/2020 10:40 AM    HDL Cholesterol 51 07/08/2020 10:40 AM    LDL, calculated 47 07/08/2020 10:40 AM    Triglyceride 103 07/08/2020 10:40 AM    CHOL/HDL Ratio 2 07/08/2020 10:40 AM     No results found for: GLUCPOC  Lab Results   Component Value Date/Time    Color brown (A) 07/08/2020 10:41 AM    Appearance Clear 01/15/2020 03:22 PM    Specific gravity 1.015 07/08/2020 10:41 AM    Specific gravity 1.014 09/04/2019 02:45 PM    pH (UA) 5 07/08/2020 10:41 AM    Protein 2+ (A) 07/08/2020 10:41 AM    Glucose NEGATIVE  09/04/2019 02:45 PM    Ketone Negative 07/08/2020 10:41 AM    Bilirubin Negative 07/08/2020 10:41 AM    Urobilinogen Negative 07/08/2020 10:41 AM    Nitrites Negative 07/08/2020 10:41 AM    Leukocyte Esterase Negative 07/08/2020 10:41 AM    Epithelial cells FEW 09/04/2019 02:45 PM    Bacteria Few 01/15/2020 03:22 PM    WBC 0 07/08/2020 10:41 AM    RBC 2-5 (A) 07/08/2020 10:41 AM         Medications Reviewed:     Current Facility-Administered Medications   Medication Dose Route Frequency    sodium chloride (NS) flush 5-40 mL  5-40 mL IntraVENous Q8H    sodium chloride (NS) flush 5-40 mL  5-40 mL IntraVENous PRN    acetaminophen (TYLENOL) tablet 650 mg  650 mg Oral Q6H PRN    Or    acetaminophen (TYLENOL) suppository 650 mg  650 mg Rectal Q6H PRN    polyethylene glycol (MIRALAX) packet 17 g  17 g Oral DAILY PRN    promethazine (PHENERGAN) tablet 12.5 mg  12.5 mg Oral Q6H PRN    Or    ondansetron (ZOFRAN) injection 4 mg  4 mg IntraVENous Q6H PRN    ampicillin-sulbactam (UNASYN) 1.5 g in 0.9% sodium chloride (MBP/ADV) 50 mL MBP  1.5 g IntraVENous Q6H    aspirin delayed-release tablet 81 mg  81 mg Oral DAILY    atorvastatin (LIPITOR) tablet 40 mg  40 mg Oral QHS    carbidopa-levodopa (SINEMET)  mg per tablet 1 Tab  1 Tab Oral QID    entacapone (COMTAN) tablet 200 mg  200 mg Oral TID    pantoprazole (PROTONIX) tablet 20 mg  20 mg Oral ACB    PARoxetine (PAXIL) tablet 10 mg  10 mg Oral DAILY    gabapentin (NEURONTIN) capsule 200 mg  200 mg Oral BID    rOPINIRole (REQUIP) tablet 1 mg  1 mg Oral BID     ______________________________________________________________________  EXPECTED LENGTH OF STAY: 3d 2h  ACTUAL LENGTH OF STAY:          1                 Samantha Dugan MD

## 2020-12-05 ENCOUNTER — APPOINTMENT (OUTPATIENT)
Dept: NON INVASIVE DIAGNOSTICS | Age: 80
DRG: 871 | End: 2020-12-05
Attending: INTERNAL MEDICINE
Payer: MEDICARE

## 2020-12-05 ENCOUNTER — APPOINTMENT (OUTPATIENT)
Dept: GENERAL RADIOLOGY | Age: 80
DRG: 871 | End: 2020-12-05
Attending: INTERNAL MEDICINE
Payer: MEDICARE

## 2020-12-05 LAB
ANION GAP SERPL CALC-SCNC: 7 MMOL/L (ref 5–15)
BASOPHILS # BLD: 0 K/UL (ref 0–0.1)
BASOPHILS NFR BLD: 0 % (ref 0–1)
BUN SERPL-MCNC: 33 MG/DL (ref 6–20)
BUN/CREAT SERPL: 29 (ref 12–20)
CALCIUM SERPL-MCNC: 9.2 MG/DL (ref 8.5–10.1)
CHLORIDE SERPL-SCNC: 108 MMOL/L (ref 97–108)
CO2 SERPL-SCNC: 27 MMOL/L (ref 21–32)
CREAT SERPL-MCNC: 1.15 MG/DL (ref 0.7–1.3)
DIFFERENTIAL METHOD BLD: ABNORMAL
EOSINOPHIL # BLD: 0.1 K/UL (ref 0–0.4)
EOSINOPHIL NFR BLD: 1 % (ref 0–7)
ERYTHROCYTE [DISTWIDTH] IN BLOOD BY AUTOMATED COUNT: 12.4 % (ref 11.5–14.5)
GLUCOSE SERPL-MCNC: 98 MG/DL (ref 65–100)
HCT VFR BLD AUTO: 29.7 % (ref 36.6–50.3)
HGB BLD-MCNC: 9.7 G/DL (ref 12.1–17)
IMM GRANULOCYTES # BLD AUTO: 0.1 K/UL (ref 0–0.04)
IMM GRANULOCYTES NFR BLD AUTO: 1 % (ref 0–0.5)
LYMPHOCYTES # BLD: 0.9 K/UL (ref 0.8–3.5)
LYMPHOCYTES NFR BLD: 10 % (ref 12–49)
MAGNESIUM SERPL-MCNC: 1.9 MG/DL (ref 1.6–2.4)
MCH RBC QN AUTO: 33.3 PG (ref 26–34)
MCHC RBC AUTO-ENTMCNC: 32.7 G/DL (ref 30–36.5)
MCV RBC AUTO: 102.1 FL (ref 80–99)
MONOCYTES # BLD: 0.4 K/UL (ref 0–1)
MONOCYTES NFR BLD: 4 % (ref 5–13)
NEUTS SEG # BLD: 7.7 K/UL (ref 1.8–8)
NEUTS SEG NFR BLD: 84 % (ref 32–75)
NRBC # BLD: 0 K/UL (ref 0–0.01)
NRBC BLD-RTO: 0 PER 100 WBC
PHOSPHATE SERPL-MCNC: 3.2 MG/DL (ref 2.6–4.7)
PLATELET # BLD AUTO: 183 K/UL (ref 150–400)
PMV BLD AUTO: 9.8 FL (ref 8.9–12.9)
POTASSIUM SERPL-SCNC: 3.9 MMOL/L (ref 3.5–5.1)
RBC # BLD AUTO: 2.91 M/UL (ref 4.1–5.7)
SODIUM SERPL-SCNC: 142 MMOL/L (ref 136–145)
WBC # BLD AUTO: 9.1 K/UL (ref 4.1–11.1)

## 2020-12-05 PROCEDURE — 65270000032 HC RM SEMIPRIVATE

## 2020-12-05 PROCEDURE — 71045 X-RAY EXAM CHEST 1 VIEW: CPT

## 2020-12-05 PROCEDURE — 94640 AIRWAY INHALATION TREATMENT: CPT

## 2020-12-05 PROCEDURE — 74011000250 HC RX REV CODE- 250: Performed by: INTERNAL MEDICINE

## 2020-12-05 PROCEDURE — 36415 COLL VENOUS BLD VENIPUNCTURE: CPT

## 2020-12-05 PROCEDURE — 83735 ASSAY OF MAGNESIUM: CPT

## 2020-12-05 PROCEDURE — 74011000258 HC RX REV CODE- 258: Performed by: INTERNAL MEDICINE

## 2020-12-05 PROCEDURE — 85025 COMPLETE CBC W/AUTO DIFF WBC: CPT

## 2020-12-05 PROCEDURE — 84100 ASSAY OF PHOSPHORUS: CPT

## 2020-12-05 PROCEDURE — 74011250637 HC RX REV CODE- 250/637: Performed by: INTERNAL MEDICINE

## 2020-12-05 PROCEDURE — 74011250636 HC RX REV CODE- 250/636: Performed by: INTERNAL MEDICINE

## 2020-12-05 PROCEDURE — 80048 BASIC METABOLIC PNL TOTAL CA: CPT

## 2020-12-05 PROCEDURE — 74011000250 HC RX REV CODE- 250: Performed by: NURSE PRACTITIONER

## 2020-12-05 RX ORDER — CARBIDOPA AND LEVODOPA 25; 100 MG/1; MG/1
2 TABLET ORAL 4 TIMES DAILY
Status: DISCONTINUED | OUTPATIENT
Start: 2020-12-05 | End: 2020-12-18 | Stop reason: HOSPADM

## 2020-12-05 RX ORDER — FUROSEMIDE 20 MG/1
20 TABLET ORAL DAILY
Status: DISCONTINUED | OUTPATIENT
Start: 2020-12-05 | End: 2020-12-13

## 2020-12-05 RX ORDER — IPRATROPIUM BROMIDE AND ALBUTEROL SULFATE 2.5; .5 MG/3ML; MG/3ML
3 SOLUTION RESPIRATORY (INHALATION)
Status: DISCONTINUED | OUTPATIENT
Start: 2020-12-05 | End: 2020-12-06

## 2020-12-05 RX ORDER — GUAIFENESIN 600 MG/1
600 TABLET, EXTENDED RELEASE ORAL EVERY 12 HOURS
Status: DISCONTINUED | OUTPATIENT
Start: 2020-12-05 | End: 2020-12-15 | Stop reason: ALTCHOICE

## 2020-12-05 RX ADMIN — FUROSEMIDE 20 MG: 20 TABLET ORAL at 12:05

## 2020-12-05 RX ADMIN — ENTACAPONE 200 MG: 200 TABLET, FILM COATED ORAL at 17:04

## 2020-12-05 RX ADMIN — ROPINIROLE HYDROCHLORIDE 1 MG: 1 TABLET, FILM COATED ORAL at 17:03

## 2020-12-05 RX ADMIN — CARBIDOPA AND LEVODOPA 2 TABLET: 25; 100 TABLET ORAL at 22:01

## 2020-12-05 RX ADMIN — ENTACAPONE 200 MG: 200 TABLET, FILM COATED ORAL at 22:13

## 2020-12-05 RX ADMIN — AMPICILLIN SODIUM AND SULBACTAM SODIUM 1.5 G: 1; .5 INJECTION, POWDER, FOR SOLUTION INTRAMUSCULAR; INTRAVENOUS at 17:08

## 2020-12-05 RX ADMIN — Medication 10 ML: at 12:05

## 2020-12-05 RX ADMIN — CARBIDOPA AND LEVODOPA 1 TABLET: 25; 100 TABLET ORAL at 12:05

## 2020-12-05 RX ADMIN — ALBUTEROL SULFATE 2.5 MG: 2.5 SOLUTION RESPIRATORY (INHALATION) at 00:42

## 2020-12-05 RX ADMIN — ATORVASTATIN CALCIUM 40 MG: 40 TABLET, FILM COATED ORAL at 22:01

## 2020-12-05 RX ADMIN — GUAIFENESIN 600 MG: 600 TABLET, EXTENDED RELEASE ORAL at 12:04

## 2020-12-05 RX ADMIN — IPRATROPIUM BROMIDE AND ALBUTEROL SULFATE 3 ML: .5; 3 SOLUTION RESPIRATORY (INHALATION) at 20:54

## 2020-12-05 RX ADMIN — GUAIFENESIN 600 MG: 600 TABLET, EXTENDED RELEASE ORAL at 22:01

## 2020-12-05 RX ADMIN — PAROXETINE HYDROCHLORIDE 10 MG: 20 TABLET, FILM COATED ORAL at 08:29

## 2020-12-05 RX ADMIN — PANTOPRAZOLE SODIUM 20 MG: 20 TABLET, DELAYED RELEASE ORAL at 08:28

## 2020-12-05 RX ADMIN — Medication 10 ML: at 04:44

## 2020-12-05 RX ADMIN — Medication 10 ML: at 22:40

## 2020-12-05 RX ADMIN — IPRATROPIUM BROMIDE AND ALBUTEROL SULFATE 3 ML: .5; 3 SOLUTION RESPIRATORY (INHALATION) at 12:44

## 2020-12-05 RX ADMIN — CARBIDOPA AND LEVODOPA 2 TABLET: 25; 100 TABLET ORAL at 17:04

## 2020-12-05 RX ADMIN — AMPICILLIN SODIUM AND SULBACTAM SODIUM 1.5 G: 1; .5 INJECTION, POWDER, FOR SOLUTION INTRAMUSCULAR; INTRAVENOUS at 22:39

## 2020-12-05 RX ADMIN — AMPICILLIN SODIUM AND SULBACTAM SODIUM 1.5 G: 1; .5 INJECTION, POWDER, FOR SOLUTION INTRAMUSCULAR; INTRAVENOUS at 04:44

## 2020-12-05 RX ADMIN — Medication 81 MG: at 08:28

## 2020-12-05 RX ADMIN — ROPINIROLE HYDROCHLORIDE 1 MG: 1 TABLET, FILM COATED ORAL at 08:28

## 2020-12-05 RX ADMIN — GABAPENTIN 200 MG: 100 CAPSULE ORAL at 17:03

## 2020-12-05 RX ADMIN — ENTACAPONE 200 MG: 200 TABLET, FILM COATED ORAL at 08:29

## 2020-12-05 RX ADMIN — GABAPENTIN 200 MG: 100 CAPSULE ORAL at 08:28

## 2020-12-05 RX ADMIN — AMPICILLIN SODIUM AND SULBACTAM SODIUM 1.5 G: 1; .5 INJECTION, POWDER, FOR SOLUTION INTRAMUSCULAR; INTRAVENOUS at 10:19

## 2020-12-05 RX ADMIN — CARBIDOPA AND LEVODOPA 1 TABLET: 25; 100 TABLET ORAL at 08:28

## 2020-12-05 NOTE — PROGRESS NOTES
Bedside shift change report given to Piter Armstrong RN (oncoming nurse) by Meredith Salcido (offgoing nurse). Report included the following information SBAR and MAR.     2200 pm:  Patient's daughter is concerned about patient's wheezing. Audible wheezing noted and labored breathing. Expiratory wheezes noted bilaterally on Respiratory Assessment. Patient's oxygen saturation is 88% on Room Air. No oxygen order noted. Will contact the hospitalist for oxygen orders. 2230 pm:  Oxygen order received from hospitalist.  Patient placed on 2 liters via NC. Oxygen saturation is 94% on 2 liters NC. Wheezing has resolved. Will continue to monitor. 2300 pm:  Daughter advises the nurse that the wheezing has returned. Patient's oxygen saturation remains at 94% on 2 liters NC. Slight audible wheezes noted. Expiratory wheezes continue in lungs bilaterally. Hospitalist paged per daughter's request for Albuterol treatments. Orders given for Albuterol nebulizers PRN. Respiratory paged and notified of new orders. 0100 am:  Patient's daughter inquires if oxygen saturation can be checked again. Oxygen saturation is 88% on 2 liters via NC. Increased oxygen to 3 liters via NC. Order states titrate oxygen to maintain saturation above 92%. Oxygen saturation is 92% on 3 liters via NC. Will continue to monitor patient's oxygen saturation via pulse oximetry.

## 2020-12-05 NOTE — PROGRESS NOTES
[address] [facility] Adult  Hospitalist Group                                                                                          Hospitalist Progress Note  Leeanne Berrios DO  Answering service: [phone] OR [phone] from in house phone        Date of Service:  2020  NAME:  Yue Mchugh  :  1940  MRN:  [id number]      Admission Summary:   Yue Mchugh is a 78 y.o. male past medical history significant for Parkinson, dementia, CAD, CKD stage III presented emergency room with respiratory distress and hypoxia. Patient Patricia vallecillo as per report he had an episode today of choking during dinner afterward he became hypoxic with O2 sats in the upper 80s and found to have a \" low-grade fever\" of 99.9. Per daughter report he had a similar episode of choking last week again when he was eating by his respiratory symptoms were not as severe and he recovered quickly. Interval history / Subjective: Follow-up respiratory failure. Patient seen and examined. Overnight, patient with increased oxygen demands. He is sitting in chair and alert. Asking to use the bathroom. No further complaints. CXR unchanged. Daughter at bedside. Discussed plan with both her and another daughter via phone. Assessment & Plan:     Acute hypoxic respiratory failure: Persistent  Aspiration pneumonia:  Volume overload:  -Suspected respiratory failure due to aspiration pneumonia  -Procalcitonin elevated at 12  -Continue Unasyn  -S/p Lasix IV x1, ordered lasix 20 mg daily  -Albuterol nebulizer twice daily  -Guaifenisen twice daily  -Echocardiogram ordered and pending, elevated proBNP     MARI: Improving  -Suspected prerenal  -Monitor closely in setting of Lasix     Anemia, macrocytic: Stable     Parkinson: at baseline as per family  -continue with home medications     Hypertension: continue to hold antihypertensives, reassess daily    Follow up PT/OT.  May need SNF on discharge     Code status: FULL  DVT prophylaxis: SCDs    Care Plan discussed with: Patient/Family  Anticipated Disposition: SNF versus assisted living facility   Anticipated Discharge: >48 hours     Hospital Problems  Date Reviewed: 9/14/2020          Codes Class Noted POA    Pneumonia ICD-10-CM: J18.9  ICD-9-CM: 210  12/3/2020 Unknown                Review of Systems:   Negative unless stated above      Vital Signs:    Last 24hrs VS reviewed since prior progress note. Most recent are:  Visit Vitals  /72 (BP 1 Location: Right arm, BP Patient Position: At rest)   Pulse 63   Temp 98.7 °F (37.1 °C)   Resp 19   Ht 5' 10\" (1.778 m)   Wt 87 kg (191 lb 12.8 oz)   SpO2 93%   BMI 27.52 kg/m²         Intake/Output Summary (Last 24 hours) at 12/5/2020 1435  Last data filed at 12/5/2020 1221  Gross per 24 hour   Intake 240 ml   Output    Net 240 ml        Physical Examination:     I had a face to face encounter with this patient and independently examined them on 12/5/2020 as outlined below:          Constitutional:  No acute distress, sitting in chair, elderly      ENT:  Oral mucosa moist, oropharynx benign. Resp:  Bibasilar crackles, no wheezing/rhonchi/rales. No accessory muscle use   CV:  Regular rhythm, normal rate, no murmurs, gallops, rubs    GI:  Soft, non distended, non tender. normoactive bowel sounds, no hepatosplenomegaly     Musculoskeletal:  No edema, warm, 2+ pulses throughout    Neurologic:  Moves all extremities.      Skin:  Good turgor, no rashes or ulcers       Data Review:    Review and/or order of clinical lab test  Review and/or order of tests in the radiology section of CPT  Review and/or order of tests in the medicine section of CPT      Labs:     Recent Labs     12/05/20  0304 12/04/20  0629   WBC 9.1 12.1*   HGB 9.7* 9.8*   HCT 29.7* 29.5*    179     Recent Labs     12/05/20  0304 12/04/20  0629 12/03/20  0733    140 139   K 3.9 3.9 4.4    107 107   CO2 27 27 26   BUN 33* 36* 39*   CREA 1.15 1.43* 1.53*   GLU 98 104* 107*   CA 9.2 8.9 8.8   MG 1.9 1.8  --    PHOS 3.2 2.7  --      Recent Labs     12/02/20  2222   ALT 7*   AP 59   TBILI 0.6   TP 7.1   ALB 3.4*   GLOB 3.7     No results for input(s): INR, PTP, APTT, INREXT, INREXT in the last 72 hours. No results for input(s): FE, TIBC, PSAT, FERR in the last 72 hours. No results found for: FOL, RBCF   No results for input(s): PH, PCO2, PO2 in the last 72 hours.   Recent Labs     12/02/20  2245   TROIQ <0.05     Lab Results   Component Value Date/Time    Cholesterol, total 119 07/08/2020 10:40 AM    HDL Cholesterol 51 07/08/2020 10:40 AM    LDL, calculated 47 07/08/2020 10:40 AM    Triglyceride 103 07/08/2020 10:40 AM    CHOL/HDL Ratio 2 07/08/2020 10:40 AM     No results found for: GLUCPOC  Lab Results   Component Value Date/Time    Color brown (A) 07/08/2020 10:41 AM    Appearance Clear 01/15/2020 03:22 PM    Specific gravity 1.015 07/08/2020 10:41 AM    Specific gravity 1.014 09/04/2019 02:45 PM    pH (UA) 5 07/08/2020 10:41 AM    Protein 2+ (A) 07/08/2020 10:41 AM    Glucose NEGATIVE  09/04/2019 02:45 PM    Ketone Negative 07/08/2020 10:41 AM    Bilirubin Negative 07/08/2020 10:41 AM    Urobilinogen Negative 07/08/2020 10:41 AM    Nitrites Negative 07/08/2020 10:41 AM    Leukocyte Esterase Negative 07/08/2020 10:41 AM    Epithelial cells FEW 09/04/2019 02:45 PM    Bacteria Few 01/15/2020 03:22 PM    WBC 0 07/08/2020 10:41 AM    RBC 2-5 (A) 07/08/2020 10:41 AM         Medications Reviewed:     Current Facility-Administered Medications   Medication Dose Route Frequency    furosemide (LASIX) tablet 20 mg  20 mg Oral DAILY    albuterol-ipratropium (DUO-NEB) 2.5 MG-0.5 MG/3 ML  3 mL Nebulization BID RT    guaiFENesin ER (MUCINEX) tablet 600 mg  600 mg Oral Q12H    albuterol (PROVENTIL VENTOLIN) nebulizer solution 2.5 mg  2.5 mg Nebulization Q6H PRN    sodium chloride (NS) flush 5-40 mL  5-40 mL IntraVENous Q8H    sodium chloride (NS) flush 5-40 mL  5-40 mL IntraVENous PRN    acetaminophen (TYLENOL) tablet 650 mg  650 mg Oral Q6H PRN    Or    acetaminophen (TYLENOL) suppository 650 mg  650 mg Rectal Q6H PRN    polyethylene glycol (MIRALAX) packet 17 g  17 g Oral DAILY PRN    promethazine (PHENERGAN) tablet 12.5 mg  12.5 mg Oral Q6H PRN    Or    ondansetron (ZOFRAN) injection 4 mg  4 mg IntraVENous Q6H PRN    ampicillin-sulbactam (UNASYN) 1.5 g in 0.9% sodium chloride (MBP/ADV) 50 mL MBP  1.5 g IntraVENous Q6H    aspirin delayed-release tablet 81 mg  81 mg Oral DAILY    atorvastatin (LIPITOR) tablet 40 mg  40 mg Oral QHS    carbidopa-levodopa (SINEMET)  mg per tablet 1 Tab  1 Tab Oral QID    entacapone (COMTAN) tablet 200 mg  200 mg Oral TID    pantoprazole (PROTONIX) tablet 20 mg  20 mg Oral ACB    PARoxetine (PAXIL) tablet 10 mg  10 mg Oral DAILY    gabapentin (NEURONTIN) capsule 200 mg  200 mg Oral BID    rOPINIRole (REQUIP) tablet 1 mg  1 mg Oral BID     ______________________________________________________________________  EXPECTED LENGTH OF STAY: 3d 2h  ACTUAL LENGTH OF STAY:          2                 Leeanne Adams DO

## 2020-12-06 ENCOUNTER — APPOINTMENT (OUTPATIENT)
Dept: NON INVASIVE DIAGNOSTICS | Age: 80
DRG: 871 | End: 2020-12-06
Attending: INTERNAL MEDICINE
Payer: MEDICARE

## 2020-12-06 ENCOUNTER — APPOINTMENT (OUTPATIENT)
Dept: GENERAL RADIOLOGY | Age: 80
DRG: 871 | End: 2020-12-06
Attending: INTERNAL MEDICINE
Payer: MEDICARE

## 2020-12-06 LAB
ANION GAP SERPL CALC-SCNC: 7 MMOL/L (ref 5–15)
BUN SERPL-MCNC: 20 MG/DL (ref 6–20)
BUN/CREAT SERPL: 20 (ref 12–20)
CALCIUM SERPL-MCNC: 9 MG/DL (ref 8.5–10.1)
CHLORIDE SERPL-SCNC: 108 MMOL/L (ref 97–108)
CO2 SERPL-SCNC: 27 MMOL/L (ref 21–32)
CREAT SERPL-MCNC: 1.02 MG/DL (ref 0.7–1.3)
ECHO AV MEAN GRADIENT: 12.01 MMHG
ECHO AV PEAK GRADIENT: 20.86 MMHG
ECHO AV PEAK VELOCITY: 228.35 CM/S
ECHO AV VTI: 49.73 CM
ECHO LA MAJOR AXIS: 4.12 CM
ECHO LA MINOR AXIS: 2.01 CM
ECHO LV INTERNAL DIMENSION DIASTOLIC: 3.27 CM (ref 4.2–5.9)
ECHO LV INTERNAL DIMENSION SYSTOLIC: 2.34 CM
ECHO LV IVSD: 1.25 CM (ref 0.6–1)
ECHO LV MASS 2D: 140.7 G (ref 88–224)
ECHO LV MASS INDEX 2D: 68.6 G/M2 (ref 49–115)
ECHO LV POSTERIOR WALL DIASTOLIC: 1.36 CM (ref 0.6–1)
ECHO LVOT PEAK GRADIENT: 4.02 MMHG
ECHO LVOT PEAK VELOCITY: 100.25 CM/S
ECHO LVOT VTI: 16.95 CM
ECHO MV A VELOCITY: 89.25 CM/S
ECHO MV AREA PHT: 4.19 CM2
ECHO MV E DECELERATION TIME (DT): 180.95 MS
ECHO MV E VELOCITY: 99.95 CM/S
ECHO MV E/A RATIO: 1.12
ECHO MV PRESSURE HALF TIME (PHT): 52.48 MS
ECHO PV PEAK INSTANTANEOUS GRADIENT SYSTOLIC: 6.14 MMHG
ECHO RV TAPSE: 1.85 CM (ref 1.5–2)
ECHO TV REGURGITANT MAX VELOCITY: 279.35 CM/S
ECHO TV REGURGITANT PEAK GRADIENT: 31.21 MMHG
ERYTHROCYTE [DISTWIDTH] IN BLOOD BY AUTOMATED COUNT: 11.9 % (ref 11.5–14.5)
GLUCOSE SERPL-MCNC: 99 MG/DL (ref 65–100)
HCT VFR BLD AUTO: 28.9 % (ref 36.6–50.3)
HGB BLD-MCNC: 9.9 G/DL (ref 12.1–17)
MCH RBC QN AUTO: 34.3 PG (ref 26–34)
MCHC RBC AUTO-ENTMCNC: 34.3 G/DL (ref 30–36.5)
MCV RBC AUTO: 100 FL (ref 80–99)
NRBC # BLD: 0 K/UL (ref 0–0.01)
NRBC BLD-RTO: 0 PER 100 WBC
PLATELET # BLD AUTO: 182 K/UL (ref 150–400)
PMV BLD AUTO: 9.5 FL (ref 8.9–12.9)
POTASSIUM SERPL-SCNC: 3.8 MMOL/L (ref 3.5–5.1)
RBC # BLD AUTO: 2.89 M/UL (ref 4.1–5.7)
SODIUM SERPL-SCNC: 142 MMOL/L (ref 136–145)
WBC # BLD AUTO: 9 K/UL (ref 4.1–11.1)

## 2020-12-06 PROCEDURE — C8929 TTE W OR WO FOL WCON,DOPPLER: HCPCS

## 2020-12-06 PROCEDURE — 74011250637 HC RX REV CODE- 250/637: Performed by: INTERNAL MEDICINE

## 2020-12-06 PROCEDURE — 65270000032 HC RM SEMIPRIVATE

## 2020-12-06 PROCEDURE — 71045 X-RAY EXAM CHEST 1 VIEW: CPT

## 2020-12-06 PROCEDURE — 94640 AIRWAY INHALATION TREATMENT: CPT

## 2020-12-06 PROCEDURE — 36415 COLL VENOUS BLD VENIPUNCTURE: CPT

## 2020-12-06 PROCEDURE — 94668 MNPJ CHEST WALL SBSQ: CPT

## 2020-12-06 PROCEDURE — 94760 N-INVAS EAR/PLS OXIMETRY 1: CPT

## 2020-12-06 PROCEDURE — 94667 MNPJ CHEST WALL 1ST: CPT

## 2020-12-06 PROCEDURE — 80048 BASIC METABOLIC PNL TOTAL CA: CPT

## 2020-12-06 PROCEDURE — 77010033678 HC OXYGEN DAILY

## 2020-12-06 PROCEDURE — 94664 DEMO&/EVAL PT USE INHALER: CPT

## 2020-12-06 PROCEDURE — 74011250637 HC RX REV CODE- 250/637: Performed by: NURSE PRACTITIONER

## 2020-12-06 PROCEDURE — 74011000250 HC RX REV CODE- 250: Performed by: INTERNAL MEDICINE

## 2020-12-06 PROCEDURE — 74011250636 HC RX REV CODE- 250/636: Performed by: INTERNAL MEDICINE

## 2020-12-06 PROCEDURE — 85027 COMPLETE CBC AUTOMATED: CPT

## 2020-12-06 PROCEDURE — 74011000258 HC RX REV CODE- 258: Performed by: INTERNAL MEDICINE

## 2020-12-06 RX ORDER — IPRATROPIUM BROMIDE AND ALBUTEROL SULFATE 2.5; .5 MG/3ML; MG/3ML
3 SOLUTION RESPIRATORY (INHALATION) 3 TIMES DAILY
Status: DISCONTINUED | OUTPATIENT
Start: 2020-12-06 | End: 2020-12-07

## 2020-12-06 RX ORDER — LOSARTAN POTASSIUM 50 MG/1
50 TABLET ORAL DAILY
Status: DISCONTINUED | OUTPATIENT
Start: 2020-12-06 | End: 2020-12-18 | Stop reason: HOSPADM

## 2020-12-06 RX ORDER — AMLODIPINE BESYLATE 5 MG/1
5 TABLET ORAL DAILY
Status: DISCONTINUED | OUTPATIENT
Start: 2020-12-06 | End: 2020-12-07

## 2020-12-06 RX ADMIN — GUAIFENESIN 600 MG: 600 TABLET, EXTENDED RELEASE ORAL at 21:19

## 2020-12-06 RX ADMIN — ENTACAPONE 200 MG: 200 TABLET, FILM COATED ORAL at 09:25

## 2020-12-06 RX ADMIN — Medication 10 ML: at 21:22

## 2020-12-06 RX ADMIN — PAROXETINE HYDROCHLORIDE 10 MG: 20 TABLET, FILM COATED ORAL at 08:54

## 2020-12-06 RX ADMIN — ROPINIROLE HYDROCHLORIDE 1 MG: 1 TABLET, FILM COATED ORAL at 17:18

## 2020-12-06 RX ADMIN — ROPINIROLE HYDROCHLORIDE 1 MG: 1 TABLET, FILM COATED ORAL at 08:55

## 2020-12-06 RX ADMIN — FUROSEMIDE 20 MG: 20 TABLET ORAL at 08:55

## 2020-12-06 RX ADMIN — IPRATROPIUM BROMIDE AND ALBUTEROL SULFATE 3 ML: .5; 3 SOLUTION RESPIRATORY (INHALATION) at 14:55

## 2020-12-06 RX ADMIN — ATORVASTATIN CALCIUM 40 MG: 40 TABLET, FILM COATED ORAL at 21:19

## 2020-12-06 RX ADMIN — ENTACAPONE 200 MG: 200 TABLET, FILM COATED ORAL at 21:19

## 2020-12-06 RX ADMIN — IPRATROPIUM BROMIDE AND ALBUTEROL SULFATE 3 ML: .5; 3 SOLUTION RESPIRATORY (INHALATION) at 07:34

## 2020-12-06 RX ADMIN — SODIUM CHLORIDE 1.5 ML: 9 INJECTION INTRAMUSCULAR; INTRAVENOUS; SUBCUTANEOUS at 11:00

## 2020-12-06 RX ADMIN — AMPICILLIN SODIUM AND SULBACTAM SODIUM 1.5 G: 1; .5 INJECTION, POWDER, FOR SOLUTION INTRAMUSCULAR; INTRAVENOUS at 04:46

## 2020-12-06 RX ADMIN — Medication 10 ML: at 13:16

## 2020-12-06 RX ADMIN — GUAIFENESIN 600 MG: 600 TABLET, EXTENDED RELEASE ORAL at 08:54

## 2020-12-06 RX ADMIN — CARBIDOPA AND LEVODOPA 2 TABLET: 25; 100 TABLET ORAL at 17:18

## 2020-12-06 RX ADMIN — CARBIDOPA AND LEVODOPA 2 TABLET: 25; 100 TABLET ORAL at 21:19

## 2020-12-06 RX ADMIN — CARBIDOPA AND LEVODOPA 2 TABLET: 25; 100 TABLET ORAL at 13:15

## 2020-12-06 RX ADMIN — AMLODIPINE BESYLATE 5 MG: 5 TABLET ORAL at 08:55

## 2020-12-06 RX ADMIN — PANTOPRAZOLE SODIUM 20 MG: 20 TABLET, DELAYED RELEASE ORAL at 06:27

## 2020-12-06 RX ADMIN — LOSARTAN POTASSIUM 50 MG: 50 TABLET, FILM COATED ORAL at 16:11

## 2020-12-06 RX ADMIN — ENTACAPONE 200 MG: 200 TABLET, FILM COATED ORAL at 16:11

## 2020-12-06 RX ADMIN — CARBIDOPA AND LEVODOPA 2 TABLET: 25; 100 TABLET ORAL at 08:55

## 2020-12-06 RX ADMIN — ACETAMINOPHEN 650 MG: 325 TABLET ORAL at 08:54

## 2020-12-06 RX ADMIN — GABAPENTIN 200 MG: 100 CAPSULE ORAL at 08:55

## 2020-12-06 RX ADMIN — Medication 10 ML: at 04:47

## 2020-12-06 RX ADMIN — ACETAMINOPHEN 650 MG: 325 TABLET ORAL at 19:46

## 2020-12-06 RX ADMIN — GABAPENTIN 200 MG: 100 CAPSULE ORAL at 17:18

## 2020-12-06 RX ADMIN — AMPICILLIN SODIUM AND SULBACTAM SODIUM 1.5 G: 1; .5 INJECTION, POWDER, FOR SOLUTION INTRAMUSCULAR; INTRAVENOUS at 16:11

## 2020-12-06 RX ADMIN — Medication 81 MG: at 08:54

## 2020-12-06 RX ADMIN — AMPICILLIN SODIUM AND SULBACTAM SODIUM 1.5 G: 1; .5 INJECTION, POWDER, FOR SOLUTION INTRAMUSCULAR; INTRAVENOUS at 21:19

## 2020-12-06 RX ADMIN — AMPICILLIN SODIUM AND SULBACTAM SODIUM 1.5 G: 1; .5 INJECTION, POWDER, FOR SOLUTION INTRAMUSCULAR; INTRAVENOUS at 09:25

## 2020-12-06 RX ADMIN — IPRATROPIUM BROMIDE AND ALBUTEROL SULFATE 3 ML: .5; 3 SOLUTION RESPIRATORY (INHALATION) at 14:57

## 2020-12-06 NOTE — PROGRESS NOTES
[address] [facility] Adult  Hospitalist Group                                                                                          Hospitalist Progress Note  Leeanne Grider DO  Answering service: [phone] OR [phone] from in house phone        Date of Service:  2020  NAME:  Yusra Sullivan  :  1940  MRN:  [id number]      Admission Summary:   Yusra Sullivan is a 78 y.o. male past medical history significant for Parkinson, dementia, CAD, CKD stage III presented emergency room with respiratory distress and hypoxia. Patient Christiana vallecillo as per report he had an episode today of choking during dinner afterward he became hypoxic with O2 sats in the upper 80s and found to have a \" low-grade fever\" of 99.9. Per daughter report he had a similar episode of choking last week again when he was eating by his respiratory symptoms were not as severe and he recovered quickly. Interval history / Subjective: Follow-up respiratory failure. Patient seen and examined earlier today. Patient lying in bed and asking when he can go home. Febrile this .1. Hypertensive 180/79. Updated daughter at bedside and another daughter via phone. Assessment & Plan:     Acute hypoxic respiratory failure: Persistent  Aspiration pneumonia:  Volume overload:  -Suspected respiratory failure due to aspiration pneumonia  -Procalcitonin elevated at 12  -Continue Unasyn  -S/p Lasix IV x1, continue lasix 20 mg daily  -Albuterol nebulizer three times daily  -Guaifenisen twice daily  -Echocardiogram ordered and pending, elevated proBNP     MARI: resolved  -Suspected prerenal  -Monitor closely in setting of Lasix     Anemia, macrocytic: Stable     Parkinson: at baseline as per family  -continue with home medications     Hypertension: started amlodipine, consider losartan     Follow up PT/OT.  May need SNF on discharge     Code status: FULL  DVT prophylaxis: SCDs    Care Plan discussed with: Patient/Family  Anticipated Disposition: SNF versus assisted living facility   Anticipated Discharge: >48 hours     Hospital Problems  Date Reviewed: 9/14/2020          Codes Class Noted POA    Pneumonia ICD-10-CM: J18.9  ICD-9-CM: 993  12/3/2020 Unknown                Review of Systems:   Negative unless stated above      Vital Signs:    Last 24hrs VS reviewed since prior progress note. Most recent are:  Visit Vitals  BP (!) 180/79   Pulse 77   Temp (!) 101.1 °F (38.4 °C)   Resp 16   Ht 5' 10\" (1.778 m)   Wt 87 kg (191 lb 12.8 oz)   SpO2 90%   BMI 27.52 kg/m²       No intake or output data in the 24 hours ending 12/06/20 1303     Physical Examination:     I had a face to face encounter with this patient and independently examined them on 12/6/2020 as outlined below:          Constitutional:  No acute distress, lying in bed, elderly      ENT:  Oral mucosa moist, oropharynx benign. Resp:  Bibasilar crackles R>L, no wheezing/rhonchi/rales. No accessory muscle use   CV:  Regular rhythm, normal rate, no murmurs, gallops, rubs    GI:  Soft, non distended, non tender. normoactive bowel sounds, no hepatosplenomegaly     Musculoskeletal:  No edema, warm, 2+ pulses throughout    Neurologic:  Moves all extremities. Skin:  Good turgor, no rashes or ulcers       Data Review:    Review and/or order of clinical lab test  Review and/or order of tests in the radiology section of CPT  Review and/or order of tests in the medicine section of CPT      Labs:     Recent Labs     12/06/20  1052 12/05/20  0304   WBC 9.0 9.1   HGB 9.9* 9.7*   HCT 28.9* 29.7*    183     Recent Labs     12/06/20  0324 12/05/20  0304 12/04/20  0629    142 140   K 3.8 3.9 3.9    108 107   CO2 27 27 27   BUN 20 33* 36*   CREA 1.02 1.15 1.43*   GLU 99 98 104*   CA 9.0 9.2 8.9   MG  --  1.9 1.8   PHOS  --  3.2 2.7     No results for input(s): ALT, AP, TBIL, TBILI, TP, ALB, GLOB, GGT, AML, LPSE in the last 72 hours.     No lab exists for component: SGOT, GPT, AMYP, HLPSE  No results for input(s): INR, PTP, APTT, INREXT, INREXT in the last 72 hours. No results for input(s): FE, TIBC, PSAT, FERR in the last 72 hours. No results found for: FOL, RBCF   No results for input(s): PH, PCO2, PO2 in the last 72 hours. No results for input(s): CPK, CKNDX, TROIQ in the last 72 hours.     No lab exists for component: CPKMB  Lab Results   Component Value Date/Time    Cholesterol, total 119 07/08/2020 10:40 AM    HDL Cholesterol 51 07/08/2020 10:40 AM    LDL, calculated 47 07/08/2020 10:40 AM    Triglyceride 103 07/08/2020 10:40 AM    CHOL/HDL Ratio 2 07/08/2020 10:40 AM     No results found for: GLUCPOC  Lab Results   Component Value Date/Time    Color brown (A) 07/08/2020 10:41 AM    Appearance Clear 01/15/2020 03:22 PM    Specific gravity 1.015 07/08/2020 10:41 AM    Specific gravity 1.014 09/04/2019 02:45 PM    pH (UA) 5 07/08/2020 10:41 AM    Protein 2+ (A) 07/08/2020 10:41 AM    Glucose NEGATIVE  09/04/2019 02:45 PM    Ketone Negative 07/08/2020 10:41 AM    Bilirubin Negative 07/08/2020 10:41 AM    Urobilinogen Negative 07/08/2020 10:41 AM    Nitrites Negative 07/08/2020 10:41 AM    Leukocyte Esterase Negative 07/08/2020 10:41 AM    Epithelial cells FEW 09/04/2019 02:45 PM    Bacteria Few 01/15/2020 03:22 PM    WBC 0 07/08/2020 10:41 AM    RBC 2-5 (A) 07/08/2020 10:41 AM         Medications Reviewed:     Current Facility-Administered Medications   Medication Dose Route Frequency    amLODIPine (NORVASC) tablet 5 mg  5 mg Oral DAILY    albuterol-ipratropium (DUO-NEB) 2.5 MG-0.5 MG/3 ML  3 mL Nebulization TID    furosemide (LASIX) tablet 20 mg  20 mg Oral DAILY    guaiFENesin ER (MUCINEX) tablet 600 mg  600 mg Oral Q12H    carbidopa-levodopa (SINEMET)  mg per tablet 2 Tab  2 Tab Oral QID    albuterol (PROVENTIL VENTOLIN) nebulizer solution 2.5 mg  2.5 mg Nebulization Q6H PRN    sodium chloride (NS) flush 5-40 mL  5-40 mL IntraVENous Q8H    sodium chloride (NS) flush 5-40 mL  5-40 mL IntraVENous PRN    acetaminophen (TYLENOL) tablet 650 mg  650 mg Oral Q6H PRN    Or    acetaminophen (TYLENOL) suppository 650 mg  650 mg Rectal Q6H PRN    polyethylene glycol (MIRALAX) packet 17 g  17 g Oral DAILY PRN    promethazine (PHENERGAN) tablet 12.5 mg  12.5 mg Oral Q6H PRN    Or    ondansetron (ZOFRAN) injection 4 mg  4 mg IntraVENous Q6H PRN    ampicillin-sulbactam (UNASYN) 1.5 g in 0.9% sodium chloride (MBP/ADV) 50 mL MBP  1.5 g IntraVENous Q6H    aspirin delayed-release tablet 81 mg  81 mg Oral DAILY    atorvastatin (LIPITOR) tablet 40 mg  40 mg Oral QHS    entacapone (COMTAN) tablet 200 mg  200 mg Oral TID    pantoprazole (PROTONIX) tablet 20 mg  20 mg Oral ACB    PARoxetine (PAXIL) tablet 10 mg  10 mg Oral DAILY    gabapentin (NEURONTIN) capsule 200 mg  200 mg Oral BID    rOPINIRole (REQUIP) tablet 1 mg  1 mg Oral BID     ______________________________________________________________________  EXPECTED LENGTH OF STAY: 3d 2h  ACTUAL LENGTH OF STAY:          [address] [facility]

## 2020-12-06 NOTE — PROGRESS NOTES
12/06/20 0844   Vital Signs   Temp (!) 101.1 °F (38.4 °C)   Temp Source Axillary   Pulse (Heart Rate) 77   Heart Rate Source Other (comment)   Resp Rate 16   O2 Sat (%) 90 %   Level of Consciousness Alert   BP (!) 180/79   MAP (Calculated) 113   BP 1 Method Automatic   BP 1 Location Right arm   BP Patient Position At rest   MEWS Score 3   Dr El Frazier aware

## 2020-12-06 NOTE — PROGRESS NOTES
During code atlas that was called on another pt- dtr kept coming in edge of room and in hallway stating her father wanted to get back to bed. Instructed dtr to go back to room and wait until we became available to help her father. ALL staff w the exception of 1 was in code atlas.   Ptis a max 2 assist.  Dtr became irritated when asked to go back to room and wait for staff

## 2020-12-07 LAB
ANION GAP SERPL CALC-SCNC: 6 MMOL/L (ref 5–15)
BUN SERPL-MCNC: 15 MG/DL (ref 6–20)
BUN/CREAT SERPL: 14 (ref 12–20)
CALCIUM SERPL-MCNC: 9.4 MG/DL (ref 8.5–10.1)
CHLORIDE SERPL-SCNC: 105 MMOL/L (ref 97–108)
CO2 SERPL-SCNC: 29 MMOL/L (ref 21–32)
CREAT SERPL-MCNC: 1.11 MG/DL (ref 0.7–1.3)
ERYTHROCYTE [DISTWIDTH] IN BLOOD BY AUTOMATED COUNT: 12 % (ref 11.5–14.5)
GLUCOSE SERPL-MCNC: 104 MG/DL (ref 65–100)
HCT VFR BLD AUTO: 33.6 % (ref 36.6–50.3)
HGB BLD-MCNC: 10.8 G/DL (ref 12.1–17)
MCH RBC QN AUTO: 33 PG (ref 26–34)
MCHC RBC AUTO-ENTMCNC: 32.1 G/DL (ref 30–36.5)
MCV RBC AUTO: 102.8 FL (ref 80–99)
NRBC # BLD: 0 K/UL (ref 0–0.01)
NRBC BLD-RTO: 0 PER 100 WBC
PLATELET # BLD AUTO: 213 K/UL (ref 150–400)
PMV BLD AUTO: 9.8 FL (ref 8.9–12.9)
POTASSIUM SERPL-SCNC: 3.9 MMOL/L (ref 3.5–5.1)
RBC # BLD AUTO: 3.27 M/UL (ref 4.1–5.7)
SODIUM SERPL-SCNC: 140 MMOL/L (ref 136–145)
WBC # BLD AUTO: 11.6 K/UL (ref 4.1–11.1)

## 2020-12-07 PROCEDURE — 77010033678 HC OXYGEN DAILY

## 2020-12-07 PROCEDURE — 97535 SELF CARE MNGMENT TRAINING: CPT

## 2020-12-07 PROCEDURE — 74011250636 HC RX REV CODE- 250/636: Performed by: INTERNAL MEDICINE

## 2020-12-07 PROCEDURE — 94640 AIRWAY INHALATION TREATMENT: CPT

## 2020-12-07 PROCEDURE — 80048 BASIC METABOLIC PNL TOTAL CA: CPT

## 2020-12-07 PROCEDURE — 36415 COLL VENOUS BLD VENIPUNCTURE: CPT

## 2020-12-07 PROCEDURE — 74011000250 HC RX REV CODE- 250: Performed by: INTERNAL MEDICINE

## 2020-12-07 PROCEDURE — 74011250637 HC RX REV CODE- 250/637: Performed by: INTERNAL MEDICINE

## 2020-12-07 PROCEDURE — 74011000258 HC RX REV CODE- 258: Performed by: INTERNAL MEDICINE

## 2020-12-07 PROCEDURE — 65270000032 HC RM SEMIPRIVATE

## 2020-12-07 PROCEDURE — 85027 COMPLETE CBC AUTOMATED: CPT

## 2020-12-07 RX ORDER — IPRATROPIUM BROMIDE AND ALBUTEROL SULFATE 2.5; .5 MG/3ML; MG/3ML
3 SOLUTION RESPIRATORY (INHALATION)
Status: DISCONTINUED | OUTPATIENT
Start: 2020-12-07 | End: 2020-12-17

## 2020-12-07 RX ORDER — AMLODIPINE BESYLATE 5 MG/1
10 TABLET ORAL DAILY
Status: DISCONTINUED | OUTPATIENT
Start: 2020-12-07 | End: 2020-12-18 | Stop reason: HOSPADM

## 2020-12-07 RX ADMIN — AMPICILLIN SODIUM AND SULBACTAM SODIUM 1.5 G: 1; .5 INJECTION, POWDER, FOR SOLUTION INTRAMUSCULAR; INTRAVENOUS at 15:54

## 2020-12-07 RX ADMIN — ENTACAPONE 200 MG: 200 TABLET, FILM COATED ORAL at 09:48

## 2020-12-07 RX ADMIN — PAROXETINE HYDROCHLORIDE 10 MG: 20 TABLET, FILM COATED ORAL at 09:49

## 2020-12-07 RX ADMIN — AMPICILLIN SODIUM AND SULBACTAM SODIUM 1.5 G: 1; .5 INJECTION, POWDER, FOR SOLUTION INTRAMUSCULAR; INTRAVENOUS at 10:03

## 2020-12-07 RX ADMIN — Medication 81 MG: at 09:48

## 2020-12-07 RX ADMIN — ROPINIROLE HYDROCHLORIDE 1 MG: 1 TABLET, FILM COATED ORAL at 09:48

## 2020-12-07 RX ADMIN — AMPICILLIN SODIUM AND SULBACTAM SODIUM 1.5 G: 1; .5 INJECTION, POWDER, FOR SOLUTION INTRAMUSCULAR; INTRAVENOUS at 04:30

## 2020-12-07 RX ADMIN — IPRATROPIUM BROMIDE AND ALBUTEROL SULFATE 3 ML: .5; 3 SOLUTION RESPIRATORY (INHALATION) at 08:45

## 2020-12-07 RX ADMIN — IPRATROPIUM BROMIDE AND ALBUTEROL SULFATE 3 ML: .5; 3 SOLUTION RESPIRATORY (INHALATION) at 12:56

## 2020-12-07 RX ADMIN — PANTOPRAZOLE SODIUM 20 MG: 20 TABLET, DELAYED RELEASE ORAL at 06:35

## 2020-12-07 RX ADMIN — CARBIDOPA AND LEVODOPA 2 TABLET: 25; 100 TABLET ORAL at 17:43

## 2020-12-07 RX ADMIN — ENTACAPONE 200 MG: 200 TABLET, FILM COATED ORAL at 22:28

## 2020-12-07 RX ADMIN — LOSARTAN POTASSIUM 50 MG: 50 TABLET, FILM COATED ORAL at 09:48

## 2020-12-07 RX ADMIN — Medication 10 ML: at 06:35

## 2020-12-07 RX ADMIN — CARBIDOPA AND LEVODOPA 2 TABLET: 25; 100 TABLET ORAL at 22:28

## 2020-12-07 RX ADMIN — Medication 10 ML: at 13:59

## 2020-12-07 RX ADMIN — ACETAMINOPHEN 650 MG: 325 TABLET ORAL at 06:34

## 2020-12-07 RX ADMIN — CARBIDOPA AND LEVODOPA 2 TABLET: 25; 100 TABLET ORAL at 13:59

## 2020-12-07 RX ADMIN — GABAPENTIN 200 MG: 100 CAPSULE ORAL at 17:43

## 2020-12-07 RX ADMIN — AMPICILLIN SODIUM AND SULBACTAM SODIUM 1.5 G: 1; .5 INJECTION, POWDER, FOR SOLUTION INTRAMUSCULAR; INTRAVENOUS at 22:28

## 2020-12-07 RX ADMIN — AMLODIPINE BESYLATE 10 MG: 5 TABLET ORAL at 09:48

## 2020-12-07 RX ADMIN — FUROSEMIDE 20 MG: 20 TABLET ORAL at 09:48

## 2020-12-07 RX ADMIN — ROPINIROLE HYDROCHLORIDE 1 MG: 1 TABLET, FILM COATED ORAL at 17:44

## 2020-12-07 RX ADMIN — GUAIFENESIN 600 MG: 600 TABLET, EXTENDED RELEASE ORAL at 09:48

## 2020-12-07 RX ADMIN — ATORVASTATIN CALCIUM 40 MG: 40 TABLET, FILM COATED ORAL at 22:28

## 2020-12-07 RX ADMIN — GUAIFENESIN 600 MG: 600 TABLET, EXTENDED RELEASE ORAL at 22:28

## 2020-12-07 RX ADMIN — CARBIDOPA AND LEVODOPA 2 TABLET: 25; 100 TABLET ORAL at 09:48

## 2020-12-07 RX ADMIN — ENTACAPONE 200 MG: 200 TABLET, FILM COATED ORAL at 15:54

## 2020-12-07 RX ADMIN — GABAPENTIN 200 MG: 100 CAPSULE ORAL at 09:48

## 2020-12-07 RX ADMIN — IPRATROPIUM BROMIDE AND ALBUTEROL SULFATE 3 ML: .5; 3 SOLUTION RESPIRATORY (INHALATION) at 20:18

## 2020-12-07 RX ADMIN — Medication 10 ML: at 22:28

## 2020-12-07 NOTE — PROGRESS NOTES
Physical Therapy  12/7/2020    Chart reviewed. Attempted to see pt this afternoon, however pt reports he just finished with therapy (OT) and would like time to rest. PT to follow up as able and appropriate.      Marge Means, PTA

## 2020-12-07 NOTE — PROGRESS NOTES
[address] [facility]  Hospitalist Group                                                                                          Hospitalist Progress Note  Leeanne Obrien DO  Answering service: [phone] OR [phone] from in house phone        Date of Service:  2020  NAME:  Kim Cabrera  :  1940  MRN:  [id number]      Admission Summary:   Kim Cabrera is a 78 y.o. male past medical history significant for Parkinson, dementia, CAD, CKD stage III presented emergency room with respiratory distress and hypoxia. Patient Dano vallecillo as per report he had an episode today of choking during dinner afterward he became hypoxic with O2 sats in the upper 80s and found to have a \" low-grade fever\" of 99.9. Per daughter report he had a similar episode of choking last week again when he was eating by his respiratory symptoms were not as severe and he recovered quickly. Interval history / Subjective: Follow-up respiratory failure. Patient seen and examined. Son at bedside. Patient more alert today and states he feels fine. Assessment & Plan:     Acute hypoxic respiratory failure: Persistent  Aspiration pneumonia:  Volume overload:  -Suspected respiratory failure due to aspiration pneumonia  -Procalcitonin elevated at 12  -Continue Unasyn  -S/p Lasix IV x1, continue lasix 20 mg daily  -Albuterol nebulizer three times changed to prn   -Guaifenisen twice daily  -Echocardiogram with EF 03-99%, mild diastolic dysfunction     MARI: resolved  -Suspected prerenal  -Monitor closely in setting of Lasix     Anemia, macrocytic: Stable     Parkinson: at baseline as per family  -continue with home medications     Hypertension: increased amlodipine, continue losartan     Follow up PT/OT.  May need SNF on discharge     Code status: FULL  DVT prophylaxis: SCDs    Care Plan discussed with: Patient/Family  Anticipated Disposition: SNF versus assisted living facility   Anticipated Discharge: >48 hours     Hospital Problems  Date Reviewed: 9/14/2020          Codes Class Noted POA    Pneumonia ICD-10-CM: J18.9  ICD-9-CM: 646  12/3/2020 Unknown                Review of Systems:   Negative unless stated above      Vital Signs:    Last 24hrs VS reviewed since prior progress note. Most recent are:  Visit Vitals  /82 (BP 1 Location: Right arm, BP Patient Position: At rest)   Pulse 71   Temp 98.5 °F (36.9 °C)   Resp 16   Ht 5' 10\" (1.778 m)   Wt 87.2 kg (192 lb 3.9 oz)   SpO2 94%   BMI 27.58 kg/m²         Intake/Output Summary (Last 24 hours) at 12/7/2020 1236  Last data filed at 12/7/2020 1211  Gross per 24 hour   Intake 240 ml   Output 600 ml   Net -360 ml        Physical Examination:     I had a face to face encounter with this patient and independently examined them on 12/7/2020 as outlined below:          Constitutional:  No acute distress, lying in bed, elderly      ENT:  Oral mucosa moist, oropharynx benign. Resp:  minimal Bibasilar crackles R>L, no wheezing/rhonchi/rales. No accessory muscle use   CV:  Regular rhythm, normal rate, no murmurs, gallops, rubs    GI:  Soft, non distended, non tender. normoactive bowel sounds, no hepatosplenomegaly     Musculoskeletal:  No edema, warm, 2+ pulses throughout    Neurologic:  Moves all extremities.      Skin:  Good turgor, no rashes or ulcers       Data Review:    Review and/or order of clinical lab test  Review and/or order of tests in the radiology section of CPT  Review and/or order of tests in the medicine section of CPT      Labs:     Recent Labs     12/07/20  0329 12/06/20  1052   WBC 11.6* 9.0   HGB 10.8* 9.9*   HCT 33.6* 28.9*    182     Recent Labs     12/07/20  0329 12/06/20  0324 12/05/20  0304    142 142   K 3.9 3.8 3.9    108 108   CO2 29 27 27   BUN 15 20 33*   CREA 1.11 1.02 1.15   * 99 98   CA 9.4 9.0 9.2   MG  --   --  1.9   PHOS  --   --  3.2     No results for input(s): ALT, AP, TBIL, TBILI, TP, ALB, GLOB, GGT, AML, LPSE in the last 72 hours. No lab exists for component: SGOT, GPT, AMYP, HLPSE  No results for input(s): INR, PTP, APTT, INREXT, INREXT in the last 72 hours. No results for input(s): FE, TIBC, PSAT, FERR in the last 72 hours. No results found for: FOL, RBCF   No results for input(s): PH, PCO2, PO2 in the last 72 hours. No results for input(s): CPK, CKNDX, TROIQ in the last 72 hours.     No lab exists for component: CPKMB  Lab Results   Component Value Date/Time    Cholesterol, total 119 07/08/2020 10:40 AM    HDL Cholesterol 51 07/08/2020 10:40 AM    LDL, calculated 47 07/08/2020 10:40 AM    Triglyceride 103 07/08/2020 10:40 AM    CHOL/HDL Ratio 2 07/08/2020 10:40 AM     No results found for: GLUCPOC  Lab Results   Component Value Date/Time    Color brown (A) 07/08/2020 10:41 AM    Appearance Clear 01/15/2020 03:22 PM    Specific gravity 1.015 07/08/2020 10:41 AM    Specific gravity 1.014 09/04/2019 02:45 PM    pH (UA) 5 07/08/2020 10:41 AM    Protein 2+ (A) 07/08/2020 10:41 AM    Glucose NEGATIVE  09/04/2019 02:45 PM    Ketone Negative 07/08/2020 10:41 AM    Bilirubin Negative 07/08/2020 10:41 AM    Urobilinogen Negative 07/08/2020 10:41 AM    Nitrites Negative 07/08/2020 10:41 AM    Leukocyte Esterase Negative 07/08/2020 10:41 AM    Epithelial cells FEW 09/04/2019 02:45 PM    Bacteria Few 01/15/2020 03:22 PM    WBC 0 07/08/2020 10:41 AM    RBC 2-5 (A) 07/08/2020 10:41 AM         Medications Reviewed:     Current Facility-Administered Medications   Medication Dose Route Frequency    albuterol-ipratropium (DUO-NEB) 2.5 MG-0.5 MG/3 ML  3 mL Nebulization TID RT    amLODIPine (NORVASC) tablet 10 mg  10 mg Oral DAILY    losartan (COZAAR) tablet 50 mg  50 mg Oral DAILY    furosemide (LASIX) tablet 20 mg  20 mg Oral DAILY    guaiFENesin ER (MUCINEX) tablet 600 mg  600 mg Oral Q12H    carbidopa-levodopa (SINEMET)  mg per tablet 2 Tab  2 Tab Oral QID    albuterol (PROVENTIL VENTOLIN) nebulizer solution 2.5 mg  2.5 mg Nebulization Q6H PRN    sodium chloride (NS) flush 5-40 mL  5-40 mL IntraVENous Q8H    sodium chloride (NS) flush 5-40 mL  5-40 mL IntraVENous PRN    acetaminophen (TYLENOL) tablet 650 mg  650 mg Oral Q6H PRN    Or    acetaminophen (TYLENOL) suppository 650 mg  650 mg Rectal Q6H PRN    polyethylene glycol (MIRALAX) packet 17 g  17 g Oral DAILY PRN    promethazine (PHENERGAN) tablet 12.5 mg  12.5 mg Oral Q6H PRN    Or    ondansetron (ZOFRAN) injection 4 mg  4 mg IntraVENous Q6H PRN    ampicillin-sulbactam (UNASYN) 1.5 g in 0.9% sodium chloride (MBP/ADV) 50 mL MBP  1.5 g IntraVENous Q6H    aspirin delayed-release tablet 81 mg  81 mg Oral DAILY    atorvastatin (LIPITOR) tablet 40 mg  40 mg Oral QHS    entacapone (COMTAN) tablet 200 mg  200 mg Oral TID    pantoprazole (PROTONIX) tablet 20 mg  20 mg Oral ACB    PARoxetine (PAXIL) tablet 10 mg  10 mg Oral DAILY    gabapentin (NEURONTIN) capsule 200 mg  200 mg Oral BID    rOPINIRole (REQUIP) tablet 1 mg  1 mg Oral BID     ______________________________________________________________________  EXPECTED LENGTH OF STAY: 3d 2h  ACTUAL LENGTH OF STAY:          Diane Escalante, DO

## 2020-12-07 NOTE — PROGRESS NOTES
Problem: Self Care Deficits Care Plan (Adult)  Goal: *Acute Goals and Plan of Care (Insert Text)  Description:   FUNCTIONAL STATUS PRIOR TO ADMISSION: Patient is an unreliable historian 2/2 baseline dementia. Per chart review, patient was modified independent using a rollator for short distance functional mobility and received minimal(?) assistance from staff. This date, patient reporting he self-feeds and occasionally needs help with lower body dressing. HOME SUPPORT: Patient recently moved to [facility]. Occupational Therapy Goals  Initiated 12/4/2020  1. Patient will perform seated ADL task with material set-up and minimal assistance within 7 day(s). 2.  Patient will perform upper body dressing with minimal assistance within 7 day(s). 3.  Patient will perform lower body dressing with moderate assistance within 7 day(s). 4.  Patient will perform toilet transfers to [facility] with moderate assistance and RW within 7 day(s). 5.  Patient will perform all aspects of toileting with moderate assistance and RW within 7 day(s). Outcome: Progressing Towards Goal   OCCUPATIONAL THERAPY TREATMENT  Patient: Ann Cuevas (78 y.o. male)  Date: 12/7/2020  Diagnosis: Pneumonia [J18.9]   <principal problem not specified>       Precautions: Fall  Chart, occupational therapy assessment, plan of care, and goals were reviewed. ASSESSMENT  Patient continues with skilled OT services and is progressing towards goals. Participation impacted by impaired use of bilateral UEs in a bimanual task, impaired hand coordination, impaired strength and endurance for functional activity, impaired word finding, impaired attention to task, impaired reach to LEs, slowed processing. Patient requesting instruction on use of incentive spirometer. With instruction, patient completed 2 sets of 10 reps with it successfully.  Next, he completed 1 set of 10 reps of bilateral shoulder flexion/extension and instructed to continue same for 1 set of 10 reps 3 times a day. Current Level of Function Impacting Discharge (ADLs): Self care with set up to total assist    Other factors to consider for discharge: Mod I for mobility with rollator at baseline per chart, baseline dementia, lived at [address] :  Patient continues to benefit from skilled intervention to address the above impairments. Continue treatment per established plan of care. to address goals. Recommend with staff: Long sit versus chair for meals and self care, self care with set up to total assist    Recommend next OT session: bathing, grooming    Recommendation for discharge: (in order for the patient to meet his/her long term goals)  Occupational therapy at least 2 days/week in the home AND ensure assist and/or supervision for safety with self care and mobility    This discharge recommendation:  Has been made in collaboration with the attending provider and/or case management    IF patient discharges home will need the following DME: may need walker: rolling and wheelchair       SUBJECTIVE:   Patient stated Ariana Brown are you going? Garima Stalling at end of session    OBJECTIVE DATA SUMMARY:   Cognitive/Behavioral Status:  Neurologic State: Drowsy  Orientation Level: Oriented to person;Oriented to place; Disoriented to time;Disoriented to situation  Cognition: Decreased attention/concentration;Decreased command following;Memory loss  Perception: Appears intact  Perseveration: No perseveration noted  Safety/Judgement: Awareness of environment    Functional Mobility and Transfers for ADLs:    ADL Intervention:  Feeding  Container Management: Total assistance (dependent)  Food to Mouth: Stand-by assistance  Drink to Mouth: Set-up  Cues: Physical assistance;Verbal cues provided;Visual cues provided    Grooming  Position Performed: Long sitting on bed  Washing Face: Minimum assistance       Lower Body Dressing Assistance  Dressing Assistance:  Total assistance(dependent)(inferred)         Cognitive Retraining  Orientation Retraining: Reorienting;Time  Following Commands: Follows one step commands/directions  Safety/Judgement: Awareness of environment  Cues: Tactile cues provided;Verbal cues provided;Visual cues provided    Therapeutic Exercises:   See above      Activity Tolerance:   Fair    After treatment patient left in no apparent distress:   Heels elevated for pressure relief, Call bell within reach, Bed / chair alarm activated, and Restraints, long sit in bed    COMMUNICATION/COLLABORATION:   The patients plan of care was discussed with: Registered nurse.      LUCIA Ernst  Time Calculation: 42 mins

## 2020-12-08 ENCOUNTER — APPOINTMENT (OUTPATIENT)
Dept: GENERAL RADIOLOGY | Age: 80
DRG: 871 | End: 2020-12-08
Attending: INTERNAL MEDICINE
Payer: MEDICARE

## 2020-12-08 LAB
BACTERIA SPEC CULT: NORMAL
BACTERIA SPEC CULT: NORMAL
SERVICE CMNT-IMP: NORMAL
SERVICE CMNT-IMP: NORMAL

## 2020-12-08 PROCEDURE — 74011250636 HC RX REV CODE- 250/636: Performed by: INTERNAL MEDICINE

## 2020-12-08 PROCEDURE — 74011000258 HC RX REV CODE- 258: Performed by: INTERNAL MEDICINE

## 2020-12-08 PROCEDURE — 74011250637 HC RX REV CODE- 250/637: Performed by: NURSE PRACTITIONER

## 2020-12-08 PROCEDURE — 97530 THERAPEUTIC ACTIVITIES: CPT

## 2020-12-08 PROCEDURE — 74011000250 HC RX REV CODE- 250: Performed by: NURSE PRACTITIONER

## 2020-12-08 PROCEDURE — 77010033678 HC OXYGEN DAILY

## 2020-12-08 PROCEDURE — 74011250637 HC RX REV CODE- 250/637: Performed by: INTERNAL MEDICINE

## 2020-12-08 PROCEDURE — 97110 THERAPEUTIC EXERCISES: CPT

## 2020-12-08 PROCEDURE — 74011000250 HC RX REV CODE- 250: Performed by: INTERNAL MEDICINE

## 2020-12-08 PROCEDURE — 73502 X-RAY EXAM HIP UNI 2-3 VIEWS: CPT

## 2020-12-08 PROCEDURE — 65270000032 HC RM SEMIPRIVATE

## 2020-12-08 PROCEDURE — 94640 AIRWAY INHALATION TREATMENT: CPT

## 2020-12-08 RX ORDER — OXYCODONE HYDROCHLORIDE 5 MG/1
5 TABLET ORAL ONCE
Status: COMPLETED | OUTPATIENT
Start: 2020-12-08 | End: 2020-12-08

## 2020-12-08 RX ORDER — QUETIAPINE FUMARATE 25 MG/1
TABLET, FILM COATED ORAL
Status: CANCELLED | OUTPATIENT
Start: 2020-12-08

## 2020-12-08 RX ORDER — OXYCODONE HYDROCHLORIDE 5 MG/1
2.5 TABLET ORAL
Status: DISCONTINUED | OUTPATIENT
Start: 2020-12-08 | End: 2020-12-18 | Stop reason: HOSPADM

## 2020-12-08 RX ADMIN — GUAIFENESIN 600 MG: 600 TABLET, EXTENDED RELEASE ORAL at 10:29

## 2020-12-08 RX ADMIN — PAROXETINE HYDROCHLORIDE 10 MG: 20 TABLET, FILM COATED ORAL at 10:28

## 2020-12-08 RX ADMIN — IPRATROPIUM BROMIDE AND ALBUTEROL SULFATE 3 ML: .5; 3 SOLUTION RESPIRATORY (INHALATION) at 13:17

## 2020-12-08 RX ADMIN — AMPICILLIN SODIUM AND SULBACTAM SODIUM 1.5 G: 1; .5 INJECTION, POWDER, FOR SOLUTION INTRAMUSCULAR; INTRAVENOUS at 17:56

## 2020-12-08 RX ADMIN — CARBIDOPA AND LEVODOPA 2 TABLET: 25; 100 TABLET ORAL at 21:26

## 2020-12-08 RX ADMIN — AMPICILLIN SODIUM AND SULBACTAM SODIUM 1.5 G: 1; .5 INJECTION, POWDER, FOR SOLUTION INTRAMUSCULAR; INTRAVENOUS at 21:28

## 2020-12-08 RX ADMIN — FUROSEMIDE 20 MG: 20 TABLET ORAL at 10:29

## 2020-12-08 RX ADMIN — ENTACAPONE 200 MG: 200 TABLET, FILM COATED ORAL at 17:55

## 2020-12-08 RX ADMIN — ALBUTEROL SULFATE 2.5 MG: 2.5 SOLUTION RESPIRATORY (INHALATION) at 23:29

## 2020-12-08 RX ADMIN — AMLODIPINE BESYLATE 10 MG: 5 TABLET ORAL at 10:28

## 2020-12-08 RX ADMIN — Medication 10 ML: at 14:19

## 2020-12-08 RX ADMIN — Medication 10 ML: at 21:37

## 2020-12-08 RX ADMIN — CARBIDOPA AND LEVODOPA 2 TABLET: 25; 100 TABLET ORAL at 10:34

## 2020-12-08 RX ADMIN — CARBIDOPA AND LEVODOPA 2 TABLET: 25; 100 TABLET ORAL at 17:55

## 2020-12-08 RX ADMIN — ATORVASTATIN CALCIUM 40 MG: 40 TABLET, FILM COATED ORAL at 21:28

## 2020-12-08 RX ADMIN — AMPICILLIN SODIUM AND SULBACTAM SODIUM 1.5 G: 1; .5 INJECTION, POWDER, FOR SOLUTION INTRAMUSCULAR; INTRAVENOUS at 04:24

## 2020-12-08 RX ADMIN — GABAPENTIN 200 MG: 100 CAPSULE ORAL at 10:29

## 2020-12-08 RX ADMIN — AMPICILLIN SODIUM AND SULBACTAM SODIUM 1.5 G: 1; .5 INJECTION, POWDER, FOR SOLUTION INTRAMUSCULAR; INTRAVENOUS at 10:29

## 2020-12-08 RX ADMIN — OXYCODONE HYDROCHLORIDE 5 MG: 5 TABLET ORAL at 04:18

## 2020-12-08 RX ADMIN — ACETAMINOPHEN 650 MG: 325 TABLET ORAL at 21:26

## 2020-12-08 RX ADMIN — GUAIFENESIN 600 MG: 600 TABLET, EXTENDED RELEASE ORAL at 21:27

## 2020-12-08 RX ADMIN — IPRATROPIUM BROMIDE AND ALBUTEROL SULFATE 3 ML: .5; 3 SOLUTION RESPIRATORY (INHALATION) at 07:10

## 2020-12-08 RX ADMIN — PANTOPRAZOLE SODIUM 20 MG: 20 TABLET, DELAYED RELEASE ORAL at 07:31

## 2020-12-08 RX ADMIN — IPRATROPIUM BROMIDE AND ALBUTEROL SULFATE 3 ML: .5; 3 SOLUTION RESPIRATORY (INHALATION) at 19:34

## 2020-12-08 RX ADMIN — ACETAMINOPHEN 650 MG: 325 TABLET ORAL at 00:18

## 2020-12-08 RX ADMIN — Medication 10 ML: at 07:31

## 2020-12-08 RX ADMIN — GABAPENTIN 200 MG: 100 CAPSULE ORAL at 17:55

## 2020-12-08 RX ADMIN — ROPINIROLE HYDROCHLORIDE 1 MG: 1 TABLET, FILM COATED ORAL at 17:55

## 2020-12-08 RX ADMIN — LOSARTAN POTASSIUM 50 MG: 50 TABLET, FILM COATED ORAL at 10:29

## 2020-12-08 RX ADMIN — ACETAMINOPHEN 650 MG: 325 TABLET ORAL at 10:29

## 2020-12-08 RX ADMIN — CARBIDOPA AND LEVODOPA 2 TABLET: 25; 100 TABLET ORAL at 14:18

## 2020-12-08 RX ADMIN — Medication 81 MG: at 10:28

## 2020-12-08 RX ADMIN — ENTACAPONE 200 MG: 200 TABLET, FILM COATED ORAL at 10:29

## 2020-12-08 RX ADMIN — ROPINIROLE HYDROCHLORIDE 1 MG: 1 TABLET, FILM COATED ORAL at 10:29

## 2020-12-08 RX ADMIN — ENTACAPONE 200 MG: 200 TABLET, FILM COATED ORAL at 21:27

## 2020-12-08 NOTE — PROGRESS NOTES
Physician Progress Note      PATIENTBookashvin Malone  CSN #:                  [id number]  :                       1940  ADMIT DATE:       2020 10:01 PM  DISCH DATE:  RESPONDING  PROVIDER #:        MARY ANNE Wong DO          QUERY TEXT:    Dear attending physician,    Pt admitted with acute hypoxic respiratory failure and has volume overload documented. Echo with mild diastolic dysfunction. If possible, please document in progress notes and discharge summary further specificity regarding the type and acuity of CHF:    The medical record reflects the following:  Risk Factors: no hx. chf, Hx. CKD2, HTN  Clinical Indicators: -NT-proBNP 830  Per H&P- Acute hypoxic respiratory failure: likely from aspiration pna vs fluid overload. -Noted to have an elevated proBNP. No history of heart failure. We will try small dose of Lasix 20 mg IV x1.  -CXR- Hypoinspiratory lungs with multifocal bilateral airspace disease, not  significantly changed. -Per PN- Acute hypoxic respiratory failure: Persistent  Aspiration pneumonia:  Volume overload:  -Suspected respiratory failure due to aspiration pneumonia  -S/p Lasix IV x1, continue lasix 20 mg daily  Echocardiogram with EF 64-40%, mild diastolic dysfunction  Treatment: I&O?s, monitor weight, fluid restriction, pulse oximetry, IV Lasix 20 mg on 12/3 to Lasix 20 mg po qd, echo, Oxygen to keep oxygen saturations >92%,echo    Thank you,  Barbara Montiel RN, BSN  Clinical documentation Improvement  [phone]  Options provided:  -- Acute on Chronic Diastolic CHF/HFpEF  -- Chronic Diastolic CHF/HFpEF  -- Volume overload only, no heart failure  -- Other - I will add my own diagnosis  -- Disagree - Not applicable / Not valid  -- Disagree - Clinically unable to determine / Unknown  -- Refer to Clinical Documentation Reviewer    PROVIDER RESPONSE TEXT:    This patient has chronic diastolic CHF/HFpEF.     Query created by: Harrison Kinsey on 2020 3:43 PM      Electronically signed by:  Young Watkins DO 12/8/2020 8:38 AM

## 2020-12-08 NOTE — PROGRESS NOTES
[address] [facility] Adult  Hospitalist Group                                                                                          Hospitalist Progress Note  Leeanne Tracy   Answering service: [phone] OR [phone] from in house phone        Date of Service:  2020  NAME:  Dimple Babinski  :  1940  MRN:  [id number]      Admission Summary:   Dimple Babinski is a 78 y.o. male past medical history significant for Parkinson, dementia, CAD, CKD stage III presented emergency room with respiratory distress and hypoxia. Patient Danica vallecillo as per report he had an episode today of choking during dinner afterward he became hypoxic with O2 sats in the upper 80s and found to have a \" low-grade fever\" of 99.9. Per daughter report he had a similar episode of choking last week again when he was eating by his respiratory symptoms were not as severe and he recovered quickly. Interval history / Subjective: Follow-up respiratory failure. Patient seen and examined. Son at bedside. Overnight, patient complained of hip pain, required prn oxycodone. Per son, was then able to sleep well. PT worked with patient but reported left hip pain.       Assessment & Plan:     Acute hypoxic respiratory failure: Persistent  Aspiration pneumonia:  Volume overload:  -Suspected respiratory failure due to aspiration pneumonia  -Procalcitonin elevated at 12  -Continue Unasyn  -S/p Lasix IV x1, continue lasix 20 mg daily  -Albuterol nebulizer three times changed to prn   -Guaifenisen twice daily  -Echocardiogram with EF 74-27%, mild diastolic dysfunction     MARI: resolved  -Suspected prerenal  -Monitor closely in setting of Lasix     Anemia, macrocytic: Stable     Parkinson: at baseline as per family  -continue with home medications     Hypertension: continue amlodipine, continue losartan   -has fluctuating blood pressures    Left hip pain:   -order x-ray, continue tylenol and prn oxycodone (low dose)     Follow up PT/OT. Suspect need SNF on discharge    Patient with baseline low functioning level. He is at risk for decline     Code status: FULL  DVT prophylaxis: SCDs    Care Plan discussed with: Patient/Family  Anticipated Disposition: SNF versus assisted living facility   Anticipated Discharge: >48 hours     Hospital Problems  Date Reviewed: 9/14/2020          Codes Class Noted POA    Pneumonia ICD-10-CM: J18.9  ICD-9-CM: 486  12/3/2020 Unknown                Review of Systems:   Negative unless stated above      Vital Signs:    Last 24hrs VS reviewed since prior progress note. Most recent are:  Visit Vitals  BP (!) 176/78 (BP 1 Location: Right arm, BP Patient Position: At rest)   Pulse 77   Temp 98 °F (36.7 °C)   Resp 18   Ht 5' 10\" (1.778 m)   Wt 85.2 kg (187 lb 13.3 oz)   SpO2 92%   BMI 26.95 kg/m²         Intake/Output Summary (Last 24 hours) at 12/8/2020 1204  Last data filed at 12/7/2020 1211  Gross per 24 hour   Intake    Output 350 ml   Net -350 ml        Physical Examination:     I had a face to face encounter with this patient and independently examined them on 12/8/2020 as outlined below:          Constitutional:  No acute distress, lying in bed, elderly, sleeping, shivering       ENT:  Oral mucosa moist, oropharynx benign. Resp:  minimal Bibasilar crackles R>L, no wheezing/rhonchi/rales. No accessory muscle use   CV:  Regular rhythm, normal rate, no murmurs, gallops, rubs    GI:  Soft, non distended, non tender. normoactive bowel sounds, no hepatosplenomegaly     Musculoskeletal:  No edema, warm, 2+ pulses throughout    Neurologic:  Moves all extremities.      Skin:  Good turgor, no rashes or ulcers       Data Review:    Review and/or order of clinical lab test  Review and/or order of tests in the radiology section of CPT  Review and/or order of tests in the medicine section of CPT      Labs:     Recent Labs     12/07/20  0329 12/06/20  1052   WBC 11.6* 9.0   HGB 10.8* 9.9*   HCT 33.6* 28.9*    182 Recent Labs     12/07/20  0329 12/06/20  0324    142   K 3.9 3.8    108   CO2 29 27   BUN 15 20   CREA 1.11 1.02   * 99   CA 9.4 9.0     No results for input(s): ALT, AP, TBIL, TBILI, TP, ALB, GLOB, GGT, AML, LPSE in the last 72 hours. No lab exists for component: SGOT, GPT, AMYP, HLPSE  No results for input(s): INR, PTP, APTT, INREXT, INREXT in the last 72 hours. No results for input(s): FE, TIBC, PSAT, FERR in the last 72 hours. No results found for: FOL, RBCF   No results for input(s): PH, PCO2, PO2 in the last 72 hours. No results for input(s): CPK, CKNDX, TROIQ in the last 72 hours.     No lab exists for component: CPKMB  Lab Results   Component Value Date/Time    Cholesterol, total 119 07/08/2020 10:40 AM    HDL Cholesterol 51 07/08/2020 10:40 AM    LDL, calculated 47 07/08/2020 10:40 AM    Triglyceride 103 07/08/2020 10:40 AM    CHOL/HDL Ratio 2 07/08/2020 10:40 AM     No results found for: GLUCPOC  Lab Results   Component Value Date/Time    Color brown (A) 07/08/2020 10:41 AM    Appearance Clear 01/15/2020 03:22 PM    Specific gravity 1.015 07/08/2020 10:41 AM    Specific gravity 1.014 09/04/2019 02:45 PM    pH (UA) 5 07/08/2020 10:41 AM    Protein 2+ (A) 07/08/2020 10:41 AM    Glucose NEGATIVE  09/04/2019 02:45 PM    Ketone Negative 07/08/2020 10:41 AM    Bilirubin Negative 07/08/2020 10:41 AM    Urobilinogen Negative 07/08/2020 10:41 AM    Nitrites Negative 07/08/2020 10:41 AM    Leukocyte Esterase Negative 07/08/2020 10:41 AM    Epithelial cells FEW 09/04/2019 02:45 PM    Bacteria Few 01/15/2020 03:22 PM    WBC 0 07/08/2020 10:41 AM    RBC 2-5 (A) 07/08/2020 10:41 AM         Medications Reviewed:     Current Facility-Administered Medications   Medication Dose Route Frequency    albuterol-ipratropium (DUO-NEB) 2.5 MG-0.5 MG/3 ML  3 mL Nebulization TID RT    amLODIPine (NORVASC) tablet 10 mg  10 mg Oral DAILY    losartan (COZAAR) tablet 50 mg  50 mg Oral DAILY    furosemide (LASIX) tablet 20 mg  20 mg Oral DAILY    guaiFENesin ER (MUCINEX) tablet 600 mg  600 mg Oral Q12H    carbidopa-levodopa (SINEMET)  mg per tablet 2 Tab  2 Tab Oral QID    albuterol (PROVENTIL VENTOLIN) nebulizer solution 2.5 mg  2.5 mg Nebulization Q6H PRN    sodium chloride (NS) flush 5-40 mL  5-40 mL IntraVENous Q8H    sodium chloride (NS) flush 5-40 mL  5-40 mL IntraVENous PRN    acetaminophen (TYLENOL) tablet 650 mg  650 mg Oral Q6H PRN    Or    acetaminophen (TYLENOL) suppository 650 mg  650 mg Rectal Q6H PRN    polyethylene glycol (MIRALAX) packet 17 g  17 g Oral DAILY PRN    promethazine (PHENERGAN) tablet 12.5 mg  12.5 mg Oral Q6H PRN    Or    ondansetron (ZOFRAN) injection 4 mg  4 mg IntraVENous Q6H PRN    ampicillin-sulbactam (UNASYN) 1.5 g in 0.9% sodium chloride (MBP/ADV) 50 mL MBP  1.5 g IntraVENous Q6H    aspirin delayed-release tablet 81 mg  81 mg Oral DAILY    atorvastatin (LIPITOR) tablet 40 mg  40 mg Oral QHS    entacapone (COMTAN) tablet 200 mg  200 mg Oral TID    pantoprazole (PROTONIX) tablet 20 mg  20 mg Oral ACB    PARoxetine (PAXIL) tablet 10 mg  10 mg Oral DAILY    gabapentin (NEURONTIN) capsule 200 mg  200 mg Oral BID    rOPINIRole (REQUIP) tablet 1 mg  1 mg Oral BID     ______________________________________________________________________  EXPECTED LENGTH OF STAY: 3d 2h  ACTUAL LENGTH OF STAY:          [address] [facility] [place],

## 2020-12-08 NOTE — PROGRESS NOTES
Problem: Self Care Deficits Care Plan (Adult)  Goal: *Acute Goals and Plan of Care (Insert Text)  Description:   FUNCTIONAL STATUS PRIOR TO ADMISSION: Patient is an unreliable historian 2/2 baseline dementia. Per chart review, patient was modified independent using a rollator for short distance functional mobility and received minimal(?) assistance from staff. This date, patient reporting he self-feeds and occasionally needs help with lower body dressing. HOME SUPPORT: Patient recently moved to [facility]. Occupational Therapy Goals  Initiated 12/4/2020  1. Patient will perform seated ADL task with material set-up and minimal assistance within 7 day(s). 2.  Patient will perform upper body dressing with minimal assistance within 7 day(s). 3.  Patient will perform lower body dressing with moderate assistance within 7 day(s). 4.  Patient will perform toilet transfers to [facility] with moderate assistance and RW within 7 day(s). 5.  Patient will perform all aspects of toileting with moderate assistance and RW within 7 day(s). Outcome: Progressing Towards Goal     Problem: Self Care Deficits Care Plan (Adult)  Goal: *Acute Goals and Plan of Care (Insert Text)  Description:   FUNCTIONAL STATUS PRIOR TO ADMISSION: Patient is an unreliable historian 2/2 baseline dementia. Per chart review, patient was modified independent using a rollator for short distance functional mobility and received minimal(?) assistance from staff. This date, patient reporting he self-feeds and occasionally needs help with lower body dressing. HOME SUPPORT: Patient recently moved to [facility]. Occupational Therapy Goals  Initiated 12/4/2020  1. Patient will perform seated ADL task with material set-up and minimal assistance within 7 day(s). 2.  Patient will perform upper body dressing with minimal assistance within 7 day(s). 3.  Patient will perform lower body dressing with moderate assistance within 7 day(s).   4.  Patient will perform toilet transfers to [facility] with moderate assistance and RW within 7 day(s). 5.  Patient will perform all aspects of toileting with moderate assistance and RW within 7 day(s). 12/8/2020 1116 by LUCIA Macdonald  Outcome: Not Progressing Towards Goal  OCCUPATIONAL THERAPY TREATMENT  Patient: Swati Chambers (49 y.o. male)  Date: 12/8/2020  Diagnosis: Pneumonia [J18.9]   <principal problem not specified>       Precautions: Fall  Chart, occupational therapy assessment, plan of care, and goals were reviewed. ASSESSMENT  Patient continues with skilled OT services and is not progressing towards goals. Participation this date impacted by son and patient's report that patient did not sleep last night. Patient participated in bed mobility, seated balance, and and attempt to stand for functional transfers. Once seated on EOB, patient reporting left hip pain at a 9/10. Patient unable to come to standing or scoot on bed with max assist of 2. Returned patient to long sit to rest. Son present. Current Level of Function Impacting Discharge (ADLs): Set up to total assist for self care, bed mobility with up to max assist of 2    Other factors to consider for discharge: none         PLAN :  Patient continues to benefit from skilled intervention to address the above impairments. Continue treatment per established plan of care. to address goals.     Recommend with staff: Bed in modified chair position for meals and self care,  Set up to total assist for self care, bed mobility with up to max assist of 2        Recommend next OT session: self care on EOB    Recommendation for discharge: (in order for the patient to meet his/her long term goals)  Therapy up to 5 days/week in SNF setting  If discharged to home would need set up to total assist for self care and up to max assist of 2 for mobility, HH  This discharge recommendation:  Has been made in collaboration with the attending provider and/or case management    IF patient discharges home will need the following DME: bedside commode, hospital bed and walker: rolling       SUBJECTIVE:   Patient stated this pain is just too much, I can't stand.  nurse notified    OBJECTIVE DATA SUMMARY:   Cognitive/Behavioral Status:  Neurologic State: Drowsy; Confused  Orientation Level: Oriented to person;Disoriented to situation;Disoriented to time;Oriented to place  Cognition: Decreased attention/concentration;Decreased command following;Memory loss  Perception: Appears intact  Perseveration: No perseveration noted  Safety/Judgement: Decreased awareness of environment;Decreased insight into deficits    Functional Mobility and Transfers for ADLs:  Bed Mobility:  Supine to Sit: Maximum assistance;Assist x2; Additional time; Adaptive equipment  Sit to Supine: Minimum assistance;Assist x1;Additional time; Adaptive equipment    Transfers:  Sit to Stand: Other (comment)(unable to stand)          Balance:  Sitting: Impaired; Without support  Sitting - Static: Fair (occasional)  Sitting - Dynamic: Fair (occasional)  Standing: Impaired; With support(unable to come to standing)    ADL Intervention:            Lower Body Dressing Assistance  Dressing Assistance: Total assistance(dependent)(inferred from mobiltiy)    Toileting  Bladder Hygiene: Total assistance (dependent)    Cognitive Retraining  Organizing/Sequencing: Breaking task down  Attention to Task: Single task  Following Commands: Follows one step commands/directions  Safety/Judgement: Decreased awareness of environment;Decreased insight into deficits  Cues: Visual cues provided;Verbal cues provided; Tactile cues provided      Activity Tolerance:   Fair    After treatment patient left in no apparent distress:   Heels elevated for pressure relief, Call bell within reach, Bed / chair alarm activated, Caregiver / family present and in long sit    COMMUNICATION/COLLABORATION:   The patients plan of care was discussed with: Physical therapist and Registered nurse.     LUCIA Diaz  Time Calculation: 24 mins

## 2020-12-08 NOTE — PROGRESS NOTES
Problem: Mobility Impaired (Adult and Pediatric)  Goal: *Acute Goals and Plan of Care (Insert Text)  Description: FUNCTIONAL STATUS PRIOR TO ADMISSION: Patient was modified independent using a rollator for functional mobility (approx 15 ft). Patient required minimal assistance for basic and instrumental ADLs. HOME SUPPORT PRIOR TO ADMISSION: The patient lived with wife. Physical Therapy Goals  Initiated 12/3/2020  1. Patient will move from supine to sit and sit to supine , scoot up and down, and roll side to side in bed with modified independence within 7 day(s). 2.  Patient will transfer from bed to chair and chair to bed with supervision/set-up using the least restrictive device within 7 day(s). 3.  Patient will perform sit to stand with supervision/set-up within 7 day(s). 4.  Patient will ambulate with supervision/set-up for 30 feet with the least restrictive device within 7 day(s). 5.  Patient will ascend/descend 3 stairs with bilateral handrail(s) with minimal assistance/contact guard assist within 7 day(s). Outcome: Progressing Towards Goal   PHYSICAL THERAPY TREATMENT  Patient: Luis Grubbs (19 y.o. male)  Date: 12/8/2020  Diagnosis: Pneumonia [J18.9]   <principal problem not specified>       Precautions: Fall  Chart, physical therapy assessment, plan of care and goals were reviewed. ASSESSMENT  Patient continues with skilled PT services and is slowly progressing towards goals. Pt received supine in bed with son at bedside. Pt tolerated session fairly well, but is limited by decreased strength and ROM, decreased functional mobility, impaired seated balance, inability to stand and limited tolerance to activity to severe pain in L hip. Pt required max A x 2 supine to sit EOB and attempted sit <>stand from elevated bed height with RW in front of patient, but pt unable to assume standing position with max A x 2.  Attempted lateral scooting along EOB, but pt required max assist and was unsuccessful. Pt returned to supine position with min A and pillows placed for comfort. At this time, pt is functioning well below baseline and has not been able to ambulate. Would recommend SNF placement upon discharge. Current Level of Function Impacting Discharge (mobility/balance): max A x 2 supine to sit, unable to achieve standing position    Other factors to consider for discharge: previously ambulatory with rollator x 15 ft at [facility]         PLAN :  Patient continues to benefit from skilled intervention to address the above impairments. Continue treatment per established plan of care. to address goals. Recommendation for discharge: (in order for the patient to meet his/her long term goals)  Therapy up to 5 days/week in SNF setting    This discharge recommendation:  Has been made in collaboration with the attending provider and/or case management         SUBJECTIVE:   Patient stated It hurts so  much. I could die. Do you have a gun to help? Amy Miller  Notified RN of patient's comments    OBJECTIVE DATA SUMMARY:   Critical Behavior:  Neurologic State: Drowsy  Orientation Level: Oriented to person, Oriented to place  Cognition: Decreased attention/concentration  Safety/Judgement: Awareness of environment  Functional Mobility Training:  Bed Mobility:     Supine to Sit: Maximum assistance;Assist x2  Sit to Supine: Minimum assistance           Transfers:  Sit to Stand: (unable to assume standing position with max A x 2)        Balance:  Sitting: Impaired  Sitting - Static: Fair (occasional)  Sitting - Dynamic: Fair (occasional)  Standing: (unable to assume standing)  Ambulation/Gait Training:             Therapeutic Exercises:   LAQ, seated marching, ankle pumps  Pain Rating:  Pt c/o 9-10/10 pain in L hip    Activity Tolerance:   Fair    After treatment patient left in no apparent distress:   Supine in bed, Call bell within reach, Caregiver / family present, and Side rails x 3    COMMUNICATION/COLLABORATION:   The patients plan of care was discussed with: Occupational therapist, Registered nurse, and Case management.      Rylee Mcelroy, PT, DPT   Time Calculation: 28 mins

## 2020-12-09 ENCOUNTER — APPOINTMENT (OUTPATIENT)
Dept: CT IMAGING | Age: 80
DRG: 871 | End: 2020-12-09
Attending: INTERNAL MEDICINE
Payer: MEDICARE

## 2020-12-09 ENCOUNTER — APPOINTMENT (OUTPATIENT)
Dept: VASCULAR SURGERY | Age: 80
DRG: 871 | End: 2020-12-09
Attending: INTERNAL MEDICINE
Payer: MEDICARE

## 2020-12-09 LAB
ANION GAP SERPL CALC-SCNC: 7 MMOL/L (ref 5–15)
BUN SERPL-MCNC: 20 MG/DL (ref 6–20)
BUN/CREAT SERPL: 18 (ref 12–20)
CALCIUM SERPL-MCNC: 8.2 MG/DL (ref 8.5–10.1)
CHLORIDE SERPL-SCNC: 102 MMOL/L (ref 97–108)
CO2 SERPL-SCNC: 27 MMOL/L (ref 21–32)
CREAT SERPL-MCNC: 1.11 MG/DL (ref 0.7–1.3)
D DIMER PPP FEU-MCNC: 4.97 MG/L FEU (ref 0–0.65)
ERYTHROCYTE [DISTWIDTH] IN BLOOD BY AUTOMATED COUNT: 11.9 % (ref 11.5–14.5)
GLUCOSE SERPL-MCNC: 107 MG/DL (ref 65–100)
HCT VFR BLD AUTO: 28.8 % (ref 36.6–50.3)
HGB BLD-MCNC: 9.5 G/DL (ref 12.1–17)
MAGNESIUM SERPL-MCNC: 2.2 MG/DL (ref 1.6–2.4)
MCH RBC QN AUTO: 33.6 PG (ref 26–34)
MCHC RBC AUTO-ENTMCNC: 33 G/DL (ref 30–36.5)
MCV RBC AUTO: 101.8 FL (ref 80–99)
NRBC # BLD: 0 K/UL (ref 0–0.01)
NRBC BLD-RTO: 0 PER 100 WBC
PHOSPHATE SERPL-MCNC: 3.8 MG/DL (ref 2.6–4.7)
PLATELET # BLD AUTO: 229 K/UL (ref 150–400)
PMV BLD AUTO: 9.9 FL (ref 8.9–12.9)
POTASSIUM SERPL-SCNC: 4 MMOL/L (ref 3.5–5.1)
PROCALCITONIN SERPL-MCNC: 1.27 NG/ML
RBC # BLD AUTO: 2.83 M/UL (ref 4.1–5.7)
SODIUM SERPL-SCNC: 136 MMOL/L (ref 136–145)
WBC # BLD AUTO: 9.4 K/UL (ref 4.1–11.1)

## 2020-12-09 PROCEDURE — 83735 ASSAY OF MAGNESIUM: CPT

## 2020-12-09 PROCEDURE — 85379 FIBRIN DEGRADATION QUANT: CPT

## 2020-12-09 PROCEDURE — 51798 US URINE CAPACITY MEASURE: CPT

## 2020-12-09 PROCEDURE — 77010033678 HC OXYGEN DAILY

## 2020-12-09 PROCEDURE — 36415 COLL VENOUS BLD VENIPUNCTURE: CPT

## 2020-12-09 PROCEDURE — 94664 DEMO&/EVAL PT USE INHALER: CPT

## 2020-12-09 PROCEDURE — 65270000032 HC RM SEMIPRIVATE

## 2020-12-09 PROCEDURE — 74011250637 HC RX REV CODE- 250/637: Performed by: INTERNAL MEDICINE

## 2020-12-09 PROCEDURE — 97530 THERAPEUTIC ACTIVITIES: CPT

## 2020-12-09 PROCEDURE — 74011000258 HC RX REV CODE- 258: Performed by: INTERNAL MEDICINE

## 2020-12-09 PROCEDURE — 74011250636 HC RX REV CODE- 250/636: Performed by: INTERNAL MEDICINE

## 2020-12-09 PROCEDURE — 84100 ASSAY OF PHOSPHORUS: CPT

## 2020-12-09 PROCEDURE — 85027 COMPLETE CBC AUTOMATED: CPT

## 2020-12-09 PROCEDURE — 80048 BASIC METABOLIC PNL TOTAL CA: CPT

## 2020-12-09 PROCEDURE — 84145 PROCALCITONIN (PCT): CPT

## 2020-12-09 PROCEDURE — 94640 AIRWAY INHALATION TREATMENT: CPT

## 2020-12-09 PROCEDURE — 97535 SELF CARE MNGMENT TRAINING: CPT

## 2020-12-09 PROCEDURE — 93970 EXTREMITY STUDY: CPT

## 2020-12-09 PROCEDURE — 71250 CT THORAX DX C-: CPT

## 2020-12-09 PROCEDURE — 87040 BLOOD CULTURE FOR BACTERIA: CPT

## 2020-12-09 PROCEDURE — 94761 N-INVAS EAR/PLS OXIMETRY MLT: CPT

## 2020-12-09 PROCEDURE — 74011000250 HC RX REV CODE- 250: Performed by: INTERNAL MEDICINE

## 2020-12-09 RX ORDER — ENOXAPARIN SODIUM 100 MG/ML
40 INJECTION SUBCUTANEOUS EVERY 24 HOURS
Status: DISCONTINUED | OUTPATIENT
Start: 2020-12-09 | End: 2020-12-18 | Stop reason: HOSPADM

## 2020-12-09 RX ADMIN — IPRATROPIUM BROMIDE AND ALBUTEROL SULFATE 3 ML: .5; 3 SOLUTION RESPIRATORY (INHALATION) at 07:36

## 2020-12-09 RX ADMIN — ENTACAPONE 200 MG: 200 TABLET, FILM COATED ORAL at 22:26

## 2020-12-09 RX ADMIN — GUAIFENESIN 600 MG: 600 TABLET, EXTENDED RELEASE ORAL at 09:49

## 2020-12-09 RX ADMIN — OXYCODONE HYDROCHLORIDE 2.5 MG: 5 TABLET ORAL at 01:19

## 2020-12-09 RX ADMIN — CARBIDOPA AND LEVODOPA 2 TABLET: 25; 100 TABLET ORAL at 12:09

## 2020-12-09 RX ADMIN — ENOXAPARIN SODIUM 40 MG: 40 INJECTION SUBCUTANEOUS at 16:28

## 2020-12-09 RX ADMIN — CARBIDOPA AND LEVODOPA 2 TABLET: 25; 100 TABLET ORAL at 22:26

## 2020-12-09 RX ADMIN — PIPERACILLIN AND TAZOBACTAM 3.38 G: 3; .375 INJECTION, POWDER, LYOPHILIZED, FOR SOLUTION INTRAVENOUS at 19:39

## 2020-12-09 RX ADMIN — ACETAMINOPHEN 650 MG: 325 TABLET ORAL at 18:17

## 2020-12-09 RX ADMIN — Medication 10 ML: at 16:26

## 2020-12-09 RX ADMIN — ENTACAPONE 200 MG: 200 TABLET, FILM COATED ORAL at 09:49

## 2020-12-09 RX ADMIN — ROPINIROLE HYDROCHLORIDE 1 MG: 1 TABLET, FILM COATED ORAL at 18:17

## 2020-12-09 RX ADMIN — CARBIDOPA AND LEVODOPA 2 TABLET: 25; 100 TABLET ORAL at 09:48

## 2020-12-09 RX ADMIN — IPRATROPIUM BROMIDE AND ALBUTEROL SULFATE 3 ML: .5; 3 SOLUTION RESPIRATORY (INHALATION) at 16:32

## 2020-12-09 RX ADMIN — CARBIDOPA AND LEVODOPA 2 TABLET: 25; 100 TABLET ORAL at 18:17

## 2020-12-09 RX ADMIN — PAROXETINE HYDROCHLORIDE 10 MG: 20 TABLET, FILM COATED ORAL at 09:48

## 2020-12-09 RX ADMIN — GUAIFENESIN 600 MG: 600 TABLET, EXTENDED RELEASE ORAL at 20:51

## 2020-12-09 RX ADMIN — ROPINIROLE HYDROCHLORIDE 1 MG: 1 TABLET, FILM COATED ORAL at 09:49

## 2020-12-09 RX ADMIN — Medication 10 ML: at 06:46

## 2020-12-09 RX ADMIN — AMPICILLIN SODIUM AND SULBACTAM SODIUM 1.5 G: 1; .5 INJECTION, POWDER, FOR SOLUTION INTRAMUSCULAR; INTRAVENOUS at 09:36

## 2020-12-09 RX ADMIN — AMPICILLIN SODIUM AND SULBACTAM SODIUM 1.5 G: 1; .5 INJECTION, POWDER, FOR SOLUTION INTRAMUSCULAR; INTRAVENOUS at 03:40

## 2020-12-09 RX ADMIN — IPRATROPIUM BROMIDE AND ALBUTEROL SULFATE 3 ML: .5; 3 SOLUTION RESPIRATORY (INHALATION) at 20:00

## 2020-12-09 RX ADMIN — ATORVASTATIN CALCIUM 40 MG: 40 TABLET, FILM COATED ORAL at 22:26

## 2020-12-09 RX ADMIN — GABAPENTIN 200 MG: 100 CAPSULE ORAL at 18:17

## 2020-12-09 RX ADMIN — ENTACAPONE 200 MG: 200 TABLET, FILM COATED ORAL at 16:29

## 2020-12-09 RX ADMIN — Medication 81 MG: at 09:49

## 2020-12-09 RX ADMIN — AMPICILLIN SODIUM AND SULBACTAM SODIUM 1.5 G: 1; .5 INJECTION, POWDER, FOR SOLUTION INTRAMUSCULAR; INTRAVENOUS at 16:28

## 2020-12-09 RX ADMIN — PANTOPRAZOLE SODIUM 20 MG: 20 TABLET, DELAYED RELEASE ORAL at 06:46

## 2020-12-09 RX ADMIN — GABAPENTIN 200 MG: 100 CAPSULE ORAL at 09:47

## 2020-12-09 RX ADMIN — ACETAMINOPHEN 650 MG: 325 TABLET ORAL at 12:09

## 2020-12-09 NOTE — PROGRESS NOTES
[address] [facility] Hospitalist Progress Note Nonda Boas, DO Answering service: [phone] OR [phone] from in house phone Date of Service:  2020 NAME:  Pelon Herr :  1940 MRN:  [id number] Admission Summary:  
Pelon Herr is a 78 y.o. male past medical history significant for Parkinson, dementia, CAD, CKD stage III presented emergency room with respiratory distress and hypoxia. Patient Anshul vallecillo as per report he had an episode today of choking during dinner afterward he became hypoxic with O2 sats in the upper 80s and found to have a \" low-grade fever\" of 99.9. Per daughter report he had a similar episode of choking last week again when he was eating by his respiratory symptoms were not as severe and he recovered quickly. Interval history / Subjective: Follow-up respiratory failure. Patient seen and examined earlier today. Daughter at bedside. Patient with new fevers overnight. Increased oxygen demands during therapy session but then pulse ox moved to earlobe with better readings. Patient disoriented with increased sleep during the day and more awake at night with family present. Daughter reports possible allergy to dye. Addendum: CT reviewed. Compatible with pneumonia. Will transition to zosyn Assessment & Plan:  
 
Acute hypoxic respiratory failure: Persistent Aspiration pneumonia: 
Volume overload: 
-Suspected respiratory failure due to aspiration pneumonia 
-Procalcitonin elevated at 12, repeat improved to 1.27 
-Continue Unasyn 
-S/p Lasix IV x1, continue lasix 20 mg daily 
-Albuterol nebulizer three times changed to prn  
-Guaifenisen twice daily 
-Echocardiogram with EF 11-94%, mild diastolic dysfunction Fever:  
-CT scan w/o contrast ordered. Unable to do CTA due to possible dye allergy -Lower extremity dopplers negative for DVT 
-D-dimer elevated 4.97 Acute encephalopathy:  
-patient with reversed days/nights. Encouraged family to talk with patient during the day for improved sleep at night Parkinson: at baseline as per family 
-continue with home medications 
  
MARI: resolved 
-Suspected prerenal 
-Monitor closely in setting of Lasix 
  
Anemia, macrocytic: Stable 
  
Hypertension: continue amlodipine, continue losartan  
-has fluctuating blood pressures Left hip pain:  
-order x-ray, continue tylenol and prn oxycodone (low dose) Follow up PT/OT. Suspect need SNF on discharge Patient with baseline low functioning level. He is at risk for decline 
  
Code status: FULL 
DVT prophylaxis: SCDs Care Plan discussed with: Patient/Family Anticipated Disposition: SNF Anticipated Discharge: >48 hours Hospital Problems  Date Reviewed: 9/14/2020 Codes Class Noted POA Pneumonia ICD-10-CM: J18.9 ICD-9-CM: 421  12/3/2020 Unknown Review of Systems:  
Negative unless stated above Vital Signs:  
 Last 24hrs VS reviewed since prior progress note. Most recent are: 
Visit Vitals BP (!) 100/57 (BP 1 Location: Left arm, BP Patient Position: At rest) Pulse 78 Temp 100.2 °F (37.9 °C) Resp 18 Ht 5' 10\" (1.778 m) Wt 85.1 kg (187 lb 9.8 oz) SpO2 92% BMI 26.92 kg/m² Intake/Output Summary (Last 24 hours) at 12/9/2020 1537 Last data filed at 12/9/2020 7813 Gross per 24 hour Intake 120 ml Output 875 ml Net -755 ml Physical Examination:  
 
I had a face to face encounter with this patient and independently examined them on 12/9/2020 as outlined below: 
 
     
Constitutional:  No acute distress, lying in bed, elderly, sleeping ENT:  Oral mucosa moist, oropharynx benign. Resp:  minimal Bibasilar crackles R>L, no wheezing/rhonchi/rales. No accessory muscle use CV:  Regular rhythm, normal rate, no murmurs, gallops, rubs GI:  Soft, non distended, non tender. normoactive bowel sounds, no hepatosplenomegaly Musculoskeletal:  No edema, warm, 2+ pulses throughout Neurologic:  Moves all extremities. Skin:  Good turgor, no rashes or ulcers Data Review:  
 Review and/or order of clinical lab test 
Review and/or order of tests in the radiology section of [facility] Review and/or order of tests in the medicine section of [facility] Labs:  
 
Recent Labs 12/09/20 
[id number] 12/07/20 
1457 WBC 9.4 11.6* HGB 9.5* 10.8* HCT 28.8* 33.6*  213 Recent Labs 12/09/20 
[id number] 12/07/20 
4090  140  
K 4.0 3.9  105 CO2 27 29 BUN 20 15 CREA 1.11 1.11  
* 104* CA 8.2* 9.4 MG 2.2  --   
PHOS 3.8  -- No results for input(s): ALT, AP, TBIL, TBILI, TP, ALB, GLOB, GGT, AML, LPSE in the last 72 hours. No lab exists for component: SGOT, GPT, AMYP, HLPSE No results for input(s): INR, PTP, APTT, INREXT, INREXT in the last 72 hours. No results for input(s): FE, TIBC, PSAT, FERR in the last 72 hours. No results found for: FOL, RBCF No results for input(s): PH, PCO2, PO2 in the last 72 hours. No results for input(s): CPK, CKNDX, TROIQ in the last 72 hours. No lab exists for component: CPKMB Lab Results Component Value Date/Time Cholesterol, total 119 07/08/2020 10:40 AM  
 HDL Cholesterol 51 07/08/2020 10:40 AM  
 LDL, calculated 47 07/08/2020 10:40 AM  
 Triglyceride 103 07/08/2020 10:40 AM  
 CHOL/HDL Ratio 2 07/08/2020 10:40 AM  
 
[facility] found [facility] Lab Results Component Value Date/Time  Color brown (A) 07/08/2020 10:41 AM  
 Appearance Clear 01/15/2020 03:22 PM  
 Specific gravity 1.015 07/08/2020 10:41 AM  
 Specific gravity 1.014 09/04/2019 02:45 PM  
 pH (UA) 5 07/08/2020 10:41 AM  
 Protein 2+ (A) 07/08/2020 10:41 AM  
 Glucose NEGATIVE  09/04/2019 02:45 PM  
 Ketone Negative 07/08/2020 10:41 AM  
 Bilirubin Negative 07/08/2020 10:41 AM  
 Urobilinogen Negative 07/08/2020 10:41 AM  
 Nitrites Negative 07/08/2020 10:41 AM  
 Leukocyte Esterase Negative 07/08/2020 10:41 AM  
 Epithelial cells FEW 09/04/2019 02:45 PM  
 Bacteria Few 01/15/2020 03:22 PM  
 WBC 0 07/08/2020 10:41 AM  
 RBC 2-5 (A) 07/08/2020 10:41 AM  
 
 
 
Medications Reviewed:  
 
Current Facility-Administered Medications Medication Dose Route Frequency  oxyCODONE IR (ROXICODONE) tablet 2.5 mg  2.5 mg Oral Q6H PRN  
 albuterol-ipratropium (DUO-NEB) 2.5 MG-0.5 MG/3 ML  3 mL Nebulization TID RT  
 amLODIPine (NORVASC) tablet 10 mg  10 mg Oral DAILY  losartan (COZAAR) tablet 50 mg  50 mg Oral DAILY  furosemide (LASIX) tablet 20 mg  20 mg Oral DAILY  guaiFENesin ER (MUCINEX) tablet 600 mg  600 mg Oral Q12H  carbidopa-levodopa (SINEMET)  mg per tablet 2 Tab  2 Tab Oral QID  albuterol (PROVENTIL VENTOLIN) nebulizer solution 2.5 mg  2.5 mg Nebulization Q6H PRN  
 sodium chloride (NS) flush 5-40 mL  5-40 mL IntraVENous Q8H  
 sodium chloride (NS) flush 5-40 mL  5-40 mL IntraVENous PRN  
 acetaminophen (TYLENOL) tablet 650 mg  650 mg Oral Q6H PRN Or  
 acetaminophen (TYLENOL) suppository 650 mg  650 mg Rectal Q6H PRN  polyethylene glycol (MIRALAX) packet 17 g  17 g Oral DAILY PRN  promethazine (PHENERGAN) tablet 12.5 mg  12.5 mg Oral Q6H PRN Or  
 ondansetron (ZOFRAN) injection 4 mg  4 mg IntraVENous Q6H PRN  
 ampicillin-sulbactam (UNASYN) 1.5 g in 0.9% sodium chloride (MBP/ADV) 50 mL MBP  1.5 g IntraVENous Q6H  
 aspirin delayed-release tablet 81 mg  81 mg Oral DAILY  atorvastatin (LIPITOR) tablet 40 mg  40 mg Oral QHS  entacapone (COMTAN) tablet 200 mg  200 mg Oral TID  pantoprazole (PROTONIX) tablet 20 mg  20 mg Oral ACB  PARoxetine (PAXIL) tablet 10 mg  10 mg Oral DAILY  gabapentin (NEURONTIN) capsule 200 mg  200 mg Oral BID  rOPINIRole (REQUIP) tablet 1 mg  1 mg Oral BID  
 
 ______________________________________________________________________ EXPECTED LENGTH OF STAY: 3d 2h 
ACTUAL LENGTH OF STAY:          [id number] [facility], DO

## 2020-12-09 NOTE — PROGRESS NOTES
Problem: Self Care Deficits Care Plan (Adult) Goal: *Acute Goals and Plan of Care (Insert Text) Description:  
FUNCTIONAL STATUS PRIOR TO ADMISSION: Patient is an unreliable historian 2/2 baseline dementia. Per chart review, patient was modified independent using a rollator for short distance functional mobility and received minimal(?) assistance from staff. This date, patient reporting he self-feeds and occasionally needs help with lower body dressing. HOME SUPPORT: Patient recently moved to [facility]. Occupational Therapy Goals Initiated 12/4/2020 1. Patient will perform seated ADL task with material set-up and minimal assistance within 7 day(s). 2.  Patient will perform upper body dressing with minimal assistance within 7 day(s). 3.  Patient will perform lower body dressing with moderate assistance within 7 day(s). 4.  Patient will perform toilet transfers to [facility] with moderate assistance and RW within 7 day(s). 5.  Patient will perform all aspects of toileting with moderate assistance and RW within 7 day(s). Outcome: Not Progressing Towards Goal 
 OCCUPATIONAL THERAPY TREATMENT Patient: Dimple Babinski (75 y.o. male) Date: 12/9/2020 Diagnosis: Pneumonia [J18.9] <principal problem not specified> Precautions: Fall Chart, occupational therapy assessment, plan of care, and goals were reviewed. ASSESSMENT Patient continues with skilled OT services and is not progressing towards goals. Patient received in bed and on 4.5 liters of O2 with SATs at 88% and HR 88. Patient participated in bed mobility, seated balance, change of gown, and return to bed after sitting up for approximately 10 min. Participation impacted increased confusion, impaired orientation, impaired attention to task, impaired arousal, impaired seated balance, impaired strength and endurance for functional activity. Unable to get a O2 reading in sitting with portable pulse ox or dynamap.  Returned to supine in bed and elevated head. O2 SATs 89%. Nurse notified of O2 SATs and participation. Daughter present for session. Current Level of Function Impacting Discharge (ADLs): Total assist for self care and mobility Other factors to consider for discharge: decline in status and ability to participate PLAN : 
Patient continues to benefit from skilled intervention to address the above impairments. Continue treatment per established plan of care. to address goals. Recommend with staff: Encourage participation in self care as able in supported sitting, bed in modified chair position 3 times a day, total assist for self care and mobility, pressure relief boots on bilateral LEs Recommend next OT session: grooming in bed in supported sitting Recommendation for discharge: (in order for the patient to meet his/her long term goals) Therapy up to 5 days/week in SNF setting If discharged to home, patient would require total assist for self care and assist of 2 for bed mobility, total care for cognition and safety as well as all iADL, Luciano Carbajalfurt This discharge recommendation: 
Has been made in collaboration with the attending provider and/or case management IF patient discharges home will need the following DME: hospital bed, mechanical lift, and wheelchair and assist of [place] to follow proper fit of DME SUBJECTIVE:  
Patient stated It hurts.  indicating left hip initially, but not in sitting OBJECTIVE DATA SUMMARY:  
Cognitive/Behavioral Status: 
Neurologic State: Lethargic;Eyes open to voice; Confused Orientation Level: Disoriented to place; Disoriented to situation;Disoriented to time;Oriented to person Cognition: Decreased attention/concentration;Memory loss; No command following Perception: Cues to maintain midline in sitting; Tactile;Verbal;Visual 
Perseveration: No perseveration noted Safety/Judgement: Decreased insight into deficits; Decreased awareness of environment;Decreased awareness of need for safety Functional Mobility and Transfers for ADLs: 
Bed Mobility: 
Rolling: Assist x2;Total assistance Supine to Sit: Total assistance;Assist x2 Sit to Supine: Assist x2;Total assistance Scooting: Total assistance;Assist x2 Balance: 
Sitting: Impaired; With support Sitting - Static: Poor (constant support) Sitting - Dynamic: Poor (constant support) ADL Intervention: 
  
Upper Body Dressing Assistance Hospital Gown: Total assistance (dependent) Cognitive Retraining Orientation Retraining: Place Organizing/Sequencing: Breaking task down Attention to Task: Single task;Distractibility Safety/Judgement: Decreased insight into deficits; Decreased awareness of environment;Decreased awareness of need for safety Activity Tolerance:  
Poor After treatment patient left in no apparent distress:  
Supine in bed, Heels elevated for pressure relief, Call bell within reach, and Caregiver / family present COMMUNICATION/COLLABORATION:  
The patients plan of care was discussed with: Physical therapist and Registered nurse. Siomara Sarabia Time Calculation: 32 mins

## 2020-12-09 NOTE — PROGRESS NOTES
Comprehensive Nutrition Assessment Type and Reason for Visit: Initial, RD nutrition re-screen/LOS Nutrition Recommendations/Plan: 1. Mechanical soft diet + ONS at all meals for ease/concentration of nutrients with decreased PO intake 2. Feeding assistance with all meals 3. Consider checking Zinc levels d/t long-standing complaints of loss of taste (20+ years) Nutrition Assessment:    
78 y.o. male who has a PMHx of Parkinson's, dementia, Aaron esophagus s/p multiple dilations, CKD, CAD, and CVA. Admitted with acute hypoxic respiratory failure, suspected aspiration PNA, and volume overload. Pt screened for LOS. Noted patient [address] Had episode of choking during dinner day of admission, afterward he became hypoxic with O2 sats in the upper 80s and found to have a low-grade fever. Per daughter report he had a similar episode of choking the week prior when he was eating, but his respiratory symptoms were not as severe and he recovered quickly. He does have significant Parkinson disease and he has been having difficulty eating and swallowing. Family also is concerned that patient has gained about 18 pounds recently and with bilateral lower extremity swelling. SLP saw pt on admission with the following assessment/recommendations: Mechanical soft consistencies; avoid hard, tough solids like steak/chicken and crusty breads. Suspect, given history of Aaron's esophagus, GERD, and multiple esophageal dilatations, patient has a component of esophageal dysphagia which is likely leading to episodes of \"choking\" at meals. This appears to be occurring with solid foods only. May benefit from further GI assessment. Pt discussed in rounds. Pt eating better with family present and helping with feedings. Spoke with daughter at bedside this afternoon, pt actually down for CT.   Daughter states that pt usually has an excellent appetite and can feed himself despite some tremors. Pt had been losing weight up until about 2 months ago when they moved him into [facility] he is given 3 meals/day + desserts. Prior to then, he was at home with wife who had dementia and would split one meal between the 2 of them vs each having one. Intake would be inconsistent. Tremors worse with this admission and overall is more lethargic and not expressing a great desire to eat. She was able to get him to eat some eggs this AM and drink about 2/3rds of his Ensure. She thinks he would prefer chocolate, but has not required use of them in the past.  She notes that his taste has been off since his stroke 20+ years ago. I explained benefits of supplements especially in acute illness to help with ease/ concentration of nutrients when not eating well. Encouraged her to continue to focus on feeding him real food first and offering supplement at end of meal. 
 
 
Malnutrition Assessment: 
Malnutrition Status: At risk for malnutrition (specify)(decreased PO intake) Context:  Acute illness Nutritionally Significant Medications:  
Unasyn, Lipitor, Sinemet, Lasix, Mucinex, Cozaar, oxycodone, Protonix, Paxil Estimated Daily Nutrient Needs: 
Energy (kcal): 1678-2538(MSJ x 1.3-1.5) Protein (g): 100(1.2 gm/kg) Fluid (ml/day): 1 mL/kcal 
 
Nutrition Related Findings:  
Edema: 1+ bilat LE Last BM: 12/9 - soft Wounds:   
None Current Nutrition Therapies: DIET CARDIAC Mechanical Soft DIET NUTRITIONAL SUPPLEMENTS Breakfast, Lunch, Dinner; Ensure [employer] Meal intake:  
Patient Vitals for the past 168 hrs: 
 % Diet Eaten 12/09/20 0954 60 % 12/06/20 1739 25 % 12/06/20 1243 25 % 12/06/20 0854 10 % 12/05/20 1221 25 % 12/05/20 0901 50 % 12/03/20 1725 50 % Anthropometric Measures: 
· Height:  5' 10\" (177.8 cm) · Current Body Wt:  85.1 kg (187 lb 9.8 oz) · Admission Body Wt:  202 lb 2.6 oz(91.7 kg) · Usual Body Wt:  81.6 kg (180 lb)(175-185 lb) · Ideal Body Wt:  166 lbs:  113 % · BMI Category: Overweight (BMI 25.0-29. 9) Wt Readings from Last 20 Encounters:  
12/09/20 85.1 kg (187 lb 9.8 oz) 12/04/20 88.4 kg (194 lb 14.2 oz) 12/03/20 91.7 kg (202 lb 2.6 oz) 09/14/20 81.2 kg (179 lb) 07/08/20 80.7 kg (178 lb) 02/18/20 80.3 kg (177 lb) 09/06/19 79.4 kg (175 lb) 09/04/19 79.4 kg (175 lb)  
07/29/19 82.6 kg (182 lb) 07/08/19 82.3 kg (181 lb 6.4 oz) 06/28/19 82.7 kg (182 lb 6.4 oz) 06/13/19 82.6 kg (182 lb 3.2 oz) 06/06/19 82.3 kg (181 lb 6.4 oz) 03/07/19 82.6 kg (182 lb)  
02/22/19 83.9 kg (185 lb)  
01/21/19 85 kg (187 lb 6.4 oz) 01/15/19 84.7 kg (186 lb 11.7 oz) 01/15/19 83.5 kg (184 lb) 11/01/18 86.2 kg (190 lb) 10/01/18 85.3 kg (188 lb) 08/30/18 84.8 kg (187 lb)  
06/14/18 84.4 kg (186 lb) Nutrition Diagnosis:  
· Inadequate oral intake related to cognitive or neurological impairment, early satiety(self-feeding difficulty, ) as evidenced by intake 0-25%, intake 26-50% Nutrition Interventions:  
Food and/or Nutrient Delivery: Continue current diet, Modify oral nutrition supplement Nutrition Education and Counseling: No recommendations at this time Coordination of Nutrition Care: Continue to monitor while inpatient, Feeding assistance/environmental change, Interdisciplinary rounds Goals: 
PO >/=50% of most meals + 1-2 ONS daily within 5-7 days Nutrition Monitoring and Evaluation:  
Behavioral-Environmental Outcomes: None identified Food/Nutrient Intake Outcomes: Food and nutrient intake, Supplement intake Physical Signs/Symptoms Outcomes: Biochemical data, Chewing or swallowing, Fluid status or edema, Meal time behavior, Weight Discharge Planning:   
Continue current diet, Continue oral nutrition supplement Recent Labs 12/09/20 
[id number] 12/07/20 
9183 * 104* BUN 20 15 CREA 1.11 1.11  140  
K 4.0 3.9  105 CO2 27 29  
CA 8.2* 9.4 PHOS 3.8  --   
MG 2.2  -- No results for input(s): ALT, AP, TBIL, TBILI, TP, ALB, GLOB, GGT, AML, LPSE in the last 72 hours. No lab exists for component: SGOT, GPT, AMYP, HLPSE No results for input(s): LAC in the last 72 hours. Recent Labs 12/09/20 
[id number] 12/07/20 
8843 WBC 9.4 11.6* HGB 9.5* 10.8* HCT 28.8* 33.6*  213 No results for input(s): PREALB in the last 72 hours. No results for input(s): TRIGL in the last 72 hours. No results for input(s): GLUCPOC in the last 72 hours. Lab Results Component Value Date/Time Hemoglobin A1c 5.6 01/20/2015 03:30 PM  
 
 
 
Nanette Coffman RD Available via Mobile Card Or Pager# [phone]

## 2020-12-09 NOTE — PROGRESS NOTES
Problem: Pressure Injury - Risk of 
Goal: *Prevention of pressure injury Description: Document Elier Scale and appropriate interventions in the flowsheet. Outcome: Progressing Towards Goal 
Note: Pressure Injury Interventions: 
Sensory Interventions: Assess changes in LOC, Float heels, Discuss PT/OT consult with provider, Keep linens dry and wrinkle-free Moisture Interventions: Absorbent underpads, Check for incontinence Q2 hours and as needed, Maintain skin hydration (lotion/cream) Activity Interventions: Pressure redistribution bed/mattress(bed type), PT/OT evaluation Mobility Interventions: Assess need for specialty bed, HOB 30 degrees or less, Float heels Nutrition Interventions: Document food/fluid/supplement intake Friction and Shear Interventions: HOB 30 degrees or less, Foam dressings/transparent film/skin sealants Problem: Patient Education: Go to Patient Education Activity Goal: Patient/Family Education Outcome: Progressing Towards Goal 
  
Problem: Falls - Risk of 
Goal: *Absence of Falls Description: Document Lesia Canales Fall Risk and appropriate interventions in the flowsheet. Outcome: Progressing Towards Goal 
Note: Fall Risk Interventions: 
Mobility Interventions: Bed/chair exit alarm, Communicate number of staff needed for ambulation/transfer, Patient to call before getting OOB Mentation Interventions: Adequate sleep, hydration, pain control, Door open when patient unattended, Evaluate medications/consider consulting pharmacy Medication Interventions: Evaluate medications/consider consulting pharmacy, Patient to call before getting OOB Elimination Interventions: Call light in reach, Elevated toilet seat, Bed/chair exit alarm Problem: Patient Education: Go to Patient Education Activity Goal: Patient/Family Education Outcome: Progressing Towards Goal 
  
Problem: Patient Education: Go to Patient Education Activity Goal: Patient/Family Education Outcome: Progressing Towards Goal 
  
Problem: Discharge Planning Goal: *Discharge to safe environment Outcome: Progressing Towards Goal 
  
Problem: Patient Education: Go to Patient Education Activity Goal: Patient/Family Education Outcome: Progressing Towards Goal 
  
Problem: Breathing Pattern - Ineffective Goal: *Absence of hypoxia Outcome: Progressing Towards Goal 
Goal: *Use of effective breathing techniques Outcome: Progressing Towards Goal 
  
Problem: Patient Education: Go to Patient Education Activity Goal: Patient/Family Education Outcome: Progressing Towards Goal

## 2020-12-09 NOTE — PROGRESS NOTES
Problem: Mobility Impaired (Adult and Pediatric) Goal: *Acute Goals and Plan of Care (Insert Text) Description: FUNCTIONAL STATUS PRIOR TO ADMISSION: Patient was modified independent using a rollator for functional mobility (approx 15 ft). Patient required minimal assistance for basic and instrumental ADLs. HOME SUPPORT PRIOR TO ADMISSION: The patient lived with wife. Physical Therapy Goals Initiated 12/3/2020 1. Patient will move from supine to sit and sit to supine , scoot up and down, and roll side to side in bed with modified independence within 7 day(s). 2.  Patient will transfer from bed to chair and chair to bed with supervision/set-up using the least restrictive device within 7 day(s). 3.  Patient will perform sit to stand with supervision/set-up within 7 day(s). 4.  Patient will ambulate with supervision/set-up for 30 feet with the least restrictive device within 7 day(s). 5.  Patient will ascend/descend 3 stairs with bilateral handrail(s) with minimal assistance/contact guard assist within 7 day(s). Outcome: Not Progressing Towards Goal 
 PHYSICAL THERAPY TREATMENT Patient: Luis Grubbs (77 y.o. male) Date: 12/9/2020 Diagnosis: Pneumonia [J18.9] <principal problem not specified> Precautions: Fall Chart, physical therapy assessment, plan of care and goals were reviewed. ASSESSMENT Patient continues with skilled PT services and is not progressing towards goals. Pt with decreased SYLVESTER today; opened eyes to voice, but difficulty maintaining. Pt unable to follow commands therefore required total A x 2 for bed mob. Tolerated EOB approx 10 min with CGA for balance initially, then min/ mod A with fatigue; noted pt lean to R with inability to effectively use UEs for support/ assist. Pt demo myclonic jerking bilat UEs throughout session, little to no pain behavior with movement.  
 
Pt demo decreased function with mobility due to decreased ability to actively participate in session this date. Will benefit from con't PT for mobility progression as tolerated. Recommend SNF level rehab at d/c. Current Level of Function Impacting Discharge (mobility/balance): bed mob total A x 2, static sit CGA to mod A with fatigue Other factors to consider for discharge: pt able to amb short distance with rollator at baseline, supportive daughter PLAN : 
Patient continues to benefit from skilled intervention to address the above impairments. Continue treatment per established plan of care. to address goals. Recommendation for discharge: (in order for the patient to meet his/her long term goals) Therapy up to 5 days/week in SNF setting This discharge recommendation: 
Has been made in collaboration with the attending provider and/or case management IF patient discharges home will need the following DME: hospital bed, wheelchair, and mechanical lift SUBJECTIVE:  
Patient mumbling, but minimal verbal interaction this session OBJECTIVE DATA SUMMARY:  
Critical Behavior: 
Neurologic State: Lethargic, Eyes open to voice, Confused Orientation Level: Disoriented to place, Disoriented to situation, Disoriented to time, Oriented to person Cognition: Decreased attention/concentration, Memory loss, No command following Safety/Judgement: Decreased insight into deficits, Decreased awareness of environment, Decreased awareness of need for safety Functional Mobility Training: 
Bed Mobility: 
Rolling: Assist x2;Total assistance Supine to Sit: Total assistance;Assist x2 Sit to Supine: Assist x2;Total assistance Scooting: Total assistance;Assist x2 Balance: 
Sitting: Impaired; With support Sitting - Static: Poor (constant support) Sitting - Dynamic: Poor (constant support) Pain Rating: 
Pt indicated L hip pain at beginning of session Activity Tolerance:  
Poor After treatment patient left in no apparent distress: Supine in bed, Heels elevated for pressure relief, Call bell within reach, Caregiver / family present and Side rails x 3 
 
COMMUNICATION/COLLABORATION:  
The patients plan of care was discussed with: Occupational therapy assistant and Registered nurse. Siobhan Dunn, PT Time Calculation: 31 mins

## 2020-12-10 LAB
ANION GAP SERPL CALC-SCNC: 8 MMOL/L (ref 5–15)
ARTERIAL PATENCY WRIST A: NO
BASE EXCESS BLD CALC-SCNC: 6 MMOL/L
BDY SITE: ABNORMAL
BUN SERPL-MCNC: 29 MG/DL (ref 6–20)
BUN/CREAT SERPL: 22 (ref 12–20)
CA-I BLD-SCNC: 1.14 MMOL/L (ref 1.12–1.32)
CALCIUM SERPL-MCNC: 8.6 MG/DL (ref 8.5–10.1)
CHLORIDE SERPL-SCNC: 101 MMOL/L (ref 97–108)
CO2 SERPL-SCNC: 28 MMOL/L (ref 21–32)
CREAT SERPL-MCNC: 1.34 MG/DL (ref 0.7–1.3)
ERYTHROCYTE [DISTWIDTH] IN BLOOD BY AUTOMATED COUNT: 12 % (ref 11.5–14.5)
GAS FLOW.O2 O2 DELIVERY SYS: ABNORMAL L/MIN
GAS FLOW.O2 SETTING OXYMISER: 5 L/M
GLUCOSE SERPL-MCNC: 107 MG/DL (ref 65–100)
HCO3 BLD-SCNC: 28.7 MMOL/L (ref 22–26)
HCT VFR BLD AUTO: 28.9 % (ref 36.6–50.3)
HGB BLD-MCNC: 9.5 G/DL (ref 12.1–17)
MAGNESIUM SERPL-MCNC: 2.3 MG/DL (ref 1.6–2.4)
MCH RBC QN AUTO: 33.7 PG (ref 26–34)
MCHC RBC AUTO-ENTMCNC: 32.9 G/DL (ref 30–36.5)
MCV RBC AUTO: 102.5 FL (ref 80–99)
NRBC # BLD: 0 K/UL (ref 0–0.01)
NRBC BLD-RTO: 0 PER 100 WBC
PCO2 BLD: 33.3 MMHG (ref 35–45)
PH BLD: 7.54 [PH] (ref 7.35–7.45)
PHOSPHATE SERPL-MCNC: 3.8 MG/DL (ref 2.6–4.7)
PLATELET # BLD AUTO: 292 K/UL (ref 150–400)
PMV BLD AUTO: 9.9 FL (ref 8.9–12.9)
PO2 BLD: 58 MMHG (ref 80–100)
POTASSIUM SERPL-SCNC: 4 MMOL/L (ref 3.5–5.1)
RBC # BLD AUTO: 2.82 M/UL (ref 4.1–5.7)
SAO2 % BLD: 93 % (ref 92–97)
SODIUM SERPL-SCNC: 137 MMOL/L (ref 136–145)
SPECIMEN TYPE: ABNORMAL
TOTAL RESP. RATE, ITRR: 23
WBC # BLD AUTO: 9.8 K/UL (ref 4.1–11.1)

## 2020-12-10 PROCEDURE — 74011250636 HC RX REV CODE- 250/636: Performed by: INTERNAL MEDICINE

## 2020-12-10 PROCEDURE — 97530 THERAPEUTIC ACTIVITIES: CPT

## 2020-12-10 PROCEDURE — 36600 WITHDRAWAL OF ARTERIAL BLOOD: CPT

## 2020-12-10 PROCEDURE — 94640 AIRWAY INHALATION TREATMENT: CPT

## 2020-12-10 PROCEDURE — 94664 DEMO&/EVAL PT USE INHALER: CPT

## 2020-12-10 PROCEDURE — 83735 ASSAY OF MAGNESIUM: CPT

## 2020-12-10 PROCEDURE — 74011250637 HC RX REV CODE- 250/637: Performed by: INTERNAL MEDICINE

## 2020-12-10 PROCEDURE — 36415 COLL VENOUS BLD VENIPUNCTURE: CPT

## 2020-12-10 PROCEDURE — 80048 BASIC METABOLIC PNL TOTAL CA: CPT

## 2020-12-10 PROCEDURE — 77010033678 HC OXYGEN DAILY

## 2020-12-10 PROCEDURE — 74011000250 HC RX REV CODE- 250: Performed by: INTERNAL MEDICINE

## 2020-12-10 PROCEDURE — 84100 ASSAY OF PHOSPHORUS: CPT

## 2020-12-10 PROCEDURE — 82803 BLOOD GASES ANY COMBINATION: CPT

## 2020-12-10 PROCEDURE — 74011000258 HC RX REV CODE- 258: Performed by: INTERNAL MEDICINE

## 2020-12-10 PROCEDURE — 65270000032 HC RM SEMIPRIVATE

## 2020-12-10 PROCEDURE — 85027 COMPLETE CBC AUTOMATED: CPT

## 2020-12-10 RX ORDER — ACETYLCYSTEINE 200 MG/ML
200 SOLUTION ORAL; RESPIRATORY (INHALATION)
Status: DISCONTINUED | OUTPATIENT
Start: 2020-12-10 | End: 2020-12-18

## 2020-12-10 RX ORDER — BALSAM PERU/CASTOR OIL
OINTMENT (GRAM) TOPICAL 3 TIMES DAILY
Status: DISCONTINUED | OUTPATIENT
Start: 2020-12-10 | End: 2020-12-18 | Stop reason: HOSPADM

## 2020-12-10 RX ADMIN — ENOXAPARIN SODIUM 40 MG: 40 INJECTION SUBCUTANEOUS at 17:04

## 2020-12-10 RX ADMIN — CARBIDOPA AND LEVODOPA 2 TABLET: 25; 100 TABLET ORAL at 13:30

## 2020-12-10 RX ADMIN — ROPINIROLE HYDROCHLORIDE 1 MG: 1 TABLET, FILM COATED ORAL at 09:14

## 2020-12-10 RX ADMIN — GUAIFENESIN 600 MG: 600 TABLET, EXTENDED RELEASE ORAL at 20:41

## 2020-12-10 RX ADMIN — ACETYLCYSTEINE 200 MG: 200 SOLUTION ORAL; RESPIRATORY (INHALATION) at 14:00

## 2020-12-10 RX ADMIN — Medication: at 15:38

## 2020-12-10 RX ADMIN — ATORVASTATIN CALCIUM 40 MG: 40 TABLET, FILM COATED ORAL at 20:41

## 2020-12-10 RX ADMIN — GABAPENTIN 200 MG: 100 CAPSULE ORAL at 09:14

## 2020-12-10 RX ADMIN — PIPERACILLIN AND TAZOBACTAM 3.38 G: 3; .375 INJECTION, POWDER, LYOPHILIZED, FOR SOLUTION INTRAVENOUS at 19:53

## 2020-12-10 RX ADMIN — IPRATROPIUM BROMIDE AND ALBUTEROL SULFATE 3 ML: .5; 3 SOLUTION RESPIRATORY (INHALATION) at 20:58

## 2020-12-10 RX ADMIN — PIPERACILLIN AND TAZOBACTAM 3.38 G: 3; .375 INJECTION, POWDER, LYOPHILIZED, FOR SOLUTION INTRAVENOUS at 04:12

## 2020-12-10 RX ADMIN — CARBIDOPA AND LEVODOPA 2 TABLET: 25; 100 TABLET ORAL at 09:27

## 2020-12-10 RX ADMIN — IPRATROPIUM BROMIDE AND ALBUTEROL SULFATE 3 ML: .5; 3 SOLUTION RESPIRATORY (INHALATION) at 12:58

## 2020-12-10 RX ADMIN — ACETYLCYSTEINE 800 MG: 200 SOLUTION ORAL; RESPIRATORY (INHALATION) at 20:59

## 2020-12-10 RX ADMIN — AMLODIPINE BESYLATE 10 MG: 5 TABLET ORAL at 09:15

## 2020-12-10 RX ADMIN — Medication: at 20:41

## 2020-12-10 RX ADMIN — PIPERACILLIN AND TAZOBACTAM 3.38 G: 3; .375 INJECTION, POWDER, LYOPHILIZED, FOR SOLUTION INTRAVENOUS at 12:46

## 2020-12-10 RX ADMIN — IPRATROPIUM BROMIDE AND ALBUTEROL SULFATE 3 ML: .5; 3 SOLUTION RESPIRATORY (INHALATION) at 09:17

## 2020-12-10 RX ADMIN — FUROSEMIDE 20 MG: 20 TABLET ORAL at 09:15

## 2020-12-10 RX ADMIN — ENTACAPONE 200 MG: 200 TABLET, FILM COATED ORAL at 20:41

## 2020-12-10 RX ADMIN — ROPINIROLE HYDROCHLORIDE 1 MG: 1 TABLET, FILM COATED ORAL at 17:04

## 2020-12-10 RX ADMIN — ACETAMINOPHEN 650 MG: 325 TABLET ORAL at 09:35

## 2020-12-10 RX ADMIN — LOSARTAN POTASSIUM 50 MG: 50 TABLET, FILM COATED ORAL at 09:15

## 2020-12-10 RX ADMIN — PAROXETINE HYDROCHLORIDE 10 MG: 20 TABLET, FILM COATED ORAL at 09:15

## 2020-12-10 RX ADMIN — GABAPENTIN 200 MG: 100 CAPSULE ORAL at 17:04

## 2020-12-10 RX ADMIN — GUAIFENESIN 600 MG: 600 TABLET, EXTENDED RELEASE ORAL at 09:15

## 2020-12-10 RX ADMIN — ENTACAPONE 200 MG: 200 TABLET, FILM COATED ORAL at 17:04

## 2020-12-10 RX ADMIN — ENTACAPONE 200 MG: 200 TABLET, FILM COATED ORAL at 09:14

## 2020-12-10 RX ADMIN — CARBIDOPA AND LEVODOPA 2 TABLET: 25; 100 TABLET ORAL at 20:41

## 2020-12-10 RX ADMIN — CARBIDOPA AND LEVODOPA 2 TABLET: 25; 100 TABLET ORAL at 17:04

## 2020-12-10 RX ADMIN — Medication 81 MG: at 09:15

## 2020-12-10 RX ADMIN — ACETAMINOPHEN 650 MG: 325 TABLET ORAL at 19:17

## 2020-12-10 NOTE — PROGRESS NOTES
[address] [facility] Adult  Hospitalist Group Hospitalist Progress Note Mortimer Ober, MD 
Answering service: [phone] from in house phone Date of Service:  12/10/2020 NAME:  Trixie Hernandez :  1940 MRN:  [id number] Admission Summary:  
Trixie Hernandez is a 78 y.o. male past medical history significant for Parkinson, dementia, CAD, CKD stage III presented emergency room with respiratory distress and hypoxia. Patient Enrique vallecillo as per report he had an episode today of choking during dinner afterward he became hypoxic with O2 sats in the upper 80s and found to have a \" low-grade fever\" of 99.9. Per daughter report he had a similar episode of choking last week again when he was eating by his respiratory symptoms were not as severe and he recovered quickly. Interval history / Subjective: Follow-up respiratory failure. Patient seen and examined earlier today. Daughter at bedside. Discussed in detail. Still requiring 5 liters of oxygen . Pulmonology consulted. On IV zosyn Assessment & Plan:  
 
Acute hypoxic respiratory failure: Persistent Aspiration pneumonia: 
Volume overload: 
-Suspected respiratory failure due to aspiration pneumonia 
-Procalcitonin elevated at 12, repeat improved to 1.27 
-Continue Unasyn 
-S/p Lasix IV x1,  
-Albuterol nebulizer three times changed to prn  
-Guaifenisen twice daily 
-Echocardiogram with EF 59-59%, mild diastolic dysfunction On 5 liters of oxygen Pulmonology following Fever: improving  
-CT scan w/o contrast ordered. Unable to do CTA due to possible dye allergy  
-Lower extremity dopplers negative for DVT 
-D-dimer elevated 4.97 On IV zosyn Acute encephalopathy:  
-patient with reversed days/nights. Encouraged family to talk with patient during the day for improved sleep at night Parkinson: at baseline as per family 
-continue with home medications 
  
Elevated serum creatinine  
-Suspected prerenal 
-Mlasix and losartan held due to elevated serum creatinine  
  
Anemia, macrocytic: Stable 
  
Hypertension: continue amlodipine,  losartan (on hold)  
-has fluctuating blood pressures Left hip pain:  
-order x-ray, no acute abnormality Follow up PT/OT. Suspect need SNF on discharge Patient with baseline low functioning level. He is at risk for decline 
  
Code status: FULL 
DVT prophylaxis: SCDs Care Plan discussed with: Patient/Family Anticipated Disposition: SNF Anticipated Discharge: >48 hours Hospital Problems  Date Reviewed: 9/14/2020 Codes Class Noted POA Pneumonia ICD-10-CM: J18.9 ICD-9-CM: 150  12/3/2020 Unknown Review of Systems:  
Negative unless stated above Vital Signs:  
 Last 24hrs VS reviewed since prior progress note. Most recent are: 
Visit Vitals /70 (BP 1 Location: Left arm, BP Patient Position: Sitting) Pulse 92 Temp 98.2 °F (36.8 °C) Resp 18 Ht 5' 10\" (1.778 m) Wt 85.3 kg (188 lb 0.8 oz) SpO2 97% BMI 26.98 kg/m² Intake/Output Summary (Last 24 hours) at 12/10/2020 1717 Last data filed at 12/10/2020 1705 Gross per 24 hour Intake 1271 ml Output  Net 1271 ml Physical Examination:  
 
I had a face to face encounter with this patient and independently examined them on 12/10/2020 as outlined below: 
 
     
Constitutional:  No acute distress, lying in bed, elderly, sleeping ENT:  Oral mucosa moist, oropharynx benign. Resp:  no wheezing/rhonchi/rales. No accessory muscle use CV:  Regular rhythm, normal rate, no murmurs, gallops, rubs GI:  Soft, non distended, non tender. normoactive bowel sounds, no hepatosplenomegaly Musculoskeletal:  No edema, warm, 2+ pulses throughout Neurologic:  Moves all extremities. Skin:  Good turgor, no rashes or ulcers Data Review:  
 Review and/or order of clinical lab test 
Review and/or order of tests in the radiology section of [facility] Review and/or order of tests in the medicine section of [facility] Labs:  
 
Recent Labs 12/10/20 
1713 12/09/20 
0293 WBC 9.8 9.4 HGB 9.5* 9.5* HCT 28.9* 28.8*  
 229 Recent Labs 12/10/20 
8477 12/09/20 
8593  136  
K 4.0 4.0  
 102 CO2 28 27 BUN 29* 20  
CREA 1.34* 1.11  
* 107* CA 8.6 8.2* MG 2.3 2.2 PHOS 3.8 3.8 No results for input(s): ALT, AP, TBIL, TBILI, TP, ALB, GLOB, GGT, AML, LPSE in the last 72 hours. No lab exists for component: SGOT, GPT, AMYP, HLPSE No results for input(s): INR, PTP, APTT, INREXT, INREXT in the last 72 hours. No results for input(s): FE, TIBC, PSAT, FERR in the last 72 hours. No results found for: FOL, RBCF No results for input(s): PH, PCO2, PO2 in the last 72 hours. No results for input(s): CPK, CKNDX, TROIQ in the last 72 hours. No lab exists for component: CPKMB Lab Results Component Value Date/Time Cholesterol, total 119 07/08/2020 10:40 AM  
 HDL Cholesterol 51 07/08/2020 10:40 AM  
 LDL, calculated 47 07/08/2020 10:40 AM  
 Triglyceride 103 07/08/2020 10:40 AM  
 CHOL/HDL Ratio 2 07/08/2020 10:40 AM  
 
No results found for: [facility] Lab Results Component Value Date/Time  Color brown (A) 07/08/2020 10:41 AM  
 Appearance Clear 01/15/2020 03:22 PM  
 Specific gravity 1.015 07/08/2020 10:41 AM  
 Specific gravity 1.014 09/04/2019 02:45 PM  
 pH (UA) 5 07/08/2020 10:41 AM  
 Protein 2+ (A) 07/08/2020 10:41 AM  
 Glucose NEGATIVE  09/04/2019 02:45 PM  
 Ketone Negative 07/08/2020 10:41 AM  
 Bilirubin Negative 07/08/2020 10:41 AM  
 Urobilinogen Negative 07/08/2020 10:41 AM  
 Nitrites Negative 07/08/2020 10:41 AM  
 Leukocyte Esterase Negative 07/08/2020 10:41 AM  
 Epithelial cells FEW 09/04/2019 02:45 PM  
 Bacteria Few 01/15/2020 03:22 PM  
 WBC 0 07/08/2020 10:41 AM  
 RBC 2-5 (A) 07/08/2020 10:41 AM  
 
 
 
Medications Reviewed:  
 
Current Facility-Administered Medications Medication Dose Route Frequency  acetylcysteine (MUCOMYST) 200 mg/mL (20 %) solution 200 mg  200 mg Nebulization TID RT  
 balsam peru-castor oiL (VENELEX) ointment   Topical TID  enoxaparin (LOVENOX) injection 40 mg  40 mg SubCUTAneous Q24H  piperacillin-tazobactam (ZOSYN) 3.375 g in 0.9% sodium chloride (MBP/ADV) 100 mL MBP  3.375 g IntraVENous Q8H  
 oxyCODONE IR (ROXICODONE) tablet 2.5 mg  2.5 mg Oral Q6H PRN  
 albuterol-ipratropium (DUO-NEB) 2.5 MG-0.5 MG/3 ML  3 mL Nebulization TID RT  
 amLODIPine (NORVASC) tablet 10 mg  10 mg Oral DAILY  [Held by provider] losartan (COZAAR) tablet 50 mg  50 mg Oral DAILY  [Held by provider] furosemide (LASIX) tablet 20 mg  20 mg Oral DAILY  guaiFENesin ER (MUCINEX) tablet 600 mg  600 mg Oral Q12H  carbidopa-levodopa (SINEMET)  mg per tablet 2 Tab  2 Tab Oral QID  albuterol (PROVENTIL VENTOLIN) nebulizer solution 2.5 mg  2.5 mg Nebulization Q6H PRN  
 sodium chloride (NS) flush 5-40 mL  5-40 mL IntraVENous Q8H  
 sodium chloride (NS) flush 5-40 mL  5-40 mL IntraVENous PRN  
 acetaminophen (TYLENOL) tablet 650 mg  650 mg Oral Q6H PRN Or  
 acetaminophen (TYLENOL) suppository 650 mg  650 mg Rectal Q6H PRN  polyethylene glycol (MIRALAX) packet 17 g  17 g Oral DAILY PRN  promethazine (PHENERGAN) tablet 12.5 mg  12.5 mg Oral Q6H PRN Or  
 ondansetron (ZOFRAN) injection 4 mg  4 mg IntraVENous Q6H PRN  
 aspirin delayed-release tablet 81 mg  81 mg Oral DAILY  atorvastatin (LIPITOR) tablet 40 mg  40 mg Oral QHS  entacapone (COMTAN) tablet 200 mg  200 mg Oral TID  pantoprazole (PROTONIX) tablet 20 mg  20 mg Oral ACB  PARoxetine (PAXIL) tablet 10 mg  10 mg Oral DAILY  gabapentin (NEURONTIN) capsule 200 mg  200 mg Oral BID  rOPINIRole (REQUIP) tablet 1 mg  1 mg Oral BID  
 
 ______________________________________________________________________ EXPECTED LENGTH OF STAY: 3d 2h 
ACTUAL LENGTH OF STAY:          7 Theo Coronado MD

## 2020-12-10 NOTE — PROGRESS NOTES
Problem: Self Care Deficits Care Plan (Adult) Goal: *Acute Goals and Plan of Care (Insert Text) Description:  
FUNCTIONAL STATUS PRIOR TO ADMISSION: Patient is an unreliable historian 2/2 baseline dementia. Per chart review, patient was modified independent using a rollator for short distance functional mobility and received minimal(?) assistance from staff. This date, patient reporting he self-feeds and occasionally needs help with lower body dressing. HOME SUPPORT: Patient recently moved to [facility]. Occupational Therapy Goals Initiated 12/4/2020 1. Patient will perform seated ADL task with material set-up and minimal assistance within 7 day(s). 2.  Patient will perform upper body dressing with minimal assistance within 7 day(s). 3.  Patient will perform lower body dressing with moderate assistance within 7 day(s). 4.  Patient will perform toilet transfers to [facility] with moderate assistance and RW within 7 day(s). 5.  Patient will perform all aspects of toileting with moderate assistance and RW within 7 day(s). Outcome: Progressing Towards Goal 
  
OCCUPATIONAL THERAPY TREATMENT Patient: Jania Somers (1 y.o. male) Date: 12/10/2020 Diagnosis: Pneumonia [J18.9] <principal problem not specified> Precautions: Fall Chart, occupational therapy assessment, plan of care, and goals were reviewed. ASSESSMENT Patient continues with skilled OT services and is progressing towards goals. Pt's supportive daughter present in the room. Pt drowsy, but willing to participate. Overall, max to total assist level with all mobility and self-care. Pt able to maintain sit balance once positioned properly. Encouraged pt to wash face, but unable to do so and required total assist.  Pt achieved partial standing 3x using RW. Pt unable to safely progress with steps. Performed lateral transfer to drop-arm recliner chair at max assist x 2.  Pt still drowsy and difficult keeping eyes open. Pt with c/o of R UE pain with passive range. No bruising or pain by touch. Notified nurse to call lift team when pt ready to return back to bed. Will con't to follow Current Level of Function Impacting Discharge (ADLs): max to total assist care Other factors to consider for discharge: PLAN : 
Patient continues to benefit from skilled intervention to address the above impairments. Continue treatment per established plan of care. to address goals. Recommend with staff: OOB with lift team 
 
Recommend next OT session: see goals Recommendation for discharge: (in order for the patient to meet his/her long term goals) SNF at [facility] 
 
 
IF patient discharges home will need the following DME: none SUBJECTIVE:  
Patient stated Yulisa gibson all .  OBJECTIVE DATA SUMMARY:  
Cognitive/Behavioral Status: 
Neurologic State: Drowsy; Confused Orientation Level: Oriented to person;Disoriented to place; Disoriented to situation;Disoriented to time Cognition: Decreased attention/concentration;Decreased command following;Recognition of people/places Functional Mobility and Transfers for ADLs: 
Bed Mobility: 
Supine to Sit: Maximum assistance;Assist x2 Transfers: 
Sit to Stand: Moderate assistance;Assist x2; Other (comment)(partial standing) Functional Transfers Adaptive Equipment: Elwanda Racquel (comment) Bed to Chair: Maximum assistance;Assist x2; Other (comment)(lateral transfer) Balance: 
 Decrease sit balance, but improved with positioning ADL Intervention: 
  
 
Grooming Grooming Assistance: Total assistance(dependent) Washing Face: Total assistance (dependent) Lower Body Dressing Assistance Socks: Total assistance (dependent) Toileting Toileting Assistance: Total assistance(dependent) Therapeutic Exercises:  
 
 
Pain: 
R shoulder pain with range Activity Tolerance:  
Fair After treatment patient left in no apparent distress:  
Sitting in chair, Call bell within reach, and Caregiver / family present COMMUNICATION/COLLABORATION:  
The patients plan of care was discussed with: Physical therapist and Registered nurse. PATSY Oneil/ANNETTE Time Calculation: 38 mins

## 2020-12-10 NOTE — CONSULTS
PULMONARY MEDICINE Initial Physician Consultation Note Name: Jodee Jay : 1940 MRN: [id number] Date: 12/10/2020 Subjective:  
Consult Note: 12/10/2020 Requesting Physician: 
Reason for consult: 
 
Medical records and data reviewed. Patient is a 78 y.o. male who presented to the hospital on 2020 with a choking episode. Presentation to the emergency room was found to have hypoxia as well as evidence of right lower lobe infiltrate and was admitted to the hospital for further management. SARS-CoV-2 test was negative. Despite several days of antibiotics, he continues to be hypoxic with symptoms of cough. CT scan of the chest that was done yesterday shows bilateral pulmonary infiltrates right more than left. Cultures have been negative so far. Antibiotics were changed to Zosyn. Fever curve as well as WBC count appear to be better over the past 48 hours. Patient appears to be delirious and ABG. was done did not show evidence of respiratory acidosis. He does have underlying dementia. Left lower extremity Doppler was negative for DVT. Review of Systems: A comprehensive 12 system review of systems was not obtained from the patient Assessment:  
 
Acute hypoxic pulmonary insufficiency Bilateral pneumonia after aspiration episode, improving fever curve and leukocytosis Delirium with acute encephalopathy, no evidence of respiratory acidosis History of organic cardiac disease, appears compensated Other medical problems per chart Recommendations:  
 
Wean oxygen as tolerated Agree with switch to Zosyn Continue bronchodilators On Lovenox Add Mucomyst 
Chest PT for mobilization of secretions Attempt to obtain sputum culture if able Consider dysphagia evaluation if this has not been done previously after delirium resolves Monitor clinical course Discussed with daughter at bedside Active Problem List:  
 
Problem List  Date Reviewed: 2020 Codes Class Acute blood loss anemia ICD-10-CM: D62 
ICD-9-CM: 285.1 Parkinson's disease ([facility] ICD-10-CM: G20 
ICD-9-CM: 332.0 Long-term current use of high risk medication other than anticoagulant ICD-10-CM: Z79.899 ICD-9-CM: V58.69 Gastroesophageal reflux disease without esophagitis ICD-10-CM: K21.9 ICD-9-CM: 530.81 Pneumonia ICD-10-CM: J18.9 ICD-9-CM: 300 Benign prostatic hyperplasia with nocturia ICD-10-CM: N40.1, R35.1 ICD-9-CM: 600.01, 788.43 History of stroke ICD-10-CM: Z86.73 
ICD-9-CM: V12.54 Dementia due to Parkinson's disease without behavioral disturbance ([facility] ICD-10-CM: G20, F02.80 ICD-9-CM: 332.0, 294.10 Parkinsonism ([facility] ICD-10-CM: G20 
ICD-9-CM: 332.0 Bilateral carotid artery stenosis ICD-10-CM: I65.23 ICD-9-CM: 433.10, 433.30 Mild cognitive impairment ICD-10-CM: G31.84 ICD-9-CM: 331.83 Cerebrovascular accident (CVA) ([facility] ICD-10-CM: I63.9 ICD-9-CM: 434.91 Rectal bleeding ICD-10-CM: K62.5 ICD-9-CM: 569.3 PE (physical exam), annual ICD-10-CM: Z00.00 ICD-9-CM: V70.0 Overview Addendum 6/12/2019  8:13 AM by Ignacio Persaud  
  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 
ICD-9-CM: 786.09 Hearing difficulty of both ears ICD-10-CM: H91.93 
ICD-9-CM: 389.9 Sleep apnea in adult ICD-10-CM: G47.30 ICD-9-CM: 327.23 Overview Signed 7/12/2017  3:47 PM by Gwen Keane No longer on CPAP @13 Tendinitis of left rotator cuff ICD-10-CM: M75.82 ICD-9-CM: 726.10 Coronary artery disease ICD-10-CM: I25.10 ICD-9-CM: 414.00 Overview Signed 7/12/2017  3:51 PM by Gwen Keane By EBT DJD (degenerative joint disease) ICD-10-CM: M19.90 ICD-9-CM: 715.90 Tendinitis ICD-10-CM: M77.9 ICD-9-CM: 726.90 Overview Signed 7/12/2017  3:52 PM by Erica Carreon ACHILLES Gout ICD-10-CM: M10.9 ICD-9-CM: 274.9 Tremor ICD-10-CM: R25.1 ICD-9-CM: 781.0 Overview Signed 7/12/2017  3:53 PM by Erica Carreon LEFT HAND Restless leg syndrome ICD-10-CM: G25.81 ICD-9-CM: 333.94 Lumbar stenosis ICD-10-CM: M48.061 
ICD-9-CM: 724.02 Overview Signed 1/29/2015  6:49 AM by Alvaro Catherine  
  S/P L4-5 microdiscectomy Essential hypertension ICD-10-CM: I10 
ICD-9-CM: 401.9 MCI (mild cognitive impairment) ICD-10-CM: G31.84 ICD-9-CM: 331.83   
   
 CKD (chronic kidney disease) stage 2, GFR 60-89 ml/min ICD-10-CM: N18.2 ICD-9-CM: 585.2 Past Medical History:  
 
 has a past medical history of Aaron esophagus, Benign nodular prostatic hyperplasia without lower urinary tract symptoms (7/12/2017), CKD (chronic kidney disease) stage 2, GFR 60-89 ml/min, Constipation, Coronary artery disease (7/12/2017), Depression, DJD (degenerative joint disease) (7/12/2017), Falls, Gout (7/12/2017), Headache, Hearing difficulty of both ears (7/12/2017), Hyperlipidemia, Hypertension, Impaired cognition (7/12/2017), MCI (mild cognitive impairment), Restless leg syndrome (7/12/2017), Sleep apnea in adult (7/12/2017), Snoring, Stroke ([facility] Tendinitis (7/12/2017), Tendinitis of left rotator cuff (07/12/2017), Tremor (7/12/2017), and Unspecified sleep apnea. Past Surgical History:  
 
 has a past surgical history that includes pr egd transoral biopsy single/multiple (8/16/2011); pr egd transoral biopsy single/multiple (10/1/2013); hx orthopaedic (1/27/15); colonoscopy,flex,w/control, bleeding (1/16/2019); and colonoscopy (N/A, 1/16/2019). Home Medications:  
 
Prior to Admission medications Medication Sig Start Date End Date Taking? Authorizing Provider  
rOPINIRole (REQUIP) 1 mg tablet Take 1 mg by mouth two (2) times a day. Yes Provider, Historical  
guaiFENesin (Tussin) 100 mg/5 mL liquid Take 400 mg by mouth daily as needed for Cough. Yes Provider, Historical  
valsartan (DIOVAN) 160 mg tablet Take 1 Tab by mouth Every morning for 90 days. Indications: high blood pressure 10/11/20 1/9/21  Ronnie Hayes MD  
amLODIPine (NORVASC) 5 mg tablet 0ne tab daily qhs  Indications: high blood pressure 10/11/20   Ronnie Hayes MD  
gabapentin (NEURONTIN) 100 mg capsule T2C BID 10/9/20   Ronnie Hayes MD  
PARoxetine (PAXIL) 10 mg tablet TAKE 1 TABLET BY MOUTH DAILY 10/9/20   Ronnie Hayes MD  
entacapone (COMTAN) 200 mg tablet TAKE 1 TABLET THREE TIMES A DAY 10/9/20   Fabien Merida MD  
artificial tears, dextran 70-hypromellose, (GenTeal Tears Mild) 0.1-0.3 % ophthalmic solution Administer 1 Drop to both eyes nightly. Provider, Historical  
carboxymethylcellulose sodium (THERATEARS OP) Apply  to eye. 1 -2 drops both eyes bid    Provider, Historical  
esomeprazole (NEXIUM) 20 mg capsule TAKE 1 CAPSULE DAILY 8/5/20   Ronnie Hayes MD  
carbidopa-levodopa (Sinemet)  mg per tablet 2 p.o. 4 times daily  Indications: Parkinson's disease 4/28/20   Ronnie Hayes MD  
atorvastatin (LIPITOR) 40 mg tablet TAKE 1 TABLET DAILY 3/16/20   Ronnie Hayes MD  
acetaminophen (TYLENOL EXTRA STRENGTH) 500 mg tablet Take 1,000 mg by mouth three (3) times daily as needed for Pain. Rapid release    Provider, Historical  
senna-docusate (SENNA-S) 8.6-50 mg per tablet Take 1 Tab by mouth every other day. Provider, Historical  
aspirin delayed-release 81 mg tablet Take 81 mg by mouth daily. Provider, Historical  
vitamin e (E GEMS) 1,000 unit capsule Take 2,000 Units by mouth daily. Provider, Historical  
MULTIVITAMIN PO Take 1 Tab by mouth daily.     Provider, Historical  
 
 
 Allergies/Social/Family History: No Known Allergies Social History Tobacco Use  Smoking status: Former Smoker  Smokeless tobacco: Never Used Substance Use Topics  Alcohol use: No  
  
Family History Problem Relation Age of Onset  Heart Disease Mother  Heart Disease Father Objective:  
Vital Signs: 
Visit Vitals /64 Pulse 97 Temp 98.4 °F (36.9 °C) Resp 18 Ht 5' 10\" (1.778 m) Wt 85.3 kg (188 lb 0.8 oz) SpO2 99% BMI 26.98 kg/m² O2 Flow Rate (L/min): 4 l/min O2 Device: Nasal cannula Temp (24hrs), Av.2 °F (37.3 °C), Min:98.4 °F (36.9 °C), Max:100.2 °F (37.9 °C) Intake/Output:  
 
Intake/Output Summary (Last 24 hours) at 12/10/2020 1055 Last data filed at 12/10/2020 6026 Gross per 24 hour Intake 771 ml Output  Net 771 ml Physical Exam:  
General:  Drowsy, restless, no distress, appears stated age. Head:  Normocephalic, without obvious abnormality, atraumatic. Eyes:  Conjunctivae/corneas clear. PERRL Neck: Supple, symmetrical, no adenopathy, no carotid bruit and no JVD. Lungs:   Decreased BS Chest wall:  No tenderness or deformity. Heart:  Regular rate and rhythm, S1-S2 normal, no murmur, no click, rub or gallop. Abdomen:   Soft, non-tender. Bowel sounds present. No masses,  No organomegaly. Extremities: Atraumatic, no cyanosis or edema. Pulses: Palpable Skin: No rashes or lesions Neurologic: Grossly nonfocal  
 
  
 LABS AND  DATA: Personally reviewed Recent Labs 12/10/20 
0856 20 WBC 9.8 9.4 HGB 9.5* 9.5* HCT 28.9* 28.8*  
 229 Recent Labs 12/10/20 
6183 20 
4449  136  
K 4.0 4.0  
 102 CO2 28 27 BUN 29* 20  
CREA 1.34* 1.11  
* 107* CA 8.6 8.2* MG 2.3 2.2 PHOS 3.8 3.8 No results for input(s): AP, TBIL, TP, ALB, GLOB, AML, LPSE in the last 72 hours. No lab exists for component: SGOT, GPT, AMYP No results for input(s): INR, PTP, APTT, INREXT in the last 72 hours. Recent Labs 12/10/20 
1037 PHI 7.54* PCO2I 33.3*  
PO2I 58* No results for input(s): CPK, CKMB, TROIQ, BNPP in the last 72 hours. MEDS: Reviewed Chest Imaging: personally reviewed and report checked Tele- reviewed Medical decision making:  
I have reviewed the flowsheet and previous day's notes Patient has acute or chronic illness that poses a threat to life or bodily function Review and order of Clinical lab tests Review and Order of Radiology tests Independent visualization of Image High Risk Drug therapy requiring intensive monitoring for toxicity: eg steroids, pressors, antibiotics Thank you for allowing me to participate in this patient's care. Petr Wong MD 
 
 
[facility]

## 2020-12-10 NOTE — PROGRESS NOTES
CRAIG: 
RUR: 19% Disposition:  Return to [facility] with possible advance from level 2 to Level 5 vs SNF 
 
CM met with patient and daughter Aaron Siemens). Discussion took place re recommendation for snf. Patient and wife live at The [address] [place] [address] ([phone]). The facility offers 5 levels of care. The patient was admitted to the facility on 9/29/20 at level 2. Daughter Colton Jimenes has been staying here with patient and her sister has been staying with their mom at [address]. We discussed possibility of having patient considered for level 5 if this is comparable to  SNF level of care for rehab Plan:  CM will call the DON at [facility] to discuss above option and if facility can meet this patient's needs. CM continuing to follow. OSKAR Cota, CRM 
 
12:37p  Call placed to [facility], DON at the [facility]. She directed me to speak with the  Chu Zavala). CM called back and left message for Gabriela to return call to this CM.

## 2020-12-10 NOTE — PROGRESS NOTES
Problem: Mobility Impaired (Adult and Pediatric) Goal: *Acute Goals and Plan of Care (Insert Text) Description: FUNCTIONAL STATUS PRIOR TO ADMISSION: Patient was modified independent using a rollator for functional mobility (approx 15 ft). Patient required minimal assistance for basic and instrumental ADLs. HOME SUPPORT PRIOR TO ADMISSION: The patient lived with wife. Physical Therapy Goals Re-assessed and downgraded 12/10/2020 1. Patient will move from supine to sit and sit to supine , scoot up and down, and roll side to side in bed with maximal assistance within 7 day(s). 2.  Patient will transfer from bed to chair and chair to bed with maximal assistance using the least restrictive device within 7 day(s). 3.  Patient will perform sit to stand with moderate assistance  within 7 day(s). 4.  Patient will ambulate with maximal assistance for 5 feet with the least restrictive device within 7 day(s). 5.  Patient will sit EOB for 10 minutes with SBA within 7 days. Initiated 12/3/2020 1. Patient will move from supine to sit and sit to supine , scoot up and down, and roll side to side in bed with modified independence within 7 day(s). 2.  Patient will transfer from bed to chair and chair to bed with supervision/set-up using the least restrictive device within 7 day(s). 3.  Patient will perform sit to stand with supervision/set-up within 7 day(s). 4.  Patient will ambulate with supervision/set-up for 30 feet with the least restrictive device within 7 day(s). 5.  Patient will ascend/descend 3 stairs with bilateral handrail(s) with minimal assistance/contact guard assist within 7 day(s). Outcome: Progressing Towards Goal 
 PHYSICAL THERAPY TREATMENT: WEEKLY REASSESSMENT Patient: Kiel Huertas (89 y.o. male) Date: 12/10/2020 Primary Diagnosis: Pneumonia [J18.9] Precautions:   Fall ASSESSMENT Patient continues with skilled PT services and is not progressing towards goals. Pt present with global weakness, delayed initiation, impaired sitting/standing balance and decreased activity tolerance. Pt requires Max A x 2 to come to sitting EOB. Initial Max A progressing to CGA to maintain sitting balance at EOB. Pt completed 3 partial stands with Mod A x 2 though unable to extend hips and trunk. Opted for lateral scoot to drop arm recliner for safety. O2 saturations 95% and greater on 3L/min. Pt's daughter in room and in aware to have nursing call lift team for return to bed. Pt reporting hip pain but when asked to point to pain he points to R shoulder. Painful to palpation and ROM. Pt will require SNF placement. Patient's progression toward goals since last assessment: minimal progress, all goals downgraded Current Level of Function Impacting Discharge (mobility/balance): Max A x 2 to transfer to chair Functional Outcome Measure: The patient scored 10/100 on the Barthel outcome measure. Other factors to consider for discharge: minimal mobility PTA, continues to decline with strength and mobility this admission PLAN : 
Goals have been updated based on progression since last assessment. Patient continues to benefit from skilled intervention to address the above impairments. Recommendations and Planned Interventions: bed mobility training, transfer training, gait training, therapeutic exercises, neuromuscular re-education, patient and family training/education, and therapeutic activities Frequency/Duration: Patient will be followed by physical therapy:  5 times a week to address goals. Recommendation for discharge: (in order for the patient to meet his/her long term goals) Therapy up to 5 days/week in SNF setting This discharge recommendation: 
Has been made in collaboration with the attending provider and/or case management IF patient discharges home will need the following DME: hospital bed and mechanical lift SUBJECTIVE:  
 Patient stated Laurence caruso. OBJECTIVE DATA SUMMARY:  
HISTORY:   
Past Medical History:  
Diagnosis Date Aaron esophagus Benign nodular prostatic hyperplasia without lower urinary tract symptoms 7/12/2017 CKD (chronic kidney disease) stage 2, GFR 60-89 ml/min Constipation Coronary artery disease 7/12/2017 Depression DJD (degenerative joint disease) 7/12/2017 Falls Gout 7/12/2017 Headache Hearing difficulty of both ears 7/12/2017 Hyperlipidemia Hypertension Impaired cognition 7/12/2017 MCI (mild cognitive impairment) Restless leg syndrome 7/12/2017 Sleep apnea in adult 7/12/2017 No longer on CPAP @13 Snoring Stroke [facility]) Tendinitis 7/12/2017 ACHILLES Tendinitis of left rotator cuff 07/12/2017 Tremor 7/12/2017 LEFT HAND Unspecified sleep apnea   
 lost weight no longer has it Past Surgical History:  
Procedure Laterality Date COLONOSCOPY N/A 1/16/2019 COLONOSCOPY performed by Tosin Cobb MD at [facility] ENDOSCOPY  
 COLONOSCOPY,FLEX,W/CONTROL, BLEEDING  1/16/2019 HX ORTHOPAEDIC  1/27/15 L4-L5 MICRODECOMPRESSION (Right [place] EGD TRANSORAL BIOPSY SINGLE/MULTIPLE  8/16/2011 KS EGD TRANSORAL BIOPSY SINGLE/MULTIPLE  10/1/2013 Personal factors and/or comorbidities impacting plan of care: Parkinson's, dementia, stroke, minimal progress, O2 needs Home Situation Home Environment: Private residence # Steps to Enter: 3 Rails to Enter: Yes Hand Rails : Bilateral 
One/Two Story Residence: One story Living Alone: No 
Support Systems: Spouse/Significant Other/Partner Current DME Used/Available at Home: Simba Lavender, rollator, Wheelchair EXAMINATION/PRESENTATION/DECISION MAKING:  
Critical Behavior: 
Neurologic State: Drowsy, Confused Orientation Level: Oriented to person, Disoriented to place, Disoriented to situation, Disoriented to time Cognition: Decreased attention/concentration, Decreased command following, Recognition of people/places Safety/Judgement: Decreased insight into deficits, Decreased awareness of environment, Decreased awareness of need for safety Hearing: 
  
Skin:   
Edema:  
Range Of Motion: 
AROM: Generally decreased, functional 
  
  
  
PROM: Generally decreased, functional 
  
  
  
Strength:   
Strength: Generally decreased, functional 
  
  
  
  
  
  
Tone & Sensation:  
Tone: Abnormal 
  
  
  
  
Sensation: Impaired Coordination: 
Coordination: Generally decreased, functional 
Vision:  
  
Functional Mobility: 
Bed Mobility: 
  
Supine to Sit: Maximum assistance;Assist x2;Bed Modified Transfers: 
Sit to Stand: Moderate assistance;Assist x2(unable to achieve upright standing) Stand to Sit: Moderate assistance;Assist x2 Bed to Chair: Maximum assistance;Assist x2(lateral scoot to drop arm recliner) Balance:  
  
Ambulation/Gait Training: 
  
  
  
  
  
  
 
 
Functional Measure: 
Barthel Index: 
 
Bathin Bladder: 0 Bowels: 0 Groomin Dressin Feedin Mobility: 0 Stairs: 0 Toilet Use: 0 Transfer (Bed to Chair and Back): 5 Total: 10/100 The Barthel ADL Index: Guidelines 1. The index should be used as a record of what a patient does, not as a record of what a patient could do. 2. The main aim is to establish degree of independence from any help, physical or verbal, however minor and for whatever reason. 3. The need for supervision renders the patient not independent. 4. A patient's performance should be established using the best available evidence. Asking the patient, friends/relatives and nurses are the usual sources, but direct observation and common sense are also important. However direct testing is not needed. 5. Usually the patient's performance over the preceding 24-48 hours is important, but occasionally longer periods will be relevant. 6. Middle categories imply that the patient supplies over 50 per cent of the effort. 7. Use of aids to be independent is allowed. Kristi Davis., Barthel, DMoisésW. (1790). Functional evaluation: the Barthel Index. [address] [facility] (14)2. Chares Show lorena [place], J.J.M.F, [place]., Erik Gleason., [place], [address] [facility] (1999). Measuring the change indisability after inpatient rehabilitation; comparison of the responsiveness of the Barthel Index and Functional Tyler Measure. Journal of Neurology, Neurosurgery, and Psychiatry, 66(4), 653-800. RAY VegasA, CHESTER Navas, & Darcie Crocker M.A. (2004.) Assessment of post-stroke quality of life in cost-effectiveness studies: The usefulness of the Barthel Index and the EuroQoL-5D. [facility], 13, 183-18 Pain Rating: 
Pain to R shoulder, does not rate Activity Tolerance:  
Fair, desaturates with exertion and requires oxygen, and requires rest breaks After treatment patient left in no apparent distress:  
Sitting in chair, Call bell within reach, and Caregiver / family present COMMUNICATION/EDUCATION:  
The patients plan of care was discussed with: Registered nurse. Fall prevention education was provided and the patient/caregiver indicated understanding., Patient/family have participated as able in goal setting and plan of care. , and Patient/family agree to work toward stated goals and plan of care. Thank you for this referral. 
Kaelyn Ross, PT Time Calculation: 37 mins

## 2020-12-10 NOTE — WOUND CARE
WOCN Note:  
 
New consult placed for assessment of sacrum and buttocks. Assessed in room 608. PPE: face mask, goggles and gloves. Brandy RN at the bedside for assessment. Chart reviewed. Admitted DX:  Pneumonia Past Medical History:  
Diagnosis Date  Aaron esophagus  Benign nodular prostatic hyperplasia without lower urinary tract symptoms 7/12/2017  CKD (chronic kidney disease) stage 2, GFR 60-89 ml/min  Constipation  Coronary artery disease 7/12/2017  Depression  DJD (degenerative joint disease) 7/12/2017 [address]  Gout 7/12/2017  Headache   
 Hearing difficulty of both ears 7/12/2017  Hyperlipidemia  Hypertension  Impaired cognition 7/12/2017  MCI (mild cognitive impairment)  Restless leg syndrome 7/12/2017  Sleep apnea in adult 7/12/2017 No longer on CPAP @13  Snoring  Stroke ([facility] [address]  Tendinitis 7/12/2017 ACHILLES  Tendinitis of left rotator cuff 07/12/2017  Tremor 7/12/2017 LEFT HAND  Unspecified sleep apnea   
 lost weight no longer has it Assessment:  
Patient is alert, communicative and requires assist of 2 with repositioning. Bed:  total care foam mattress Patient wearing briefs for incontinence. Patient reports no pain. Patient repositioned on left side with pillow. Heels offloaded with pillows. Heels intact with blanching pink erythema. 1.  Left sacrum: 2 x 2 x 0 cm  100% non blanching red. Wound, Pressure Prevention & Skin Care Recommendations: 1. Minimize layers of linen/pads under patient to optimize support surface. 2.  Turn/reposition approximately every 2 hours and offload heels. 3.  Manage moisture/ Keep skin folds clean and dry. 4.  Specialty bed:  Air mattress 5. Sacrum:  Venelex TID. Discussed above plan with patient and Brandy VILLAREAL. Transition of Care: Plan to follow as needed while admitted to hospital. 
 
Shereen aButista, ARASHN RN [facility] Inpatient Wound Care Available on Perfect Serve Pager [phone] [phone]

## 2020-12-10 NOTE — PROGRESS NOTES
Problem: Pressure Injury - Risk of 
Goal: *Prevention of pressure injury Description: Document Elier Scale and appropriate interventions in the flowsheet. Outcome: Progressing Towards Goal 
Note: Pressure Injury Interventions: 
Sensory Interventions: Float heels, Keep linens dry and wrinkle-free Moisture Interventions: Limit adult briefs Activity Interventions: Increase time out of bed, Pressure redistribution bed/mattress(bed type) Mobility Interventions: HOB 30 degrees or less, Float heels, PT/OT evaluation, Pressure redistribution bed/mattress (bed type) Nutrition Interventions: Document food/fluid/supplement intake Friction and Shear Interventions: HOB 30 degrees or less, Lift sheet Problem: Patient Education: Go to Patient Education Activity Goal: Patient/Family Education Outcome: Progressing Towards Goal 
  
Problem: Falls - Risk of 
Goal: *Absence of Falls Description: Document Bayron Apo Fall Risk and appropriate interventions in the flowsheet. Outcome: Progressing Towards Goal 
Note: Fall Risk Interventions: 
Mobility Interventions: Communicate number of staff needed for ambulation/transfer, Bed/chair exit alarm, Patient to call before getting OOB Mentation Interventions: Bed/chair exit alarm, Door open when patient unattended, Evaluate medications/consider consulting pharmacy, Adequate sleep, hydration, pain control, Family/sitter at bedside, Familiar objects from home, [facility] and hearing aids Medication Interventions: Evaluate medications/consider consulting pharmacy, Bed/chair exit alarm, Patient to call before getting OOB Elimination Interventions: Call light in reach, Patient to call for help with toileting needs Problem: Patient Education: Go to Patient Education Activity Goal: Patient/Family Education Outcome: Progressing Towards Goal 
  
Problem: Patient Education: Go to Patient Education Activity Goal: Patient/Family Education Outcome: Progressing Towards Goal 
  
Problem: Discharge Planning Description: [facility]: *Discharge to safe environment Outcome: Progressing Towards Goal 
  
Problem: Patient Education: Go to Patient Education Activity Goal: Patient/Family Education Outcome: Progressing Towards Goal 
  
Problem: Breathing Pattern - Ineffective Goal: *Absence of hypoxia Outcome: Progressing Towards Goal 
Goal: *Use of effective breathing techniques Outcome: Progressing Towards Goal 
  
Problem: Patient Education: Go to Patient Education Activity Goal: Patient/Family Education Outcome: Progressing Towards Goal 
  
Problem: Nutrition Deficit Goal: *Optimize nutritional status Outcome: Progressing Towards Goal

## 2020-12-10 NOTE — PROGRESS NOTES
Attempted to wean patient down on O2. De-sat to low 80's on 3 L/min. Turned up O2 to 6 L and O2 Sat now up to 98%. HR elevated prior to turning up O2. HR currently at 97

## 2020-12-11 ENCOUNTER — APPOINTMENT (OUTPATIENT)
Dept: GENERAL RADIOLOGY | Age: 80
DRG: 871 | End: 2020-12-11
Attending: INTERNAL MEDICINE
Payer: MEDICARE

## 2020-12-11 PROCEDURE — 74230 X-RAY XM SWLNG FUNCJ C+: CPT

## 2020-12-11 PROCEDURE — 74011000258 HC RX REV CODE- 258: Performed by: INTERNAL MEDICINE

## 2020-12-11 PROCEDURE — 74011000250 HC RX REV CODE- 250: Performed by: INTERNAL MEDICINE

## 2020-12-11 PROCEDURE — 65270000032 HC RM SEMIPRIVATE

## 2020-12-11 PROCEDURE — 94664 DEMO&/EVAL PT USE INHALER: CPT

## 2020-12-11 PROCEDURE — 97535 SELF CARE MNGMENT TRAINING: CPT

## 2020-12-11 PROCEDURE — 74011250637 HC RX REV CODE- 250/637: Performed by: INTERNAL MEDICINE

## 2020-12-11 PROCEDURE — 97530 THERAPEUTIC ACTIVITIES: CPT

## 2020-12-11 PROCEDURE — 77010033678 HC OXYGEN DAILY

## 2020-12-11 PROCEDURE — 74011250636 HC RX REV CODE- 250/636: Performed by: INTERNAL MEDICINE

## 2020-12-11 PROCEDURE — 92611 MOTION FLUOROSCOPY/SWALLOW: CPT

## 2020-12-11 PROCEDURE — 94640 AIRWAY INHALATION TREATMENT: CPT

## 2020-12-11 RX ADMIN — ATORVASTATIN CALCIUM 40 MG: 40 TABLET, FILM COATED ORAL at 21:46

## 2020-12-11 RX ADMIN — Medication: at 08:12

## 2020-12-11 RX ADMIN — IPRATROPIUM BROMIDE AND ALBUTEROL SULFATE 3 ML: .5; 3 SOLUTION RESPIRATORY (INHALATION) at 20:53

## 2020-12-11 RX ADMIN — ENTACAPONE 200 MG: 200 TABLET, FILM COATED ORAL at 21:46

## 2020-12-11 RX ADMIN — Medication 10 ML: at 05:01

## 2020-12-11 RX ADMIN — ACETAMINOPHEN 650 MG: 325 TABLET ORAL at 21:46

## 2020-12-11 RX ADMIN — GABAPENTIN 200 MG: 100 CAPSULE ORAL at 08:12

## 2020-12-11 RX ADMIN — ENTACAPONE 200 MG: 200 TABLET, FILM COATED ORAL at 08:26

## 2020-12-11 RX ADMIN — Medication: at 21:46

## 2020-12-11 RX ADMIN — CARBIDOPA AND LEVODOPA 2 TABLET: 25; 100 TABLET ORAL at 08:12

## 2020-12-11 RX ADMIN — PIPERACILLIN AND TAZOBACTAM 3.38 G: 3; .375 INJECTION, POWDER, LYOPHILIZED, FOR SOLUTION INTRAVENOUS at 18:20

## 2020-12-11 RX ADMIN — CARBIDOPA AND LEVODOPA 2 TABLET: 25; 100 TABLET ORAL at 17:08

## 2020-12-11 RX ADMIN — PANTOPRAZOLE SODIUM 20 MG: 20 TABLET, DELAYED RELEASE ORAL at 07:05

## 2020-12-11 RX ADMIN — CARBIDOPA AND LEVODOPA 2 TABLET: 25; 100 TABLET ORAL at 21:46

## 2020-12-11 RX ADMIN — Medication 81 MG: at 08:12

## 2020-12-11 RX ADMIN — GUAIFENESIN 600 MG: 600 TABLET, EXTENDED RELEASE ORAL at 21:46

## 2020-12-11 RX ADMIN — ROPINIROLE HYDROCHLORIDE 1 MG: 1 TABLET, FILM COATED ORAL at 17:10

## 2020-12-11 RX ADMIN — ACETYLCYSTEINE 200 MG: 200 SOLUTION ORAL; RESPIRATORY (INHALATION) at 20:53

## 2020-12-11 RX ADMIN — PIPERACILLIN AND TAZOBACTAM 3.38 G: 3; .375 INJECTION, POWDER, LYOPHILIZED, FOR SOLUTION INTRAVENOUS at 11:33

## 2020-12-11 RX ADMIN — ENOXAPARIN SODIUM 40 MG: 40 INJECTION SUBCUTANEOUS at 17:05

## 2020-12-11 RX ADMIN — ENTACAPONE 200 MG: 200 TABLET, FILM COATED ORAL at 17:04

## 2020-12-11 RX ADMIN — GUAIFENESIN 600 MG: 600 TABLET, EXTENDED RELEASE ORAL at 08:12

## 2020-12-11 RX ADMIN — PAROXETINE HYDROCHLORIDE 10 MG: 20 TABLET, FILM COATED ORAL at 08:12

## 2020-12-11 RX ADMIN — ACETYLCYSTEINE 200 MG: 200 SOLUTION ORAL; RESPIRATORY (INHALATION) at 07:55

## 2020-12-11 RX ADMIN — ROPINIROLE HYDROCHLORIDE 1 MG: 1 TABLET, FILM COATED ORAL at 08:13

## 2020-12-11 RX ADMIN — AMLODIPINE BESYLATE 10 MG: 5 TABLET ORAL at 08:12

## 2020-12-11 RX ADMIN — Medication: at 17:05

## 2020-12-11 RX ADMIN — GABAPENTIN 200 MG: 100 CAPSULE ORAL at 17:04

## 2020-12-11 RX ADMIN — IPRATROPIUM BROMIDE AND ALBUTEROL SULFATE 3 ML: .5; 3 SOLUTION RESPIRATORY (INHALATION) at 07:55

## 2020-12-11 RX ADMIN — PIPERACILLIN AND TAZOBACTAM 3.38 G: 3; .375 INJECTION, POWDER, LYOPHILIZED, FOR SOLUTION INTRAVENOUS at 05:01

## 2020-12-11 RX ADMIN — Medication 1 DROP: at 21:47

## 2020-12-11 NOTE — PROGRESS NOTES
Problem: Mobility Impaired (Adult and Pediatric) Goal: *Acute Goals and Plan of Care (Insert Text) Description: FUNCTIONAL STATUS PRIOR TO ADMISSION: Patient was modified independent using a rollator for functional mobility (approx 15 ft). Patient required minimal assistance for basic and instrumental ADLs. HOME SUPPORT PRIOR TO ADMISSION: The patient lived with wife. Physical Therapy Goals Re-assessed and downgraded 12/10/2020 1. Patient will move from supine to sit and sit to supine , scoot up and down, and roll side to side in bed with maximal assistance within 7 day(s). 2.  Patient will transfer from bed to chair and chair to bed with maximal assistance using the least restrictive device within 7 day(s). 3.  Patient will perform sit to stand with moderate assistance  within 7 day(s). 4.  Patient will ambulate with maximal assistance for 5 feet with the least restrictive device within 7 day(s). 5.  Patient will sit EOB for 10 minutes with SBA within 7 days. Initiated 12/3/2020 1. Patient will move from supine to sit and sit to supine , scoot up and down, and roll side to side in bed with modified independence within 7 day(s). 2.  Patient will transfer from bed to chair and chair to bed with supervision/set-up using the least restrictive device within 7 day(s). 3.  Patient will perform sit to stand with supervision/set-up within 7 day(s). 4.  Patient will ambulate with supervision/set-up for 30 feet with the least restrictive device within 7 day(s). 5.  Patient will ascend/descend 3 stairs with bilateral handrail(s) with minimal assistance/contact guard assist within 7 day(s). Outcome: Progressing Towards Goal 
 PHYSICAL THERAPY TREATMENT Patient: Julian Toribio (41 y.o. male) Date: 12/11/2020 Diagnosis: Pneumonia [J18.9] <principal problem not specified> Precautions: Fall Chart, physical therapy assessment, plan of care and goals were reviewed. ASSESSMENT Patient continues with skilled PT services and is progressing towards goals. He continues to demonstrates difficulty with coordination of movement demonstrating initiation but unable to move his legs to EOB. Patient is provided Max A x2 to transition supine to sit and square his hips. He demonstrates good sitting balance with UE support but is unable to achieve full standing balance with sit to stand at RW. Patient is provided Max A x2 for lateral transfer to bedside chair. He demonstrates improved sit<>stand transfer from bedside chair with tactile and VC for hand placement. Patient demonstrates improved posture on second stand and is able to tolerate standing 10-15 seconds while linens are arranged. Current Level of Function Impacting Discharge (mobility/balance): Max Ax2 Other factors to consider for discharge: medical stability, inability to ambulate or transfer at this time, increased need for assistance, increased risk for falls PLAN : 
Patient continues to benefit from skilled intervention to address the above impairments. Continue treatment per established plan of care. to address goals. Recommendation for discharge: (in order for the patient to meet his/her long term goals) Therapy up to 5 days/week in SNF setting This discharge recommendation: 
Has been made in collaboration with the attending provider and/or case management IF patient discharges home will need the following DME: to be determined (TBD) SUBJECTIVE:  
Patient stated i'm ok.  OBJECTIVE DATA SUMMARY:  
Critical Behavior: 
Neurologic State: (P) Alert Orientation Level: (P) Oriented to person Cognition: (P) Decreased attention/concentration Safety/Judgement: Awareness of environment Functional Mobility Training: 
Bed Mobility: 
Rolling: Maximum assistance;Assist x2;Adaptive equipment; Additional time Supine to Sit: Maximum assistance;Assist x2; Additional time; Adaptive equipment Scooting: Maximum assistance;Assist x1;Assist x2 Transfers: 
Sit to Stand: Maximum assistance;Assist x2; Additional time; Adaptive equipment(from bed and mod of 2 from chair) Stand to Sit: Moderate assistance;Assist x2; Additional time; Adaptive equipment Bed to Chair: Maximum assistance;Assist x2; Additional time; Other (comment)(lateral scoot to drop arm chair) Balance: 
Sitting: Impaired; Without support Sitting - Static: Fair (occasional) Sitting - Dynamic: Poor (constant support) Standing: Impaired; With support Standing - Static: Constant support; Fair 
Standing - Dynamic : Poor;Constant support Ambulation/Gait Training: 
  
  
  
  
  
  
  
  
  
  
  
  
  
  
  
  
  
  
Stairs: Therapeutic Exercises:  
 
Pain Rating: 
 
 
Activity Tolerance:  
Fair After treatment patient left in no apparent distress:  
Sitting in chair, Call bell within reach, and Caregiver / family present COMMUNICATION/COLLABORATION:  
The patients plan of care was discussed with: Occupational therapist and Registered nurse. Kimber Moreno, PT Time Calculation: 36 mins

## 2020-12-11 NOTE — PROGRESS NOTES
[address] [facility] Adult  Hospitalist Group Hospitalist Progress Note Armando Scales MD 
Answering service: [phone] OR 36 from in house phone Date of Service:  2020 NAME:  Fredy Dowell :  1940 MRN:  [id number] Admission Summary:  
Fredy Dowell is a 78 y.o. male past medical history significant for Parkinson, dementia, CAD, CKD stage III presented emergency room with respiratory distress and hypoxia. Patient Stevie vallecillo as per report he had an episode today of choking during dinner afterward he became hypoxic with O2 sats in the upper 80s and found to have a \" low-grade fever\" of 99.9. Per daughter report he had a similar episode of choking last week again when he was eating by his respiratory symptoms were not as severe and he recovered quickly. Interval history / Subjective: Follow-up respiratory failure. Patient seen and examined earlier today. Daughter at bedside. Looks much better today. Weaning off oxygen. On 3 liters today. Pulmonology following Talked to daughter in detail. Assessment & Plan:  
 
Acute hypoxic respiratory failure: improving Aspiration pneumonia: 
Volume overload: 
-Suspected respiratory failure due to aspiration pneumonia 
-Procalcitonin elevated at 12, repeat improved to 1.27 
-on zosyn  
-S/p Lasix IV x1,  
-Albuterol nebulizer three times changed to prn  
-Guaifenisen twice daily 
-Echocardiogram with EF 39-53%, mild diastolic dysfunction On 3 liters of oxygen Pulmonology following Fever: resolved  
-CT scan w/o contrast ordered. Unable to do CTA due to possible dye allergy  
-Lower extremity dopplers negative for DVT 
-D-dimer elevated 4.97 On IV zosyn Acute encephalopathy: improved - Encouraged family to talk with patient during the day for improved sleep at night Parkinson: at baseline as per family -continue with home medications 
  
Elevated serum creatinine  
-Suspected prerenal 
-lasix and losartan held due to elevated serum creatinine BMP in AM 
  
Anemia, macrocytic: Stable 
  
Hypertension: continue amlodipine,  losartan (on hold)  
-has fluctuating blood pressures Left hip pain:  
-order x-ray, no acute abnormality Follow up PT/OT. Suspect need SNF on discharge Patient with baseline low functioning level. He is at risk for decline 
  
Code status: FULL 
DVT prophylaxis: SCDs Care Plan discussed with: Patient/Family Anticipated Disposition: SNF Anticipated Discharge: >48 hours Hospital Problems  Date Reviewed: 9/14/2020 Codes Class Noted POA Pneumonia ICD-10-CM: J18.9 ICD-9-CM: 986  12/3/2020 Unknown Review of Systems:  
Negative unless stated above Vital Signs:  
 Last 24hrs VS reviewed since prior progress note. Most recent are: 
Visit Vitals /72 (BP 1 Location: Right arm, BP Patient Position: At rest) Pulse 90 Temp 98.8 °F (37.1 °C) Resp 17 Ht 5' 10\" (1.778 m) Wt 83.8 kg (184 lb 11.9 oz) SpO2 94% BMI 26.51 kg/m² Intake/Output Summary (Last 24 hours) at 12/11/2020 1829 Last data filed at 12/10/2020 2041 Gross per 24 hour Intake 37 ml Output  Net 37 ml Physical Examination:  
 
I had a face to face encounter with this patient and independently examined them on 12/11/2020 as outlined below: 
 
     
Constitutional:  No acute distress, lsitting on chair ENT:  Oral mucosa moist, oropharynx benign. Resp:  no wheezing/rhonchi/rales. No accessory muscle use CV:  Regular rhythm, normal rate, no murmurs, gallops, rubs GI:  Soft, non distended, non tender. normoactive bowel sounds, no hepatosplenomegaly Musculoskeletal:  No edema, warm, 2+ pulses throughout Neurologic:  Moves all extremities. Skin:  Good turgor, no rashes or ulcers Data Review: Review and/or order of clinical lab test 
Review and/or order of tests in the radiology section of CPT Review and/or order of tests in the medicine section of [facility] Labs:  
 
Recent Labs 12/10/20 
1791 12/09/20 
7042 WBC 9.8 9.4 HGB 9.5* 9.5* HCT 28.9* 28.8*  
 229 Recent Labs 12/10/20 
6761 12/09/20 
3057  136  
K 4.0 4.0  
 102 CO2 28 27 BUN 29* 20  
CREA 1.34* 1.11  
* 107* CA 8.6 8.2* MG 2.3 2.2 PHOS 3.8 3.8 No results for input(s): ALT, AP, TBIL, TBILI, TP, ALB, GLOB, GGT, AML, LPSE in the last 72 hours. No lab exists for component: SGOT, GPT, AMYP, HLPSE No results for input(s): INR, PTP, APTT, INREXT, INREXT in the last 72 hours. No results for input(s): FE, TIBC, PSAT, FERR in the last 72 hours. No results found for: FOL, RBCF No results for input(s): PH, PCO2, PO2 in the last 72 hours. No results for input(s): CPK, CKNDX, TROIQ in the last 72 hours. No lab exists for component: CPKMB Lab Results Component Value Date/Time Cholesterol, total 119 07/08/2020 10:40 AM  
 HDL Cholesterol 51 07/08/2020 10:40 AM  
 LDL, calculated 47 07/08/2020 10:40 AM  
 Triglyceride 103 07/08/2020 10:40 AM  
 CHOL/HDL Ratio 2 07/08/2020 10:40 AM  
 
No results found for: [facility] Lab Results Component Value Date/Time  Color brown (A) 07/08/2020 10:41 AM  
 Appearance Clear 01/15/2020 03:22 PM  
 Specific gravity 1.015 07/08/2020 10:41 AM  
 Specific gravity 1.014 09/04/2019 02:45 PM  
 pH (UA) 5 07/08/2020 10:41 AM  
 Protein 2+ (A) 07/08/2020 10:41 AM  
 Glucose NEGATIVE  09/04/2019 02:45 PM  
 Ketone Negative 07/08/2020 10:41 AM  
 Bilirubin Negative 07/08/2020 10:41 AM  
 Urobilinogen Negative 07/08/2020 10:41 AM  
 Nitrites Negative 07/08/2020 10:41 AM  
 Leukocyte Esterase Negative 07/08/2020 10:41 AM  
 Epithelial cells FEW 09/04/2019 02:45 PM  
 Bacteria Few 01/15/2020 03:22 PM  
 WBC 0 07/08/2020 10:41 AM  
 RBC 2-5 (A) 07/08/2020 10:41 AM  
 
 
 
Medications Reviewed:  
 
Current Facility-Administered Medications Medication Dose Route Frequency  polyvinyl alcohol-povidone (NATURAL TEARS) 0.5-0.6 % ophthalmic solution 1 Drop  1 Drop Both Eyes QHS  acetylcysteine (MUCOMYST) 200 mg/mL (20 %) solution 200 mg  200 mg Nebulization TID RT  
 balsam peru-castor oiL (VENELEX) ointment   Topical TID  enoxaparin (LOVENOX) injection 40 mg  40 mg SubCUTAneous Q24H  piperacillin-tazobactam (ZOSYN) 3.375 g in 0.9% sodium chloride (MBP/ADV) 100 mL MBP  3.375 g IntraVENous Q8H  
 oxyCODONE IR (ROXICODONE) tablet 2.5 mg  2.5 mg Oral Q6H PRN  
 albuterol-ipratropium (DUO-NEB) 2.5 MG-0.5 MG/3 ML  3 mL Nebulization TID RT  
 amLODIPine (NORVASC) tablet 10 mg  10 mg Oral DAILY  [Held by provider] losartan (COZAAR) tablet 50 mg  50 mg Oral DAILY  [Held by provider] furosemide (LASIX) tablet 20 mg  20 mg Oral DAILY  guaiFENesin ER (MUCINEX) tablet 600 mg  600 mg Oral Q12H  carbidopa-levodopa (SINEMET)  mg per tablet 2 Tab  2 Tab Oral QID  albuterol (PROVENTIL VENTOLIN) nebulizer solution 2.5 mg  2.5 mg Nebulization Q6H PRN  
 sodium chloride (NS) flush 5-40 mL  5-40 mL IntraVENous Q8H  
 sodium chloride (NS) flush 5-40 mL  5-40 mL IntraVENous PRN  
 acetaminophen (TYLENOL) tablet 650 mg  650 mg Oral Q6H PRN Or  
 acetaminophen (TYLENOL) suppository 650 mg  650 mg Rectal Q6H PRN  polyethylene glycol (MIRALAX) packet 17 g  17 g Oral DAILY PRN  promethazine (PHENERGAN) tablet 12.5 mg  12.5 mg Oral Q6H PRN Or  
 ondansetron (ZOFRAN) injection 4 mg  4 mg IntraVENous Q6H PRN  
 aspirin delayed-release tablet 81 mg  81 mg Oral DAILY  atorvastatin (LIPITOR) tablet 40 mg  40 mg Oral QHS  entacapone (COMTAN) tablet 200 mg  200 mg Oral TID  pantoprazole (PROTONIX) tablet 20 mg  20 mg Oral ACB  PARoxetine (PAXIL) tablet 10 mg  10 mg Oral DAILY  gabapentin (NEURONTIN) capsule 200 mg  200 mg Oral BID  rOPINIRole (REQUIP) tablet 1 mg  1 mg Oral BID  
 
______________________________________________________________________ EXPECTED LENGTH OF STAY: 3d 2h 
ACTUAL LENGTH OF STAY:          8 Wilbert Guardado MD

## 2020-12-11 NOTE — PROGRESS NOTES
CRAIG: 
RUR: 20% Disposition:  Return to [facility] with possible advance from level 2 to Level 5 vs SNF 
 
CM called Facility ([facility] [phone]) and requested to speak with MONO Green. Per Fauzia Cedillo, facility does not offer rehab at level SNF. CM faxing clinicals for review and will discuss snf recommendation with patient/daughter. SNF choice will need to be obtained. CM continuing to follow. Pavel Purcell, [address], [address] [place] Attempt to visit with patient/family earlier today. Patient was asleep. No family in room. 4:54p CM met with patient and daughter. CM provided both with updates from prior conversation had with Fauzia Cedillo. Daughter had also spoken with Fauzia Cedillo and visited [facility] to discuss possible level of care increase for patient's return. She too was told that facility is unable to meet the patient snf rehab needs. We discussed referrals being sent to local snf's and daughter was in agreement. Daughter aware that [facility] is not accepting patients at this time. Referrals being sent via CC link and [employer]. CM continuing to follow.  
 
Pavel Purcell, BSJIMMY, CRM

## 2020-12-11 NOTE — PROGRESS NOTES
Problem: Self Care Deficits Care Plan (Adult) Goal: *Acute Goals and Plan of Care (Insert Text) Description:  
FUNCTIONAL STATUS PRIOR TO ADMISSION: Patient is an unreliable historian 2/2 baseline dementia. Per chart review, patient was modified independent using a rollator for short distance functional mobility and received minimal(?) assistance from staff. This date, patient reporting he self-feeds and occasionally needs help with lower body dressing. HOME SUPPORT: Patient recently moved to [facility]. Occupational Therapy Goals Goals reviewed and updated: 12/11/2020 1. Patient will perform 2 unsupported seated ADL tasks with set up/supervision within 7 day(s). 2.  Patient will perform upper body dressing with moderate assistance within 7 day(s). 3.  Patient will perform lower body dressing with maximum assistance within 7 day(s). 4.  Patient will perform toilet transfers to [facility] with moderate assistance and RW within 7 day(s). 5.  Patient will perform all aspects of toileting with maximum assistance and RW within 7 day(s). Initiated 12/4/2020 1. Patient will perform seated ADL task with material set-up and minimal assistance within 7 day(s). MET to wash face and don/doff glasses in supported sitting 12/11/2020 2. Patient will perform upper body dressing with minimal assistance within 7 day(s). 3.  Patient will perform lower body dressing with moderate assistance within 7 day(s). 4.  Patient will perform toilet transfers to [facility] with moderate assistance and RW within 7 day(s). 5.  Patient will perform all aspects of toileting with moderate assistance and RW within 7 day(s). 12/11/2020 1152 by Marichuy Ghosh Outcome: Progressing Towards Goal 
 OCCUPATIONAL THERAPY TREATMENT/WEEKLY RE-ASSESSMENT Patient: Fozia Saldivar (23 y.o. male) Date: 12/11/2020 Diagnosis: Pneumonia [J18.9] <principal problem not specified> Precautions: Fall Chart, occupational therapy assessment, plan of care, and goals were reviewed. ASSESSMENT Patient continues with skilled OT services and is slowly progressing towards goals. Patient progressing toward goals this date after initial gains, then decline in arousal, need for increased O2,, and ability to participate on 12/9/2020. Patient now with increased arousal and command following to participate with mod to max assist of 2 on 3 liters of O2. Lateral scoot to drop arm chair and seated up in chair on 12/10 and 12/11/2020. Need for [facility] lift back to bed. O2 SATs 97 to 100% on 3 liters during session this date. Patient following commands with extra time, cues and intermittent need for assist to initiate. Patient continues to demonstrate impaired cognition and orientation, reach to LEs, seated and standing balance, functional strength and endurance, bilateral UE bimanual integration and coordination, right shoulder pain with mobility, and tremors with Parkinson's. Participation over the past week also impacted by report of left hip pain on 12/9 (no complaint this date) and poor sleep overnight on 12/8/2020. Grooming with set up for washing face and min assist to don/doff glasses. Total assist to don mask. Daughter present for session. Goals adjusted to reflect status. Current Level of Function Impacting Discharge (ADLs): Set up to total assist for self care, grooming with set up to mod assist, UE self care with mod to max assist, LE self care with max to total assist, toileting with total assist 
 
Other factors to consider for discharge: Parkinson's PLAN : 
Goals have been updated based on progression since last assessment. Patient continues to benefit from skilled intervention to address the above impairments. Continue to follow patient 5 times a week to address goals.  
 
Recommend with staff: Matt Ayala in chair with [facility] lift for meals and self care, Set up to total assist for self care, grooming with set up to mod assist, UE self care with mod to max assist, LE self care with max to total assist, toileting with total assist 
 
 
Recommend next OT session: bathing in chair Recommendation for discharge: (in order for the patient to meet his/her long term goals) Therapy up to 5 days/week in SNF setting If discharged to home, will need max assist of 2 for mobility and max to total assist overall for self care. Mechanical lift for transfers. HH. Total assist for iADLs This discharge recommendation: 
Has been made in collaboration with the attending provider and/or case management IF patient discharges home will need the following DME: hospital bed, mechanical lift, walker: rolling, and wheelchair and [facility] to assure fit of DME SUBJECTIVE:  
Patient stated [place].  when asked where he is OBJECTIVE DATA SUMMARY:  
Cognitive/Behavioral Status: 
Neurologic State: Alert Orientation Level: Oriented to person; Other (Comment); Disoriented to place; Disoriented to situation;Disoriented to time(oriented to Dec and 11th) Cognition: Decreased attention/concentration;Decreased command following;Memory loss Perception: Cues to maintain midline in sitting;Cues to maintain midline in standing; Tactile;Verbal;Visual 
Perseveration: No perseveration noted Safety/Judgement: Awareness of environment Functional Mobility and Transfers for ADLs: 
Bed Mobility: 
Rolling: Maximum assistance;Assist x2;Adaptive equipment; Additional time Supine to Sit: Maximum assistance;Assist x2; Additional time; Adaptive equipment Scooting: Maximum assistance;Assist x1;Assist x2 Transfers: 
Sit to Stand: Maximum assistance;Assist x2; Additional time; Adaptive equipment(from bed and mod of 2 from chair) Bed to Chair: Maximum assistance;Assist x2; Additional time; Other (comment)(lateral scoot to drop arm chair) Balance: 
Sitting: Impaired; Without support Sitting - Static: Fair (occasional) Sitting - Dynamic: Poor (constant support) Standing: Impaired; With support Standing - Static: Constant support; Fair 
Standing - Dynamic : Poor;Constant support ADL Intervention: 
  
 
Grooming Washing Face: Set-up Adaptive Equipment: Other (comment)(don and doff glasses with min assist; mask with total assist) Lower Body Bathing Perineal  : Maximum assistance Position Performed: Standing Cues: Physical assistance pants up;Physical assistance pants down; Tactile cues provided;Verbal cues provided Adaptive Equipment: Ferna Meo Lower Body Dressing Assistance Protective Undergarmet: Total assistance (dependent) Position Performed: Standing Cues: Physical assistance; Tactile cues provided;Verbal cues provided Adaptive Equipment Used: Ferna Meo Cognitive Retraining Orientation Retraining: Reorienting;Place;Time Organizing/Sequencing: Breaking task down Attention to Task: Single task Following Commands: Follows one step commands/directions Safety/Judgement: Awareness of environment Cues: Visual cues provided;Verbal cues provided; Tactile cues provided Activity Tolerance:  
Fair After treatment patient left in no apparent distress:  
Sitting in chair, Call bell within reach, and Caregiver / family present COMMUNICATION/COLLABORATION:  
The patients plan of care was discussed with: Physical therapist, Occupational therapist, and Registered nurse. Siomara S Markie Garcia Time Calculation: 41 mins

## 2020-12-11 NOTE — PROGRESS NOTES
PULMONARY MEDICINE Progress Note Name: Jeffrey Vallejo : 1940 MRN: [id number] Date: 2020 Subjective:  
Consult Note: 2020 Requesting Physician: 
Reason for consult: 
 
Medical records and data reviewed. Patient is a 78 y.o. male who presented to the hospital on 2020 with a choking episode. Presentation to the emergency room was found to have hypoxia as well as evidence of right lower lobe infiltrate and was admitted to the hospital for further management. SARS-CoV-2 test was negative. Despite several days of antibiotics, he continues to be hypoxic with symptoms of cough. CT scan of the chest that was done yesterday shows bilateral pulmonary infiltrates right more than left. Cultures have been negative so far. Antibiotics were changed to Zosyn. Fever curve as well as WBC count appear to be better over the past 48 hours. Patient appears to be delirious and ABG. was done did not show evidence of respiratory acidosis. He does have underlying dementia. Left lower extremity Doppler was negative for DVT. Review of Systems: A comprehensive 12 system review of systems was not obtained from the patient Assessment:  
 
Acute hypoxic pulmonary insufficiency Bilateral pneumonia after aspiration episode, improving fever curve and leukocytosis Delirium with acute encephalopathy, no evidence of respiratory acidosis History of organic cardiac disease, appears compensated Other medical problems per chart Recommendations:  
 
Wean oxygen as tolerated Agree with switch to Zosyn Continue bronchodilators On Lovenox Mucomyst 
Chest PT for mobilization of secretions O2 titration above 90% SLP/PT/OT Discussed with daughter/RN at bedside Active Problem List:  
 
Problem List  Date Reviewed: 2020 Codes Class Acute blood loss anemia ICD-10-CM: D62 
ICD-9-CM: 285.1 Parkinson's disease ([facility] ICD-10-CM: G20 
ICD-9-CM: 332.0 Long-term current use of high risk medication other than anticoagulant ICD-10-CM: Z79.899 ICD-9-CM: V58.69 Gastroesophageal reflux disease without esophagitis ICD-10-CM: K21.9 ICD-9-CM: 530.81 Pneumonia ICD-10-CM: J18.9 ICD-9-CM: 904 Benign prostatic hyperplasia with nocturia ICD-10-CM: N40.1, R35.1 ICD-9-CM: 600.01, 788.43 History of stroke ICD-10-CM: Z86.73 
ICD-9-CM: V12.54 Dementia due to Parkinson's disease without behavioral disturbance ([facility] ICD-10-CM: G20, F02.80 ICD-9-CM: 332.0, 294.10 Parkinsonism ([facility].) ICD-10-CM: G20 
ICD-9-CM: 332.0 Bilateral carotid artery stenosis ICD-10-CM: I65.23 ICD-9-CM: 433.10, 433.30 Mild cognitive impairment ICD-10-CM: G31.84 ICD-9-CM: 331.83 Cerebrovascular accident (CVA) ([facility] ICD-10-CM: I63.9 ICD-9-CM: 434.91 Rectal bleeding ICD-10-CM: K62.5 ICD-9-CM: 569.3 PE (physical exam), annual ICD-10-CM: Z00.00 ICD-9-CM: V70.0 Overview Addendum 6/12/2019  8:13 AM by Ryan Ruiz  
  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 
ICD-9-CM: 786.09 Hearing difficulty of both ears ICD-10-CM: H91.93 
ICD-9-CM: 389.9 Sleep apnea in adult ICD-10-CM: G47.30 ICD-9-CM: 327.23 Overview Signed 7/12/2017  3:47 PM by Lee Pritchard No longer on CPAP @13 Tendinitis of left rotator cuff ICD-10-CM: M75.82 ICD-9-CM: 726.10 Coronary artery disease ICD-10-CM: I25.10 ICD-9-CM: 414.00 Overview Signed 7/12/2017  3:51 PM by Lee Pritchard By EBT 
  
  
   
 DJD (degenerative joint disease) ICD-10-CM: M19.90 ICD-9-CM: 715.90 Tendinitis ICD-10-CM: M77.9 ICD-9-CM: 726.90   
 Overview Signed 7/12/2017  3:52 PM by Beatriz Fox ACHILLES Gout ICD-10-CM: M10.9 ICD-9-CM: 274.9 Tremor ICD-10-CM: R25.1 ICD-9-CM: 781.0 Overview Signed 7/12/2017  3:53 PM by Beatriz Fox LEFT HAND Restless leg syndrome ICD-10-CM: G25.81 ICD-9-CM: 333.94 Lumbar stenosis ICD-10-CM: M48.061 
ICD-9-CM: 724.02 Overview Signed 1/29/2015  6:49 AM by Julieta Sparks  
  S/P L4-5 microdiscectomy Essential hypertension ICD-10-CM: I10 
ICD-9-CM: 401.9 MCI (mild cognitive impairment) ICD-10-CM: G31.84 ICD-9-CM: 331.83   
   
 CKD (chronic kidney disease) stage 2, GFR 60-89 ml/min ICD-10-CM: N18.2 ICD-9-CM: 585.2 Past Medical History:  
 
 has a past medical history of Aaron esophagus, Benign nodular prostatic hyperplasia without lower urinary tract symptoms (7/12/2017), CKD (chronic kidney disease) stage 2, GFR 60-89 ml/min, Constipation, Coronary artery disease (7/12/2017), Depression, DJD (degenerative joint disease) (7/12/2017), Falls, Gout (7/12/2017), Headache, Hearing difficulty of both ears (7/12/2017), Hyperlipidemia, Hypertension, Impaired cognition (7/12/2017), MCI (mild cognitive impairment), Restless leg syndrome (7/12/2017), Sleep apnea in adult (7/12/2017), Snoring, Stroke ([facility] [address] [facility] Tendinitis (7/12/2017), Tendinitis of left rotator cuff (07/12/2017), Tremor (7/12/2017), and Unspecified sleep apnea. Past Surgical History:  
 
 has a past surgical history that includes pr egd transoral biopsy single/multiple (8/16/2011); pr egd transoral biopsy single/multiple (10/1/2013); hx orthopaedic (1/27/15); colonoscopy,flex,w/control, bleeding (1/16/2019); and colonoscopy (N/A, 1/16/2019). Home Medications:  
 
Prior to Admission medications Medication Sig Start Date End Date Taking? Authorizing Provider  
rOPINIRole (REQUIP) 1 mg tablet Take 1 mg by mouth two (2) times a day.    Yes Provider, Historical  
 guaiFENesin (Tussin) 100 mg/5 mL liquid Take 400 mg by mouth daily as needed for Cough. Yes Provider, Historical  
valsartan (DIOVAN) 160 mg tablet Take 1 Tab by mouth Every morning for 90 days. Indications: high blood pressure 10/11/20 1/9/21  Meaghan Harvey MD  
amLODIPine (NORVASC) 5 mg tablet 0ne tab daily qhs  Indications: high blood pressure 10/11/20   Meaghan Harvey MD  
gabapentin (NEURONTIN) 100 mg capsule T2C BID 10/9/20   Meaghan Harvey MD  
PARoxetine (PAXIL) 10 mg tablet TAKE 1 TABLET BY MOUTH DAILY 10/9/20   Meaghan Harvey MD  
entacapone (COMTAN) 200 mg tablet TAKE 1 TABLET THREE TIMES A DAY 10/9/20   Nadine Rogers MD  
artificial tears, dextran 70-hypromellose, (GenTeal Tears Mild) 0.1-0.3 % ophthalmic solution Administer 1 Drop to both eyes nightly. Provider, Historical  
carboxymethylcellulose sodium (THERATEARS OP) Apply  to eye. 1 -2 drops both eyes bid    Provider, Historical  
esomeprazole (NEXIUM) 20 mg capsule TAKE 1 CAPSULE DAILY 8/5/20   Meaghan Harvey MD  
carbidopa-levodopa (Sinemet)  mg per tablet 2 p.o. 4 times daily  Indications: Parkinson's disease 4/28/20   Meaghan Harvey MD  
atorvastatin (LIPITOR) 40 mg tablet TAKE 1 TABLET DAILY 3/16/20   Meaghan Harvey MD  
acetaminophen (TYLENOL EXTRA STRENGTH) 500 mg tablet Take 1,000 mg by mouth three (3) times daily as needed for Pain. Rapid release    Provider, Historical  
senna-docusate (SENNA-S) 8.6-50 mg per tablet Take 1 Tab by mouth every other day. Provider, Historical  
aspirin delayed-release 81 mg tablet Take 81 mg by mouth daily. Provider, Historical  
vitamin e (E GEMS) 1,000 unit capsule Take 2,000 Units by mouth daily. Provider, Historical  
MULTIVITAMIN PO Take 1 Tab by mouth daily. Provider, Historical  
 
 
Allergies/Social/Family History: No Known Allergies Social History Tobacco Use  Smoking status: Former Smoker  Smokeless tobacco: Never Used Substance Use Topics  Alcohol use: No  
  
Family History Problem Relation Age of Onset  Heart Disease Mother  Heart Disease Father Objective:  
Vital Signs: 
Visit Vitals /76 (BP 1 Location: Right arm, BP Patient Position: At rest) Pulse 96 Temp 98.1 °F (36.7 °C) Resp 16 Ht 5' 10\" (1.778 m) Wt 83.8 kg (184 lb 11.9 oz) SpO2 99% BMI 26.51 kg/m² O2 Flow Rate (L/min): 3 l/min O2 Device: Nasal cannula Temp (24hrs), Av.6 °F (37 °C), Min:98.1 °F (36.7 °C), Max:100 °F (37.8 °C) Intake/Output:  
 
Intake/Output Summary (Last 24 hours) at 2020 4367 Last data filed at 12/10/2020 2041 Gross per 24 hour Intake 1308 ml Output  Net 1308 ml Physical Exam:  
General:  Drowsy, restless, no distress, appears stated age. Head:  Normocephalic, without obvious abnormality, atraumatic. Eyes:  Conjunctivae/corneas clear. PERRL Neck: Supple, symmetrical, no adenopathy, no carotid bruit and no JVD. Lungs:   Decreased BS Chest wall:  No tenderness or deformity. Heart:  Regular rate and rhythm, S1-S2 normal, no murmur, no click, rub or gallop. Abdomen:   Soft, non-tender. Bowel sounds present. No masses,  No organomegaly. Extremities: Atraumatic, no cyanosis or edema. Pulses: Palpable Skin: No rashes or lesions Neurologic: Grossly nonfocal  
 
  
 LABS AND  DATA: Personally reviewed Recent Labs 12/10/20 
4657 20 
8838 WBC 9.8 9.4 HGB 9.5* 9.5* HCT 28.9* 28.8*  
 229 Recent Labs 12/10/20 
9236 20 
3676  136  
K 4.0 4.0  
 102 CO2 28 27 BUN 29* 20  
CREA 1.34* 1.11  
* 107* CA 8.6 8.2* MG 2.3 2.2 PHOS 3.8 3.8 No results for input(s): AP, TBIL, TP, ALB, GLOB, AML, LPSE in the last 72 hours. No lab exists for component: SGOT, GPT, AMYP No results for input(s): INR, PTP, APTT, INREXT, INREXT in the last 72 hours. Recent Labs 12/10/20 
1037 PHI 7.54* PCO2I 33.3*  
PO2I 58*  
 
 No results for input(s): CPK, CKMB, TROIQ, BNPP in the last 72 hours. MEDS: Reviewed Chest Imaging: personally reviewed and report checked Tele- reviewed Medical decision making:  
I have reviewed the flowsheet and previous day's notes Patient has acute or chronic illness that poses a threat to life or bodily function Review and order of Clinical lab tests Review and Order of Radiology tests Independent visualization of Image High Risk Drug therapy requiring intensive monitoring for toxicity: eg steroids, pressors, antibiotics Thank you for allowing me to participate in this patient's care. Ambrose Meraz PA-C [facility]

## 2020-12-11 NOTE — PROGRESS NOTES
SPEECH PATHOLOGY MODIFIED BARIUM SWALLOW STUDY/DISCHARGE Patient: Pelon Herr (30 y.o. male) Date: 12/11/2020 Primary Diagnosis: Pneumonia [J18.9] Precautions:   Fall ASSESSMENT : 
Based on the objective data described below, the patient presents with functional oral and mild pharyngeal dysphagia. Pharyngeal dysphagia characterized by slightly reduced hyoid excursion resulting in sluggish epiglottic retroflexion which causes min-mod vallecular residue with solid consistency. Pharyngeal dysphagia also characterized by delayed swallow initiation with large, sequential sips of thin liquids causing min penetration before the swallow. Pt ultimately cleared airway of penetrated contents after two swallow initiations. UES appeared WFL, although pt with known esophageal dysphagia. Suspect current swallow dysfunction is the direct result of deficits related to Parkinson's Disease, however, swallow is functional.  Suspect pt is at greater risk for post-prandial aspiration given hx of beltran's esophagus and other esophageal deficits and therefore recommend pt continue diet with overall esophageal/reflux precautions. Discussed results with patient and patient's daughter who was present for study. Further skilled acute therapy provided by a speech-language pathologist is not indicated at this time. PLAN : 
Recommendations and Planned Interventions: 
--continue mechanical soft diet/thin liquids (patient's baseline 2/2 esophageal dysphagia) 
--small sips/bites 
--alternate liquids/solids 
--fully upright 
--meds as tolerated (pt's daughter reports that pt able to take many pills at a time with liquid at baseline) --hold PO for two hours before bed.  
--keep HOB elevated during meals, for at least an hour after meals, and while sleeping Discharge Recommendations: None SUBJECTIVE:  
Patient stated, Liborio Cerda. Pt cooperative throughout session though confused.  
 
OBJECTIVE:  
 
 Past Medical History:  
Diagnosis Date  Aaron esophagus  Benign nodular prostatic hyperplasia without lower urinary tract symptoms 7/12/2017  CKD (chronic kidney disease) stage 2, GFR 60-89 ml/min  Constipation  Coronary artery disease 7/12/2017  Depression  DJD (degenerative joint disease) 7/12/2017 [address]  Gout 7/12/2017  Headache   
 Hearing difficulty of both ears 7/12/2017  Hyperlipidemia  Hypertension  Impaired cognition 7/12/2017  MCI (mild cognitive impairment)  Restless leg syndrome 7/12/2017  Sleep apnea in adult 7/12/2017 No longer on CPAP @13  Snoring  Stroke ([address]  Tendinitis 7/12/2017 ACHILLES  Tendinitis of left rotator cuff 07/12/2017  Tremor 7/12/2017 LEFT HAND  Unspecified sleep apnea   
 lost weight no longer has it Past Surgical History:  
Procedure Laterality Date  COLONOSCOPY N/A 1/16/2019 COLONOSCOPY performed by Melisa Negron MD at [facility] ENDOSCOPY  COLONOSCOPY,FLEX,W/CONTROL, BLEEDING  1/16/2019  HX ORTHOPAEDIC  1/27/15 L4-L5 MICRODECOMPRESSION (Right  [place] EGD TRANSORAL BIOPSY SINGLE/MULTIPLE  8/16/2011  [place] EGD TRANSORAL BIOPSY SINGLE/MULTIPLE  10/1/2013 Prior Level of Function/Home Situation:  
Home Situation Home Environment: Private residence # Steps to Enter: 3 Rails to Enter: Yes Hand Rails : Bilateral 
One/Two Story Residence: One story Living Alone: No 
Support Systems: Spouse/Significant Other/Partner Current DME Used/Available at Home: [employer], rollator, Wheelchair Diet prior to admission: Mechanical soft diet/thin liquids Current Diet:  Mechanical soft diet/thin liquids Radiologist: Dr. Nathanael Cooney Film Views: Lateral;Fluoro Patient Position: upright in hausted chair Trial 1:  
Consistency Presented: Thin liquid;Puree; Solid How Presented: Self-fed/presented;Cup/sip;Straw;Successive swallows;Spoon Consistency Amount: (150 cc thin; 2 Ba paste) Bolus Acceptance: No impairment Bolus Formation/Control: No impairment:    
Propulsion: No impairment Oral Residue: None Initiation of Swallow: Triggered at vallecula; Other (comment)(Laryngeal vestibule with large, sequential sips) Timing: No impairment Penetration: Before swallow; Other (comment)(from large, sequential sips) Aspiration/Timing: No evidence of aspiration Pharyngeal Clearance: Vallecular residue;10-50% Attempted Modifications: Small sips and bites Effective Modifications: Small sips and bites Decreased Tongue Base Retraction?: No 
Laryngeal Elevation: Other (comment)(reduced anterior hyoid movement and sluggish epiglottic retr) Aspiration/Penetration Score: 3 (Penetration/Visible residue-Contrast remains above the folds/cords, but is not cleared) Pharyngeal Symmetry: Not assessed Pharyngeal-Esophageal Segment: (known esophageal dysphagia) Pharyngeal Dysfunction: None Oral Phase Severity: No impairment Pharyngeal Phase Severity: Minimal 
 
 
NOMS:  
The NOMS functional outcome measure was used to quantify this patient's level of swallowing impairment. Based on the NOMS, the patient was determined to be at level 6 for swallow function NOMS Swallowing Levels: 
Level 1 (CN): NPO Level 2 (CM): NPO but takes consistency in therapy Level 3 (CL): Takes less than 50% of nutrition p.o. and continues with nonoral feedings; and/or safe with mod cues; and/or max diet restriction Level 4 (CK): Safe swallow but needs mod cues; and/or mod diet restriction; and/or still requires some nonoral feeding/supplements Level 5 (CJ): Safe swallow with min diet restriction; and/or needs min cues Level 6 (CI): Independent with p.o.; rare cues; usually self cues; may need to avoid some foods or needs extra time Level 7 (CH): Independent for all p.o. CARINA. (2003).  National Outcomes Measurement System (NOMS): Adult Speech-Language Pathology User's Guide. COMMUNICATION/EDUCATION:  
 
The patient's plan of care including recommendations, planned interventions, and recommended diet changes were discussed with: Registered nurse. Patient/family have participated as able in goal setting and plan of care. Thank you for this referral. 
Kimberly Mojica SLP Time Calculation: 30 mins

## 2020-12-11 NOTE — PROGRESS NOTES
SLP Contact Note Reconsult received and appreciated. Given concern for dysphagia, hx of PD, and hx of esophageal issues, will complete a Modified Barium Swallow Study to objectively assess safety of swallow physiology. Thank you, Blake Mazariegos M.Ed, CCC-SLP Speech-Language Pathologist

## 2020-12-12 LAB
ANION GAP SERPL CALC-SCNC: 6 MMOL/L (ref 5–15)
BASOPHILS # BLD: 0 K/UL (ref 0–0.1)
BASOPHILS NFR BLD: 0 % (ref 0–1)
BUN SERPL-MCNC: 28 MG/DL (ref 6–20)
BUN/CREAT SERPL: 26 (ref 12–20)
CALCIUM SERPL-MCNC: 8.7 MG/DL (ref 8.5–10.1)
CHLORIDE SERPL-SCNC: 105 MMOL/L (ref 97–108)
CO2 SERPL-SCNC: 28 MMOL/L (ref 21–32)
CREAT SERPL-MCNC: 1.08 MG/DL (ref 0.7–1.3)
DIFFERENTIAL METHOD BLD: ABNORMAL
EOSINOPHIL # BLD: 0.3 K/UL (ref 0–0.4)
EOSINOPHIL NFR BLD: 3 % (ref 0–7)
ERYTHROCYTE [DISTWIDTH] IN BLOOD BY AUTOMATED COUNT: 12.1 % (ref 11.5–14.5)
GLUCOSE SERPL-MCNC: 98 MG/DL (ref 65–100)
HCT VFR BLD AUTO: 30.3 % (ref 36.6–50.3)
HGB BLD-MCNC: 9.8 G/DL (ref 12.1–17)
IMM GRANULOCYTES # BLD AUTO: 0.1 K/UL (ref 0–0.04)
IMM GRANULOCYTES NFR BLD AUTO: 1 % (ref 0–0.5)
LYMPHOCYTES # BLD: 1.3 K/UL (ref 0.8–3.5)
LYMPHOCYTES NFR BLD: 13 % (ref 12–49)
MCH RBC QN AUTO: 33 PG (ref 26–34)
MCHC RBC AUTO-ENTMCNC: 32.3 G/DL (ref 30–36.5)
MCV RBC AUTO: 102 FL (ref 80–99)
MONOCYTES # BLD: 0.6 K/UL (ref 0–1)
MONOCYTES NFR BLD: 6 % (ref 5–13)
NEUTS SEG # BLD: 7.2 K/UL (ref 1.8–8)
NEUTS SEG NFR BLD: 77 % (ref 32–75)
NRBC # BLD: 0 K/UL (ref 0–0.01)
NRBC BLD-RTO: 0 PER 100 WBC
PLATELET # BLD AUTO: 359 K/UL (ref 150–400)
PMV BLD AUTO: 9.7 FL (ref 8.9–12.9)
POTASSIUM SERPL-SCNC: 4 MMOL/L (ref 3.5–5.1)
RBC # BLD AUTO: 2.97 M/UL (ref 4.1–5.7)
SODIUM SERPL-SCNC: 139 MMOL/L (ref 136–145)
WBC # BLD AUTO: 9.4 K/UL (ref 4.1–11.1)

## 2020-12-12 PROCEDURE — 74011000258 HC RX REV CODE- 258: Performed by: INTERNAL MEDICINE

## 2020-12-12 PROCEDURE — 74011250636 HC RX REV CODE- 250/636: Performed by: INTERNAL MEDICINE

## 2020-12-12 PROCEDURE — 85025 COMPLETE CBC W/AUTO DIFF WBC: CPT

## 2020-12-12 PROCEDURE — 74011000250 HC RX REV CODE- 250: Performed by: INTERNAL MEDICINE

## 2020-12-12 PROCEDURE — 74011250637 HC RX REV CODE- 250/637: Performed by: INTERNAL MEDICINE

## 2020-12-12 PROCEDURE — 36415 COLL VENOUS BLD VENIPUNCTURE: CPT

## 2020-12-12 PROCEDURE — 65270000032 HC RM SEMIPRIVATE

## 2020-12-12 PROCEDURE — 77030029684 HC NEB SM VOL KT MONA -A

## 2020-12-12 PROCEDURE — 80048 BASIC METABOLIC PNL TOTAL CA: CPT

## 2020-12-12 PROCEDURE — 94760 N-INVAS EAR/PLS OXIMETRY 1: CPT

## 2020-12-12 PROCEDURE — 77010033678 HC OXYGEN DAILY

## 2020-12-12 PROCEDURE — 94640 AIRWAY INHALATION TREATMENT: CPT

## 2020-12-12 RX ADMIN — Medication: at 21:49

## 2020-12-12 RX ADMIN — Medication: at 08:19

## 2020-12-12 RX ADMIN — LOSARTAN POTASSIUM 50 MG: 50 TABLET, FILM COATED ORAL at 11:15

## 2020-12-12 RX ADMIN — GABAPENTIN 200 MG: 100 CAPSULE ORAL at 08:18

## 2020-12-12 RX ADMIN — PAROXETINE HYDROCHLORIDE 10 MG: 20 TABLET, FILM COATED ORAL at 08:19

## 2020-12-12 RX ADMIN — ROPINIROLE HYDROCHLORIDE 1 MG: 1 TABLET, FILM COATED ORAL at 15:48

## 2020-12-12 RX ADMIN — Medication: at 15:47

## 2020-12-12 RX ADMIN — CARBIDOPA AND LEVODOPA 2 TABLET: 25; 100 TABLET ORAL at 15:49

## 2020-12-12 RX ADMIN — ENOXAPARIN SODIUM 40 MG: 40 INJECTION SUBCUTANEOUS at 15:47

## 2020-12-12 RX ADMIN — GABAPENTIN 200 MG: 100 CAPSULE ORAL at 15:48

## 2020-12-12 RX ADMIN — ENTACAPONE 200 MG: 200 TABLET, FILM COATED ORAL at 21:48

## 2020-12-12 RX ADMIN — ACETYLCYSTEINE 200 MG: 200 SOLUTION ORAL; RESPIRATORY (INHALATION) at 13:37

## 2020-12-12 RX ADMIN — Medication 10 ML: at 05:02

## 2020-12-12 RX ADMIN — ATORVASTATIN CALCIUM 40 MG: 40 TABLET, FILM COATED ORAL at 21:48

## 2020-12-12 RX ADMIN — GUAIFENESIN 600 MG: 600 TABLET, EXTENDED RELEASE ORAL at 08:18

## 2020-12-12 RX ADMIN — IPRATROPIUM BROMIDE AND ALBUTEROL SULFATE 3 ML: .5; 3 SOLUTION RESPIRATORY (INHALATION) at 07:58

## 2020-12-12 RX ADMIN — PIPERACILLIN AND TAZOBACTAM 3.38 G: 3; .375 INJECTION, POWDER, LYOPHILIZED, FOR SOLUTION INTRAVENOUS at 05:02

## 2020-12-12 RX ADMIN — CARBIDOPA AND LEVODOPA 2 TABLET: 25; 100 TABLET ORAL at 08:18

## 2020-12-12 RX ADMIN — ACETAMINOPHEN 650 MG: 325 TABLET ORAL at 21:49

## 2020-12-12 RX ADMIN — PIPERACILLIN AND TAZOBACTAM 3.38 G: 3; .375 INJECTION, POWDER, LYOPHILIZED, FOR SOLUTION INTRAVENOUS at 11:14

## 2020-12-12 RX ADMIN — PANTOPRAZOLE SODIUM 20 MG: 20 TABLET, DELAYED RELEASE ORAL at 07:38

## 2020-12-12 RX ADMIN — PIPERACILLIN AND TAZOBACTAM 3.38 G: 3; .375 INJECTION, POWDER, LYOPHILIZED, FOR SOLUTION INTRAVENOUS at 18:58

## 2020-12-12 RX ADMIN — ACETYLCYSTEINE 200 MG: 200 SOLUTION ORAL; RESPIRATORY (INHALATION) at 19:39

## 2020-12-12 RX ADMIN — ENTACAPONE 200 MG: 200 TABLET, FILM COATED ORAL at 15:48

## 2020-12-12 RX ADMIN — AMLODIPINE BESYLATE 10 MG: 5 TABLET ORAL at 08:18

## 2020-12-12 RX ADMIN — Medication 1 DROP: at 21:49

## 2020-12-12 RX ADMIN — CARBIDOPA AND LEVODOPA 2 TABLET: 25; 100 TABLET ORAL at 21:48

## 2020-12-12 RX ADMIN — ENTACAPONE 200 MG: 200 TABLET, FILM COATED ORAL at 08:19

## 2020-12-12 RX ADMIN — GUAIFENESIN 600 MG: 600 TABLET, EXTENDED RELEASE ORAL at 21:48

## 2020-12-12 RX ADMIN — Medication 81 MG: at 08:19

## 2020-12-12 RX ADMIN — ACETYLCYSTEINE 200 MG: 200 SOLUTION ORAL; RESPIRATORY (INHALATION) at 07:58

## 2020-12-12 RX ADMIN — ROPINIROLE HYDROCHLORIDE 1 MG: 1 TABLET, FILM COATED ORAL at 08:18

## 2020-12-12 RX ADMIN — IPRATROPIUM BROMIDE AND ALBUTEROL SULFATE 3 ML: .5; 3 SOLUTION RESPIRATORY (INHALATION) at 19:39

## 2020-12-12 RX ADMIN — IPRATROPIUM BROMIDE AND ALBUTEROL SULFATE 3 ML: .5; 3 SOLUTION RESPIRATORY (INHALATION) at 13:37

## 2020-12-12 NOTE — PROGRESS NOTES
Physician Progress Note Eli Gonzalez 
CSN #:                  [id number] :                       1940 ADMIT DATE:       2020 10:01 PM 
[address] [place] DATE: 
RESPONDING 
PROVIDER #:        ASAD MD Daryle Posey MD 
 
 
 
 
QUERY TEXT: 
 
 
Dear attending physician, 
Pt admitted with [id number] and noted to have WBC >12K on ,  temperature of 103.1 on , 100.4 on 12/3 with documentation of pneumonia. If possible, please document in progress notes and discharge summary if you are evaluating and/or treating: The medical record reflects the following: 
Risk Factors: +Covid 19 Clinical Indicators:-WBC 11.4, CRP 10.70, 103.1, 100.4 on 12/3, WBC >12K on 12/4 
12/3-Per PN- COVID 19 pneumonia Treatment: ID consult, IV Remdesivir, monitor labwork, imaging, pulse oximetry, vital signs, oxygen as needed to keep saturation over 90%, Tylenol 650 mg po given x 1, Mortrin 800 mg po x 1 on , Tylenol x 1 on  and 12/3, Decadron 10 mg iv x 1 on  Dear attending physician, 
Pt admitted with documented aspiration pneumonia. Noted to have an WBC >12K  on 12/3 and 20, lactic acid level 2.0, documented respiratory failure, afebrile on presentation. On , Noted low grade fever, documented acute encephalopathy. Please document in the progress notes and discharge summary if you are evaluating and/or treating any of the following: The medical record reflects the following: 
Risk Factors: 78year old from [address] after choking during dinner Clinical Indicators: -100.4, 100.2 -WBC 9.4 
- Daughter at bedside. Patient with new fevers overnight. Suspected respiratory failure due to aspiration pneumonia Acute encephalopathy: 
-patient with reversed days/nights. 12/10-Per pulmonary-CT scan of the chest that was done yesterday shows bilateral pulmonary infiltrates right more than left.   Cultures have been negative so far. Antibiotics were changed to Zosyn. Fever curve as well as WBC count appear to be better over the past 48 hours Treatment: IV zosyn, monitor labwork, vital signs, imaging, pulse oximetry, Tylenol [facility] Sepsis Criteria Sepsis Criteria - A minimum of 2 indicators with infection (indicators should not be explained by another non-infectious condition). Only one scenario below needs to be present: 
 
Scenario A: 
o Temp: fever (core >38.3C or 100.9F) or hypothermia (core temp <36C or 96.8F) AND WBC > 12,000 or < 4,000 or bands >10% Scenario B: 
- One of the following: o Temp: fever (core >38.3C or 100.9F) or hypothermia (core temp <36C or 96.8F); OR 
o WBC > 12,000 or < 4,000 or bands >10% AND - At least one of the following: o Lactate > 2.2 mmol/L 
o Tachycardia > 90 beats per minute or more than two SD above the normal value for age 
o Tachypnea > 20 breaths per minute a pCO2 of < 32 mm Hg 
o Plasma Procalcitonin more than 2 SD above the normal value 
o Plasma c-reactive protein elevated more than 2 SD above the normal value 
o Altered mental status 
o Mottling of the skin or prolonged cap refill 
o Non-diabetic, unexplained hyperglycemia (glucose > 140 mg/dL) 
o Arterial hypoxemia (Pao2/Fio2 < 300) 
o Acute oliguria (urine output <0.5ml/kg/hr. for at least 2 hrs., despite adequate fluid resuscitation) o Hyperbilirubinemia (> 2 mg/dL) 
o Other evidence of acute organ failure such as hypotension (SBP < 90 mmHg or SBP decrease by > 40 mmHg) acute kidney injury supported by KDIGO, thrombocytopenia (< 100,000 /uL), acute respiratory failure, encephalopathy, shock, etc. 
 
 
 
Thank you, Miguelangel Jacome RN, BSN Clinical documentation Improvement 
[phone] Options provided: 
-- Sepsis, POA due to COVID-19 
-- Sepsis, due to COVID-19 
-- COVID-19 without sepsis -- Sepsis, POA 
-- Sepsis, not POA 
-- No Sepsis -- Other - I will add my own diagnosis -- Disagree - Not applicable / Not valid -- Disagree - Clinically unable to determine / Unknown 
-- Refer to Clinical Documentation Reviewer PROVIDER RESPONSE TEXT: 
 
This patient has Sepsis, POA.  
 
Query created by: Marge Cummings on 12/11/2020 3:33 PM 
 
 
Electronically signed by:  CRISTÓBAL Stringer MD 12/11/2020 7:02 PM

## 2020-12-12 NOTE — PROGRESS NOTES
[address] [facility] Adult  Hospitalist Group Hospitalist Progress Note Chano Bnaks MD 
Answering service: [phone] from in house phone Date of Service:  2020 NAME:  Edwin Hale :  1940 MRN:  [id number] Admission Summary:  
Edwin Hale is a 78 y.o. male past medical history significant for Parkinson, dementia, CAD, CKD stage III presented emergency room with respiratory distress and hypoxia. Patient Luma vallecillo as per report he had an episode today of choking during dinner afterward he became hypoxic with O2 sats in the upper 80s and found to have a \" low-grade fever\" of 99.9. Per daughter report he had a similar episode of choking last week again when he was eating by his respiratory symptoms were not as severe and he recovered quickly. Interval history / Subjective: Follow-up respiratory failure. Patient seen and examined earlier today. Daughter at bedside. Looks much better today. Weaning off oxygen. On 2 liters today. Pulmonology following Talked to daughter in detail. No complaints Assessment & Plan:  
 
Acute hypoxic respiratory failure: improving Aspiration pneumonia: 
Volume overload: 
-Suspected respiratory failure due to aspiration pneumonia 
-Procalcitonin elevated at 12, repeat improved to 1.27 
-on zosyn  
-S/p Lasix IV x1,  
-Albuterol nebulizer three times changed to prn  
-Guaifenisen twice daily 
-Echocardiogram with EF 07-20%, mild diastolic dysfunction On 2 liters of oxygen Pulmonology following Fever: resolved  
-CT scan w/o contrast ordered. Unable to do CTA due to possible dye allergy  
-Lower extremity dopplers negative for DVT 
-D-dimer elevated 4.97 On IV zosyn Acute encephalopathy: improved - Encouraged family to talk with patient during the day for improved sleep at night Parkinson: at baseline as per family 
-continue with home medications 
  
Elevated serum creatinine  
-Suspected prerenal 
-lasix and losartan held due to elevated serum creatinine BMP in AM 
  
Anemia, macrocytic: Stable 
  
Hypertension: continue amlodipine,  losartan (on hold)  
-has fluctuating blood pressures Left hip pain:  
-order x-ray, no acute abnormality Follow up PT/OT. Suspect need SNF on discharge Patient with baseline low functioning level. He is at risk for decline 
  
Code status: FULL 
DVT prophylaxis: SCDs Care Plan discussed with: Patient/Family Anticipated Disposition: SNF Anticipated Discharge: >48 hours Hospital Problems  Date Reviewed: 9/14/2020 Codes Class Noted POA Pneumonia ICD-10-CM: J18.9 ICD-9-CM: 670  12/3/2020 Unknown Review of Systems:  
Negative unless stated above Vital Signs:  
 Last 24hrs VS reviewed since prior progress note. Most recent are: 
Visit Vitals BP (!) 149/87 (BP 1 Location: Right arm, BP Patient Position: At rest) Pulse 98 Temp 98.9 °F (37.2 °C) Resp 16 Ht 5' 10\" (1.778 m) Wt 93.9 kg (207 lb) SpO2 91% BMI 29.70 kg/m² No intake or output data in the 24 hours ending 12/12/20 1617 Physical Examination:  
 
I had a face to face encounter with this patient and independently examined them on 12/12/2020 as outlined below: 
 
     
Constitutional:  No acute distress, lsitting on chair ENT:  Oral mucosa moist, oropharynx benign. Resp:  no wheezing/rhonchi/rales. No accessory muscle use CV:  Regular rhythm, normal rate, no murmurs, gallops, rubs GI:  Soft, non distended, non tender. normoactive bowel sounds, no hepatosplenomegaly Musculoskeletal:  No edema, warm, 2+ pulses throughout Neurologic:  Moves all extremities. Skin:  Good turgor, no rashes or ulcers Data Review:  
 Review and/or order of clinical lab test 
 Review and/or order of tests in the radiology section of [facility] Review and/or order of tests in the medicine section of [facility] Labs:  
 
Recent Labs 12/12/20 
0505 12/10/20 
7256 WBC 9.4 9.8 HGB 9.8* 9.5* HCT 30.3* 28.9*  
 292 Recent Labs 12/12/20 
0505 12/10/20 
6917  137  
K 4.0 4.0  
 101 CO2 28 28 BUN 28* 29* CREA 1.08 1.34* GLU 98 107* CA 8.7 8.6 MG  --  2.3 PHOS  --  3.8 No results for input(s): ALT, AP, TBIL, TBILI, TP, ALB, GLOB, GGT, AML, LPSE in the last 72 hours. No lab exists for component: SGOT, GPT, AMYP, HLPSE No results for input(s): INR, PTP, APTT, INREXT, INREXT in the last 72 hours. No results for input(s): FE, TIBC, PSAT, FERR in the last 72 hours. No results found for: FOL, RBCF No results for input(s): PH, PCO2, PO2 in the last 72 hours. No results for input(s): CPK, CKNDX, TROIQ in the last 72 hours. No lab exists for component: CPKMB Lab Results Component Value Date/Time Cholesterol, total 119 07/08/2020 10:40 AM  
 HDL Cholesterol 51 07/08/2020 10:40 AM  
 LDL, calculated 47 07/08/2020 10:40 AM  
 Triglyceride 103 07/08/2020 10:40 AM  
 CHOL/HDL Ratio 2 07/08/2020 10:40 AM  
 
No results found for: [facility] Lab Results Component Value Date/Time Color brown (A) 07/08/2020 10:41 AM  
 Appearance Clear 01/15/2020 03:22 PM  
 Specific gravity 1.015 07/08/2020 10:41 AM  
 Specific gravity 1.014 09/04/2019 02:45 PM  
 pH (UA) 5 07/08/2020 10:41 AM  
 Protein 2+ (A) 07/08/2020 10:41 AM  
 Glucose NEGATIVE  09/04/2019 02:45 PM  
 Ketone Negative 07/08/2020 10:41 AM  
 Bilirubin Negative 07/08/2020 10:41 AM  
 Urobilinogen Negative 07/08/2020 10:41 AM  
 Nitrites Negative 07/08/2020 10:41 AM  
 Leukocyte Esterase Negative 07/08/2020 10:41 AM  
 Epithelial cells FEW 09/04/2019 02:45 PM  
 Bacteria Few 01/15/2020 03:22 PM  
 WBC 0 07/08/2020 10:41 AM  
 RBC 2-5 (A) 07/08/2020 10:41 AM  
 
 
 
Medications Reviewed: Current Facility-Administered Medications Medication Dose Route Frequency  polyvinyl alcohol-povidone (NATURAL TEARS) 0.5-0.6 % ophthalmic solution 1 Drop  1 Drop Both Eyes QHS  acetylcysteine (MUCOMYST) 200 mg/mL (20 %) solution 200 mg  200 mg Nebulization TID RT  
 balsam peru-castor oiL (VENELEX) ointment   Topical TID  enoxaparin (LOVENOX) injection 40 mg  40 mg SubCUTAneous Q24H  piperacillin-tazobactam (ZOSYN) 3.375 g in 0.9% sodium chloride (MBP/ADV) 100 mL MBP  3.375 g IntraVENous Q8H  
 oxyCODONE IR (ROXICODONE) tablet 2.5 mg  2.5 mg Oral Q6H PRN  
 albuterol-ipratropium (DUO-NEB) 2.5 MG-0.5 MG/3 ML  3 mL Nebulization TID RT  
 amLODIPine (NORVASC) tablet 10 mg  10 mg Oral DAILY  losartan (COZAAR) tablet 50 mg  50 mg Oral DAILY  [Held by provider] furosemide (LASIX) tablet 20 mg  20 mg Oral DAILY  guaiFENesin ER (MUCINEX) tablet 600 mg  600 mg Oral Q12H  carbidopa-levodopa (SINEMET)  mg per tablet 2 Tab  2 Tab Oral QID  albuterol (PROVENTIL VENTOLIN) nebulizer solution 2.5 mg  2.5 mg Nebulization Q6H PRN  
 sodium chloride (NS) flush 5-40 mL  5-40 mL IntraVENous Q8H  
 sodium chloride (NS) flush 5-40 mL  5-40 mL IntraVENous PRN  
 acetaminophen (TYLENOL) tablet 650 mg  650 mg Oral Q6H PRN Or  
 acetaminophen (TYLENOL) suppository 650 mg  650 mg Rectal Q6H PRN  polyethylene glycol (MIRALAX) packet 17 g  17 g Oral DAILY PRN  promethazine (PHENERGAN) tablet 12.5 mg  12.5 mg Oral Q6H PRN Or  
 ondansetron (ZOFRAN) injection 4 mg  4 mg IntraVENous Q6H PRN  
 aspirin delayed-release tablet 81 mg  81 mg Oral DAILY  atorvastatin (LIPITOR) tablet 40 mg  40 mg Oral QHS  entacapone (COMTAN) tablet 200 mg  200 mg Oral TID  pantoprazole (PROTONIX) tablet 20 mg  20 mg Oral ACB  PARoxetine (PAXIL) tablet 10 mg  10 mg Oral DAILY  gabapentin (NEURONTIN) capsule 200 mg  200 mg Oral BID  rOPINIRole (REQUIP) tablet 1 mg  1 mg Oral BID  
 
 ______________________________________________________________________ EXPECTED LENGTH OF STAY: 3d 2h 
ACTUAL LENGTH OF STAY:          9 Sunitha Ventura MD

## 2020-12-13 ENCOUNTER — APPOINTMENT (OUTPATIENT)
Dept: GENERAL RADIOLOGY | Age: 80
DRG: 871 | End: 2020-12-13
Attending: INTERNAL MEDICINE
Payer: MEDICARE

## 2020-12-13 LAB
ANION GAP SERPL CALC-SCNC: 3 MMOL/L (ref 5–15)
ATRIAL RATE: 75 BPM
BASOPHILS # BLD: 0 K/UL (ref 0–0.1)
BASOPHILS NFR BLD: 0 % (ref 0–1)
BUN SERPL-MCNC: 27 MG/DL (ref 6–20)
BUN/CREAT SERPL: 25 (ref 12–20)
CALCIUM SERPL-MCNC: 9 MG/DL (ref 8.5–10.1)
CALCULATED P AXIS, ECG09: 4 DEGREES
CALCULATED R AXIS, ECG10: -40 DEGREES
CALCULATED T AXIS, ECG11: -10 DEGREES
CHLORIDE SERPL-SCNC: 107 MMOL/L (ref 97–108)
CO2 SERPL-SCNC: 29 MMOL/L (ref 21–32)
CREAT SERPL-MCNC: 1.06 MG/DL (ref 0.7–1.3)
DIAGNOSIS, 93000: NORMAL
DIFFERENTIAL METHOD BLD: ABNORMAL
EOSINOPHIL # BLD: 0.2 K/UL (ref 0–0.4)
EOSINOPHIL NFR BLD: 2 % (ref 0–7)
ERYTHROCYTE [DISTWIDTH] IN BLOOD BY AUTOMATED COUNT: 12.1 % (ref 11.5–14.5)
GLUCOSE SERPL-MCNC: 111 MG/DL (ref 65–100)
HCT VFR BLD AUTO: 31.1 % (ref 36.6–50.3)
HGB BLD-MCNC: 10 G/DL (ref 12.1–17)
IMM GRANULOCYTES # BLD AUTO: 0.1 K/UL (ref 0–0.04)
IMM GRANULOCYTES NFR BLD AUTO: 1 % (ref 0–0.5)
LYMPHOCYTES # BLD: 1.3 K/UL (ref 0.8–3.5)
LYMPHOCYTES NFR BLD: 10 % (ref 12–49)
MCH RBC QN AUTO: 33 PG (ref 26–34)
MCHC RBC AUTO-ENTMCNC: 32.2 G/DL (ref 30–36.5)
MCV RBC AUTO: 102.6 FL (ref 80–99)
MONOCYTES # BLD: 0.8 K/UL (ref 0–1)
MONOCYTES NFR BLD: 6 % (ref 5–13)
NEUTS SEG # BLD: 10.7 K/UL (ref 1.8–8)
NEUTS SEG NFR BLD: 81 % (ref 32–75)
NRBC # BLD: 0 K/UL (ref 0–0.01)
NRBC BLD-RTO: 0 PER 100 WBC
P-R INTERVAL, ECG05: 172 MS
PLATELET # BLD AUTO: 388 K/UL (ref 150–400)
PMV BLD AUTO: 9.5 FL (ref 8.9–12.9)
POTASSIUM SERPL-SCNC: 4.4 MMOL/L (ref 3.5–5.1)
PROCALCITONIN SERPL-MCNC: 0.25 NG/ML
Q-T INTERVAL, ECG07: 404 MS
QRS DURATION, ECG06: 82 MS
QTC CALCULATION (BEZET), ECG08: 451 MS
RBC # BLD AUTO: 3.03 M/UL (ref 4.1–5.7)
SODIUM SERPL-SCNC: 139 MMOL/L (ref 136–145)
TROPONIN I SERPL-MCNC: <0.05 NG/ML
VENTRICULAR RATE, ECG03: 75 BPM
WBC # BLD AUTO: 13.2 K/UL (ref 4.1–11.1)

## 2020-12-13 PROCEDURE — 85025 COMPLETE CBC W/AUTO DIFF WBC: CPT

## 2020-12-13 PROCEDURE — 74011250636 HC RX REV CODE- 250/636: Performed by: PHYSICIAN ASSISTANT

## 2020-12-13 PROCEDURE — 74011250637 HC RX REV CODE- 250/637: Performed by: INTERNAL MEDICINE

## 2020-12-13 PROCEDURE — 74011000258 HC RX REV CODE- 258: Performed by: INTERNAL MEDICINE

## 2020-12-13 PROCEDURE — 84484 ASSAY OF TROPONIN QUANT: CPT

## 2020-12-13 PROCEDURE — 77030029684 HC NEB SM VOL KT MONA -A

## 2020-12-13 PROCEDURE — 74011000250 HC RX REV CODE- 250: Performed by: INTERNAL MEDICINE

## 2020-12-13 PROCEDURE — 36415 COLL VENOUS BLD VENIPUNCTURE: CPT

## 2020-12-13 PROCEDURE — 94640 AIRWAY INHALATION TREATMENT: CPT

## 2020-12-13 PROCEDURE — 80048 BASIC METABOLIC PNL TOTAL CA: CPT

## 2020-12-13 PROCEDURE — 65660000001 HC RM ICU INTERMED STEPDOWN

## 2020-12-13 PROCEDURE — 77010033678 HC OXYGEN DAILY

## 2020-12-13 PROCEDURE — 93005 ELECTROCARDIOGRAM TRACING: CPT

## 2020-12-13 PROCEDURE — 84145 PROCALCITONIN (PCT): CPT

## 2020-12-13 PROCEDURE — 71045 X-RAY EXAM CHEST 1 VIEW: CPT

## 2020-12-13 PROCEDURE — 94760 N-INVAS EAR/PLS OXIMETRY 1: CPT

## 2020-12-13 PROCEDURE — 74011250636 HC RX REV CODE- 250/636: Performed by: INTERNAL MEDICINE

## 2020-12-13 RX ORDER — VANCOMYCIN 2 GRAM/500 ML IN 0.9 % SODIUM CHLORIDE INTRAVENOUS
2000 ONCE
Status: COMPLETED | OUTPATIENT
Start: 2020-12-13 | End: 2020-12-13

## 2020-12-13 RX ORDER — VANCOMYCIN/0.9 % SOD CHLORIDE 1.5G/250ML
1500 PLASTIC BAG, INJECTION (ML) INTRAVENOUS
Status: DISCONTINUED | OUTPATIENT
Start: 2020-12-14 | End: 2020-12-14

## 2020-12-13 RX ADMIN — IPRATROPIUM BROMIDE AND ALBUTEROL SULFATE 3 ML: .5; 3 SOLUTION RESPIRATORY (INHALATION) at 14:01

## 2020-12-13 RX ADMIN — Medication: at 23:07

## 2020-12-13 RX ADMIN — ACETYLCYSTEINE 200 MG: 200 SOLUTION ORAL; RESPIRATORY (INHALATION) at 07:36

## 2020-12-13 RX ADMIN — GUAIFENESIN 600 MG: 600 TABLET, EXTENDED RELEASE ORAL at 10:30

## 2020-12-13 RX ADMIN — GABAPENTIN 200 MG: 100 CAPSULE ORAL at 10:31

## 2020-12-13 RX ADMIN — PIPERACILLIN AND TAZOBACTAM 3.38 G: 3; .375 INJECTION, POWDER, LYOPHILIZED, FOR SOLUTION INTRAVENOUS at 21:04

## 2020-12-13 RX ADMIN — PANTOPRAZOLE SODIUM 20 MG: 20 TABLET, DELAYED RELEASE ORAL at 06:21

## 2020-12-13 RX ADMIN — CARBIDOPA AND LEVODOPA 2 TABLET: 25; 100 TABLET ORAL at 17:15

## 2020-12-13 RX ADMIN — Medication 81 MG: at 10:30

## 2020-12-13 RX ADMIN — ENTACAPONE 200 MG: 200 TABLET, FILM COATED ORAL at 10:30

## 2020-12-13 RX ADMIN — ACETYLCYSTEINE 200 MG: 200 SOLUTION ORAL; RESPIRATORY (INHALATION) at 20:24

## 2020-12-13 RX ADMIN — AMLODIPINE BESYLATE 10 MG: 5 TABLET ORAL at 10:38

## 2020-12-13 RX ADMIN — PAROXETINE HYDROCHLORIDE 10 MG: 20 TABLET, FILM COATED ORAL at 10:30

## 2020-12-13 RX ADMIN — IPRATROPIUM BROMIDE AND ALBUTEROL SULFATE 3 ML: .5; 3 SOLUTION RESPIRATORY (INHALATION) at 07:36

## 2020-12-13 RX ADMIN — ROPINIROLE HYDROCHLORIDE 1 MG: 1 TABLET, FILM COATED ORAL at 17:15

## 2020-12-13 RX ADMIN — ACETYLCYSTEINE 200 MG: 200 SOLUTION ORAL; RESPIRATORY (INHALATION) at 14:01

## 2020-12-13 RX ADMIN — ENTACAPONE 200 MG: 200 TABLET, FILM COATED ORAL at 15:39

## 2020-12-13 RX ADMIN — CARBIDOPA AND LEVODOPA 2 TABLET: 25; 100 TABLET ORAL at 10:45

## 2020-12-13 RX ADMIN — Medication 10 ML: at 15:38

## 2020-12-13 RX ADMIN — PIPERACILLIN AND TAZOBACTAM 3.38 G: 3; .375 INJECTION, POWDER, LYOPHILIZED, FOR SOLUTION INTRAVENOUS at 03:19

## 2020-12-13 RX ADMIN — PIPERACILLIN AND TAZOBACTAM 3.38 G: 3; .375 INJECTION, POWDER, LYOPHILIZED, FOR SOLUTION INTRAVENOUS at 14:08

## 2020-12-13 RX ADMIN — CARBIDOPA AND LEVODOPA 2 TABLET: 25; 100 TABLET ORAL at 13:23

## 2020-12-13 RX ADMIN — ACETAMINOPHEN 650 MG: 325 TABLET ORAL at 17:15

## 2020-12-13 RX ADMIN — ACETAMINOPHEN 650 MG: 325 TABLET ORAL at 03:19

## 2020-12-13 RX ADMIN — Medication 1 DROP: at 23:08

## 2020-12-13 RX ADMIN — Medication: at 11:06

## 2020-12-13 RX ADMIN — IPRATROPIUM BROMIDE AND ALBUTEROL SULFATE 3 ML: .5; 3 SOLUTION RESPIRATORY (INHALATION) at 20:24

## 2020-12-13 RX ADMIN — ROPINIROLE HYDROCHLORIDE 1 MG: 1 TABLET, FILM COATED ORAL at 10:29

## 2020-12-13 RX ADMIN — GABAPENTIN 200 MG: 100 CAPSULE ORAL at 17:15

## 2020-12-13 RX ADMIN — VANCOMYCIN HYDROCHLORIDE 2000 MG: 10 INJECTION, POWDER, LYOPHILIZED, FOR SOLUTION INTRAVENOUS at 11:02

## 2020-12-13 RX ADMIN — LOSARTAN POTASSIUM 50 MG: 50 TABLET, FILM COATED ORAL at 10:30

## 2020-12-13 RX ADMIN — ENOXAPARIN SODIUM 40 MG: 40 INJECTION SUBCUTANEOUS at 17:30

## 2020-12-13 NOTE — PROGRESS NOTES
PULMONARY MEDICINE Progress Note Name: Ghassan Jackson : 1940 MRN: [id number] Date: 2020 Subjective:  
 
 Noted RRT overnight for chest pains; overnight NP believes secondary to pulmonary issues. Given lasix. WBC up. Query mucus plug? Aspiration event? Seems more confused today; he is getting his days and nights mixed up. Daughter at bedside Consult Note: 12/10 Requesting Physician: 
Reason for consult: 
 
Medical records and data reviewed. Patient is a 78 y.o. male who presented to the hospital on 2020 with a choking episode. Presentation to the emergency room was found to have hypoxia as well as evidence of right lower lobe infiltrate and was admitted to the hospital for further management. SARS-CoV-2 test was negative. Despite several days of antibiotics, he continues to be hypoxic with symptoms of cough. CT scan of the chest that was done yesterday shows bilateral pulmonary infiltrates right more than left. Cultures have been negative so far. Antibiotics were changed to Zosyn. Fever curve as well as WBC count appear to be better over the past 48 hours. Patient appears to be delirious and ABG. was done did not show evidence of respiratory acidosis. He does have underlying dementia. Left lower extremity Doppler was negative for DVT. Review of Systems: A comprehensive 12 system review of systems was not obtained from the patient Assessment:  
 
Acute hypoxic pulmonary insufficiency Bilateral pneumonia after aspiration episode, improving fever curve and leukocytosis Delirium with acute encephalopathy, no evidence of respiratory acidosis History of organic cardiac disease, appears compensated Other medical problems per chart Recommendations:  
 
Wean oxygen as tolerated with saturations above 90% Zosyn; add vanc today 
procal today. Continue bronchodilators On Lovenox Mucomyst 
 Chest PT for mobilization of secretions, discussed with daughter and she can help out with this. O2 titration above 90% SLP/PT/OT Active Problem List:  
 
Problem List  Date Reviewed: 9/14/2020 Codes Class Acute blood loss anemia ICD-10-CM: D62 
ICD-9-CM: 285.1 Parkinson's disease ([facility] ICD-10-CM: G20 
ICD-9-CM: 332.0 Long-term current use of high risk medication other than anticoagulant ICD-10-CM: Z79.899 ICD-9-CM: V58.69 Gastroesophageal reflux disease without esophagitis ICD-10-CM: K21.9 ICD-9-CM: 530.81 Pneumonia ICD-10-CM: J18.9 ICD-9-CM: 964 Benign prostatic hyperplasia with nocturia ICD-10-CM: N40.1, R35.1 ICD-9-CM: 600.01, 788.43 History of stroke ICD-10-CM: Z86.73 
ICD-9-CM: V12.54 Dementia due to Parkinson's disease without behavioral disturbance ([facility] ICD-10-CM: G20, F02.80 ICD-9-CM: 332.0, 294.10 Parkinsonism ([facility] ICD-10-CM: G20 
ICD-9-CM: 332.0 Bilateral carotid artery stenosis ICD-10-CM: I65.23 ICD-9-CM: 433.10, 433.30 Mild cognitive impairment ICD-10-CM: G31.84 ICD-9-CM: 331.83 Cerebrovascular accident (CVA) ([facility] ICD-10-CM: I63.9 ICD-9-CM: 434.91 Rectal bleeding ICD-10-CM: K62.5 ICD-9-CM: 569.3 PE (physical exam), annual ICD-10-CM: Z00.00 ICD-9-CM: V70.0 Overview Addendum 6/12/2019  8:13 AM by Agustina Bentoner  
  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 
ICD-9-CM: 786.09 Hearing difficulty of both ears ICD-10-CM: H91.93 
ICD-9-CM: 389.9 Sleep apnea in adult ICD-10-CM: G47.30 ICD-9-CM: 327.23 Overview Signed 7/12/2017  3:47 PM by Acacia Cancer No longer on CPAP @13 Tendinitis of left rotator cuff ICD-10-CM: M75.82 ICD-9-CM: 726.10 Coronary artery disease ICD-10-CM: I25.10 ICD-9-CM: 414.00 Overview Signed 7/12/2017  3:51 PM by Merritt Stanley By EBT 
  
  
   
 DJD (degenerative joint disease) ICD-10-CM: M19.90 ICD-9-CM: 715.90 Tendinitis ICD-10-CM: M77.9 ICD-9-CM: 726.90 Overview Signed 7/12/2017  3:52 PM by Merritt Stanley ACHILLES Gout ICD-10-CM: M10.9 ICD-9-CM: 274.9 Tremor ICD-10-CM: R25.1 ICD-9-CM: 781.0 Overview Signed 7/12/2017  3:53 PM by Merritt Stanley LEFT HAND Restless leg syndrome ICD-10-CM: G25.81 ICD-9-CM: 333.94 Lumbar stenosis ICD-10-CM: M48.061 
ICD-9-CM: 724.02 Overview Signed 1/29/2015  6:49 AM by Ayanna Hoff  
  S/P L4-5 microdiscectomy Essential hypertension ICD-10-CM: I10 
ICD-9-CM: 401.9 MCI (mild cognitive impairment) ICD-10-CM: G31.84 ICD-9-CM: 331.83   
   
 CKD (chronic kidney disease) stage 2, GFR 60-89 ml/min ICD-10-CM: N18.2 ICD-9-CM: 585.2 Past Medical History:  
 
 has a past medical history of Aaron esophagus, Benign nodular prostatic hyperplasia without lower urinary tract symptoms (7/12/2017), CKD (chronic kidney disease) stage 2, GFR 60-89 ml/min, Constipation, Coronary artery disease (7/12/2017), Depression, DJD (degenerative joint disease) (7/12/2017), Falls, Gout (7/12/2017), Headache, Hearing difficulty of both ears (7/12/2017), Hyperlipidemia, Hypertension, Impaired cognition (7/12/2017), MCI (mild cognitive impairment), Restless leg syndrome (7/12/2017), Sleep apnea in adult (7/12/2017), Snoring, Stroke ([address] Tendinitis (7/12/2017), Tendinitis of left rotator cuff (07/12/2017), Tremor (7/12/2017), and Unspecified sleep apnea.  
 
Past Surgical History:  
 
 has a past surgical history that includes pr egd transoral biopsy single/multiple (8/16/2011); pr egd transoral biopsy single/multiple (10/1/2013); hx orthopaedic (1/27/15); colonoscopy,flex,w/control, bleeding (1/16/2019); and colonoscopy (N/A, 1/16/2019). Home Medications:  
 
Prior to Admission medications Medication Sig Start Date End Date Taking? Authorizing Provider  
rOPINIRole (REQUIP) 1 mg tablet Take 1 mg by mouth two (2) times a day. Yes Provider, Historical  
guaiFENesin (Tussin) 100 mg/5 mL liquid Take 400 mg by mouth daily as needed for Cough. Yes Provider, Historical  
valsartan (DIOVAN) 160 mg tablet Take 1 Tab by mouth Every morning for 90 days. Indications: high blood pressure 10/11/20 1/9/21  Konrad Torres MD  
amLODIPine (NORVASC) 5 mg tablet 0ne tab daily qhs  Indications: high blood pressure 10/11/20   Konrad Torres MD  
gabapentin (NEURONTIN) 100 mg capsule T2C BID 10/9/20   Konrad Torres MD  
PARoxetine (PAXIL) 10 mg tablet TAKE 1 TABLET BY MOUTH DAILY 10/9/20   Konrad Torres MD  
entacapone (COMTAN) 200 mg tablet TAKE 1 TABLET THREE TIMES A DAY 10/9/20   Kvng Massey MD  
artificial tears, dextran 70-hypromellose, (GenTeal Tears Mild) 0.1-0.3 % ophthalmic solution Administer 1 Drop to both eyes nightly. Provider, Historical  
carboxymethylcellulose sodium (THERATEARS OP) Apply  to eye. 1 -2 drops both eyes bid    Provider, Historical  
esomeprazole (NEXIUM) 20 mg capsule TAKE 1 CAPSULE DAILY 8/5/20   Konrad Torres MD  
carbidopa-levodopa (Sinemet)  mg per tablet 2 p.o. 4 times daily  Indications: Parkinson's disease 4/28/20   Konrad Torrse MD  
atorvastatin (LIPITOR) 40 mg tablet TAKE 1 TABLET DAILY 3/16/20   Konrad Torres MD  
acetaminophen (TYLENOL EXTRA STRENGTH) 500 mg tablet Take 1,000 mg by mouth three (3) times daily as needed for Pain. Rapid release    Provider, Historical  
senna-docusate (SENNA-S) 8.6-50 mg per tablet Take 1 Tab by mouth every other day. Provider, Historical  
aspirin delayed-release 81 mg tablet Take 81 mg by mouth daily.     Provider, Historical  
 vitamin e (E GEMS) 1,000 unit capsule Take 2,000 Units by mouth daily. Provider, Historical  
MULTIVITAMIN PO Take 1 Tab by mouth daily. Provider, Historical  
 
 
Allergies/Social/Family History: No Known Allergies Social History Tobacco Use  Smoking status: Former Smoker  Smokeless tobacco: Never Used Substance Use Topics  Alcohol use: No  
  
Family History Problem Relation Age of Onset  Heart Disease Mother  Heart Disease Father Objective:  
Vital Signs: 
Visit Vitals /70 Pulse 74 Temp 98.1 °F (36.7 °C) Resp 18 Ht 5' 10\" (1.778 m) Wt 93.9 kg (207 lb) SpO2 96% BMI 29.70 kg/m² O2 Flow Rate (L/min): 4 l/min O2 Device: Nasal cannula Temp (24hrs), Av.9 °F (37.2 °C), Min:97.9 °F (36.6 °C), Max:100 °F (37.8 °C) Intake/Output:  
No intake or output data in the 24 hours ending 20 09 Physical Exam:  
General:  Drowsy, restless, no distress, appears stated age. Head:  Normocephalic, without obvious abnormality, atraumatic. Eyes:  Conjunctivae/corneas clear. PERRL Neck: Supple, symmetrical, no adenopathy, no carotid bruit and no JVD. Lungs:   Decreased BS Chest wall:  No tenderness or deformity. Heart:  Regular rate and rhythm, S1-S2 normal, no murmur, no click, rub or gallop. Abdomen:   Soft, non-tender. Bowel sounds present. No masses,  No organomegaly. Extremities: Atraumatic, no cyanosis or edema. Pulses: Palpable Skin: No rashes or lesions Neurologic: Grossly nonfocal  
 
  
 LABS AND  DATA: Personally reviewed Recent Labs 208 20 
0505 WBC 13.2* 9.4 HGB 10.0* 9.8* HCT 31.1* 30.3*  359 Recent Labs 20 
0148 20 
0505  139  
K 4.4 4.0  
 105 CO2 29 28 BUN 27* 28* CREA 1.06 1.08  
* 98  
CA 9.0 8.7 No results for input(s): AP, TBIL, TP, ALB, GLOB, AML, LPSE in the last 72 hours. No lab exists for component: SGOT, GPT, AMYP No results for input(s): INR, PTP, APTT, INREXT, INREXT in the last 72 hours. Recent Labs 12/10/20 
1037 PHI 7.54* PCO2I 33.3*  
PO2I 58* Recent Labs 12/13/20 
0148 TROIQ <0.05  
 
 
MEDS: Reviewed Chest Imaging: personally reviewed and report checked Tele- reviewed Medical decision making:  
I have reviewed the flowsheet and previous day's notes Patient has acute or chronic illness that poses a threat to life or bodily function Review and order of Clinical lab tests Review and Order of Radiology tests Independent visualization of Image High Risk Drug therapy requiring intensive monitoring for toxicity: eg steroids, pressors, antibiotics Thank you for allowing me to participate in this patient's care. Elia Cervantes PA-C [facility]

## 2020-12-13 NOTE — PROGRESS NOTES
[facility] Adult  Hospitalist Group Hospitalist Progress Note Ly Griggs MD 
Answering service: [phone] OR 36 from in house phone Date of Service:  2020 NAME:  Brittni Prater :  1940 MRN:  [id number] Admission Summary:  
Brittni Prater is a 78 y.o. male past medical history significant for Parkinson, dementia, CAD, CKD stage III presented emergency room with respiratory distress and hypoxia. Patient Bruno vallecillo as per report he had an episode today of choking during dinner afterward he became hypoxic with O2 sats in the upper 80s and found to have a \" low-grade fever\" of 99.9. Per daughter report he had a similar episode of choking last week again when he was eating by his respiratory symptoms were not as severe and he recovered quickly. Interval history / Subjective: Follow-up respiratory failure. Patient seen and examined earlier today. Daughter at bedside. Overnight a rapid response was called due to chest pain. Troponin negative. Aspiration? Elevated white count. Vancomycin was started by pulmonology. Transferred to [facility]. Talked to family in detail. Assessment & Plan:  
 
Acute hypoxic respiratory failure: worsening Aspiration pneumonia: 
Volume overload: 
-Suspected respiratory failure due to aspiration pneumonia 
-Procalcitonin elevated at 12, repeat improved to 1.27 
-on zosyn . Also on iv vancomycin  
-S/p Lasix IV x1,  
-Albuterol nebulizer three times changed to prn  
-Guaifenisen twice daily 
-Echocardiogram with EF 49-27%, mild diastolic dysfunction On 4 liters of oxygen Pulmonology following Fever: resolved Leucocytosis. Worsening  
-CT scan w/o contrast ordered. Unable to do CTA due to possible dye allergy  
-Lower extremity dopplers negative for DVT 
-D-dimer elevated 4.97 On IV zosyn and IV vancomycin Acute encephalopathy: improved - Encouraged family to talk with patient during the day for improved sleep at night Parkinson: at baseline as per family 
-continue with home medications 
  
Elevated serum creatinine , resolved  
-Suspected prerenal 
-lasix and losartan held due to elevated serum creatinine BMP in AM 
  
Anemia, macrocytic: Stable 
  
Hypertension: continue amlodipine,  losartan (on hold)  
-has fluctuating blood pressures Left hip pain:  
-order x-ray, no acute abnormality Follow up PT/OT. Suspect need SNF on discharge Patient with baseline low functioning level. He is at risk for decline 
  
Code status: FULL 
DVT prophylaxis: SCDs Care Plan discussed with: Patient/Family Anticipated Disposition: SNF Anticipated Discharge: >48 hours Hospital Problems  Date Reviewed: 9/14/2020 Codes Class Noted POA Pneumonia ICD-10-CM: J18.9 ICD-9-CM: 133  12/3/2020 Unknown Review of Systems:  
Negative unless stated above Vital Signs:  
 Last 24hrs VS reviewed since prior progress note. Most recent are: 
Visit Vitals /74 Pulse 77 Temp 97.8 °F (36.6 °C) Resp 24 Ht 5' 10\" (1.778 m) Wt 93.9 kg (207 lb) SpO2 92% BMI 29.70 kg/m² No intake or output data in the 24 hours ending 12/13/20 1317 Physical Examination:  
 
I had a face to face encounter with this patient and independently examined them on 12/13/2020 as outlined below: 
 
     
Constitutional:  looks uncomfortable today. Lying on bed ENT:  Oral mucosa moist, oropharynx benign. Resp:  no wheezing/rhonchi/rales. No accessory muscle use CV:  Regular rhythm, normal rate, no murmurs, gallops, rubs GI:  Soft, non distended, non tender. normoactive bowel sounds, no hepatosplenomegaly Musculoskeletal:  No edema, warm, 2+ pulses throughout Neurologic:  Moves all extremities. Skin:  Good turgor, no rashes or ulcers Data Review: Review and/or order of clinical lab test 
Review and/or order of tests in the radiology section of CPT Review and/or order of tests in the medicine section of CPT Labs:  
 
Recent Labs 12/13/20 
0148 12/12/20 
0505 WBC 13.2* 9.4 HGB 10.0* 9.8* HCT 31.1* 30.3*  359 Recent Labs 12/13/20 
0148 12/12/20 
0505  139  
K 4.4 4.0  
 105 CO2 29 28 BUN 27* 28* CREA 1.06 1.08  
* 98  
CA 9.0 8.7 No results for input(s): ALT, AP, TBIL, TBILI, TP, ALB, GLOB, GGT, AML, LPSE in the last 72 hours. No lab exists for component: SGOT, GPT, AMYP, HLPSE No results for input(s): INR, PTP, APTT, INREXT, INREXT in the last 72 hours. No results for input(s): FE, TIBC, PSAT, FERR in the last 72 hours. No results found for: FOL, RBCF No results for input(s): PH, PCO2, PO2 in the last 72 hours. Recent Labs 12/13/20 0148 TROIQ <0.05 Lab Results Component Value Date/Time Cholesterol, total 119 07/08/2020 10:40 AM  
 HDL Cholesterol 51 07/08/2020 10:40 AM  
 LDL, calculated 47 07/08/2020 10:40 AM  
 Triglyceride 103 07/08/2020 10:40 AM  
 CHOL/HDL Ratio 2 07/08/2020 10:40 AM  
 
No results found for: [facility] Lab Results Component Value Date/Time Color brown (A) 07/08/2020 10:41 AM  
 Appearance Clear 01/15/2020 03:22 PM  
 Specific gravity 1.015 07/08/2020 10:41 AM  
 Specific gravity 1.014 09/04/2019 02:45 PM  
 pH (UA) 5 07/08/2020 10:41 AM  
 Protein 2+ (A) 07/08/2020 10:41 AM  
 Glucose NEGATIVE  09/04/2019 02:45 PM  
 Ketone Negative 07/08/2020 10:41 AM  
 Bilirubin Negative 07/08/2020 10:41 AM  
 Urobilinogen Negative 07/08/2020 10:41 AM  
 Nitrites Negative 07/08/2020 10:41 AM  
 Leukocyte Esterase Negative 07/08/2020 10:41 AM  
 Epithelial cells FEW 09/04/2019 02:45 PM  
 Bacteria Few 01/15/2020 03:22 PM  
 WBC 0 07/08/2020 10:41 AM  
 RBC 2-5 (A) 07/08/2020 10:41 AM  
 
 
 
Medications Reviewed: Current Facility-Administered Medications Medication Dose Route Frequency  Vancomycin- Pharmacy to Dose   Other Rx Dosing/Monitoring  [START ON 12/14/2020] vancomycin (VANCOCIN) 1500 mg in  ml infusion  1,500 mg IntraVENous Q18H  polyvinyl alcohol-povidone (NATURAL TEARS) 0.5-0.6 % ophthalmic solution 1 Drop  1 Drop Both Eyes QHS  acetylcysteine (MUCOMYST) 200 mg/mL (20 %) solution 200 mg  200 mg Nebulization TID RT  
 balsam peru-castor oiL (VENELEX) ointment   Topical TID  enoxaparin (LOVENOX) injection 40 mg  40 mg SubCUTAneous Q24H  piperacillin-tazobactam (ZOSYN) 3.375 g in 0.9% sodium chloride (MBP/ADV) 100 mL MBP  3.375 g IntraVENous Q8H  
 oxyCODONE IR (ROXICODONE) tablet 2.5 mg  2.5 mg Oral Q6H PRN  
 albuterol-ipratropium (DUO-NEB) 2.5 MG-0.5 MG/3 ML  3 mL Nebulization TID RT  
 amLODIPine (NORVASC) tablet 10 mg  10 mg Oral DAILY  losartan (COZAAR) tablet 50 mg  50 mg Oral DAILY  [Held by provider] furosemide (LASIX) tablet 20 mg  20 mg Oral DAILY  guaiFENesin ER (MUCINEX) tablet 600 mg  600 mg Oral Q12H  carbidopa-levodopa (SINEMET)  mg per tablet 2 Tab  2 Tab Oral QID  albuterol (PROVENTIL VENTOLIN) nebulizer solution 2.5 mg  2.5 mg Nebulization Q6H PRN  
 sodium chloride (NS) flush 5-40 mL  5-40 mL IntraVENous Q8H  
 sodium chloride (NS) flush 5-40 mL  5-40 mL IntraVENous PRN  
 acetaminophen (TYLENOL) tablet 650 mg  650 mg Oral Q6H PRN Or  
 acetaminophen (TYLENOL) suppository 650 mg  650 mg Rectal Q6H PRN  polyethylene glycol (MIRALAX) packet 17 g  17 g Oral DAILY PRN  promethazine (PHENERGAN) tablet 12.5 mg  12.5 mg Oral Q6H PRN Or  
 ondansetron (ZOFRAN) injection 4 mg  4 mg IntraVENous Q6H PRN  
 aspirin delayed-release tablet 81 mg  81 mg Oral DAILY  atorvastatin (LIPITOR) tablet 40 mg  40 mg Oral QHS  entacapone (COMTAN) tablet 200 mg  200 mg Oral TID  pantoprazole (PROTONIX) tablet 20 mg  20 mg Oral ACB  PARoxetine (PAXIL) tablet 10 mg  10 mg Oral DAILY  gabapentin (NEURONTIN) capsule 200 mg  200 mg Oral BID  rOPINIRole (REQUIP) tablet 1 mg  1 mg Oral BID  
 
______________________________________________________________________ EXPECTED LENGTH OF STAY: 3d 2h 
ACTUAL LENGTH OF STAY:          Moe Edward MD

## 2020-12-13 NOTE — PROGRESS NOTES
Pharmacist Note - Vancomycin Dosing Consult provided for this 78 y.o. male for indication of aspiration pneumonia. Antibiotic regimen(s): vanc + zosyn Patient on vancomycin PTA? NO Recent Labs 20 
0148 20 
0505 WBC 13.2* 9.4 CREA 1.06 1.08  
BUN 27* 28* Frequency of BMP: daily Height: 177.8 cm Weight: 93.9 kg Est CrCl: 61.9 ml/min; UO: - ml/kg/hr Temp (24hrs), Av.8 °F (37.1 °C), Min:97.8 °F (36.6 °C), Max:100 °F (37.8 °C) Cultures: 
12/3 covid- neg 
/ blood- NG (final)  blood- NG (final) 12/3 resp- neg 
 blood- NG x 4 (prelim) Goal trough = 15 - 20 mcg/mL Therapy will be initiated with a loading dose of 2000 mg IV x 1 to be followed by a maintenance dose of 1500 mg IV every 18 hours. Pharmacy to follow patient daily and order levels / make dose adjustments as appropriate.

## 2020-12-13 NOTE — PROGRESS NOTES
Rapid Response Documentation Name: Jania Somers YOB: 1940 MRN: [id number] Admission Date: 12/2/2020 10:01 PM   
 
Date of service: 12/13/2020 Active Diagnoses: 
 
Hospital Problems  Date Reviewed: 9/14/2020 Codes Class Noted POA Pneumonia ICD-10-CM: J18.9 ICD-9-CM: 467  12/3/2020 Unknown Chief Complaint: 
Rapid response called for chest pain onset early this morning per patient and described as an intermittent pressure on the left side of the chest. 
 
Baseline dementia; Subjective limited. Clinical Presentation: 
78-year-old male admitted 12/2/2020 with aspiration pneumonia and suspected fluid overload. Recent D-dimer elevated, but CTA not possible due to dye allergy. CT scan of the chest shows bilateral pulmonary infiltrates. Cultures negative so far. Finishing Zosyn. PMH includes Parkinson's and dementia Physical Exam:  
 
Constitutional:  No acute distress, cooperative, pleasant Psych:  Good insight, Not anxious nor agitated. Neurologic:  BERNARDO, EOMI. AAOx2, CN II-XII reviewed Resp:  CTA bilaterally. Resps easy and unlabored. No wheezing/RUBS/rhonchi/crackles. No accessory muscle use. Able to speak in full sentences. HEENT:  Oral mucosa moist, oropharynx benign. CV:  Regular rhythm, normal rate, no murmurs, gallops, rubs GI:  Soft, non distended, non tender. normoactive bowel sounds, no hepatosplenomegaly Musculoskeletal: SAMAYOA, no deformitySkin: 
Peripheral Vascular: No edema, warm, 2+ pulses throughout Data Reviewed: All diagnostic labs and studies have been reviewed. Assessment and Plan: 
 
Chest pain · Onset this morning? ? 
· EKG sinus rhythm with PACs, rate 75. No ectopy, unchanged from previous EKG 
· A.m. labs with troponin unremarkable. Troponin negative · Doubt cardiac. Etiol pulm / pneumonia? · Transfer to remote telemetry d/w primary RN 
d/w daughter at bedside This patient was stable at the time of my exam.  Please do not hesitate to call me again if his status does not improve or worsens. Cara Pappas, MSN, RN, NP-C 
[phone] or via [employer] 
 
12/13/2020

## 2020-12-14 LAB
ANION GAP SERPL CALC-SCNC: 5 MMOL/L (ref 5–15)
APPEARANCE UR: CLEAR
BACTERIA SPEC CULT: NORMAL
BACTERIA URNS QL MICRO: NEGATIVE /HPF
BASOPHILS # BLD: 0.1 K/UL (ref 0–0.1)
BASOPHILS NFR BLD: 1 % (ref 0–1)
BILIRUB UR QL: NEGATIVE
BUN SERPL-MCNC: 30 MG/DL (ref 6–20)
BUN/CREAT SERPL: 25 (ref 12–20)
CALCIUM SERPL-MCNC: 8.9 MG/DL (ref 8.5–10.1)
CHLORIDE SERPL-SCNC: 107 MMOL/L (ref 97–108)
CO2 SERPL-SCNC: 28 MMOL/L (ref 21–32)
COLOR UR: ABNORMAL
CREAT SERPL-MCNC: 1.18 MG/DL (ref 0.7–1.3)
DIFFERENTIAL METHOD BLD: ABNORMAL
EOSINOPHIL # BLD: 0.2 K/UL (ref 0–0.4)
EOSINOPHIL NFR BLD: 2 % (ref 0–7)
EPITH CASTS URNS QL MICRO: ABNORMAL /LPF
ERYTHROCYTE [DISTWIDTH] IN BLOOD BY AUTOMATED COUNT: 12.3 % (ref 11.5–14.5)
GLUCOSE SERPL-MCNC: 108 MG/DL (ref 65–100)
GLUCOSE UR STRIP.AUTO-MCNC: NEGATIVE MG/DL
HCT VFR BLD AUTO: 26.4 % (ref 36.6–50.3)
HGB BLD-MCNC: 8.5 G/DL (ref 12.1–17)
HGB UR QL STRIP: NEGATIVE
HYALINE CASTS URNS QL MICRO: ABNORMAL /LPF (ref 0–5)
IMM GRANULOCYTES # BLD AUTO: 0.1 K/UL (ref 0–0.04)
IMM GRANULOCYTES NFR BLD AUTO: 1 % (ref 0–0.5)
KETONES UR QL STRIP.AUTO: ABNORMAL MG/DL
LEUKOCYTE ESTERASE UR QL STRIP.AUTO: NEGATIVE
LYMPHOCYTES # BLD: 1 K/UL (ref 0.8–3.5)
LYMPHOCYTES NFR BLD: 9 % (ref 12–49)
MCH RBC QN AUTO: 33.1 PG (ref 26–34)
MCHC RBC AUTO-ENTMCNC: 32.2 G/DL (ref 30–36.5)
MCV RBC AUTO: 102.7 FL (ref 80–99)
MONOCYTES # BLD: 0.8 K/UL (ref 0–1)
MONOCYTES NFR BLD: 7 % (ref 5–13)
NEUTS SEG # BLD: 8.7 K/UL (ref 1.8–8)
NEUTS SEG NFR BLD: 80 % (ref 32–75)
NITRITE UR QL STRIP.AUTO: NEGATIVE
NRBC # BLD: 0 K/UL (ref 0–0.01)
NRBC BLD-RTO: 0 PER 100 WBC
PH UR STRIP: 5 [PH] (ref 5–8)
PLATELET # BLD AUTO: 404 K/UL (ref 150–400)
PMV BLD AUTO: 9.6 FL (ref 8.9–12.9)
POTASSIUM SERPL-SCNC: 4.3 MMOL/L (ref 3.5–5.1)
PROT UR STRIP-MCNC: ABNORMAL MG/DL
RBC # BLD AUTO: 2.57 M/UL (ref 4.1–5.7)
RBC #/AREA URNS HPF: ABNORMAL /HPF (ref 0–5)
SERVICE CMNT-IMP: NORMAL
SODIUM SERPL-SCNC: 140 MMOL/L (ref 136–145)
SP GR UR REFRACTOMETRY: 1.02 (ref 1–1.03)
UROBILINOGEN UR QL STRIP.AUTO: 0.2 EU/DL (ref 0.2–1)
WBC # BLD AUTO: 10.7 K/UL (ref 4.1–11.1)
WBC URNS QL MICRO: ABNORMAL /HPF (ref 0–4)

## 2020-12-14 PROCEDURE — 81001 URINALYSIS AUTO W/SCOPE: CPT

## 2020-12-14 PROCEDURE — 94664 DEMO&/EVAL PT USE INHALER: CPT

## 2020-12-14 PROCEDURE — 36415 COLL VENOUS BLD VENIPUNCTURE: CPT

## 2020-12-14 PROCEDURE — 51798 US URINE CAPACITY MEASURE: CPT

## 2020-12-14 PROCEDURE — 74011000250 HC RX REV CODE- 250: Performed by: INTERNAL MEDICINE

## 2020-12-14 PROCEDURE — 74011250637 HC RX REV CODE- 250/637: Performed by: INTERNAL MEDICINE

## 2020-12-14 PROCEDURE — 77010033678 HC OXYGEN DAILY

## 2020-12-14 PROCEDURE — 97530 THERAPEUTIC ACTIVITIES: CPT

## 2020-12-14 PROCEDURE — 94640 AIRWAY INHALATION TREATMENT: CPT

## 2020-12-14 PROCEDURE — 85025 COMPLETE CBC W/AUTO DIFF WBC: CPT

## 2020-12-14 PROCEDURE — 74011250636 HC RX REV CODE- 250/636: Performed by: PHYSICIAN ASSISTANT

## 2020-12-14 PROCEDURE — 65660000001 HC RM ICU INTERMED STEPDOWN

## 2020-12-14 PROCEDURE — 74011000258 HC RX REV CODE- 258: Performed by: INTERNAL MEDICINE

## 2020-12-14 PROCEDURE — 80048 BASIC METABOLIC PNL TOTAL CA: CPT

## 2020-12-14 PROCEDURE — 74011250636 HC RX REV CODE- 250/636: Performed by: INTERNAL MEDICINE

## 2020-12-14 RX ORDER — VANCOMYCIN/0.9 % SOD CHLORIDE 1.5G/250ML
1500 PLASTIC BAG, INJECTION (ML) INTRAVENOUS EVERY 24 HOURS
Status: DISCONTINUED | OUTPATIENT
Start: 2020-12-15 | End: 2020-12-15

## 2020-12-14 RX ADMIN — IPRATROPIUM BROMIDE AND ALBUTEROL SULFATE 3 ML: .5; 3 SOLUTION RESPIRATORY (INHALATION) at 08:00

## 2020-12-14 RX ADMIN — Medication: at 09:00

## 2020-12-14 RX ADMIN — ENTACAPONE 200 MG: 200 TABLET, FILM COATED ORAL at 22:15

## 2020-12-14 RX ADMIN — PAROXETINE HYDROCHLORIDE 10 MG: 20 TABLET, FILM COATED ORAL at 08:50

## 2020-12-14 RX ADMIN — ENTACAPONE 200 MG: 200 TABLET, FILM COATED ORAL at 18:05

## 2020-12-14 RX ADMIN — GABAPENTIN 200 MG: 100 CAPSULE ORAL at 17:44

## 2020-12-14 RX ADMIN — CARBIDOPA AND LEVODOPA 2 TABLET: 25; 100 TABLET ORAL at 13:07

## 2020-12-14 RX ADMIN — ROPINIROLE HYDROCHLORIDE 1 MG: 1 TABLET, FILM COATED ORAL at 08:50

## 2020-12-14 RX ADMIN — PIPERACILLIN AND TAZOBACTAM 3.38 G: 3; .375 INJECTION, POWDER, LYOPHILIZED, FOR SOLUTION INTRAVENOUS at 20:43

## 2020-12-14 RX ADMIN — GUAIFENESIN 600 MG: 600 TABLET, EXTENDED RELEASE ORAL at 08:51

## 2020-12-14 RX ADMIN — ACETYLCYSTEINE 200 MG: 200 SOLUTION ORAL; RESPIRATORY (INHALATION) at 08:00

## 2020-12-14 RX ADMIN — LOSARTAN POTASSIUM 50 MG: 50 TABLET, FILM COATED ORAL at 08:50

## 2020-12-14 RX ADMIN — CARBIDOPA AND LEVODOPA 2 TABLET: 25; 100 TABLET ORAL at 08:50

## 2020-12-14 RX ADMIN — IPRATROPIUM BROMIDE AND ALBUTEROL SULFATE 3 ML: .5; 3 SOLUTION RESPIRATORY (INHALATION) at 21:02

## 2020-12-14 RX ADMIN — ENOXAPARIN SODIUM 40 MG: 40 INJECTION SUBCUTANEOUS at 17:44

## 2020-12-14 RX ADMIN — CARBIDOPA AND LEVODOPA 2 TABLET: 25; 100 TABLET ORAL at 17:44

## 2020-12-14 RX ADMIN — ACETYLCYSTEINE 200 MG: 200 SOLUTION ORAL; RESPIRATORY (INHALATION) at 14:25

## 2020-12-14 RX ADMIN — ROPINIROLE HYDROCHLORIDE 1 MG: 1 TABLET, FILM COATED ORAL at 17:44

## 2020-12-14 RX ADMIN — CARBIDOPA AND LEVODOPA 2 TABLET: 25; 100 TABLET ORAL at 22:15

## 2020-12-14 RX ADMIN — GUAIFENESIN 600 MG: 600 TABLET, EXTENDED RELEASE ORAL at 20:43

## 2020-12-14 RX ADMIN — VANCOMYCIN HYDROCHLORIDE 1500 MG: 10 INJECTION, POWDER, LYOPHILIZED, FOR SOLUTION INTRAVENOUS at 05:31

## 2020-12-14 RX ADMIN — Medication 1 DROP: at 22:17

## 2020-12-14 RX ADMIN — GABAPENTIN 200 MG: 100 CAPSULE ORAL at 08:51

## 2020-12-14 RX ADMIN — Medication: at 22:17

## 2020-12-14 RX ADMIN — AMLODIPINE BESYLATE 10 MG: 5 TABLET ORAL at 08:51

## 2020-12-14 RX ADMIN — Medication 10 ML: at 22:17

## 2020-12-14 RX ADMIN — ACETYLCYSTEINE 200 MG: 200 SOLUTION ORAL; RESPIRATORY (INHALATION) at 21:01

## 2020-12-14 RX ADMIN — Medication: at 16:00

## 2020-12-14 RX ADMIN — ATORVASTATIN CALCIUM 40 MG: 40 TABLET, FILM COATED ORAL at 22:15

## 2020-12-14 RX ADMIN — Medication 10 ML: at 13:14

## 2020-12-14 RX ADMIN — Medication 81 MG: at 08:51

## 2020-12-14 RX ADMIN — PIPERACILLIN AND TAZOBACTAM 3.38 G: 3; .375 INJECTION, POWDER, LYOPHILIZED, FOR SOLUTION INTRAVENOUS at 04:44

## 2020-12-14 RX ADMIN — IPRATROPIUM BROMIDE AND ALBUTEROL SULFATE 3 ML: .5; 3 SOLUTION RESPIRATORY (INHALATION) at 14:25

## 2020-12-14 RX ADMIN — PIPERACILLIN AND TAZOBACTAM 3.38 G: 3; .375 INJECTION, POWDER, LYOPHILIZED, FOR SOLUTION INTRAVENOUS at 13:07

## 2020-12-14 NOTE — PROGRESS NOTES
Problem: Pressure Injury - Risk of 
Goal: *Prevention of pressure injury Description: Document Elier Scale and appropriate interventions in the flowsheet. Outcome: Progressing Towards Goal 
Note: Pressure Injury Interventions: 
Sensory Interventions: Assess changes in LOC, Float heels Moisture Interventions: Absorbent underpads, Internal/External urinary devices Activity Interventions: Assess need for specialty bed, Pressure redistribution bed/mattress(bed type) Mobility Interventions: Assess need for specialty bed, Pressure redistribution bed/mattress (bed type) Nutrition Interventions: Document food/fluid/supplement intake Friction and Shear Interventions: Apply protective barrier, creams and emollients, Minimize layers Problem: Falls - Risk of 
Goal: *Absence of Falls Description: Document Delacruz Pipes Fall Risk and appropriate interventions in the flowsheet. Outcome: Progressing Towards Goal 
Note: Fall Risk Interventions: 
Mobility Interventions: Communicate number of staff needed for ambulation/transfer, Bed/chair exit alarm Mentation Interventions: Door open when patient unattended Medication Interventions: Evaluate medications/consider consulting pharmacy Elimination Interventions: Call light in reach, Toileting schedule/hourly rounds History of Falls Interventions: Door open when patient unattended Problem: Breathing Pattern - Ineffective Goal: *Absence of hypoxia Outcome: Progressing Towards Goal 
Goal: *Use of effective breathing techniques Outcome: Progressing Towards Goal 
  
Problem: Nutrition Deficit Goal: *Optimize nutritional status Outcome: Progressing Towards Goal

## 2020-12-14 NOTE — ROUTINE PROCESS
Bedside and Verbal shift change report given to Shayy Martinez (oncoming nurse) by Miah Cordero (offgoing nurse). Report included the following information SBAR, Kardex, ED Summary, Intake/Output, MAR, Recent Results and Cardiac Rhythm Paced.

## 2020-12-14 NOTE — PROGRESS NOTES
Problem: Mobility Impaired (Adult and Pediatric) Goal: *Acute Goals and Plan of Care (Insert Text) Description: FUNCTIONAL STATUS PRIOR TO ADMISSION: Patient was modified independent using a rollator for functional mobility (approx 15 ft). Patient required minimal assistance for basic and instrumental ADLs. HOME SUPPORT PRIOR TO ADMISSION: The patient lived with wife. Physical Therapy Goals Re-assessed and downgraded 12/10/2020 1. Patient will move from supine to sit and sit to supine , scoot up and down, and roll side to side in bed with maximal assistance within 7 day(s). 2.  Patient will transfer from bed to chair and chair to bed with maximal assistance using the least restrictive device within 7 day(s). 3.  Patient will perform sit to stand with moderate assistance  within 7 day(s). 4.  Patient will ambulate with maximal assistance for 5 feet with the least restrictive device within 7 day(s). 5.  Patient will sit EOB for 10 minutes with SBA within 7 days. Initiated 12/3/2020 1. Patient will move from supine to sit and sit to supine , scoot up and down, and roll side to side in bed with modified independence within 7 day(s). 2.  Patient will transfer from bed to chair and chair to bed with supervision/set-up using the least restrictive device within 7 day(s). 3.  Patient will perform sit to stand with supervision/set-up within 7 day(s). 4.  Patient will ambulate with supervision/set-up for 30 feet with the least restrictive device within 7 day(s). 5.  Patient will ascend/descend 3 stairs with bilateral handrail(s) with minimal assistance/contact guard assist within 7 day(s). 12/14/2020 1250 by Peg Padilla PT Outcome: Progressing Towards Goal 
 
PHYSICAL THERAPY TREATMENT Patient: Lesa Brody (28 y.o. male) Date: 12/14/2020 Diagnosis: Pneumonia [J18.9] <principal problem not specified> Precautions: Fall Chart, physical therapy assessment, plan of care and goals were reviewed. ASSESSMENT Patient continues with skilled PT services with little progress towards goals. Received patient supine in bed sleeping, daughter at bedside. Daughter appears supportive and actively participated in today's visit. Patient was difficult to wake and not initially following commands (when supine). Required max assist x2 for supine to sit EOB. Once sitting, patient demonstrated improved alertness and worked to participate/ following some commands. He was mod/ max assist x2 for squat pivot transfer to the chair and bedside commode. Patient participated by taking steps and reaching for the arm rest for support. Attempted transfer back to the chair using the RW. Pt unable to take steps with the walker, had difficulty with hand placement and unable to stand upright, therefore aborted transfer using the walker and completed with max assist x2. Current Level of Function Impacting Discharge (mobility/balance): Up to max assist x2 Other factors to consider for discharge: medical stability, inability to ambulate or transfer at this time, increased need for assistance, increased risk for falls PLAN : 
Patient continues to benefit from skilled intervention to address the above impairments. Continue treatment per established plan of care. to address goals. Recommendation for discharge: (in order for the patient to meet his/her long term goals) Therapy up to 5 days/week in SNF setting This discharge recommendation: 
Has been made in collaboration with the attending provider and/or case management IF patient discharges home will need the following DME: to be determined (TBD) SUBJECTIVE:  
Patient stated Jj.  when asked his name OBJECTIVE DATA SUMMARY:  
Critical Behavior: 
Neurologic State: Eyes open to voice, Lethargic, difficult to wake Orientation Level: Oriented X4 
 Cognition: Decreased attention/concentration Safety/Judgement: Awareness of environment Functional Mobility Training: 
Bed Mobility: 
Rolling: Maximum assistance;Assist x2 Supine to Sit: Maximum assistance;Assist x2 Scooting: Maximum assistance;Assist x2 Transfers: 
Sit to Stand: Moderate assistance;Maximum assistance;Assist x2 Stand to Sit: Moderate assistance;Maximum assistance;Assist x2 Worked on sit<->stand from bed, chair, and BSC. Multimodal cues required for hand placement, upright posture, and sequencing. Bed to Chair: Moderate assistance;Maximum assistance;Assist x2 squat pivot and again chair to [facility] - takes steps, does not stand with full upright posture. Does better with hand held assist as compared with the RW when performing bed to chair to bsc to chair transfer. Balance: 
Sitting: Impaired; Without support Sitting - Static: Fair (occasional) Sitting - Dynamic: Poor (constant support) Standing: Impaired; With support Standing - Static: Constant support Standing - Dynamic : Constant support Ambulation/Gait Training: 
  
  
  
 
   
 
Pain Rating: 
Voiced no pain Activity Tolerance:  
Fair After treatment patient left in no apparent distress:  
Sitting in chair, Call bell within reach, Bed / chair alarm activated, and Caregiver / family present COMMUNICATION/COLLABORATION:  
The patients plan of care was discussed with: Registered nurse. Lani Sorensen, PT Time Calculation: 25 mins

## 2020-12-14 NOTE — PROGRESS NOTES
1700 Bedside shift change report given to Kwaku Ndiaye RN (oncoming nurse) by Lavelle Pagan RN (offgoing nurse). Report included the following information SBAR, Kardex, Intake/Output and Recent Results. 1930 Bedside shift change report given to MONO Fall (oncoming nurse) by Fernando Berg RN (offgoing nurse). Report included the following information SBAR, Kardex, Intake/Output, MAR and Recent Results.

## 2020-12-14 NOTE — ROUTINE PROCESS
Late Entry 
 
2200: Patient unable to stay alert enough for PO pills crushed in applesauce. Desean Boyd paged and recommended to hold medications.

## 2020-12-14 NOTE — PROGRESS NOTES
[address] [facility] [place] [facility]  Hospitalist Group Hospitalist Progress Note Jose De Jesus Mares MD 
Answering service: [phone] from in house phone Date of Service:  2020 NAME:  Travis Vargas :  1940 MRN:  [id number] Admission Summary:  
Travis Vargas is a 78 y.o. male past medical history significant for Parkinson, dementia, CAD, CKD stage III presented emergency room with respiratory distress and hypoxia. Patient Austin vallecillo as per report he had an episode today of choking during dinner afterward he became hypoxic with O2 sats in the upper 80s and found to have a \" low-grade fever\" of 99.9. Per daughter report he had a similar episode of choking last week again when he was eating by his respiratory symptoms were not as severe and he recovered quickly. Interval history / Subjective: Follow-up respiratory failure. Patient seen and examined earlier today. Daughter at bedside. Today patient looked somewhat better but appears very fatigued. I told daughter although oxygen requirment is decreasing at risk of decline. She understood and agreed. Assessment & Plan:  
 
Acute hypoxic respiratory failure: slow improvement Aspiration pneumonia: 
Volume overload: 
-Suspected respiratory failure due to aspiration pneumonia 
-Procalcitonin elevated at 12, repeat improved to 0.25 
-on zosyn . Also on iv vancomycin  
-S/p Lasix IV x1,  
-Albuterol nebulizer three times changed to prn  
-Guaifenisen twice daily 
-Echocardiogram with EF 85-44%, mild diastolic dysfunction On 2 liters of oxygen Pulmonology following Fever: resolved Leucocytosis. Worsening  
-CT scan w/o contrast ordered. Unable to do CTA due to possible dye allergy  
-Lower extremity dopplers negative for DVT 
-D-dimer elevated 4.97 On IV zosyn and IV vancomycin Acute encephalopathy: improved - Encouraged family to talk with patient during the day for improved sleep at night Parkinson: at baseline as per family 
-continue with home medications 
  
Elevated serum creatinine , resolved  
-Suspected prerenal 
-lasix and losartan held due to elevated serum creatinine BMP in AM 
  
Anemia, macrocytic: Stable 
  
Hypertension: continue amlodipine,  losartan (on hold)  
-has fluctuating blood pressures Left hip pain:  
-order x-ray, no acute abnormality Follow up PT/OT. Suspect need SNF on discharge Patient with baseline low functioning level. He is at risk for decline 
  
Code status: FULL 
DVT prophylaxis: SCDs Care Plan discussed with: Patient/Family Anticipated Disposition: SNF Anticipated Discharge: >48 hours Hospital Problems  Date Reviewed: 9/14/2020 Codes Class Noted POA Pneumonia ICD-10-CM: J18.9 ICD-9-CM: 649  12/3/2020 Unknown Review of Systems:  
Negative unless stated above Vital Signs:  
 Last 24hrs VS reviewed since prior progress note. Most recent are: 
Visit Vitals BP (!) 118/50 (BP 1 Location: Right arm, BP Patient Position: At rest) Pulse 70 Temp 98.7 °F (37.1 °C) Resp 23 Ht 5' 10\" (1.778 m) Wt 94.5 kg (208 lb 5.4 oz) SpO2 99% BMI 29.89 kg/m² Intake/Output Summary (Last 24 hours) at 12/14/2020 1408 Last data filed at 12/14/2020 9750 Gross per 24 hour Intake 800 ml Output 300 ml Net 500 ml Physical Examination:  
 
I had a face to face encounter with this patient and independently examined them on 12/14/2020 as outlined below: 
 
     
Constitutional:  looks uncomfortable today. Lying on bed ENT:  Oral mucosa moist, oropharynx benign. Resp:  no wheezing/rhonchi/rales. No accessory muscle use CV:  Regular rhythm, normal rate, no murmurs, gallops, rubs GI:  Soft, non distended, non tender. normoactive bowel sounds, no hepatosplenomegaly Musculoskeletal:  No edema, warm, 2+ pulses throughout Neurologic:  Moves all extremities. Skin:  Good turgor, no rashes or ulcers Data Review:  
 Review and/or order of clinical lab test 
Review and/or order of tests in the radiology section of CPT Review and/or order of tests in the medicine section of [facility] Labs:  
 
Recent Labs 12/14/20 0440 12/13/20 0148 WBC 10.7 13.2* HGB 8.5* 10.0* HCT 26.4* 31.1*  
* 388 Recent Labs 12/14/20 
0440 12/13/20 
0148 12/12/20 
0505  139 139  
K 4.3 4.4 4.0  
 107 105 CO2 28 29 28 BUN 30* 27* 28* CREA 1.18 1.06 1.08  
* 111* 98  
CA 8.9 9.0 8.7 No results for input(s): ALT, AP, TBIL, TBILI, TP, ALB, GLOB, GGT, AML, LPSE in the last 72 hours. No lab exists for component: SGOT, GPT, AMYP, HLPSE No results for input(s): INR, PTP, APTT, INREXT, INREXT in the last 72 hours. No results for input(s): FE, TIBC, PSAT, FERR in the last 72 hours. No results found for: FOL, RBCF No results for input(s): PH, PCO2, PO2 in the last 72 hours. Recent Labs 12/13/20 0148 TROIQ <0.05 Lab Results Component Value Date/Time Cholesterol, total 119 07/08/2020 10:40 AM  
 HDL Cholesterol 51 07/08/2020 10:40 AM  
 LDL, calculated 47 07/08/2020 10:40 AM  
 Triglyceride 103 07/08/2020 10:40 AM  
 CHOL/HDL Ratio 2 07/08/2020 10:40 AM  
 
No results found for: [facility] Lab Results Component Value Date/Time  Color DARK YELLOW 12/14/2020 05:36 AM  
 Appearance CLEAR 12/14/2020 05:36 AM  
 Specific gravity 1.024 12/14/2020 05:36 AM  
 Specific gravity 1.015 07/08/2020 10:41 AM  
 pH (UA) 5.0 12/14/2020 05:36 AM  
 Protein TRACE (A) 12/14/2020 05:36 AM  
 Glucose Negative 12/14/2020 05:36 AM  
 Ketone TRACE (A) 12/14/2020 05:36 AM  
 Bilirubin Negative 12/14/2020 05:36 AM  
 Urobilinogen 0.2 12/14/2020 05:36 AM  
 Nitrites Negative 12/14/2020 05:36 AM  
 Leukocyte Esterase Negative 12/14/2020 05:36 AM  
 Epithelial cells FEW 12/14/2020 05:36 AM  
 Bacteria Negative 12/14/2020 05:36 AM  
 WBC 0-4 12/14/2020 05:36 AM  
 RBC 5-10 12/14/2020 05:36 AM  
 
 
 
Medications Reviewed:  
 
Current Facility-Administered Medications Medication Dose Route Frequency  [START ON 12/15/2020] vancomycin (VANCOCIN) 1500 mg in  ml infusion  1,500 mg IntraVENous Q24H  Vancomycin- Pharmacy to Dose   Other Rx Dosing/Monitoring  polyvinyl alcohol-povidone (NATURAL TEARS) 0.5-0.6 % ophthalmic solution 1 Drop  1 Drop Both Eyes QHS  acetylcysteine (MUCOMYST) 200 mg/mL (20 %) solution 200 mg  200 mg Nebulization TID RT  
 balsam peru-castor oiL (VENELEX) ointment   Topical TID  enoxaparin (LOVENOX) injection 40 mg  40 mg SubCUTAneous Q24H  piperacillin-tazobactam (ZOSYN) 3.375 g in 0.9% sodium chloride (MBP/ADV) 100 mL MBP  3.375 g IntraVENous Q8H  
 oxyCODONE IR (ROXICODONE) tablet 2.5 mg  2.5 mg Oral Q6H PRN  
 albuterol-ipratropium (DUO-NEB) 2.5 MG-0.5 MG/3 ML  3 mL Nebulization TID RT  
 amLODIPine (NORVASC) tablet 10 mg  10 mg Oral DAILY  losartan (COZAAR) tablet 50 mg  50 mg Oral DAILY  guaiFENesin ER (MUCINEX) tablet 600 mg  600 mg Oral Q12H  carbidopa-levodopa (SINEMET)  mg per tablet 2 Tab  2 Tab Oral QID  albuterol (PROVENTIL VENTOLIN) nebulizer solution 2.5 mg  2.5 mg Nebulization Q6H PRN  
 sodium chloride (NS) flush 5-40 mL  5-40 mL IntraVENous Q8H  
 sodium chloride (NS) flush 5-40 mL  5-40 mL IntraVENous PRN  
 acetaminophen (TYLENOL) tablet 650 mg  650 mg Oral Q6H PRN Or  
 acetaminophen (TYLENOL) suppository 650 mg  650 mg Rectal Q6H PRN  polyethylene glycol (MIRALAX) packet 17 g  17 g Oral DAILY PRN  promethazine (PHENERGAN) tablet 12.5 mg  12.5 mg Oral Q6H PRN Or  
 ondansetron (ZOFRAN) injection 4 mg  4 mg IntraVENous Q6H PRN  
 aspirin delayed-release tablet 81 mg  81 mg Oral DAILY  atorvastatin (LIPITOR) tablet 40 mg  40 mg Oral QHS  entacapone (COMTAN) tablet 200 mg  200 mg Oral TID  pantoprazole (PROTONIX) tablet 20 mg  20 mg Oral ACB  PARoxetine (PAXIL) tablet 10 mg  10 mg Oral DAILY  gabapentin (NEURONTIN) capsule 200 mg  200 mg Oral BID  rOPINIRole (REQUIP) tablet 1 mg  1 mg Oral BID  
 
______________________________________________________________________ EXPECTED LENGTH OF STAY: 3d 2h 
ACTUAL LENGTH OF STAY:          Rachelle Cox MD

## 2020-12-15 LAB
ANION GAP SERPL CALC-SCNC: 5 MMOL/L (ref 5–15)
BACTERIA SPEC CULT: NORMAL
BACTERIA SPEC CULT: NORMAL
BASOPHILS # BLD: 0.1 K/UL (ref 0–0.1)
BASOPHILS NFR BLD: 1 % (ref 0–1)
BUN SERPL-MCNC: 25 MG/DL (ref 6–20)
BUN/CREAT SERPL: 24 (ref 12–20)
CALCIUM SERPL-MCNC: 9 MG/DL (ref 8.5–10.1)
CHLORIDE SERPL-SCNC: 109 MMOL/L (ref 97–108)
CO2 SERPL-SCNC: 25 MMOL/L (ref 21–32)
CREAT SERPL-MCNC: 1.05 MG/DL (ref 0.7–1.3)
DIFFERENTIAL METHOD BLD: ABNORMAL
EOSINOPHIL # BLD: 0.2 K/UL (ref 0–0.4)
EOSINOPHIL NFR BLD: 2 % (ref 0–7)
ERYTHROCYTE [DISTWIDTH] IN BLOOD BY AUTOMATED COUNT: 12.3 % (ref 11.5–14.5)
GLUCOSE SERPL-MCNC: 103 MG/DL (ref 65–100)
HCT VFR BLD AUTO: 29.1 % (ref 36.6–50.3)
HGB BLD-MCNC: 9.2 G/DL (ref 12.1–17)
IMM GRANULOCYTES # BLD AUTO: 0.1 K/UL (ref 0–0.04)
IMM GRANULOCYTES NFR BLD AUTO: 1 % (ref 0–0.5)
LYMPHOCYTES # BLD: 1.2 K/UL (ref 0.8–3.5)
LYMPHOCYTES NFR BLD: 13 % (ref 12–49)
MCH RBC QN AUTO: 33.1 PG (ref 26–34)
MCHC RBC AUTO-ENTMCNC: 31.6 G/DL (ref 30–36.5)
MCV RBC AUTO: 104.7 FL (ref 80–99)
MONOCYTES # BLD: 0.7 K/UL (ref 0–1)
MONOCYTES NFR BLD: 7 % (ref 5–13)
NEUTS SEG # BLD: 6.9 K/UL (ref 1.8–8)
NEUTS SEG NFR BLD: 76 % (ref 32–75)
NRBC # BLD: 0 K/UL (ref 0–0.01)
NRBC BLD-RTO: 0 PER 100 WBC
PLATELET # BLD AUTO: 436 K/UL (ref 150–400)
PMV BLD AUTO: 9.6 FL (ref 8.9–12.9)
POTASSIUM SERPL-SCNC: 4.1 MMOL/L (ref 3.5–5.1)
RBC # BLD AUTO: 2.78 M/UL (ref 4.1–5.7)
SERVICE CMNT-IMP: NORMAL
SODIUM SERPL-SCNC: 139 MMOL/L (ref 136–145)
WBC # BLD AUTO: 9.1 K/UL (ref 4.1–11.1)

## 2020-12-15 PROCEDURE — 74011250637 HC RX REV CODE- 250/637: Performed by: INTERNAL MEDICINE

## 2020-12-15 PROCEDURE — 97530 THERAPEUTIC ACTIVITIES: CPT

## 2020-12-15 PROCEDURE — 85025 COMPLETE CBC W/AUTO DIFF WBC: CPT

## 2020-12-15 PROCEDURE — 74011000250 HC RX REV CODE- 250: Performed by: INTERNAL MEDICINE

## 2020-12-15 PROCEDURE — 65660000000 HC RM CCU STEPDOWN

## 2020-12-15 PROCEDURE — 74011000258 HC RX REV CODE- 258: Performed by: INTERNAL MEDICINE

## 2020-12-15 PROCEDURE — 97535 SELF CARE MNGMENT TRAINING: CPT

## 2020-12-15 PROCEDURE — 36415 COLL VENOUS BLD VENIPUNCTURE: CPT

## 2020-12-15 PROCEDURE — 74011250636 HC RX REV CODE- 250/636: Performed by: INTERNAL MEDICINE

## 2020-12-15 PROCEDURE — 94640 AIRWAY INHALATION TREATMENT: CPT

## 2020-12-15 PROCEDURE — 80048 BASIC METABOLIC PNL TOTAL CA: CPT

## 2020-12-15 PROCEDURE — 97116 GAIT TRAINING THERAPY: CPT

## 2020-12-15 RX ORDER — GUAIFENESIN 100 MG/5ML
400 SOLUTION ORAL EVERY 8 HOURS
Status: DISCONTINUED | OUTPATIENT
Start: 2020-12-15 | End: 2020-12-18 | Stop reason: HOSPADM

## 2020-12-15 RX ORDER — VANCOMYCIN/0.9 % SOD CHLORIDE 1.5G/250ML
1500 PLASTIC BAG, INJECTION (ML) INTRAVENOUS
Status: DISCONTINUED | OUTPATIENT
Start: 2020-12-16 | End: 2020-12-16

## 2020-12-15 RX ADMIN — ENTACAPONE 200 MG: 200 TABLET, FILM COATED ORAL at 17:04

## 2020-12-15 RX ADMIN — ENTACAPONE 200 MG: 200 TABLET, FILM COATED ORAL at 08:44

## 2020-12-15 RX ADMIN — PIPERACILLIN AND TAZOBACTAM 3.38 G: 3; .375 INJECTION, POWDER, LYOPHILIZED, FOR SOLUTION INTRAVENOUS at 03:38

## 2020-12-15 RX ADMIN — GUAIFENESIN 400 MG: 100 SOLUTION ORAL at 20:45

## 2020-12-15 RX ADMIN — Medication 81 MG: at 08:44

## 2020-12-15 RX ADMIN — ENOXAPARIN SODIUM 40 MG: 40 INJECTION SUBCUTANEOUS at 17:05

## 2020-12-15 RX ADMIN — GABAPENTIN 200 MG: 100 CAPSULE ORAL at 08:43

## 2020-12-15 RX ADMIN — ACETYLCYSTEINE 200 MG: 200 SOLUTION ORAL; RESPIRATORY (INHALATION) at 15:16

## 2020-12-15 RX ADMIN — Medication: at 21:24

## 2020-12-15 RX ADMIN — VANCOMYCIN HYDROCHLORIDE 1500 MG: 10 INJECTION, POWDER, LYOPHILIZED, FOR SOLUTION INTRAVENOUS at 05:34

## 2020-12-15 RX ADMIN — CARBIDOPA AND LEVODOPA 2 TABLET: 25; 100 TABLET ORAL at 14:56

## 2020-12-15 RX ADMIN — ENTACAPONE 200 MG: 200 TABLET, FILM COATED ORAL at 21:24

## 2020-12-15 RX ADMIN — Medication: at 17:06

## 2020-12-15 RX ADMIN — ROPINIROLE HYDROCHLORIDE 1 MG: 1 TABLET, FILM COATED ORAL at 17:04

## 2020-12-15 RX ADMIN — CARBIDOPA AND LEVODOPA 2 TABLET: 25; 100 TABLET ORAL at 17:04

## 2020-12-15 RX ADMIN — IPRATROPIUM BROMIDE AND ALBUTEROL SULFATE 3 ML: .5; 3 SOLUTION RESPIRATORY (INHALATION) at 15:16

## 2020-12-15 RX ADMIN — PANTOPRAZOLE SODIUM 20 MG: 20 TABLET, DELAYED RELEASE ORAL at 07:29

## 2020-12-15 RX ADMIN — Medication 10 ML: at 05:36

## 2020-12-15 RX ADMIN — ATORVASTATIN CALCIUM 40 MG: 40 TABLET, FILM COATED ORAL at 21:23

## 2020-12-15 RX ADMIN — PIPERACILLIN AND TAZOBACTAM 3.38 G: 3; .375 INJECTION, POWDER, LYOPHILIZED, FOR SOLUTION INTRAVENOUS at 11:48

## 2020-12-15 RX ADMIN — Medication: at 08:52

## 2020-12-15 RX ADMIN — ROPINIROLE HYDROCHLORIDE 1 MG: 1 TABLET, FILM COATED ORAL at 08:44

## 2020-12-15 RX ADMIN — PIPERACILLIN AND TAZOBACTAM 3.38 G: 3; .375 INJECTION, POWDER, LYOPHILIZED, FOR SOLUTION INTRAVENOUS at 20:50

## 2020-12-15 RX ADMIN — GABAPENTIN 200 MG: 100 CAPSULE ORAL at 17:04

## 2020-12-15 RX ADMIN — PAROXETINE HYDROCHLORIDE 10 MG: 20 TABLET, FILM COATED ORAL at 11:48

## 2020-12-15 RX ADMIN — LOSARTAN POTASSIUM 50 MG: 50 TABLET, FILM COATED ORAL at 08:44

## 2020-12-15 RX ADMIN — GUAIFENESIN 400 MG: 100 SOLUTION ORAL at 11:48

## 2020-12-15 RX ADMIN — CARBIDOPA AND LEVODOPA 2 TABLET: 25; 100 TABLET ORAL at 22:19

## 2020-12-15 RX ADMIN — GUAIFENESIN 400 MG: 100 SOLUTION ORAL at 14:56

## 2020-12-15 RX ADMIN — Medication 10 ML: at 20:45

## 2020-12-15 RX ADMIN — Medication 1 DROP: at 22:21

## 2020-12-15 RX ADMIN — AMLODIPINE BESYLATE 10 MG: 5 TABLET ORAL at 08:43

## 2020-12-15 RX ADMIN — CARBIDOPA AND LEVODOPA 2 TABLET: 25; 100 TABLET ORAL at 08:43

## 2020-12-15 NOTE — PROGRESS NOTES
Problem: Self Care Deficits Care Plan (Adult) Goal: *Acute Goals and Plan of Care (Insert Text) Description:  
FUNCTIONAL STATUS PRIOR TO ADMISSION: Patient is an unreliable historian 2/2 baseline dementia. Per chart review, patient was modified independent using a rollator for short distance functional mobility and received minimal(?) assistance from staff. This date, patient reporting he self-feeds and occasionally needs help with lower body dressing. HOME SUPPORT: Patient recently moved to [facility]. Occupational Therapy Goals Goals reviewed and updated: 12/11/2020 1. Patient will perform 2 unsupported seated ADL tasks with set up/supervision within 7 day(s). 2.  Patient will perform upper body dressing with moderate assistance within 7 day(s). 3.  Patient will perform lower body dressing with maximum assistance within 7 day(s). 4.  Patient will perform toilet transfers to [facility] with moderate assistance and RW within 7 day(s). 5.  Patient will perform all aspects of toileting with maximum assistance and RW within 7 day(s). Initiated 12/4/2020 1. Patient will perform seated ADL task with material set-up and minimal assistance within 7 day(s). MET to wash face and don/doff glasses in supported sitting 12/11/2020 2. Patient will perform upper body dressing with minimal assistance within 7 day(s). 3.  Patient will perform lower body dressing with moderate assistance within 7 day(s). 4.  Patient will perform toilet transfers to [facility] with moderate assistance and RW within 7 day(s). 5.  Patient will perform all aspects of toileting with moderate assistance and RW within 7 day(s). Outcome: Progressing Towards Goal 
 OCCUPATIONAL THERAPY TREATMENT Patient: Adali Tinsley (85 y.o. male) Date: 12/15/2020 Diagnosis: Pneumonia [J18.9] <principal problem not specified> Precautions: Fall Chart, occupational therapy assessment, plan of care, and goals were reviewed. ASSESSMENT Patient continues with skilled OT services and is progressing towards goals. Pt received seated in chair. Supportive daughter present in the room. Pt performed sit to stand at mod to max assist x 2 using RW. Increase verbal/tactile cues for progression of B feet (weight shifting). Pt's depends saturated. Stood using RW, total assist with hygiene and managing depends. Pt returned to sidelying position. Pt progressing with activity tolerance. Will con't to follow. Recommend further therapy at discharge. Current Level of Function Impacting Discharge (ADLs): mod to max assist x 2 with functional transfers Other factors to consider for discharge: PLAN : 
Patient continues to benefit from skilled intervention to address the above impairments. Continue treatment per established plan of care. to address goals. Recommend with staff: 
 
Recommend next OT session: see goals Recommendation for discharge: (in order for the patient to meet his/her long term goals) Therapy up to 5 days/week in SNF setting This discharge recommendation: A follow-up discussion with the attending provider and/or case management is planned IF patient discharges home will need the following DME: none SUBJECTIVE:  
Patient stated Are we at home? Angela Hahn OBJECTIVE DATA SUMMARY:  
Cognitive/Behavioral Status: 
Neurologic State: Alert Orientation Level: Oriented to person;Disoriented to time;Disoriented to situation;Disoriented to place Safety/Judgement: Decreased insight into deficits Functional Mobility and Transfers for ADLs: 
Bed Mobility: 
Sit to Supine: Moderate assistance Transfers: 
Sit to Stand: Moderate assistance;Maximum assistance;Assist x2; Additional time Balance: 
Sitting: Impaired; Without support Sitting - Static: Fair (occasional) Sitting - Dynamic: Poor (constant support) Standing: Impaired; With support Standing - Static: Poor;Constant support Standing - Dynamic : Poor;Constant support ADL Intervention: Lower Body Bathing Perineal  : Total assistance (dependent) Position Performed: Standing Lower Body Dressing Assistance Protective [place]: Total assistance (dependent) Socks: Total assistance (dependent) Toileting Bowel Hygiene: Total assistance (dependent) Clothing Management: Total assistance (dependent) Cognitive Retraining Safety/Judgement: Decreased insight into deficits Therapeutic Exercises:  
 
 
Pain: 
No c/o pain Activity Tolerance:  
Fair After treatment patient left in no apparent distress:  
Supine in bed and Call bell within reach Daughter present COMMUNICATION/COLLABORATION:  
The patients plan of care was discussed with: Physical therapist and Registered nurse. Lazara Castillo OTR/L Time Calculation: 26 mins

## 2020-12-15 NOTE — PROGRESS NOTES
PULMONARY MEDICINE Progress Note Name: Kim Cabrera : 1940 MRN: [id number] Date: 12/15/2020 Subjective:  
 
 Still confused WBC down  Noted RRT overnight for chest pains; overnight NP believes secondary to pulmonary issues. Given lasix. WBC up. Query mucus plug? Aspiration event? Seems more confused today; he is getting his days and nights mixed up. Daughter at bedside Consult Note: 12/10 Requesting Physician: 
Reason for consult: 
 
Medical records and data reviewed. Patient is a 78 y.o. male who presented to the hospital on 2020 with a choking episode. Presentation to the emergency room was found to have hypoxia as well as evidence of right lower lobe infiltrate and was admitted to the hospital for further management. SARS-CoV-2 test was negative. Despite several days of antibiotics, he continues to be hypoxic with symptoms of cough. CT scan of the chest that was done yesterday shows bilateral pulmonary infiltrates right more than left. Cultures have been negative so far. Antibiotics were changed to Zosyn. Fever curve as well as WBC count appear to be better over the past 48 hours. Patient appears to be delirious and ABG. was done did not show evidence of respiratory acidosis. He does have underlying dementia. Left lower extremity Doppler was negative for DVT. Review of Systems: A comprehensive 12 system review of systems was not obtained from the patient Assessment:  
 
Acute hypoxic pulmonary insufficiency Bilateral pneumonia after aspiration episode, improving fever curve and leukocytosis Delirium with acute encephalopathy, no evidence of respiratory acidosis History of organic cardiac disease, appears compensated Other medical problems per chart Recommendations:  
 
Wean oxygen as tolerated with saturations above 90% Zosyn/vanc Continue bronchodilators On Lovenox Bronchial hygiene O2 titration above 90% SLP/PT/OT Daughter at bedside Follow up in 1 mt with PCP with chest x-ray We will be available again to see if needed Active Problem List:  
 
Problem List  Date Reviewed: 9/14/2020 Codes Class Acute blood loss anemia ICD-10-CM: D62 
ICD-9-CM: 285.1 Parkinson's disease ([facility].) ICD-10-CM: G20 
ICD-9-CM: 332.0 Long-term current use of high risk medication other than anticoagulant ICD-10-CM: Z79.899 ICD-9-CM: V58.69 Gastroesophageal reflux disease without esophagitis ICD-10-CM: K21.9 ICD-9-CM: 530.81 Pneumonia ICD-10-CM: J18.9 ICD-9-CM: 880 Benign prostatic hyperplasia with nocturia ICD-10-CM: N40.1, R35.1 ICD-9-CM: 600.01, 788.43 History of stroke ICD-10-CM: Z86.73 
ICD-9-CM: V12.54 Dementia due to Parkinson's disease without behavioral disturbance ([facility] 75.) ICD-10-CM: G20, F02.80 ICD-9-CM: 332.0, 294.10 Parkinsonism ([facility] 75.) ICD-10-CM: G20 
ICD-9-CM: 332.0 Bilateral carotid artery stenosis ICD-10-CM: I65.23 ICD-9-CM: 433.10, 433.30 Mild cognitive impairment ICD-10-CM: G31.84 ICD-9-CM: 331.83 Cerebrovascular accident (CVA) ([facility] 75.) ICD-10-CM: I63.9 ICD-9-CM: 434.91 Rectal bleeding ICD-10-CM: K62.5 ICD-9-CM: 569.3 PE (physical exam), annual ICD-10-CM: Z00.00 ICD-9-CM: V70.0 Overview Addendum 6/12/2019  8:13 AM by Feroz North  
  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 
ICD-9-CM: 786.09 Hearing difficulty of both ears ICD-10-CM: H91.93 
ICD-9-CM: 389.9 Sleep apnea in adult ICD-10-CM: G47.30 ICD-9-CM: 327.23 Overview Signed 7/12/2017  3:47 PM by Marysol Ramirez No longer on CPAP @13 Tendinitis of left rotator cuff ICD-10-CM: M75.82 ICD-9-CM: 726.10 Coronary artery disease ICD-10-CM: I25.10 ICD-9-CM: 414.00 Overview Signed 7/12/2017  3:51 PM by Merritt Stanley By EBT 
  
  
   
 DJD (degenerative joint disease) ICD-10-CM: M19.90 ICD-9-CM: 715.90 Tendinitis ICD-10-CM: M77.9 ICD-9-CM: 726.90 Overview Signed 7/12/2017  3:52 PM by Merritt Stanley ACHILLES Gout ICD-10-CM: M10.9 ICD-9-CM: 274.9 Tremor ICD-10-CM: R25.1 ICD-9-CM: 781.0 Overview Signed 7/12/2017  3:53 PM by Merritt Stanley LEFT HAND Restless leg syndrome ICD-10-CM: G25.81 ICD-9-CM: 333.94 Lumbar stenosis ICD-10-CM: M48.061 
ICD-9-CM: 724.02 Overview Signed 1/29/2015  6:49 AM by Ayanna Hoff  
  S/P L4-5 microdiscectomy Essential hypertension ICD-10-CM: I10 
ICD-9-CM: 401.9 MCI (mild cognitive impairment) ICD-10-CM: G31.84 ICD-9-CM: 331.83   
   
 CKD (chronic kidney disease) stage 2, GFR 60-89 ml/min ICD-10-CM: N18.2 ICD-9-CM: 585.2 Past Medical History:  
 
 has a past medical history of Aaron esophagus, Benign nodular prostatic hyperplasia without lower urinary tract symptoms (7/12/2017), CKD (chronic kidney disease) stage 2, GFR 60-89 ml/min, Constipation, Coronary artery disease (7/12/2017), Depression, DJD (degenerative joint disease) (7/12/2017), Falls, Gout (7/12/2017), Headache, Hearing difficulty of both ears (7/12/2017), Hyperlipidemia, Hypertension, Impaired cognition (7/12/2017), MCI (mild cognitive impairment), Restless leg syndrome (7/12/2017), Sleep apnea in adult (7/12/2017), Snoring, Stroke ([facility] Tendinitis (7/12/2017), Tendinitis of left rotator cuff (07/12/2017), Tremor (7/12/2017), and Unspecified sleep apnea. Past Surgical History: has a past surgical history that includes pr egd transoral biopsy single/multiple (8/16/2011); pr egd transoral biopsy single/multiple (10/1/2013); hx orthopaedic (1/27/15); colonoscopy,flex,w/control, bleeding (1/16/2019); and colonoscopy (N/A, 1/16/2019). Home Medications:  
 
Prior to Admission medications Medication Sig Start Date End Date Taking? Authorizing Provider  
rOPINIRole (REQUIP) 1 mg tablet Take 1 mg by mouth two (2) times a day. Yes Provider, Historical  
guaiFENesin (Tussin) 100 mg/5 mL liquid Take 400 mg by mouth daily as needed for Cough. Yes Provider, Historical  
valsartan (DIOVAN) 160 mg tablet Take 1 Tab by mouth Every morning for 90 days. Indications: high blood pressure 10/11/20 1/9/21  Ronny Johnson MD  
amLODIPine (NORVASC) 5 mg tablet 0ne tab daily qhs  Indications: high blood pressure 10/11/20   Ronny Johnson MD  
gabapentin (NEURONTIN) 100 mg capsule T2C BID 10/9/20   Ronny Johnson MD  
PARoxetine (PAXIL) 10 mg tablet TAKE 1 TABLET BY MOUTH DAILY 10/9/20   Ronny Johnson MD  
entacapone (COMTAN) 200 mg tablet TAKE 1 TABLET THREE TIMES A DAY 10/9/20   Tia Lobato MD  
artificial tears, dextran 70-hypromellose, (GenTeal Tears Mild) 0.1-0.3 % ophthalmic solution Administer 1 Drop to both eyes nightly. Provider, Historical  
carboxymethylcellulose sodium (THERATEARS OP) Apply  to eye. 1 -2 drops both eyes bid    Provider, Historical  
esomeprazole (NEXIUM) 20 mg capsule TAKE 1 CAPSULE DAILY 8/5/20   Ronny Johnson MD  
carbidopa-levodopa (Sinemet)  mg per tablet 2 p.o. 4 times daily  Indications: Parkinson's disease 4/28/20   Ronny Johnson MD  
atorvastatin (LIPITOR) 40 mg tablet TAKE 1 TABLET DAILY 3/16/20   Ronny Johnson MD  
acetaminophen (TYLENOL EXTRA STRENGTH) 500 mg tablet Take 1,000 mg by mouth three (3) times daily as needed for Pain.  Rapid release    Provider, Historical  
 senna-docusate (SENNA-S) 8.6-50 mg per tablet Take 1 Tab by mouth every other day. Provider, Historical  
aspirin delayed-release 81 mg tablet Take 81 mg by mouth daily. Provider, Historical  
vitamin e (E GEMS) 1,000 unit capsule Take 2,000 Units by mouth daily. Provider, Historical  
MULTIVITAMIN PO Take 1 Tab by mouth daily. Provider, Historical  
 
 
Allergies/Social/Family History: No Known Allergies Social History Tobacco Use  Smoking status: Former Smoker  Smokeless tobacco: Never Used Substance Use Topics  Alcohol use: No  
  
Family History Problem Relation Age of Onset  Heart Disease Mother  Heart Disease Father Objective:  
Vital Signs: 
Visit Vitals BP (!) 150/81 (BP 1 Location: Right arm, BP Patient Position: At rest) Pulse 98 Temp 98.3 °F (36.8 °C) Resp 26 Ht 5' 10\" (1.778 m) Wt 93.8 kg (206 lb 12.7 oz) SpO2 95% BMI 29.67 kg/m² O2 Flow Rate (L/min): 2 l/min O2 Device: Nasal cannula Temp (24hrs), Av.8 °F (37.1 °C), Min:98.3 °F (36.8 °C), Max:99.3 °F (37.4 °C) Intake/Output:  
 
Intake/Output Summary (Last 24 hours) at 12/15/2020 1102 Last data filed at 12/15/2020 5925 Gross per 24 hour Intake 1140 ml Output 0 ml Net 1140 ml Physical Exam:  
General:  Drowsy, restless, no distress, appears stated age. Head:  Normocephalic, without obvious abnormality, atraumatic. Eyes:  Conjunctivae/corneas clear. PERRL Neck: Supple, symmetrical, no adenopathy, no carotid bruit and no JVD. Lungs:   Decreased BS Chest wall:  No tenderness or deformity. Heart:  Regular rate and rhythm, S1-S2 normal, no murmur, no click, rub or gallop. Abdomen:   Soft, non-tender. Bowel sounds present. No masses,  No organomegaly. Extremities: Atraumatic, no cyanosis or edema. Pulses: Palpable Skin: No rashes or lesions Neurologic: Grossly nonfocal  
 
  
 LABS AND  DATA: Personally reviewed Recent Labs 12/15/20 9789 12/14/20 
0440 WBC 9.1 10.7 HGB 9.2* 8.5* HCT 29.1* 26.4*  
* 404* Recent Labs 12/15/20 
0342 12/14/20 
0440  140  
K 4.1 4.3 * 107 CO2 25 28 BUN 25* 30* CREA 1.05 1.18  
* 108* CA 9.0 8.9 No results for input(s): AP, TBIL, TP, ALB, GLOB, AML, LPSE in the last 72 hours. No lab exists for component: SGOT, GPT, AMYP No results for input(s): INR, PTP, APTT, INREXT, INREXT in the last 72 hours. No results for input(s): PHI, PCO2I, PO2I, FIO2I in the last 72 hours. Recent Labs 12/13/20 
0148 TROIQ <0.05  
 
 
MEDS: Reviewed Chest Imaging: personally reviewed and report checked Tele- reviewed Medical decision making:  
I have reviewed the flowsheet and previous day's notes Patient has acute or chronic illness that poses a threat to life or bodily function Review and order of Clinical lab tests Review and Order of Radiology tests Independent visualization of Image High Risk Drug therapy requiring intensive monitoring for toxicity: eg steroids, pressors, antibiotics Thank you for allowing me to participate in this patient's care. Vicky Foley PA-C [facility]

## 2020-12-15 NOTE — PROGRESS NOTES
Day # 3 of Vancomycin Indication: Aspiration pneumonia Current regimen: 1500 mg q 24hr Abx regimen: zoyn+van ID Following ?: NO 
Concomitant nephrotoxic drugs (requires more frequent monitoring): None Frequency of BMP?: daily through  Recent Labs 12/15/20 
5360 20 
0440 20 
0148 WBC 9.1 10.7 13.2*  
CREA 1.05 1.18 1.06  
BUN 25* 30* 27* Est CrCl: ~ 62.4 ml/min; Temp (24hrs), Av.1 °F (37.3 °C), Min:98.5 °F (36.9 °C), Max:100.4 °F (38 °C) Cultures:  
 Blood - NG - Final 
 MRSA, nares - in process Goal trough = 15 - 20 mcg/mL Recent trough history (date/time/level/dose/action taken): 
none Plan: Change to 1500 mg Q 18 hr for improvement in SCr. Bryon Garcia, PharmD Clinical Pharmacist 
[facility] ()

## 2020-12-15 NOTE — PROGRESS NOTES
CRAIG: 
 
RUR 18% 5417 [facility] accepted patient pending Insurance authorization. Patient's daughter, Alexander Rosado notified of status. Arielle Mayes, RN/CRM

## 2020-12-15 NOTE — PROGRESS NOTES
Bedside and Verbal shift change report given to Allen's Rn (oncoming nurse) by Brandee Pemberton (offgoing nurse). Report included the following information SBAR, Kardex, ED Summary, Procedure Summary, Intake/Output, MAR, Recent Results and Cardiac Rhythm AFIB .

## 2020-12-15 NOTE — PROGRESS NOTES
Problem: Mobility Impaired (Adult and Pediatric) Goal: *Acute Goals and Plan of Care (Insert Text) Description: FUNCTIONAL STATUS PRIOR TO ADMISSION: Patient was modified independent using a rollator for functional mobility (approx 15 ft). Patient required minimal assistance for basic and instrumental ADLs. HOME SUPPORT PRIOR TO ADMISSION: The patient lived with wife. Physical Therapy Goals Re-assessed and downgraded 12/10/2020 1. Patient will move from supine to sit and sit to supine , scoot up and down, and roll side to side in bed with maximal assistance within 7 day(s). 2.  Patient will transfer from bed to chair and chair to bed with maximal assistance using the least restrictive device within 7 day(s). 3.  Patient will perform sit to stand with moderate assistance  within 7 day(s). 4.  Patient will ambulate with maximal assistance for 5 feet with the least restrictive device within 7 day(s). 5.  Patient will sit EOB for 10 minutes with SBA within 7 days. Initiated 12/3/2020 1. Patient will move from supine to sit and sit to supine , scoot up and down, and roll side to side in bed with modified independence within 7 day(s). 2.  Patient will transfer from bed to chair and chair to bed with supervision/set-up using the least restrictive device within 7 day(s). 3.  Patient will perform sit to stand with supervision/set-up within 7 day(s). 4.  Patient will ambulate with supervision/set-up for 30 feet with the least restrictive device within 7 day(s). 5.  Patient will ascend/descend 3 stairs with bilateral handrail(s) with minimal assistance/contact guard assist within 7 day(s). Outcome: Progressing Towards Goal 
 PHYSICAL THERAPY TREATMENT Patient: Jodee Jay (37 y.o. male) Date: 12/15/2020 Diagnosis: Pneumonia [J18.9] <principal problem not specified> Precautions: Fall Chart, physical therapy assessment, plan of care and goals were reviewed. ASSESSMENT Patient continues with skilled PT services and is progressing towards goals. Received patient sitting up in the chair, confused, daughter in the room. Demonstrates increased alertness and asking questions re: situation and location. He is mod/max assist x2 for sit<->stand and to ambulate 5 ft with the RW. Requires manual assist for lateral weight shift for left foot clearance. Maintains flexed posture throughout and takes extra time to walk short distance. Patient remains far below his functional baseline of modified independent short distances with the rollator walker. Continue to recommend SNF at discharge. Current Level of Function Impacting Discharge (mobility/balance): Mod/max assist x2 Other factors to consider for discharge:  medical stability, inability to ambulate or transfer at this time, increased need for assistance, increased risk for falls PLAN : 
Patient continues to benefit from skilled intervention to address the above impairments. Continue treatment per established plan of care. to address goals. Recommendation for discharge: (in order for the patient to meet his/her long term goals) Therapy up to 5 days/week in SNF setting This discharge recommendation: 
Has been made in collaboration with the attending provider and/or case management IF patient discharges home will need the following DME: to be determined (TBD) by rehab facility SUBJECTIVE:  
Patient stated Is this my house.  OBJECTIVE DATA SUMMARY:  
Critical Behavior: 
Neurologic State: Eyes open spontaneously Orientation Level: Oriented to person, Disoriented to place, Disoriented to situation Cognition: Decreased attention/concentration Safety/Judgement: Decreased awareness of environment Functional Mobility Training: 
Bed Mobility: 
Supine to sitting: n/t* received sitting up in the chair Sit to Supine: Moderate assistance for LE management onto bed. Pt does well achieving trunk to sidelying with tactile assist.  
  
  
  
Transfers: 
Sit to Stand: Moderate assistance;Maximum assistance;Assist x2; Additional time Stand to Sit: Moderate assistance;Maximum assistance;Assist x2 Max cues for hand placement, to push up, for upright posture, forward weight shif Balance: 
Sitting: Impaired; Without support Sitting - Static: Fair (occasional) Sitting - Dynamic: Poor (constant support) Standing: Impaired; With support Standing - Static: Poor;Constant support Standing - Dynamic : Poor;Constant support Ambulation/Gait Training: 
Distance (ft): 5 Feet (ft) Assistive Device: Gait belt;Walker, rolling Ambulation - Level of Assistance: Moderate assistance;Maximum assistance;Assist x2 Gait Abnormalities: Decreased step clearance; Festinating gait; Shuffling gait; Other(foward flexed trunk) Base of Support: Widened Stance: Left increased Speed/Anais: Slow;Shuffled;Pace decreased (<100 feet/min) Step Length: Right shortened;Left shortened Max assist for right lateral weight shift to advance left foot. Pain Rating: 
No pain indicated Activity Tolerance:  
Fair and requires rest breaks After treatment patient left in no apparent distress:  
Call bell within reach, Caregiver / family present, Side rails x 3, and left sidelying with pillow between legs COMMUNICATION/COLLABORATION:  
The patients plan of care was discussed with: Occupational therapy assistant and Registered nurse. Yaya Perez PT Time Calculation: 20 mins

## 2020-12-15 NOTE — PROGRESS NOTES
[facility] Adult  Hospitalist Group Hospitalist Progress Note Dc Doran MD 
Answering service: [phone] from in house phone Date of Service:  12/15/2020 NAME:  Sophie Vargas :  1940 MRN:  [id number] Admission Summary:  
Sophie Vargas is a 78 y.o. male past medical history significant for Parkinson, dementia, CAD, CKD stage III presented emergency room with respiratory distress and hypoxia. Patient [facility] as per report he had an episode today of choking during dinner afterward he became hypoxic with O2 sats in the upper 80s and found to have a \" low-grade fever\" of 99.9. Per daughter report he had a similar episode of choking last week again when he was eating by his respiratory symptoms were not as severe and he recovered quickly. Interval history / Subjective: Follow-up respiratory failure. Patient seen and examined earlier today. Daughter at bedside. Oxygen requirement slowly decreasing. Still on 2 liters. Was able to recognize me. srtill appears to be confused. Transfer to remote tele. Pulmonology following. Assessment & Plan:  
 
Acute hypoxic respiratory failure: slow improvement Aspiration pneumonia: 
Volume overload: 
-Suspected respiratory failure due to aspiration pneumonia 
-Procalcitonin elevated at 12, repeat improved to 0.25 
-on zosyn . Also on iv vancomycin  
-S/p Lasix IV x1,  
-Albuterol nebulizer three times changed to prn  
-Guaifenisen twice daily 
-Echocardiogram with EF 48-25%, mild diastolic dysfunction On 2 liters of oxygen Pulmonology following Fever: resolved Leucocytosis. Worsening  
-CT scan w/o contrast ordered. Unable to do CTA due to possible dye allergy  
-Lower extremity dopplers negative for DVT 
-D-dimer elevated 4.97 On IV zosyn Acute encephalopathy: waxing and waning - Encouraged family to talk with patient during the day for improved sleep at night Parkinson: at baseline as per family 
-continue with home medications 
  
Elevated serum creatinine , resolved  
-Suspected prerenal 
-lasix and losartan held due to elevated serum creatinine BMP in AM 
Will likely restart losartan tomorrow based on BP reading and serum creatinine.  
  
Anemia, macrocytic: Stable 
  
Hypertension: continue amlodipine,  losartan (on hold)  
-has fluctuating blood pressures Left hip pain:  
-order x-ray, no acute abnormality Follow up PT/OT. Suspect need SNF on discharge Patient with baseline low functioning level. He is at risk for decline 
  
Code status: FULL 
DVT prophylaxis: SCDs Care Plan discussed with: Patient/Family Anticipated Disposition: SNF Anticipated Discharge: >48 hours Hospital Problems  Date Reviewed: 9/14/2020 Codes Class Noted POA Pneumonia ICD-10-CM: J18.9 ICD-9-CM: 722  12/3/2020 Unknown Review of Systems:  
Negative unless stated above Vital Signs:  
 Last 24hrs VS reviewed since prior progress note. Most recent are: 
Visit Vitals BP (!) 145/85 (BP 1 Location: Right arm) Pulse 85 Temp 99 °F (37.2 °C) Resp 23 Ht 5' 10\" (1.778 m) Wt 93.8 kg (206 lb 12.7 oz) SpO2 95% BMI 29.67 kg/m² Intake/Output Summary (Last 24 hours) at 12/15/2020 1232 Last data filed at 12/15/2020 8909 Gross per 24 hour Intake 1140 ml Output 0 ml Net 1140 ml Physical Examination:  
 
I had a face to face encounter with this patient and independently examined them on 12/15/2020 as outlined below: 
 
     
Constitutional:  looks calm  Lying on bed ENT:  Oral mucosa moist, oropharynx benign. Resp:  no wheezing/rhonchi/rales. No accessory muscle use CV:  Regular rhythm, normal rate, no murmurs, gallops, rubs GI:  Soft, non distended, non tender. normoactive bowel sounds, no hepatosplenomegaly Musculoskeletal:  No edema, warm, 2+ pulses throughout Neurologic:  Moves all extremities. Skin:  Good turgor, no rashes or ulcers Data Review:  
 Review and/or order of clinical lab test 
Review and/or order of tests in the radiology section of CPT Review and/or order of tests in the medicine section of [facility] Labs:  
 
Recent Labs 12/15/20 
0342 12/14/20 
0440 WBC 9.1 10.7 HGB 9.2* 8.5* HCT 29.1* 26.4*  
* 404* Recent Labs 12/15/20 
7999 12/14/20 
0440 12/13/20 
0148  140 139  
K 4.1 4.3 4.4  
* 107 107 CO2 25 28 29 BUN 25* 30* 27* CREA 1.05 1.18 1.06  
* 108* 111* CA 9.0 8.9 9.0 No results for input(s): ALT, AP, TBIL, TBILI, TP, ALB, GLOB, GGT, AML, LPSE in the last 72 hours. No lab exists for component: SGOT, GPT, AMYP, HLPSE No results for input(s): INR, PTP, APTT, INREXT, INREXT in the last 72 hours. No results for input(s): FE, TIBC, PSAT, FERR in the last 72 hours. No results found for: FOL, RBCF No results for input(s): PH, PCO2, PO2 in the last 72 hours. Recent Labs 12/13/20 
0148 TROIQ <0.05 Lab Results Component Value Date/Time Cholesterol, total 119 07/08/2020 10:40 AM  
 HDL Cholesterol 51 07/08/2020 10:40 AM  
 LDL, calculated 47 07/08/2020 10:40 AM  
 Triglyceride 103 07/08/2020 10:40 AM  
 CHOL/HDL Ratio 2 07/08/2020 10:40 AM  
 
No results found for: [facility] Lab Results Component Value Date/Time  Color DARK YELLOW 12/14/2020 05:36 AM  
 Appearance CLEAR 12/14/2020 05:36 AM  
 Specific gravity 1.024 12/14/2020 05:36 AM  
 Specific gravity 1.015 07/08/2020 10:41 AM  
 pH (UA) 5.0 12/14/2020 05:36 AM  
 Protein TRACE (A) 12/14/2020 05:36 AM  
 Glucose Negative 12/14/2020 05:36 AM  
 Ketone TRACE (A) 12/14/2020 05:36 AM  
 Bilirubin Negative 12/14/2020 05:36 AM  
 Urobilinogen 0.2 12/14/2020 05:36 AM  
 Nitrites Negative 12/14/2020 05:36 AM  
 Leukocyte Esterase Negative 12/14/2020 05:36 AM  
 Epithelial cells FEW 12/14/2020 05:36 AM  
 Bacteria Negative 12/14/2020 05:36 AM  
 WBC 0-4 12/14/2020 05:36 AM  
 RBC 5-10 12/14/2020 05:36 AM  
 
 
 
Medications Reviewed:  
 
Current Facility-Administered Medications Medication Dose Route Frequency  [START ON 12/16/2020] vancomycin (VANCOCIN) 1500 mg in  ml infusion  1,500 mg IntraVENous Q18H  
 guaiFENesin (ROBITUSSIN) 100 mg/5 mL oral liquid 400 mg  400 mg Oral Q8H  Vancomycin- Pharmacy to Dose   Other Rx Dosing/Monitoring  polyvinyl alcohol-povidone (NATURAL TEARS) 0.5-0.6 % ophthalmic solution 1 Drop  1 Drop Both Eyes QHS  acetylcysteine (MUCOMYST) 200 mg/mL (20 %) solution 200 mg  200 mg Nebulization TID RT  
 balsam peru-castor oiL (VENELEX) ointment   Topical TID  enoxaparin (LOVENOX) injection 40 mg  40 mg SubCUTAneous Q24H  piperacillin-tazobactam (ZOSYN) 3.375 g in 0.9% sodium chloride (MBP/ADV) 100 mL MBP  3.375 g IntraVENous Q8H  
 oxyCODONE IR (ROXICODONE) tablet 2.5 mg  2.5 mg Oral Q6H PRN  
 albuterol-ipratropium (DUO-NEB) 2.5 MG-0.5 MG/3 ML  3 mL Nebulization TID RT  
 amLODIPine (NORVASC) tablet 10 mg  10 mg Oral DAILY  losartan (COZAAR) tablet 50 mg  50 mg Oral DAILY  carbidopa-levodopa (SINEMET)  mg per tablet 2 Tab  2 Tab Oral QID  albuterol (PROVENTIL VENTOLIN) nebulizer solution 2.5 mg  2.5 mg Nebulization Q6H PRN  
 sodium chloride (NS) flush 5-40 mL  5-40 mL IntraVENous Q8H  
 sodium chloride (NS) flush 5-40 mL  5-40 mL IntraVENous PRN  
 acetaminophen (TYLENOL) tablet 650 mg  650 mg Oral Q6H PRN Or  
 acetaminophen (TYLENOL) suppository 650 mg  650 mg Rectal Q6H PRN  polyethylene glycol (MIRALAX) packet 17 g  17 g Oral DAILY PRN  promethazine (PHENERGAN) tablet 12.5 mg  12.5 mg Oral Q6H PRN Or  
 ondansetron (ZOFRAN) injection 4 mg  4 mg IntraVENous Q6H PRN  
 aspirin delayed-release tablet 81 mg  81 mg Oral DAILY  atorvastatin (LIPITOR) tablet 40 mg  40 mg Oral QHS  entacapone (COMTAN) tablet 200 mg  200 mg Oral TID  pantoprazole (PROTONIX) tablet 20 mg  20 mg Oral ACB  PARoxetine (PAXIL) tablet 10 mg  10 mg Oral DAILY  gabapentin (NEURONTIN) capsule 200 mg  200 mg Oral BID  rOPINIRole (REQUIP) tablet 1 mg  1 mg Oral BID  
 
______________________________________________________________________ EXPECTED LENGTH OF STAY: 3d 2h 
ACTUAL LENGTH OF STAY:          Jacques Villa MD

## 2020-12-16 LAB
ANION GAP SERPL CALC-SCNC: 5 MMOL/L (ref 5–15)
ATRIAL RATE: 107 BPM
BASOPHILS # BLD: 0 K/UL (ref 0–0.1)
BASOPHILS NFR BLD: 0 % (ref 0–1)
BUN SERPL-MCNC: 21 MG/DL (ref 6–20)
BUN/CREAT SERPL: 22 (ref 12–20)
CALCIUM SERPL-MCNC: 8.9 MG/DL (ref 8.5–10.1)
CALCULATED R AXIS, ECG10: -37 DEGREES
CALCULATED T AXIS, ECG11: -6 DEGREES
CHLORIDE SERPL-SCNC: 109 MMOL/L (ref 97–108)
CO2 SERPL-SCNC: 22 MMOL/L (ref 21–32)
COMMENT, HOLDF: NORMAL
CREAT SERPL-MCNC: 0.95 MG/DL (ref 0.7–1.3)
DIAGNOSIS, 93000: NORMAL
DIFFERENTIAL METHOD BLD: ABNORMAL
EOSINOPHIL # BLD: 0.1 K/UL (ref 0–0.4)
EOSINOPHIL NFR BLD: 2 % (ref 0–7)
ERYTHROCYTE [DISTWIDTH] IN BLOOD BY AUTOMATED COUNT: 12 % (ref 11.5–14.5)
GLUCOSE BLD STRIP.AUTO-MCNC: 102 MG/DL (ref 65–100)
GLUCOSE SERPL-MCNC: 109 MG/DL (ref 65–100)
HCT VFR BLD AUTO: 28.2 % (ref 36.6–50.3)
HGB BLD-MCNC: 9.1 G/DL (ref 12.1–17)
IMM GRANULOCYTES # BLD AUTO: 0.1 K/UL (ref 0–0.04)
IMM GRANULOCYTES NFR BLD AUTO: 1 % (ref 0–0.5)
LYMPHOCYTES # BLD: 1 K/UL (ref 0.8–3.5)
LYMPHOCYTES NFR BLD: 12 % (ref 12–49)
MCH RBC QN AUTO: 33 PG (ref 26–34)
MCHC RBC AUTO-ENTMCNC: 32.3 G/DL (ref 30–36.5)
MCV RBC AUTO: 102.2 FL (ref 80–99)
MONOCYTES # BLD: 0.7 K/UL (ref 0–1)
MONOCYTES NFR BLD: 7 % (ref 5–13)
NEUTS SEG # BLD: 7 K/UL (ref 1.8–8)
NEUTS SEG NFR BLD: 78 % (ref 32–75)
NRBC # BLD: 0 K/UL (ref 0–0.01)
NRBC BLD-RTO: 0 PER 100 WBC
PLATELET # BLD AUTO: 480 K/UL (ref 150–400)
PMV BLD AUTO: 9.6 FL (ref 8.9–12.9)
POTASSIUM SERPL-SCNC: 4.8 MMOL/L (ref 3.5–5.1)
Q-T INTERVAL, ECG07: 352 MS
QRS DURATION, ECG06: 82 MS
QTC CALCULATION (BEZET), ECG08: 449 MS
RBC # BLD AUTO: 2.76 M/UL (ref 4.1–5.7)
SAMPLES BEING HELD,HOLD: NORMAL
SERVICE CMNT-IMP: ABNORMAL
SODIUM SERPL-SCNC: 136 MMOL/L (ref 136–145)
VENTRICULAR RATE, ECG03: 98 BPM
WBC # BLD AUTO: 8.9 K/UL (ref 4.1–11.1)

## 2020-12-16 PROCEDURE — 74011250636 HC RX REV CODE- 250/636: Performed by: INTERNAL MEDICINE

## 2020-12-16 PROCEDURE — 97530 THERAPEUTIC ACTIVITIES: CPT

## 2020-12-16 PROCEDURE — 74011250637 HC RX REV CODE- 250/637: Performed by: INTERNAL MEDICINE

## 2020-12-16 PROCEDURE — 85025 COMPLETE CBC W/AUTO DIFF WBC: CPT

## 2020-12-16 PROCEDURE — 80048 BASIC METABOLIC PNL TOTAL CA: CPT

## 2020-12-16 PROCEDURE — 97110 THERAPEUTIC EXERCISES: CPT

## 2020-12-16 PROCEDURE — 93005 ELECTROCARDIOGRAM TRACING: CPT

## 2020-12-16 PROCEDURE — 94760 N-INVAS EAR/PLS OXIMETRY 1: CPT

## 2020-12-16 PROCEDURE — 65660000000 HC RM CCU STEPDOWN

## 2020-12-16 PROCEDURE — 74011250636 HC RX REV CODE- 250/636: Performed by: PHYSICIAN ASSISTANT

## 2020-12-16 PROCEDURE — 74011000250 HC RX REV CODE- 250: Performed by: INTERNAL MEDICINE

## 2020-12-16 PROCEDURE — 87635 SARS-COV-2 COVID-19 AMP PRB: CPT

## 2020-12-16 PROCEDURE — 94640 AIRWAY INHALATION TREATMENT: CPT

## 2020-12-16 PROCEDURE — 36415 COLL VENOUS BLD VENIPUNCTURE: CPT

## 2020-12-16 PROCEDURE — 97535 SELF CARE MNGMENT TRAINING: CPT

## 2020-12-16 PROCEDURE — 99223 1ST HOSP IP/OBS HIGH 75: CPT | Performed by: INTERNAL MEDICINE

## 2020-12-16 PROCEDURE — 74011000258 HC RX REV CODE- 258: Performed by: INTERNAL MEDICINE

## 2020-12-16 PROCEDURE — 82962 GLUCOSE BLOOD TEST: CPT

## 2020-12-16 RX ORDER — HYDRALAZINE HYDROCHLORIDE 20 MG/ML
10 INJECTION INTRAMUSCULAR; INTRAVENOUS
Status: DISCONTINUED | OUTPATIENT
Start: 2020-12-16 | End: 2020-12-18 | Stop reason: HOSPADM

## 2020-12-16 RX ADMIN — Medication: at 22:07

## 2020-12-16 RX ADMIN — IPRATROPIUM BROMIDE AND ALBUTEROL SULFATE 3 ML: .5; 3 SOLUTION RESPIRATORY (INHALATION) at 08:14

## 2020-12-16 RX ADMIN — AMLODIPINE BESYLATE 10 MG: 5 TABLET ORAL at 09:15

## 2020-12-16 RX ADMIN — ENOXAPARIN SODIUM 40 MG: 40 INJECTION SUBCUTANEOUS at 18:22

## 2020-12-16 RX ADMIN — PIPERACILLIN AND TAZOBACTAM 3.38 G: 3; .375 INJECTION, POWDER, LYOPHILIZED, FOR SOLUTION INTRAVENOUS at 05:00

## 2020-12-16 RX ADMIN — ENTACAPONE 200 MG: 200 TABLET, FILM COATED ORAL at 18:21

## 2020-12-16 RX ADMIN — IPRATROPIUM BROMIDE AND ALBUTEROL SULFATE 3 ML: .5; 3 SOLUTION RESPIRATORY (INHALATION) at 14:14

## 2020-12-16 RX ADMIN — PANTOPRAZOLE SODIUM 20 MG: 20 TABLET, DELAYED RELEASE ORAL at 09:15

## 2020-12-16 RX ADMIN — Medication 10 ML: at 22:03

## 2020-12-16 RX ADMIN — CARBIDOPA AND LEVODOPA 2 TABLET: 25; 100 TABLET ORAL at 22:06

## 2020-12-16 RX ADMIN — CARBIDOPA AND LEVODOPA 2 TABLET: 25; 100 TABLET ORAL at 13:13

## 2020-12-16 RX ADMIN — ENTACAPONE 200 MG: 200 TABLET, FILM COATED ORAL at 13:14

## 2020-12-16 RX ADMIN — GABAPENTIN 200 MG: 100 CAPSULE ORAL at 18:21

## 2020-12-16 RX ADMIN — ROPINIROLE HYDROCHLORIDE 1 MG: 1 TABLET, FILM COATED ORAL at 18:22

## 2020-12-16 RX ADMIN — PAROXETINE HYDROCHLORIDE 10 MG: 20 TABLET, FILM COATED ORAL at 09:16

## 2020-12-16 RX ADMIN — CARBIDOPA AND LEVODOPA 2 TABLET: 25; 100 TABLET ORAL at 09:16

## 2020-12-16 RX ADMIN — ATORVASTATIN CALCIUM 40 MG: 40 TABLET, FILM COATED ORAL at 22:06

## 2020-12-16 RX ADMIN — Medication: at 09:14

## 2020-12-16 RX ADMIN — ENTACAPONE 200 MG: 200 TABLET, FILM COATED ORAL at 22:06

## 2020-12-16 RX ADMIN — ROPINIROLE HYDROCHLORIDE 1 MG: 1 TABLET, FILM COATED ORAL at 13:13

## 2020-12-16 RX ADMIN — Medication 81 MG: at 09:15

## 2020-12-16 RX ADMIN — ACETAMINOPHEN 650 MG: 325 TABLET ORAL at 09:14

## 2020-12-16 RX ADMIN — Medication: at 18:22

## 2020-12-16 RX ADMIN — GUAIFENESIN 400 MG: 100 SOLUTION ORAL at 22:06

## 2020-12-16 RX ADMIN — VANCOMYCIN HYDROCHLORIDE 1500 MG: 10 INJECTION, POWDER, LYOPHILIZED, FOR SOLUTION INTRAVENOUS at 02:30

## 2020-12-16 RX ADMIN — Medication 10 ML: at 14:00

## 2020-12-16 RX ADMIN — GUAIFENESIN 400 MG: 100 SOLUTION ORAL at 06:00

## 2020-12-16 RX ADMIN — PIPERACILLIN AND TAZOBACTAM 3.38 G: 3; .375 INJECTION, POWDER, LYOPHILIZED, FOR SOLUTION INTRAVENOUS at 13:13

## 2020-12-16 RX ADMIN — GUAIFENESIN 400 MG: 100 SOLUTION ORAL at 13:13

## 2020-12-16 RX ADMIN — GABAPENTIN 200 MG: 100 CAPSULE ORAL at 09:15

## 2020-12-16 RX ADMIN — CARBIDOPA AND LEVODOPA 2 TABLET: 25; 100 TABLET ORAL at 18:21

## 2020-12-16 RX ADMIN — Medication 1 DROP: at 22:21

## 2020-12-16 RX ADMIN — LOSARTAN POTASSIUM 50 MG: 50 TABLET, FILM COATED ORAL at 09:15

## 2020-12-16 NOTE — ROUTINE PROCESS
TRANSFER - OUT REPORT: 
 
Verbal report given to Beryl(name) on Yue Mchugh  being transferred to [facility]) for routine progression of care Report consisted of patients Situation, Background, Assessment and  
Recommendations(SBAR). Information from the following report(s) SBAR, Kardex, Accordion, Recent Results and Cardiac Rhythm sinus arrythmia was reviewed with the receiving nurse. Lines:  
Peripheral IV 12/13/20 Right Antecubital (Active) Site Assessment Clean, dry, & intact 12/15/20 9284 Phlebitis Assessment 0 12/15/20 0816 Infiltration Assessment 0 12/15/20 0816 Dressing Status Clean, dry, & intact 12/15/20 9533 Dressing Type Transparent 12/15/20 0816 Hub Color/Line Status Infusing;Yellow 12/15/20 0816 Action Taken Open ports on tubing capped 12/14/20 0730 Alcohol Cap Used Yes 12/15/20 5697 Opportunity for questions and clarification was provided. Patient transported with: 
 Registered Nurse Tech Family transferred with patient, settled in room.

## 2020-12-16 NOTE — PROGRESS NOTES
Problem: Self Care Deficits Care Plan (Adult) Goal: *Acute Goals and Plan of Care (Insert Text) Description:  
FUNCTIONAL STATUS PRIOR TO ADMISSION: Patient is an unreliable historian 2/2 baseline dementia. Per chart review, patient was modified independent using a rollator for short distance functional mobility and received minimal(?) assistance from staff. This date, patient reporting he self-feeds and occasionally needs help with lower body dressing. HOME SUPPORT: Patient recently moved to [facility]. Occupational Therapy Goals Goals reviewed and updated: 12/11/2020 1. Patient will perform 2 unsupported seated ADL tasks with set up/supervision within 7 day(s). 2.  Patient will perform upper body dressing with moderate assistance within 7 day(s). 3.  Patient will perform lower body dressing with maximum assistance within 7 day(s). 4.  Patient will perform toilet transfers to [facility] with moderate assistance and RW within 7 day(s). 5.  Patient will perform all aspects of toileting with maximum assistance and RW within 7 day(s). Initiated 12/4/2020 1. Patient will perform seated ADL task with material set-up and minimal assistance within 7 day(s). MET to wash face and don/doff glasses in supported sitting 12/11/2020 2. Patient will perform upper body dressing with minimal assistance within 7 day(s). 3.  Patient will perform lower body dressing with moderate assistance within 7 day(s). 4.  Patient will perform toilet transfers to [facility] with moderate assistance and RW within 7 day(s). 5.  Patient will perform all aspects of toileting with moderate assistance and RW within 7 day(s). Outcome: Progressing Towards Goal 
 OCCUPATIONAL THERAPY TREATMENT Patient: Kim Cabrera (49 y.o. male) Date: 12/16/2020 Diagnosis: Pneumonia [J18.9] <principal problem not specified> Precautions: Fall Chart, occupational therapy assessment, plan of care, and goals were reviewed. ASSESSMENT Patient continues with skilled OT services and is slowly progressing towards goals. Pt drowsy and increase verbal, tactile and physical movement to get pt to open eyes. Supportive daughter present in the room. Pt performed bed mobility and max to total assist x 2. Fair sit balance with high guard. Performed sit to stand with RW at max assist x 2, increase verbal/tactile cues for postioning. Total assist with hygiene/cl mgnt. Max assist x 2 to access chair, poor body mechanics, poor walker management and poor foot placement. Pt has been accepted to [facility] for rehab. Awaiting COVID results. Will con't to follow. Current Level of Function Impacting Discharge (ADLs): max to total assist with mobility and self-care Other factors to consider for discharge: PLAN : 
Patient continues to benefit from skilled intervention to address the above impairments. Continue treatment per established plan of care. to address goals. Recommend with staff: bryant lewis Recommend next OT session: see goals Recommendation for discharge: (in order for the patient to meet his/her long term goals) Therapy up to 5 days/week in SNF setting This discharge recommendation: A follow-up discussion with the attending provider and/or case management is planned IF patient discharges home will need the following DME: TBD SUBJECTIVE:  
Patient stated, \"What do I need to do? \" OBJECTIVE DATA SUMMARY:  
Cognitive/Behavioral Status: 
Neurologic State: Drowsy Orientation Level: Disoriented to time;Disoriented to situation;Disoriented to place Cognition: Decreased attention/concentration;Decreased command following Safety/Judgement: Decreased insight into deficits Functional Mobility and Transfers for ADLs: 
Bed Mobility: 
Supine to Sit: Maximum assistance;Assist x2 Transfers: 
Sit to Stand: Maximum assistance;Assist x2 Balance: 
Sitting: Impaired; Without support Sitting - Static: Fair (occasional) Sitting - Dynamic: Poor (constant support) Standing: Impaired; With support Standing - Static: Constant support Standing - Dynamic : Poor;Constant support ADL Intervention: 
  
 
Grooming Washing Face: Stand-by assistance Lower Body Bathing Perineal  : Total assistance (dependent) Position Performed: Standing Lower Body Dressing Assistance Protective Undergarmet: Total assistance (dependent) Toileting Bladder Hygiene: Total assistance (dependent) Clothing Management: Total assistance (dependent) Cognitive Retraining Safety/Judgement: Decreased insight into deficits Therapeutic Exercises:  
 
 
Pain: 
L  shoulder pain noted with movement Activity Tolerance:  
Poor After treatment patient left in no apparent distress:  
Sitting in chair, Call bell within reach, Bed / chair alarm activated, and Caregiver / family present COMMUNICATION/COLLABORATION:  
The patients plan of care was discussed with: Physical therapist and Registered nurse. PATSY Ramon/L Time Calculation: 25 mins

## 2020-12-16 NOTE — PROGRESS NOTES
Transition of Care: SNF- [address] [place] [address] has accepted; Covid screen test placed on 12/16 Transport Plan: BLS (not set up yet) RUR: 19% Dx: pneumonia Discharge pending: 
-pending cardiology consult and recommendations 
-pending medical progress 
-per hospitaliat, discharge is likely in next 24-48 hours pending weaning of O2 
 
1015: MARTÍN called Yaneth Courser at [facility] and [address] [place] [address] [phone] to [place] on auth status and covid screen policy; left message; waiting for callback 1100: Manekaryn Dutton called back; a covid screening must be done and within 72 hours of admittance to [facility]; updated Mane Dutton on pending discharge time of 24-48 hours; he verbalized understanding; MARTÍN ware served Dr. Marifer Tristan; he gave verbal order to order covid test; order placed : MARTÍN spoke with patients daughter Brandy Cisneros [phone] about discharge plans to [address] [place] [address]; she verbalized understanding CM following Rickey Willson, RN, CRM

## 2020-12-16 NOTE — CONSULTS
Cardiology Consult/Progress Note    
 
 
 
12/2/2020 10:01 PM  
Pneumonia [J18.9] HPI:  
Pelon Herr is a 78 y.o. male admitted for Pneumonia [J18.9]. This patient presented to the hospital approximately 14 days ago from the [address] [place] [address] with a reported episode of choking during dinner. He has a history for Parkinson's dementia as well as having a significant CVA at the age of 60. He can provide little history. His daughter is present for this discussion for which she provides all the history. She states that he was admitted with aspiration pneumonia as well as acute respiratory failure. He has progressed well during this admission, and is currently close to discharge. Currently on telemetry patient has been having normal sinus rhythm with frequent PACs EKG completed however demonstrated atrial fibrillation with controlled rate. Patient's daughter states that he has never been diagnosed with atrial fibrillation. She does note that he is quite athletic in his younger years he was a runner logging 80,000 miles. .  She states that he had a stroke at age of 60 which was fairly significant for which she was told he would only have about 10% function. This patient is close to discharge as he is going to the [facility] and rehab. Investigation: 
Telemetry: normal sinus rhythm, PAC 
 
ECG:  
EKG Results Procedure 720 Value Units Date/Time EKG, 12 LEAD, INITIAL [[id number]] Collected: 12/16/20 1021 Order Status: Completed Updated: 12/16/20 1111 Ventricular Rate 98 BPM   
  Atrial Rate 107 BPM   
  QRS Duration 82 ms Q-T Interval 352 ms QTC Calculation (Bezet) 449 ms Calculated R Axis -37 degrees Calculated T Axis -6 degrees Diagnosis -- Atrial fibrillation Left axis deviation Inferior infarct (cited on or before 16-DEC-2020) Abnormal ECG When compared with ECG of 13-DEC-2020 01:49, 
 Atrial fibrillation has replaced Sinus rhythm Confirmed by Maribell Agustin ([id number]) on 12/16/2020 11:11:14 AM 
  
 EKG, 12 LEAD, INITIAL [[id number]] Collected: 12/13/20 0149 Order Status: Completed Updated: 12/13/20 1326 Ventricular Rate 75 BPM   
  Atrial Rate 75 BPM   
  P-R Interval 172 ms QRS Duration 82 ms Q-T Interval 404 ms QTC Calculation (Bezet) 451 ms Calculated P Axis 4 degrees Calculated R Axis -40 degrees Calculated T Axis -10 degrees Diagnosis --  
  Sinus rhythm with premature ventricular complexes Left axis deviation When compared with ECG of 02-DEC-2020 22:59, No significant change Confirmed by Olamide Murray M.D., Gerianne Proper ([id number]) on 12/13/2020 1:26:45 PM 
  
 EKG 12 LEAD INITIAL [[id number]] Collected: 12/02/20 2259 Order Status: Completed Updated: 12/03/20 1242 Ventricular Rate 70 BPM   
  Atrial Rate 70 BPM   
  P-R Interval 176 ms QRS Duration 82 ms Q-T Interval 400 ms QTC Calculation (Bezet) 432 ms Calculated P Axis 25 degrees Calculated R Axis -44 degrees Calculated T Axis 14 degrees Diagnosis --  
  Sinus rhythm with premature atrial complexes Left axis deviation Inferior infarct , age undetermined When compared with ECG of 04-SEP-2019 14:33, 
premature atrial complexes are now present Confirmed by Maribell Agustin ([id number]) on 12/3/2020 12:42:51 PM 
  
  
 
 
Echocardiogram:  
12/02/20 ECHO ADULT COMPLETE 12/06/2020 12/6/2020 Narrative · LV: Estimated LVEF is 60 - 65%. Normal cavity size and systolic function  
(ejection fraction normal). Mild concentric hypertrophy. Mild (grade 1)  
left ventricular diastolic dysfunction. · PV: Mild pulmonic valve regurgitation is present. · MV: Mild mitral valve regurgitation is present. · AV: Aortic valve leaflet calcification present. Aortic valve mean  
gradient is 12 mmHg. Aortic valve area is 1.7 cm2. Mild aortic valve  
stenosis is present. · LA: Mildly dilated left atrium. · Image quality for this study was technically difficult. · Contrast used: DEFINITY. Signed by: Agnes White MD  
 
 
 
 Assessment and Plan 1. Recurrent paroxysmal atrial fibrillation with baseline sinus rhythm with PACs - Rate controlled without meds, currently back in sinus rhythm 
 - No OAC, continues on ASA 81 mg. 
   Per his daughter, he was taken off Coumadin a few years ago for rectal bleeding. He has remained off 2/2 high fall risk with Parkinson's and Dementia Discussed with patient and daughter. Patient does not seem to have insight. However daughter agrees that patient is not the best candidate for anticoagulation will hold off. Also discussed the natural course and prognosis of atrial fibrillation. She voiced understanding. This point of time rates are very controlled and he does not require any aggressive rate control therapy. Essentially atrial fibrillation is to be incidental driving symptomatic at this time. 2. Respiratory failure 
 - resolved 3. Asp. PNA 
 - per Primary team 
4. HTN 
 - resume home meds 5. Parkinson's 
 - continue PTA meds 6. HLD 
 - Lipitor 40 mg Afib with rate controlled. No [address] for reasons above. Echo on this admission normal.  Usually has lower HR, Bradycardic, as related to athletic heart. He was an avid running since age 25, logging many mile per year. Atrial fibrillation is incidental and not causing any symptoms. He needs no further cardiac testing or intervention. OK to discharge to rehab and then home. D/w his daughter. She understands and agrees to plan. Will see again as needed. []    High complexity decision making was performed 
[]    Patient is at high-risk of decompensation with multiple organ involvement Review of Symptoms: 
Unable to obtain 2/2 patient condition. Patient Active Problem List  
 Diagnosis Date Noted  Acute blood loss anemia 01/16/2019   Priority: 2 - Two  
  Parkinson's disease ([facility] 01/30/2015 Priority: 2 - Two  Long-term current use of high risk medication other than anticoagulant 06/13/2018 Priority: 3 - Three  Gastroesophageal reflux disease without esophagitis 09/30/2017 Priority: 3 - Three  Pneumonia 12/03/2020  Benign prostatic hyperplasia with nocturia 02/17/2020  
 History of stroke 07/29/2019  Dementia due to Parkinson's disease without behavioral disturbance ([address] 07/29/2019  Parkinsonism ([address] 07/29/2019  Bilateral carotid artery stenosis 07/29/2019  Mild cognitive impairment 07/29/2019  Cerebrovascular accident (CVA) ([address] 07/29/2019  Rectal bleeding 01/15/2019  PE (physical exam), annual 09/30/2017  Aaron esophagus  Depression  Hyperlipidemia  Snoring  Hearing difficulty of both ears 07/12/2017  Sleep apnea in adult 07/12/2017  Tendinitis of left rotator cuff 07/12/2017  Coronary artery disease 07/12/2017  DJD (degenerative joint disease) 07/12/2017  Tendinitis 07/12/2017  Gout 07/12/2017  Tremor 07/12/2017  Restless leg syndrome 07/12/2017  Lumbar stenosis 01/29/2015  Essential hypertension  MCI (mild cognitive impairment)  CKD (chronic kidney disease) stage 2, GFR 60-89 ml/min Ronny Johnson MD 
Past Medical History:  
Diagnosis Date  Aaron esophagus  Benign nodular prostatic hyperplasia without lower urinary tract symptoms 7/12/2017  CKD (chronic kidney disease) stage 2, GFR 60-89 ml/min  Constipation  Coronary artery disease 7/12/2017  Depression  DJD (degenerative joint disease) 7/12/2017 [address]  Gout 7/12/2017  Headache   
 Hearing difficulty of both ears 7/12/2017  Hyperlipidemia  Hypertension  Impaired cognition 7/12/2017  MCI (mild cognitive impairment)  Restless leg syndrome 7/12/2017  Sleep apnea in adult 7/12/2017 No longer on CPAP @13  Snoring  Stroke ([facility]  Tendinitis 7/12/2017 ACHILLES  Tendinitis of left rotator cuff 07/12/2017  Tremor 7/12/2017 LEFT HAND  Unspecified sleep apnea   
 lost weight no longer has it Past Surgical History:  
Procedure Laterality Date  COLONOSCOPY N/A 1/16/2019 COLONOSCOPY performed by Ashley Renner MD at [facility] ENDOSCOPY  COLONOSCOPY,FLEX,W/CONTROL, BLEEDING  1/16/2019  HX ORTHOPAEDIC  1/27/15 L4-L5 MICRODECOMPRESSION (Right  SC EGD TRANSORAL BIOPSY SINGLE/MULTIPLE  8/16/2011  SC EGD TRANSORAL BIOPSY SINGLE/MULTIPLE  10/1/2013 Social History Socioeconomic History  Marital status:  Spouse name: Not on file  Number of children: Not on file  Years of education: Not on file  Highest education level: Not on file Tobacco Use  Smoking status: Former Smoker  Smokeless tobacco: Never Used Substance and Sexual Activity  Alcohol use: No  
 Drug use: No  
 
Family History Problem Relation Age of Onset  Heart Disease Mother  Heart Disease Father Current Facility-Administered Medications Medication Dose Route Frequency  vancomycin (VANCOCIN) 1500 mg in  ml infusion  1,500 mg IntraVENous Q18H  
 guaiFENesin (ROBITUSSIN) 100 mg/5 mL oral liquid 400 mg  400 mg Oral Q8H  Vancomycin- Pharmacy to Dose   Other Rx Dosing/Monitoring  polyvinyl alcohol-povidone (NATURAL TEARS) 0.5-0.6 % ophthalmic solution 1 Drop  1 Drop Both Eyes QHS  acetylcysteine (MUCOMYST) 200 mg/mL (20 %) solution 200 mg  200 mg Nebulization TID RT  
 balsam peru-castor oiL (VENELEX) ointment   Topical TID  enoxaparin (LOVENOX) injection 40 mg  40 mg SubCUTAneous Q24H  piperacillin-tazobactam (ZOSYN) 3.375 g in 0.9% sodium chloride (MBP/ADV) 100 mL MBP  3.375 g IntraVENous Q8H  
 oxyCODONE IR (ROXICODONE) tablet 2.5 mg  2.5 mg Oral Q6H PRN  
 albuterol-ipratropium (DUO-NEB) 2.5 MG-0.5 MG/3 ML  3 mL Nebulization TID RT  
  amLODIPine (NORVASC) tablet 10 mg  10 mg Oral DAILY  losartan (COZAAR) tablet 50 mg  50 mg Oral DAILY  carbidopa-levodopa (SINEMET)  mg per tablet 2 Tab  2 Tab Oral QID  albuterol (PROVENTIL VENTOLIN) nebulizer solution 2.5 mg  2.5 mg Nebulization Q6H PRN  
 sodium chloride (NS) flush 5-40 mL  5-40 mL IntraVENous Q8H  
 sodium chloride (NS) flush 5-40 mL  5-40 mL IntraVENous PRN  
 acetaminophen (TYLENOL) tablet 650 mg  650 mg Oral Q6H PRN Or  
 acetaminophen (TYLENOL) suppository 650 mg  650 mg Rectal Q6H PRN  polyethylene glycol (MIRALAX) packet 17 g  17 g Oral DAILY PRN  promethazine (PHENERGAN) tablet 12.5 mg  12.5 mg Oral Q6H PRN Or  
 ondansetron (ZOFRAN) injection 4 mg  4 mg IntraVENous Q6H PRN  
 aspirin delayed-release tablet 81 mg  81 mg Oral DAILY  atorvastatin (LIPITOR) tablet 40 mg  40 mg Oral QHS  entacapone (COMTAN) tablet 200 mg  200 mg Oral TID  pantoprazole (PROTONIX) tablet 20 mg  20 mg Oral ACB  PARoxetine (PAXIL) tablet 10 mg  10 mg Oral DAILY  gabapentin (NEURONTIN) capsule 200 mg  200 mg Oral BID  rOPINIRole (REQUIP) tablet 1 mg  1 mg Oral BID Prior to Admission Medications Prescriptions Last Dose Informant Patient Reported? Taking? MULTIVITAMIN PO   Yes No  
Sig: Take 1 Tab by mouth daily. PARoxetine (PAXIL) 10 mg tablet   No No  
Sig: TAKE 1 TABLET BY MOUTH DAILY  
acetaminophen (TYLENOL EXTRA STRENGTH) 500 mg tablet   Yes No  
Sig: Take 1,000 mg by mouth three (3) times daily as needed for Pain. Rapid release  
amLODIPine (NORVASC) 5 mg tablet   No No  
Sine tab daily qhs  Indications: high blood pressure  
artificial tears, dextran 70-hypromellose, (GenTeal Tears Mild) 0.1-0.3 % ophthalmic solution   Yes No  
Sig: Administer 1 Drop to both eyes nightly. aspirin delayed-release 81 mg tablet   Yes No  
Sig: Take 81 mg by mouth daily. atorvastatin (LIPITOR) 40 mg tablet   No No  
Sig: TAKE 1 TABLET DAILY carbidopa-levodopa (Sinemet)  mg per tablet   No No  
Si p.o. 4 times daily  Indications: Parkinson's disease  
carboxymethylcellulose sodium (THERATEARS OP)   Yes No  
Sig: Apply  to eye. 1 -2 drops both eyes bid  
entacapone (COMTAN) 200 mg tablet   No No  
Sig: TAKE 1 TABLET THREE TIMES A DAY  
esomeprazole (NEXIUM) 20 mg capsule   No No  
Sig: TAKE 1 CAPSULE DAILY  
gabapentin (NEURONTIN) 100 mg capsule   No No  
Sig: T2C BID  
guaiFENesin (Tussin) 100 mg/5 mL liquid   Yes Yes Sig: Take 400 mg by mouth daily as needed for Cough. rOPINIRole (REQUIP) 1 mg tablet   Yes Yes Sig: Take 1 mg by mouth two (2) times a day. senna-docusate (SENNA-S) 8.6-50 mg per tablet   Yes No  
Sig: Take 1 Tab by mouth every other day. valsartan (DIOVAN) 160 mg tablet   No No  
Sig: Take 1 Tab by mouth Every morning for 90 days. Indications: high blood pressure  
vitamin e (E GEMS) 1,000 unit capsule   Yes No  
Sig: Take 2,000 Units by mouth daily. Facility-Administered Medications: None No Known Allergies Labs:  
Recent Results (from the past 24 hour(s)) METABOLIC PANEL, BASIC Collection Time: 20  3:12 AM  
Result Value Ref Range Sodium 136 136 - 145 mmol/L Potassium 4.8 3.5 - 5.1 mmol/L Chloride 109 (H) 97 - 108 mmol/L  
 CO2 22 21 - 32 mmol/L Anion gap 5 5 - 15 mmol/L Glucose 109 (H) 65 - 100 mg/dL BUN 21 (H) 6 - 20 MG/DL Creatinine 0.95 0.70 - 1.30 MG/DL  
 BUN/Creatinine ratio 22 (H) 12 - 20 GFR est AA >60 >60 ml/min/1.73m2 GFR est non-AA >60 >60 ml/min/1.73m2 Calcium 8.9 8.5 - 10.1 MG/DL  
CBC WITH AUTOMATED DIFF Collection Time: 20  5:27 AM  
Result Value Ref Range WBC 8.9 4.1 - 11.1 K/uL  
 RBC 2.76 (L) 4.10 - 5.70 M/uL HGB 9.1 (L) 12.1 - 17.0 g/dL HCT 28.2 (L) 36.6 - 50.3 % .2 (H) 80.0 - 99.0 FL  
 MCH 33.0 26.0 - 34.0 PG  
 MCHC 32.3 30.0 - 36.5 g/dL  
 RDW 12.0 11.5 - 14.5 % PLATELET 162 (H) 962 - 400 K/uL MPV 9.6 8.9 - 12.9 FL  
 NRBC 0.0 0  WBC ABSOLUTE NRBC 0.00 0.00 - 0.01 K/uL NEUTROPHILS 78 (H) 32 - 75 % LYMPHOCYTES 12 12 - 49 % MONOCYTES 7 5 - 13 % EOSINOPHILS 2 0 - 7 % BASOPHILS 0 0 - 1 % IMMATURE GRANULOCYTES 1 (H) 0.0 - 0.5 % ABS. NEUTROPHILS 7.0 1.8 - 8.0 K/UL  
 ABS. LYMPHOCYTES 1.0 0.8 - 3.5 K/UL  
 ABS. MONOCYTES 0.7 0.0 - 1.0 K/UL  
 ABS. EOSINOPHILS 0.1 0.0 - 0.4 K/UL  
 ABS. BASOPHILS 0.0 0.0 - 0.1 K/UL  
 ABS. IMM. GRANS. 0.1 (H) 0.00 - 0.04 K/UL  
 DF AUTOMATED    
SAMPLES BEING HELD Collection Time: 12/16/20  5:27 AM  
Result Value Ref Range SAMPLES BEING HELD  1 TALL PST COMMENT Add-on orders for these samples will be processed based on acceptable specimen integrity and analyte stability, which may vary by analyte. GLUCOSE, POC Collection Time: 12/16/20  7:36 AM  
Result Value Ref Range Glucose (POC) 102 (H) 65 - 100 mg/dL Performed by David Sandra EKG, 12 LEAD, INITIAL Collection Time: 12/16/20 10:21 AM  
Result Value Ref Range Ventricular Rate 98 BPM  
 Atrial Rate 107 BPM  
 QRS Duration 82 ms Q-T Interval 352 ms QTC Calculation (Bezet) 449 ms Calculated R Axis -37 degrees Calculated T Axis -6 degrees Diagnosis Atrial fibrillation Left axis deviation Inferior infarct (cited on or before 16-DEC-2020) Abnormal ECG When compared with ECG of 13-DEC-2020 01:49, 
Atrial fibrillation has replaced Sinus rhythm Confirmed by Justin Schafer ([id number]) on 12/16/2020 11:11:14 AM 
  
 
 
Intake/Output Summary (Last 24 hours) at 12/16/2020 1140 Last data filed at 12/15/2020 1154 Gross per 24 hour Intake 400 ml Output  Net 400 ml Patient Vitals for the past 24 hrs: 
 Temp Pulse Resp BP SpO2  
12/16/20 0903 100.3 °F (37.9 °C) (!) 107 23 133/73 97 % 12/16/20 0002 99 °F (37.2 °C) 93 29 121/76 93 % 12/15/20 1945 99.4 °F (37.4 °C) 89 24 127/73 96 % 12/15/20 1604 99.4 °F (37.4 °C) (!) 111 (!) 32 (!) 151/79 99 % 12/15/20 1517     95 % 12/15/20 1154 99 °F (37.2 °C) 85 23 (!) 145/85 95 % General:    Alert, cooperative, no distress. Psychiatric:   Normal Mood and affect Eye/ENT:   Pupils equal, No asymmetry, Conjunctival pink. Able to hear voice at normal amplitude Lungs:   Visibly symmetric chest expansion, No palpable tenderness. Clear to auscultation bilaterally. Heart:    Regular rate and rhythm, S1, S2 normal, no murmur, click, rub or gallop. No JVD, Normal palpable  
  peripheral pulses. No cyanosis Abdomen:    Soft, non-tender. Bowel sounds normal. No masses,  No organomegaly. Extremities:   Extremities normal, atraumatic, no edema. Neurologic:   CN II-XII grossly intact. No gross focal deficits Vicki Alatorre. JALEN Bay MD 
12/16/2020  
11:40 AM 
I concur with the above note, findings and assessment. BP (!) 114/38   Pulse 89   Temp 98.1 °F (36.7 °C)   Resp 17   Ht 5' 10\" (1.778 m)   Wt 210 lb (95.3 kg)   SpO2 98%   BMI 30.13 kg/m² General:    Alert, cooperative, no distress. Psychiatric:    Normal Mood and affect Eye/ENT:      Pupils equal, No asymmetry, Conjunctival pink. Able to hear voice at normal amplitude Lungs:      Visibly symmetric chest expansion, No palpable tenderness. Clear to auscultation bilaterally. Heart[de-identified]    Regular rate and rhythm, S1, S2 normal, no murmur, click, rub or gallop. No JVD, Normal palpable peripheral pulses. No cyanosis Abdomen:     Soft, non-tender. Bowel sounds normal. No masses,  No   
  organomegaly. Extremities:   Extremities normal, atraumatic, no edema. Neurologic:   CN II-XII grossly intact. No gross focal deficits Patient was seen with NP/PA, recent investigations were reviewed and treatment care/plan was formulated together. Mr. Leyla Farmer was seen for suspected atrial fibrillation. He has no definitive symptoms from A. fib. Symptoms appear related mostly from pneumonia. No strong suspicion for congestive heart failure. Discussed with daughter. Agrees that patient managed A. fib conservatively. Not a candidate for oral anticoagulation. Rates appear to be well controlled when in A. fib. Angelica Matos MD  
12/16/2020 
6:06 PM 
 
 
 
[facility] Office:   Brittaney Horvath Office: 
[address]     [address] [facility] [address]     [address] P: [id number]    P: [phone] F: [phone]    F: [phone]

## 2020-12-16 NOTE — PROGRESS NOTES
Problem: Mobility Impaired (Adult and Pediatric) Goal: *Acute Goals and Plan of Care (Insert Text) Description: FUNCTIONAL STATUS PRIOR TO ADMISSION: Patient was modified independent using a rollator for functional mobility (approx 15 ft). Patient required minimal assistance for basic and instrumental ADLs. HOME SUPPORT PRIOR TO ADMISSION: The patient lived with wife. Physical Therapy Goals Re-assessed and downgraded 12/10/2020 1. Patient will move from supine to sit and sit to supine , scoot up and down, and roll side to side in bed with maximal assistance within 7 day(s). 2.  Patient will transfer from bed to chair and chair to bed with maximal assistance using the least restrictive device within 7 day(s). 3.  Patient will perform sit to stand with moderate assistance  within 7 day(s). 4.  Patient will ambulate with maximal assistance for 5 feet with the least restrictive device within 7 day(s). 5.  Patient will sit EOB for 10 minutes with SBA within 7 days. Initiated 12/3/2020 1. Patient will move from supine to sit and sit to supine , scoot up and down, and roll side to side in bed with modified independence within 7 day(s). 2.  Patient will transfer from bed to chair and chair to bed with supervision/set-up using the least restrictive device within 7 day(s). 3.  Patient will perform sit to stand with supervision/set-up within 7 day(s). 4.  Patient will ambulate with supervision/set-up for 30 feet with the least restrictive device within 7 day(s). 5.  Patient will ascend/descend 3 stairs with bilateral handrail(s) with minimal assistance/contact guard assist within 7 day(s). Outcome: Not Progressing Towards Goal 
 PHYSICAL THERAPY TREATMENT Patient: Fredy Dowell (9 y.o. male) Date: 12/16/2020 Diagnosis: Pneumonia [J18.9] <principal problem not specified> Precautions: Fall Chart, physical therapy assessment, plan of care and goals were reviewed. ASSESSMENT Patient continues with skilled PT services and is not progressing towards goals. Patient very lethargic today not verbally responding until up in chair and with max cues. Requiring increased assistance with all mobility and unsafe to attempt gait today  daughter present and very supportive. Following directions intermittently today. Recommend return to bed via mobility team and bryant lift Current Level of Function Impacting Discharge (mobility/balance): below baseline and max assist x 2; 
 
Other factors to consider for discharge: PLAN : 
Patient continues to benefit from skilled intervention to address the above impairments. Continue treatment per established plan of care. to address goals. Recommendation for discharge: (in order for the patient to meet his/her long term goals) Therapy up to 5 days/week in SNF setting; if unable to go to rehab will need increased assistance in his MCFP requiring assist with all mobility and self care This discharge recommendation: 
Has been made in collaboration with the attending provider and/or case management IF patient discharges home will need the following DME: to be determined (TBD); if back to [facility] would need wheelchair SUBJECTIVE:  
Patient stated what do I do? Niko Howard OBJECTIVE DATA SUMMARY:  
Critical Behavior: 
Neurologic State: Drowsy Orientation Level: Disoriented to time, Disoriented to situation, Disoriented to place Cognition: Decreased attention/concentration, Decreased command following Safety/Judgement: Decreased insight into deficits Functional Mobility Training: 
Bed Mobility: 
  
Supine to Sit: Maximum assistance;Assist x2 Scooting: Total assistance Transfers: 
Sit to Stand: Maximum assistance;Assist x2 Stand to Sit: Maximum assistance;Assist x2 Balance: 
Sitting: Impaired; Without support Sitting - Static: Fair (occasional) Sitting - Dynamic: Poor (constant support) Standing: Impaired; With support Standing - Static: Constant support Standing - Dynamic : Poor;Constant support Therapeutic Exercises: Active assist hip flexion, knee extension in sitting. Pain Rating: 
Reports left shoulder pain, no number given Activity Tolerance:  
desaturates with exertion and requires oxygen After treatment patient left in no apparent distress:  
Sitting in chair, Call bell within reach, and Caregiver / family present COMMUNICATION/COLLABORATION:  
The patients plan of care was discussed with: Registered nurse. Ignacio Brumfield, PT Time Calculation: 27 mins

## 2020-12-17 ENCOUNTER — APPOINTMENT (OUTPATIENT)
Dept: GENERAL RADIOLOGY | Age: 80
DRG: 871 | End: 2020-12-17
Attending: INTERNAL MEDICINE
Payer: MEDICARE

## 2020-12-17 LAB
ANION GAP SERPL CALC-SCNC: 7 MMOL/L (ref 5–15)
APPEARANCE UR: CLEAR
BACTERIA URNS QL MICRO: ABNORMAL /HPF
BASOPHILS # BLD: 0.1 K/UL (ref 0–0.1)
BASOPHILS NFR BLD: 1 % (ref 0–1)
BILIRUB UR QL CFM: NEGATIVE
BUN SERPL-MCNC: 18 MG/DL (ref 6–20)
BUN/CREAT SERPL: 21 (ref 12–20)
CALCIUM SERPL-MCNC: 8.6 MG/DL (ref 8.5–10.1)
CHLORIDE SERPL-SCNC: 108 MMOL/L (ref 97–108)
CO2 SERPL-SCNC: 26 MMOL/L (ref 21–32)
COLOR UR: ABNORMAL
CREAT SERPL-MCNC: 0.86 MG/DL (ref 0.7–1.3)
DIFFERENTIAL METHOD BLD: ABNORMAL
EOSINOPHIL # BLD: 0.2 K/UL (ref 0–0.4)
EOSINOPHIL NFR BLD: 2 % (ref 0–7)
EPITH CASTS URNS QL MICRO: ABNORMAL /LPF
ERYTHROCYTE [DISTWIDTH] IN BLOOD BY AUTOMATED COUNT: 12.2 % (ref 11.5–14.5)
GLUCOSE SERPL-MCNC: 98 MG/DL (ref 65–100)
GLUCOSE UR STRIP.AUTO-MCNC: NEGATIVE MG/DL
HCT VFR BLD AUTO: 26.5 % (ref 36.6–50.3)
HEALTH STATUS, XMCV2T: NORMAL
HGB BLD-MCNC: 8.5 G/DL (ref 12.1–17)
HGB UR QL STRIP: NEGATIVE
HYALINE CASTS URNS QL MICRO: ABNORMAL /LPF (ref 0–5)
IMM GRANULOCYTES # BLD AUTO: 0 K/UL (ref 0–0.04)
IMM GRANULOCYTES NFR BLD AUTO: 0 % (ref 0–0.5)
KETONES UR QL STRIP.AUTO: ABNORMAL MG/DL
LACTATE SERPL-SCNC: 1.4 MMOL/L (ref 0.4–2)
LEUKOCYTE ESTERASE UR QL STRIP.AUTO: ABNORMAL
LYMPHOCYTES # BLD: 0.9 K/UL (ref 0.8–3.5)
LYMPHOCYTES NFR BLD: 12 % (ref 12–49)
MCH RBC QN AUTO: 32.6 PG (ref 26–34)
MCHC RBC AUTO-ENTMCNC: 32.1 G/DL (ref 30–36.5)
MCV RBC AUTO: 101.5 FL (ref 80–99)
MONOCYTES # BLD: 0.6 K/UL (ref 0–1)
MONOCYTES NFR BLD: 8 % (ref 5–13)
MUCOUS THREADS URNS QL MICRO: ABNORMAL /LPF
NEUTS SEG # BLD: 5.6 K/UL (ref 1.8–8)
NEUTS SEG NFR BLD: 77 % (ref 32–75)
NITRITE UR QL STRIP.AUTO: NEGATIVE
NRBC # BLD: 0 K/UL (ref 0–0.01)
NRBC BLD-RTO: 0 PER 100 WBC
OTHER,OTHU: ABNORMAL
PH UR STRIP: 5 [PH] (ref 5–8)
PLATELET # BLD AUTO: 456 K/UL (ref 150–400)
PMV BLD AUTO: 9.5 FL (ref 8.9–12.9)
POTASSIUM SERPL-SCNC: 4 MMOL/L (ref 3.5–5.1)
PROCALCITONIN SERPL-MCNC: 0.09 NG/ML
PROT UR STRIP-MCNC: ABNORMAL MG/DL
RBC # BLD AUTO: 2.61 M/UL (ref 4.1–5.7)
RBC #/AREA URNS HPF: ABNORMAL /HPF (ref 0–5)
SARS-COV-2, COV2: NOT DETECTED
SODIUM SERPL-SCNC: 141 MMOL/L (ref 136–145)
SOURCE, COVRS: NORMAL
SP GR UR REFRACTOMETRY: 1.02 (ref 1–1.03)
SPECIMEN SOURCE, FCOV2M: NORMAL
SPECIMEN TYPE, XMCV1T: NORMAL
UA: UC IF INDICATED,UAUC: ABNORMAL
UROBILINOGEN UR QL STRIP.AUTO: 1 EU/DL (ref 0.2–1)
WBC # BLD AUTO: 7.3 K/UL (ref 4.1–11.1)
WBC URNS QL MICRO: ABNORMAL /HPF (ref 0–4)

## 2020-12-17 PROCEDURE — 74011250637 HC RX REV CODE- 250/637: Performed by: INTERNAL MEDICINE

## 2020-12-17 PROCEDURE — 65660000000 HC RM CCU STEPDOWN

## 2020-12-17 PROCEDURE — 77010033678 HC OXYGEN DAILY

## 2020-12-17 PROCEDURE — 74011000250 HC RX REV CODE- 250: Performed by: INTERNAL MEDICINE

## 2020-12-17 PROCEDURE — 36415 COLL VENOUS BLD VENIPUNCTURE: CPT

## 2020-12-17 PROCEDURE — 85025 COMPLETE CBC W/AUTO DIFF WBC: CPT

## 2020-12-17 PROCEDURE — 71045 X-RAY EXAM CHEST 1 VIEW: CPT

## 2020-12-17 PROCEDURE — 94640 AIRWAY INHALATION TREATMENT: CPT

## 2020-12-17 PROCEDURE — 84145 PROCALCITONIN (PCT): CPT

## 2020-12-17 PROCEDURE — 83605 ASSAY OF LACTIC ACID: CPT

## 2020-12-17 PROCEDURE — 81001 URINALYSIS AUTO W/SCOPE: CPT

## 2020-12-17 PROCEDURE — 74011250636 HC RX REV CODE- 250/636: Performed by: INTERNAL MEDICINE

## 2020-12-17 PROCEDURE — 80048 BASIC METABOLIC PNL TOTAL CA: CPT

## 2020-12-17 PROCEDURE — 94664 DEMO&/EVAL PT USE INHALER: CPT

## 2020-12-17 RX ORDER — DOCUSATE SODIUM 100 MG/1
100 CAPSULE, LIQUID FILLED ORAL 2 TIMES DAILY
Status: DISCONTINUED | OUTPATIENT
Start: 2020-12-17 | End: 2020-12-18 | Stop reason: HOSPADM

## 2020-12-17 RX ORDER — IPRATROPIUM BROMIDE AND ALBUTEROL SULFATE 2.5; .5 MG/3ML; MG/3ML
3 SOLUTION RESPIRATORY (INHALATION)
Status: DISCONTINUED | OUTPATIENT
Start: 2020-12-17 | End: 2020-12-18 | Stop reason: HOSPADM

## 2020-12-17 RX ORDER — METOPROLOL TARTRATE 25 MG/1
25 TABLET, FILM COATED ORAL EVERY 12 HOURS
Status: DISCONTINUED | OUTPATIENT
Start: 2020-12-17 | End: 2020-12-18 | Stop reason: HOSPADM

## 2020-12-17 RX ADMIN — Medication: at 15:24

## 2020-12-17 RX ADMIN — GUAIFENESIN 400 MG: 100 SOLUTION ORAL at 15:23

## 2020-12-17 RX ADMIN — Medication 10 ML: at 07:10

## 2020-12-17 RX ADMIN — PAROXETINE HYDROCHLORIDE 10 MG: 20 TABLET, FILM COATED ORAL at 10:01

## 2020-12-17 RX ADMIN — LOSARTAN POTASSIUM 50 MG: 50 TABLET, FILM COATED ORAL at 09:58

## 2020-12-17 RX ADMIN — Medication: at 21:50

## 2020-12-17 RX ADMIN — CARBIDOPA AND LEVODOPA 2 TABLET: 25; 100 TABLET ORAL at 21:49

## 2020-12-17 RX ADMIN — IPRATROPIUM BROMIDE AND ALBUTEROL SULFATE 3 ML: .5; 3 SOLUTION RESPIRATORY (INHALATION) at 08:43

## 2020-12-17 RX ADMIN — Medication 1 DROP: at 21:50

## 2020-12-17 RX ADMIN — HYDRALAZINE HYDROCHLORIDE 10 MG: 20 INJECTION INTRAMUSCULAR; INTRAVENOUS at 07:32

## 2020-12-17 RX ADMIN — METOPROLOL TARTRATE 25 MG: 25 TABLET, FILM COATED ORAL at 10:15

## 2020-12-17 RX ADMIN — CARBIDOPA AND LEVODOPA 2 TABLET: 25; 100 TABLET ORAL at 17:02

## 2020-12-17 RX ADMIN — GABAPENTIN 200 MG: 100 CAPSULE ORAL at 17:00

## 2020-12-17 RX ADMIN — ENTACAPONE 200 MG: 200 TABLET, FILM COATED ORAL at 16:58

## 2020-12-17 RX ADMIN — ENTACAPONE 200 MG: 200 TABLET, FILM COATED ORAL at 21:49

## 2020-12-17 RX ADMIN — ACETYLCYSTEINE 200 MG: 200 SOLUTION ORAL; RESPIRATORY (INHALATION) at 20:07

## 2020-12-17 RX ADMIN — ACETAMINOPHEN 650 MG: 325 TABLET ORAL at 10:01

## 2020-12-17 RX ADMIN — GUAIFENESIN 400 MG: 100 SOLUTION ORAL at 21:50

## 2020-12-17 RX ADMIN — Medication 81 MG: at 09:58

## 2020-12-17 RX ADMIN — CARBIDOPA AND LEVODOPA 2 TABLET: 25; 100 TABLET ORAL at 10:10

## 2020-12-17 RX ADMIN — ROPINIROLE HYDROCHLORIDE 1 MG: 1 TABLET, FILM COATED ORAL at 17:36

## 2020-12-17 RX ADMIN — ATORVASTATIN CALCIUM 40 MG: 40 TABLET, FILM COATED ORAL at 21:49

## 2020-12-17 RX ADMIN — ACETYLCYSTEINE 200 MG: 200 SOLUTION ORAL; RESPIRATORY (INHALATION) at 08:47

## 2020-12-17 RX ADMIN — Medication: at 10:13

## 2020-12-17 RX ADMIN — ENTACAPONE 200 MG: 200 TABLET, FILM COATED ORAL at 10:11

## 2020-12-17 RX ADMIN — IPRATROPIUM BROMIDE AND ALBUTEROL SULFATE 3 ML: .5; 3 SOLUTION RESPIRATORY (INHALATION) at 20:08

## 2020-12-17 RX ADMIN — Medication 10 ML: at 15:23

## 2020-12-17 RX ADMIN — CARBIDOPA AND LEVODOPA 2 TABLET: 25; 100 TABLET ORAL at 13:11

## 2020-12-17 RX ADMIN — DOCUSATE SODIUM 100 MG: 100 CAPSULE ORAL at 10:00

## 2020-12-17 RX ADMIN — ROPINIROLE HYDROCHLORIDE 1 MG: 1 TABLET, FILM COATED ORAL at 10:11

## 2020-12-17 RX ADMIN — ENOXAPARIN SODIUM 40 MG: 40 INJECTION SUBCUTANEOUS at 16:57

## 2020-12-17 RX ADMIN — METOPROLOL TARTRATE 25 MG: 25 TABLET, FILM COATED ORAL at 21:49

## 2020-12-17 RX ADMIN — DOCUSATE SODIUM 100 MG: 100 CAPSULE ORAL at 17:00

## 2020-12-17 RX ADMIN — GABAPENTIN 200 MG: 100 CAPSULE ORAL at 10:01

## 2020-12-17 RX ADMIN — AMLODIPINE BESYLATE 10 MG: 5 TABLET ORAL at 10:00

## 2020-12-17 RX ADMIN — Medication 10 ML: at 21:50

## 2020-12-17 NOTE — PROGRESS NOTES
Transition of Care: SNF- [address] [place] [address] has accepted; the insurance [employer] has been confirmed as well; Covid screen test placed on 12/16 
  
Transport Plan: BLS (not set up yet) 
  
RUR: 17% 
  
Dx: pneumonia 
  
Discharge pending: 
-pending results of chest xray, labs (to be done today)  
-pending cardiology  recommendations 
-pending medical progress 
-per hospitalist; patient will not be discharging today 0830: Patients daughter Sesar De La Torre- [phone] called this CM to discuss discharge plans; she understands patient has been accepted but wants to talk to facility; this CM gave her contact info for [facility] 
 
  
900: Manuel Juarez at [facility] and [address] [place] [address] [phone] called this CM to find out when discharge might happen; per hospitalist discharge will not happen today (patient has spiked a fever and labs are being drawn with chest xray at this time) 
  
1230: CM spoke with patients other daughter Rebecca Pinon [phone] at bedside who was visibly upset about discharge plans to [address] [place] [address]; she and her other sister are concerned about patient going to a SNF during a pandemic where there is no visitation; she understands her sister will call [facility] today for more information; this CM will followup with the sisters in the morning 
  
CM following Kathryn Shukla RN, CRM

## 2020-12-17 NOTE — PROGRESS NOTES
[address] [facility] Adult  Hospitalist Group Hospitalist Progress Note 5900 [facility], DO Answering service: [phone] OR [phone] from in house phone Date of Service:  2020 NAME:  Adali Tinsley :  1940 MRN:  [id number] Admission Summary:  
Adali Tinsley is a 78 y.o. male past medical history significant for Parkinson, dementia, CAD, CKD stage III presented emergency room with respiratory distress and hypoxia. Patient [place] [place] [facility] as per report he had an episode today of choking during dinner afterward he became hypoxic with O2 sats in the upper 80s and found to have a \" low-grade fever\" of 99.9. Per daughter report he had a similar episode of choking last week again when he was eating by his respiratory symptoms were not as severe and he recovered quickly. Interval history / Subjective: Follow-up respiratory failure. Patient seen and examined. Shivering, temp 100.1, a-fib with RVR on telemetry. Just received breathing treatment. Has finished antibiotics for pneumonia. Assessment & Plan:  
 
Atrial fibrillation with RVR: new onset 
-treat underlying temperature  
-Cardiology consulted  
-not a good candidate for anticoagulation due to age and history of rectal bleeding   
-continue aspirin 
-check labs, UA, chest xray 
-add metoprolol BID Acute hypoxic respiratory failure: slow improvement Aspiration pneumonia: 
Volume overload: 
-Suspected respiratory failure due to aspiration pneumonia 
-Procalcitonin elevated at 12, repeat improved to 0.25 
-s/p zosyn- finished  
-S/p Lasix  
-Albuterol nebulizer three times changed to prn  
-Guaifenisen twice daily 
-Echocardiogram with EF 72-01%, mild diastolic dysfunction 
-outpatient follow up with pulmonology Fever: resolved  
-CT scan w/o contrast ordered. Unable to do CTA due to possible dye allergy -Lower extremity dopplers negative for DVT 
-D-dimer elevated 4.97 Acute encephalopathy: waxing and waning  
-Encouraged family to talk with patient during the day for improved sleep at night Parkinson: at baseline as per family 
-continue with home medications 
  
Elevated serum creatinine: resolved 
-Suspected prerenal 
-losartan initially held 
  
Anemia, macrocytic: Stable 
  
Hypertension: continue amlodipine, Losartan resumed  
-has fluctuating blood pressures Left hip pain:  
-order x-ray, no acute abnormality Patient with baseline low functioning level. He is at risk for decline 
  
Code status: FULL 
DVT prophylaxis: SCDs Care Plan discussed with: Patient/Family Anticipated Disposition: SNF Anticipated Discharge: 1-2 days Hospital Problems  Date Reviewed: 9/14/2020 Codes Class Noted POA Pneumonia ICD-10-CM: J18.9 ICD-9-CM: 434  12/3/2020 Unknown Review of Systems:  
Negative unless stated above Vital Signs:  
 Last 24hrs VS reviewed since prior progress note. Most recent are: 
Visit Vitals BP (!) 124/58 Pulse 96 Temp 99.3 °F (37.4 °C) Resp 28 Ht 5' 10\" (1.778 m) Wt 94.5 kg (208 lb 6.4 oz) SpO2 93% BMI 29.90 kg/m² No intake or output data in the 24 hours ending 12/17/20 0947 Physical Examination:  
 
I had a face to face encounter with this patient and independently examined them on 12/17/2020 as outlined below: 
 
     
Constitutional:  no distress, Lying on bed, shivering ENT:  Oral mucosa moist, oropharynx benign. Resp:  no wheezing/rhonchi/rales. CTAB, No accessory muscle use CV:  tachycardic, irregularly irregular, no murmurs, gallops, rubs GI:  Soft, non distended, non tender. normoactive bowel sounds, no hepatosplenomegaly Musculoskeletal:  No edema, warm, 2+ pulses throughout Neurologic:  Moves all extremities. Skin:  Good turgor, no rashes or ulcers Data Review: Review and/or order of clinical lab test 
Review and/or order of tests in the radiology section of CPT Review and/or order of tests in the medicine section of CPT Labs:  
 
Recent Labs 12/17/20 
0708 12/16/20 
0681 WBC 7.3 8.9 HGB 8.5* 9.1*  
HCT 26.5* 28.2*  
* 480* Recent Labs 12/17/20 
1071 12/16/20 
0881 12/15/20 
2943  136 139  
K 4.0 4.8 4.1  109* 109* CO2 26 22 25 BUN 18 21* 25* CREA 0.86 0.95 1.05  
GLU 98 109* 103* CA 8.6 8.9 9.0 No results for input(s): ALT, AP, TBIL, TBILI, TP, ALB, GLOB, GGT, AML, LPSE in the last 72 hours. No lab exists for component: SGOT, GPT, AMYP, HLPSE No results for input(s): INR, PTP, APTT, INREXT, INREXT in the last 72 hours. No results for input(s): FE, TIBC, PSAT, FERR in the last 72 hours. No results found for: FOL, RBCF No results for input(s): PH, PCO2, PO2 in the last 72 hours. No results for input(s): CPK, CKNDX, TROIQ in the last 72 hours. No lab exists for component: CPKMB Lab Results Component Value Date/Time Cholesterol, total 119 07/08/2020 10:40 AM  
 HDL Cholesterol 51 07/08/2020 10:40 AM  
 LDL, calculated 47 07/08/2020 10:40 AM  
 Triglyceride 103 07/08/2020 10:40 AM  
 CHOL/HDL Ratio 2 07/08/2020 10:40 AM  
 
Lab Results Component Value Date/Time Glucose (POC) 102 (H) 12/16/2020 07:36 AM  
 
Lab Results Component Value Date/Time  Color DARK YELLOW 12/14/2020 05:36 AM  
 Appearance CLEAR 12/14/2020 05:36 AM  
 Specific gravity 1.024 12/14/2020 05:36 AM  
 Specific gravity 1.015 07/08/2020 10:41 AM  
 pH (UA) 5.0 12/14/2020 05:36 AM  
 Protein TRACE (A) 12/14/2020 05:36 AM  
 Glucose Negative 12/14/2020 05:36 AM  
 Ketone TRACE (A) 12/14/2020 05:36 AM  
 Bilirubin Negative 12/14/2020 05:36 AM  
 Urobilinogen 0.2 12/14/2020 05:36 AM  
 Nitrites Negative 12/14/2020 05:36 AM  
 Leukocyte Esterase Negative 12/14/2020 05:36 AM  
 Epithelial cells FEW 12/14/2020 05:36 AM  
 Bacteria Negative 12/14/2020 05:36 AM  
 WBC 0-4 12/14/2020 05:36 AM  
 RBC 5-10 12/14/2020 05:36 AM  
 
 
 
Medications Reviewed:  
 
Current Facility-Administered Medications Medication Dose Route Frequency  albuterol-ipratropium (DUO-NEB) 2.5 MG-0.5 MG/3 ML  3 mL Nebulization Q6H PRN  
 docusate sodium (COLACE) capsule 100 mg  100 mg Oral BID  hydrALAZINE (APRESOLINE) 20 mg/mL injection 10 mg  10 mg IntraVENous Q6H PRN  
 guaiFENesin (ROBITUSSIN) 100 mg/5 mL oral liquid 400 mg  400 mg Oral Q8H  
 polyvinyl alcohol-povidone (NATURAL TEARS) 0.5-0.6 % ophthalmic solution 1 Drop  1 Drop Both Eyes QHS  acetylcysteine (MUCOMYST) 200 mg/mL (20 %) solution 200 mg  200 mg Nebulization TID RT  
 balsam peru-castor oiL (VENELEX) ointment   Topical TID  enoxaparin (LOVENOX) injection 40 mg  40 mg SubCUTAneous Q24H  
 oxyCODONE IR (ROXICODONE) tablet 2.5 mg  2.5 mg Oral Q6H PRN  
 amLODIPine (NORVASC) tablet 10 mg  10 mg Oral DAILY  losartan (COZAAR) tablet 50 mg  50 mg Oral DAILY  carbidopa-levodopa (SINEMET)  mg per tablet 2 Tab  2 Tab Oral QID  albuterol (PROVENTIL VENTOLIN) nebulizer solution 2.5 mg  2.5 mg Nebulization Q6H PRN  
 sodium chloride (NS) flush 5-40 mL  5-40 mL IntraVENous Q8H  
 sodium chloride (NS) flush 5-40 mL  5-40 mL IntraVENous PRN  
 acetaminophen (TYLENOL) tablet 650 mg  650 mg Oral Q6H PRN Or  
 acetaminophen (TYLENOL) suppository 650 mg  650 mg Rectal Q6H PRN  polyethylene glycol (MIRALAX) packet 17 g  17 g Oral DAILY PRN  promethazine (PHENERGAN) tablet 12.5 mg  12.5 mg Oral Q6H PRN Or  
 ondansetron (ZOFRAN) injection 4 mg  4 mg IntraVENous Q6H PRN  
 aspirin delayed-release tablet 81 mg  81 mg Oral DAILY  atorvastatin (LIPITOR) tablet 40 mg  40 mg Oral QHS  entacapone (COMTAN) tablet 200 mg  200 mg Oral TID  pantoprazole (PROTONIX) tablet 20 mg  20 mg Oral ACB  PARoxetine (PAXIL) tablet 10 mg  10 mg Oral DAILY  gabapentin (NEURONTIN) capsule 200 mg  200 mg Oral BID  rOPINIRole (REQUIP) tablet 1 mg  1 mg Oral BID  
 
______________________________________________________________________ EXPECTED LENGTH OF STAY: 3d 2h 
ACTUAL LENGTH OF STAY:          14 1780 [facility],

## 2020-12-17 NOTE — PROGRESS NOTES
Bedside and Verbal shift change report given to Shad (oncoming nurse) by Edison Ward (offgoing nurse). Report included the following information SBAR, Kardex, Intake/Output, MAR, Med Rec Status and Cardiac Rhythm A-Fib.

## 2020-12-17 NOTE — WOUND CARE
WOCN Note:  
  
Follow up assessment of sacrum and buttocks. Assessed in room 644. PPE: face mask, goggles and gloves. Daughter at the bedside. Chart reviewed. Admitted DX:  Pneumonia Past Medical History:  
Diagnosis Date  Aaron esophagus    
 Benign nodular prostatic hyperplasia without lower urinary tract symptoms 7/12/2017  CKD (chronic kidney disease) stage 2, GFR 60-89 ml/min    
 Constipation    
 Coronary artery disease 7/12/2017  Depression    
 DJD (degenerative joint disease) 7/12/2017  Falls    
 Gout 7/12/2017  Headache    
 Hearing difficulty of both ears 7/12/2017  Hyperlipidemia    
 Hypertension    
 Impaired cognition 7/12/2017  MCI (mild cognitive impairment)    
 Restless leg syndrome 7/12/2017  Sleep apnea in adult 7/12/2017  
  No longer on CPAP @13  Snoring    
 Stroke [facility])    
 Tendinitis 7/12/2017  
  ACHILLES  Tendinitis of left rotator cuff 07/12/2017  Tremor 7/12/2017  
  LEFT HAND  Unspecified sleep apnea    
  lost weight no longer has it  
  
Assessment:  
Patient is alert, communicative and requires assist of 2 with repositioning. Bed:  [id number] air mattress Patient wearing briefs for incontinence. Patient reports no pain. Patient repositioned on right side with pillow. Heels offloaded with boots. Heels intact without erythema. 
  
1. Left sacrum: 2 x 2 x 0 cm  100% blanching pink erythema. 
  
Wound, Pressure Prevention & Skin Care Recommendations: 1. Minimize layers of linen/pads under patient to optimize support surface. 2.  Turn/reposition approximately every 2 hours and offload heels. 3.  Manage moisture/ Keep skin folds clean and dry. 4.  Specialty bed:  Air mattress 5. Sacrum:  Venelex TID. 
  
Discussed above plan with patient and Yelena Delgado RN. 
  
Transition of Care: Plan to follow as needed while admitted to hospital. 
  
ARASH MartinezN MONO [facility] Inpatient Wound Care Available on Perfect Serve Pager [phone] [address] [phone]

## 2020-12-18 VITALS
HEART RATE: 81 BPM | TEMPERATURE: 99 F | HEIGHT: 70 IN | OXYGEN SATURATION: 94 % | DIASTOLIC BLOOD PRESSURE: 70 MMHG | RESPIRATION RATE: 23 BRPM | SYSTOLIC BLOOD PRESSURE: 140 MMHG | WEIGHT: 210.1 LBS | BODY MASS INDEX: 30.08 KG/M2

## 2020-12-18 LAB
ANION GAP SERPL CALC-SCNC: 5 MMOL/L (ref 5–15)
BASOPHILS # BLD: 0 K/UL (ref 0–0.1)
BASOPHILS NFR BLD: 1 % (ref 0–1)
BUN SERPL-MCNC: 19 MG/DL (ref 6–20)
BUN/CREAT SERPL: 21 (ref 12–20)
CALCIUM SERPL-MCNC: 8.9 MG/DL (ref 8.5–10.1)
CHLORIDE SERPL-SCNC: 109 MMOL/L (ref 97–108)
CO2 SERPL-SCNC: 27 MMOL/L (ref 21–32)
CREAT SERPL-MCNC: 0.9 MG/DL (ref 0.7–1.3)
DIFFERENTIAL METHOD BLD: ABNORMAL
EOSINOPHIL # BLD: 0.1 K/UL (ref 0–0.4)
EOSINOPHIL NFR BLD: 2 % (ref 0–7)
ERYTHROCYTE [DISTWIDTH] IN BLOOD BY AUTOMATED COUNT: 12.6 % (ref 11.5–14.5)
GLUCOSE SERPL-MCNC: 104 MG/DL (ref 65–100)
HCT VFR BLD AUTO: 27.5 % (ref 36.6–50.3)
HGB BLD-MCNC: 8.7 G/DL (ref 12.1–17)
IMM GRANULOCYTES # BLD AUTO: 0.1 K/UL (ref 0–0.04)
IMM GRANULOCYTES NFR BLD AUTO: 1 % (ref 0–0.5)
LYMPHOCYTES # BLD: 1.2 K/UL (ref 0.8–3.5)
LYMPHOCYTES NFR BLD: 16 % (ref 12–49)
MAGNESIUM SERPL-MCNC: 2.2 MG/DL (ref 1.6–2.4)
MCH RBC QN AUTO: 32.3 PG (ref 26–34)
MCHC RBC AUTO-ENTMCNC: 31.6 G/DL (ref 30–36.5)
MCV RBC AUTO: 102.2 FL (ref 80–99)
MONOCYTES # BLD: 0.7 K/UL (ref 0–1)
MONOCYTES NFR BLD: 9 % (ref 5–13)
NEUTS SEG # BLD: 5.5 K/UL (ref 1.8–8)
NEUTS SEG NFR BLD: 71 % (ref 32–75)
NRBC # BLD: 0 K/UL (ref 0–0.01)
NRBC BLD-RTO: 0 PER 100 WBC
PHOSPHATE SERPL-MCNC: 3.4 MG/DL (ref 2.6–4.7)
PLATELET # BLD AUTO: 456 K/UL (ref 150–400)
PMV BLD AUTO: 9.6 FL (ref 8.9–12.9)
POTASSIUM SERPL-SCNC: 4.3 MMOL/L (ref 3.5–5.1)
RBC # BLD AUTO: 2.69 M/UL (ref 4.1–5.7)
SODIUM SERPL-SCNC: 141 MMOL/L (ref 136–145)
WBC # BLD AUTO: 7.6 K/UL (ref 4.1–11.1)

## 2020-12-18 PROCEDURE — 84100 ASSAY OF PHOSPHORUS: CPT

## 2020-12-18 PROCEDURE — 74011250637 HC RX REV CODE- 250/637: Performed by: INTERNAL MEDICINE

## 2020-12-18 PROCEDURE — 97535 SELF CARE MNGMENT TRAINING: CPT

## 2020-12-18 PROCEDURE — 74011000250 HC RX REV CODE- 250: Performed by: INTERNAL MEDICINE

## 2020-12-18 PROCEDURE — 83735 ASSAY OF MAGNESIUM: CPT

## 2020-12-18 PROCEDURE — 80048 BASIC METABOLIC PNL TOTAL CA: CPT

## 2020-12-18 PROCEDURE — 94640 AIRWAY INHALATION TREATMENT: CPT

## 2020-12-18 PROCEDURE — 74011250636 HC RX REV CODE- 250/636: Performed by: INTERNAL MEDICINE

## 2020-12-18 PROCEDURE — 74011000250 HC RX REV CODE- 250: Performed by: NURSE PRACTITIONER

## 2020-12-18 PROCEDURE — 85025 COMPLETE CBC W/AUTO DIFF WBC: CPT

## 2020-12-18 PROCEDURE — 36415 COLL VENOUS BLD VENIPUNCTURE: CPT

## 2020-12-18 PROCEDURE — 97530 THERAPEUTIC ACTIVITIES: CPT

## 2020-12-18 PROCEDURE — 77010033678 HC OXYGEN DAILY

## 2020-12-18 RX ORDER — METOPROLOL TARTRATE 25 MG/1
25 TABLET, FILM COATED ORAL EVERY 12 HOURS
Qty: 30 TAB | Refills: 0 | Status: SHIPPED
Start: 2020-12-18

## 2020-12-18 RX ORDER — GABAPENTIN 100 MG/1
100 CAPSULE ORAL 2 TIMES DAILY
Qty: 10 CAP | Refills: 2 | Status: SHIPPED | OUTPATIENT
Start: 2020-12-18 | End: 2021-01-25 | Stop reason: SDUPTHER

## 2020-12-18 RX ORDER — AMLODIPINE BESYLATE 10 MG/1
10 TABLET ORAL DAILY
Qty: 30 TAB | Refills: 0 | Status: SHIPPED
Start: 2020-12-19

## 2020-12-18 RX ORDER — LOSARTAN POTASSIUM 50 MG/1
50 TABLET ORAL DAILY
Qty: 30 TAB | Refills: 0 | Status: SHIPPED
Start: 2020-12-19

## 2020-12-18 RX ADMIN — ROPINIROLE HYDROCHLORIDE 1 MG: 1 TABLET, FILM COATED ORAL at 18:37

## 2020-12-18 RX ADMIN — ROPINIROLE HYDROCHLORIDE 1 MG: 1 TABLET, FILM COATED ORAL at 10:17

## 2020-12-18 RX ADMIN — ALBUTEROL SULFATE 2.5 MG: 2.5 SOLUTION RESPIRATORY (INHALATION) at 07:13

## 2020-12-18 RX ADMIN — Medication 81 MG: at 10:08

## 2020-12-18 RX ADMIN — GUAIFENESIN 400 MG: 100 SOLUTION ORAL at 06:48

## 2020-12-18 RX ADMIN — GUAIFENESIN 400 MG: 100 SOLUTION ORAL at 13:25

## 2020-12-18 RX ADMIN — Medication 10 ML: at 13:31

## 2020-12-18 RX ADMIN — ACETYLCYSTEINE 200 MG: 200 SOLUTION ORAL; RESPIRATORY (INHALATION) at 07:13

## 2020-12-18 RX ADMIN — PANTOPRAZOLE SODIUM 20 MG: 20 TABLET, DELAYED RELEASE ORAL at 06:48

## 2020-12-18 RX ADMIN — GABAPENTIN 200 MG: 100 CAPSULE ORAL at 18:35

## 2020-12-18 RX ADMIN — CARBIDOPA AND LEVODOPA 2 TABLET: 25; 100 TABLET ORAL at 10:16

## 2020-12-18 RX ADMIN — CARBIDOPA AND LEVODOPA 2 TABLET: 25; 100 TABLET ORAL at 18:37

## 2020-12-18 RX ADMIN — CARBIDOPA AND LEVODOPA 2 TABLET: 25; 100 TABLET ORAL at 13:25

## 2020-12-18 RX ADMIN — ENTACAPONE 200 MG: 200 TABLET, FILM COATED ORAL at 16:39

## 2020-12-18 RX ADMIN — DOCUSATE SODIUM 100 MG: 100 CAPSULE ORAL at 10:07

## 2020-12-18 RX ADMIN — PAROXETINE HYDROCHLORIDE 10 MG: 20 TABLET, FILM COATED ORAL at 10:08

## 2020-12-18 RX ADMIN — AMLODIPINE BESYLATE 10 MG: 5 TABLET ORAL at 10:07

## 2020-12-18 RX ADMIN — Medication: at 16:39

## 2020-12-18 RX ADMIN — GABAPENTIN 200 MG: 100 CAPSULE ORAL at 10:08

## 2020-12-18 RX ADMIN — ENTACAPONE 200 MG: 200 TABLET, FILM COATED ORAL at 10:17

## 2020-12-18 RX ADMIN — Medication 10 ML: at 06:54

## 2020-12-18 RX ADMIN — LOSARTAN POTASSIUM 50 MG: 50 TABLET, FILM COATED ORAL at 10:10

## 2020-12-18 RX ADMIN — METOPROLOL TARTRATE 25 MG: 25 TABLET, FILM COATED ORAL at 10:09

## 2020-12-18 RX ADMIN — Medication: at 10:10

## 2020-12-18 RX ADMIN — DOCUSATE SODIUM 100 MG: 100 CAPSULE ORAL at 18:00

## 2020-12-18 RX ADMIN — ENOXAPARIN SODIUM 40 MG: 40 INJECTION SUBCUTANEOUS at 16:39

## 2020-12-18 NOTE — PROGRESS NOTES
Spoke with daughter whom was concerned with condition of father when she presented to his room. Coffee had been spilled on him from breakfast.  Apologized for delay in coming to room. After Pt cleaned up meal tray was assisted by daughter.

## 2020-12-18 NOTE — PROGRESS NOTES
Problem: Pressure Injury - Risk of 
Goal: *Prevention of pressure injury Description: Document Elier Scale and appropriate interventions in the flowsheet. Outcome: Progressing Towards Goal 
Note: Pressure Injury Interventions: 
Sensory Interventions: Assess changes in LOC Moisture Interventions: Absorbent underpads Activity Interventions: Pressure redistribution bed/mattress(bed type) Mobility Interventions: PT/OT evaluation Nutrition Interventions: Document food/fluid/supplement intake Friction and Shear Interventions: Lift sheet, Apply protective barrier, creams and emollients Problem: Patient Education: Go to Patient Education Activity Goal: Patient/Family Education Outcome: Progressing Towards Goal 
  
Problem: Falls - Risk of 
Goal: *Absence of Falls Description: Document Ivin Childs Fall Risk and appropriate interventions in the flowsheet. Outcome: Progressing Towards Goal 
Note: Fall Risk Interventions: 
Mobility Interventions: Bed/chair exit alarm Mentation Interventions: Adequate sleep, hydration, pain control, Bed/chair exit alarm, Door open when patient unattended Medication Interventions: Evaluate medications/consider consulting pharmacy Elimination Interventions: Call light in reach History of Falls Interventions: Door open when patient unattended Problem: Patient Education: Go to Patient Education Activity Goal: Patient/Family Education Outcome: Progressing Towards Goal 
  
Problem: Patient Education: Go to Patient Education Activity Goal: Patient/Family Education Outcome: Progressing Towards Goal 
  
Problem: Discharge Planning Goal: *Discharge to safe environment Outcome: Progressing Towards Goal 
  
Problem: Patient Education: Go to Patient Education Activity Goal: Patient/Family Education Outcome: Progressing Towards Goal 
  
Problem: Breathing Pattern - Ineffective Goal: *Absence of hypoxia Outcome: Progressing Towards Goal 
 Goal: *Use of effective breathing techniques Outcome: Progressing Towards Goal 
  
Problem: Patient Education: Go to Patient Education Activity Goal: Patient/Family Education Outcome: Progressing Towards Goal 
  
Problem: Nutrition Deficit Goal: *Optimize nutritional status Outcome: Progressing Towards Goal

## 2020-12-18 NOTE — PROGRESS NOTES
Problem: Mobility Impaired (Adult and Pediatric) Goal: *Acute Goals and Plan of Care (Insert Text) Description: FUNCTIONAL STATUS PRIOR TO ADMISSION: Patient was modified independent using a rollator for functional mobility (approx 15 ft). Patient required minimal assistance for basic and instrumental ADLs. HOME SUPPORT PRIOR TO ADMISSION: The patient lived with wife. Physical Therapy Goals Goals remain appropriate 12/18/2020 Re-assessed and downgraded 12/10/2020 1. Patient will move from supine to sit and sit to supine , scoot up and down, and roll side to side in bed with maximal assistance within 7 day(s). 2.  Patient will transfer from bed to chair and chair to bed with maximal assistance using the least restrictive device within 7 day(s). 3.  Patient will perform sit to stand with moderate assistance  within 7 day(s). 4.  Patient will ambulate with maximal assistance for 5 feet with the least restrictive device within 7 day(s). 5.  Patient will sit EOB for 10 minutes with SBA within 7 days. Initiated 12/3/2020 1. Patient will move from supine to sit and sit to supine , scoot up and down, and roll side to side in bed with modified independence within 7 day(s). 2.  Patient will transfer from bed to chair and chair to bed with supervision/set-up using the least restrictive device within 7 day(s). 3.  Patient will perform sit to stand with supervision/set-up within 7 day(s). 4.  Patient will ambulate with supervision/set-up for 30 feet with the least restrictive device within 7 day(s). 5.  Patient will ascend/descend 3 stairs with bilateral handrail(s) with minimal assistance/contact guard assist within 7 day(s). Outcome: Progressing Towards Goal 
 PHYSICAL THERAPY TREATMENT: WEEKLY REASSESSMENT Patient: Trixie Hernandez (43 y.o. male) Date: 12/18/2020 Primary Diagnosis: Pneumonia [J18.9] Precautions:   Fall ASSESSMENT Patient continues with skilled PT services and is slowly and inconsistently progressing towards goals. Patient much more alert today and interactive. Daughter present and assisting appropriately. Patient following commands inconsistently and then perseverates on one command. Less assist needed for bed mobility and sit to stand much easier today. Patient very focused on need for BM and did assist him to the [facility]. Followed up and staff had assisted him to the chair. Continue to highly recommend continued rehab as he remains far below his baseline. Patient's progression toward goals since last assessment: slow progress and inconsistent Current Level of Function Impacting Discharge (mobility/balance): mod to max assist of 2 PLAN : 
Goals have been updated based on progression since last assessment. Patient continues to benefit from skilled intervention to address the above impairments. Recommendations and Planned Interventions: bed mobility training, transfer training, gait training, therapeutic exercises, patient and family training/education, and therapeutic activities Frequency/Duration: Patient will be followed by physical therapy:  5 times a week to address goals. Recommendation for discharge: (in order for the patient to meet his/her long term goals) Therapy up to 5 days/week in SNF setting This discharge recommendation: 
Has been made in collaboration with the attending provider and/or case management IF patient discharges home will need the following DME: mechanical lift, wheelchair, and and 24/7 caregiver SUBJECTIVE:  
Patient stated I need to go to the bathroom.  OBJECTIVE DATA SUMMARY:  
HISTORY:   
Past Medical History:  
Diagnosis Date Aaron esophagus Benign nodular prostatic hyperplasia without lower urinary tract symptoms 7/12/2017 CKD (chronic kidney disease) stage 2, GFR 60-89 ml/min Constipation Coronary artery disease 7/12/2017 Depression DJD (degenerative joint disease) 7/12/2017 Falls Gout 7/12/2017 Headache Hearing difficulty of both ears 7/12/2017 Hyperlipidemia Hypertension Impaired cognition 7/12/2017 MCI (mild cognitive impairment) Restless leg syndrome 7/12/2017 Sleep apnea in adult 7/12/2017 No longer on CPAP @13 Snoring Stroke [facility]) Tendinitis 7/12/2017 ACHILLES Tendinitis of left rotator cuff 07/12/2017 Tremor 7/12/2017 LEFT HAND Unspecified sleep apnea   
 lost weight no longer has it Past Surgical History:  
Procedure Laterality Date COLONOSCOPY N/A 1/16/2019 COLONOSCOPY performed by Stacia Tejada MD at [facility] ENDOSCOPY  
 COLONOSCOPY,FLEX,W/CONTROL, BLEEDING  1/16/2019 HX ORTHOPAEDIC  1/27/15 L4-L5 MICRODECOMPRESSION (Right [facility] TRANSORAL BIOPSY SINGLE/MULTIPLE  8/16/2011 [facility] EGD TRANSORAL BIOPSY SINGLE/MULTIPLE  10/1/2013 Personal factors and/or comorbidities impacting plan of care:  
 
Home Situation Home Environment: Assisted living # Steps to Enter: 3 Rails to Enter: Yes Hand Rails : Bilateral 
One/Two Story Residence: Two story, live on 1st floor Living Alone: No 
Support Systems: Assisted living Patient Expects to be Discharged to[de-identified] Assisted living Current DME Used/Available at Home: susana Servin EXAMINATION/PRESENTATION/DECISION MAKING:  
Critical Behavior: 
Neurologic State: Alert, Eyes open spontaneously Orientation Level: Disoriented to situation, Disoriented to time Cognition: Decreased attention/concentration, Decreased command following Safety/Judgement: Decreased insight into deficits Hearing: Auditory Auditory Impairment: Hard of hearing, bilateral 
  
  
  
 Functional Mobility: 
Bed Mobility: 
Rolling: Moderate assistance; Additional time Supine to Sit: Maximum assistance; Moderate assistance;Assist x2 Scooting: Maximum assistance Transfers: Sit to Stand: Moderate assistance;Minimum assistance;Assist x2 Stand to Sit: Additional time; Moderate assistance Bed to Chair: Assist x2; Moderate assistance Balance:  
Sitting: Impaired; Without support Sitting - Static: Fair (occasional) Sitting - Dynamic: Fair (occasional) Standing: Impaired; With support Standing - Static: Constant support;Poor Standing - Dynamic : Constant support;Poor Ambulation/Gait Training: 
Distance (ft): 2 Feet (ft) Assistive Device: Gait belt;Walker, rolling Ambulation - Level of Assistance: Moderate assistance;Assist x2 Gait Abnormalities: Decreased step clearance;Shuffling gait Base of Support: Widened Speed/Anais: Shuffled;Pace decreased (<100 feet/min) Step Length: Right shortened;Left shortened Activity Tolerance:  
Fair After treatment patient left in no apparent distress:  
Sitting in chair, Call bell within reach, and Caregiver / family present COMMUNICATION/EDUCATION:  
The patients plan of care was discussed with: Occupational therapist, Registered nurse, and Case management. Fall prevention education was provided and the patient/caregiver indicated understanding., Patient/family have participated as able in goal setting and plan of care. , and Patient/family agree to work toward stated goals and plan of care. Thank you for this referral. 
Ignacio Brumfield, PT Time Calculation: 27 mins

## 2020-12-18 NOTE — PROGRESS NOTES
Problem: Pressure Injury - Risk of 
Goal: *Prevention of pressure injury Description: Document Elier Scale and appropriate interventions in the flowsheet. Outcome: Progressing Towards Goal 
Note: Pressure Injury Interventions: 
Sensory Interventions: Assess changes in LOC Moisture Interventions: Absorbent underpads Activity Interventions: Pressure redistribution bed/mattress(bed type) Mobility Interventions: PT/OT evaluation Nutrition Interventions: Document food/fluid/supplement intake Friction and Shear Interventions: Lift sheet, Apply protective barrier, creams and emollients Problem: Patient Education: Go to Patient Education Activity Goal: Patient/Family Education Outcome: Progressing Towards Goal 
  
Problem: Falls - Risk of 
Goal: *Absence of Falls Description: Document Rochele Orlando Fall Risk and appropriate interventions in the flowsheet. Outcome: Progressing Towards Goal 
Note: Fall Risk Interventions: 
Mobility Interventions: Bed/chair exit alarm Mentation Interventions: Adequate sleep, hydration, pain control, Bed/chair exit alarm, Door open when patient unattended Medication Interventions: Evaluate medications/consider consulting pharmacy Elimination Interventions: Call light in reach History of Falls Interventions: Door open when patient unattended Problem: Patient Education: Go to Patient Education Activity Goal: Patient/Family Education Outcome: Progressing Towards Goal 
  
Problem: Breathing Pattern - Ineffective Goal: *Absence of hypoxia Outcome: Progressing Towards Goal

## 2020-12-18 NOTE — PROGRESS NOTES
Daughter and Pt aware of transfer to [facility] today. Transport to arrive around 1710. All belongings gathered for transport. Daughter verbalized understanding of dc instructions and facility will arrange f/u with pcp after dc. All records for transport copied and packet made. All medications given prior to departure.

## 2020-12-18 NOTE — PROGRESS NOTES
Transition of Care: SNF- [id number] accepted; the insurance [employer]ula [employer] has been confirmed as well; Covid screen test placed on 12/16 
  
Transport Plan: [facility] to  at 5:10pm today 
  
RUR: 19% 
  
Dx: pneumonia 
  
CM noted discharge order 
 
  6033: Mariama Mares at [facility] and [address] [place] [address]- [phone] called this CM to find out when discharge might happen; Jesusita December states he may have a bed available today but definitely on Monday, 12/21; he states the insurance [employer]ula [employer] is still confirmed whether the patient comes today or Monday; he will get back to this CM later today with an update  
 
0925: CM updated patients daughter Jeff at bedside 
 
1200: Teresa December called back and can take the patient today; informed Dr. Rachel Spangler and family 1330: CM faxed Jesusita December the discharge summary to [phone] [facility] and SNF packet on the patients chart Medicare pt has received, reviewed, and signed 2nd IM letter informing them of their right to appeal the discharge. Signed copy has been placed on pt bedside chart. CM following Antonio Cohen RN, CRM

## 2020-12-18 NOTE — PROGRESS NOTES
Transition of Care Plan to SNF/Rehab 
 
SNF/Rehab Transition: 
Patient has been accepted to [facility]  and meets criteria for admission. Patient will transported by [facility] and expected to leave at 4pm. 
 
Communication to Patient/Family: Met with patient and daughter Patricia Perez (identified care giver) and they are agreeable to the transition plan. Communication to SNF/Rehab: 
Bedside RN,Sarah, has been notified to update the transition plan to the facility and call report (phone number [phone]). Discharge information has been updated on the AVS. Discharge instructions to be fax'd to facility at (Fax # [place], selected facility. Nursing Please include all hard scripts for controlled substances, med rec and dc summary, and AVS in packet. Reviewed and confirmed with facility, [facility]ic[facility] Lovett, can manage the patient care needs for the following: SNF/Rehab Transition: 
Renny Dakins with (X) only those applicable: 
 
Medication: 
[x]  Medications will be available at the facility []  IV Antibiotics [x]  Controlled Substance - hard copy to be sent with patient [x]  Weekly Labs Documents: 
[x] Hard RX [x] MAR [x] Kardex [x] AVS 
[x]Transfer Summary 
[x]Discharge Equipment: 
[]  CPAP/BiPAP []  Wound Vacuum 
[]  King or Urinary Device 
[]  PICC/Central Line 
[]  Nebulizer 
[]  Ventilator Treatment: 
[]Isolation (for MRSA, VRE, etc.) []Surgical Drain Management []Tracheostomy Care 
[]Dressing Changes []Dialysis with transportation and chair time . []PEG Care []Oxygen []Daily Weights for Heart Failure Dietary: 
[]Any diet limitations []Tube Feedings []Total Parenteral Management (TPN) Eligible for Medicaid Long Term Services and Supports Yes: 
[] Eligible for medical assistance or will become eligible within 180 days and UAI completed. [] Provider/Patient and/or support system has requested screening. [] UAI copy provided to patient or responsible party, . [] UAI unavailable at discharge will send once processed to SNF provider. [] UAI unavailable at discharged mailed to patient No:  
[] Private pay and is not financially eligible for Medicaid within the next 180 days. [] Reside out-of-state. [] A residents of a state owned/operated facility that is licensed 
by [address] and [employer] [facility] or [facility] 
[] Enrollment in [facility] hospice services 
[] [address] 
[] Patient /Family declines to have screening completed or provide financial information for screening Financial Resources: 
Medicaid   
[] Initiated and application pending  
[] Full coverage Advanced Care Plan: 
[]Surrogate Decision Maker of Care 
[]POA []Communicated Code Status \"Full\")

## 2020-12-18 NOTE — DISCHARGE INSTRUCTIONS
Discharge Instructions       PATIENT ID: Jania Somers  MRN: [id number]   YOB: 1940    DATE OF ADMISSION: 12/2/2020 10:01 PM    DATE OF DISCHARGE: 12/18/2020    PRIMARY CARE PROVIDER: Real Valerio MD     ATTENDING PHYSICIAN: Shalonda Zarate DO  DISCHARGING PROVIDER: Yomi Lamb DO    To contact this individual call [phone] and ask the  to page. If unavailable ask to be transferred the Adult Hospitalist Department. DISCHARGE DIAGNOSES     Pneumonia  Atrial fibrillation     CONSULTATIONS: IP CONSULT TO PULMONOLOGY  IP CONSULT TO CARDIOLOGY    PROCEDURES/SURGERIES: * No surgery found *    PENDING TEST RESULTS:   At the time of discharge the following test results are still pending: none    FOLLOW UP APPOINTMENTS:   Follow-up Information     Follow up With Specialties Details Why Contact [address]    Real Valerio, [address],[address]   [place] [address].  [phone]             ADDITIONAL CARE RECOMMENDATIONS:     Please follow new medication list.     DIET: Clear liquids, mechanical soft  Oral Nutritional Supplements: Ensure Enlive with breakfast, lunch, and dinner    ACTIVITY:  As tolerated    EQUIPMENT needed: supplemental oxygen       DISCHARGE MEDICATIONS:   See Medication Reconciliation Form    · It is important that you take the medication exactly as they are prescribed. · Keep your medication in the bottles provided by the pharmacist and keep a list of the medication names, dosages, and times to be taken in your wallet. · Do not take other medications without consulting your doctor. NOTIFY YOUR PHYSICIAN FOR ANY OF THE FOLLOWING:   Fever over 101 degrees for 24 hours. Chest pain, shortness of breath, fever, chills, nausea, vomiting, diarrhea, change in mentation, falling, weakness, bleeding.  Severe pain or pain not relieved by medications. Or, any other signs or symptoms that you may have questions about.       Signed:   [address], DO  12/18/2020  12:26 PM

## 2020-12-18 NOTE — DISCHARGE SUMMARY
Discharge Summary PATIENT ID: Marcos Carroll MRN: [id number] YOB: 1940 DATE OF ADMISSION: 12/2/2020 10:01 PM   
DATE OF DISCHARGE: 12/18/2020 PRIMARY CARE PROVIDER: Miri Turner MD  
 
ATTENDING PHYSICIAN: Kaylie Concepcion DO 
DISCHARGING PROVIDER: Kaylie Concepcion DO To contact this individual call [phone] and ask the  to page. If unavailable ask to be transferred the Adult Hospitalist Department. CONSULTATIONS: IP CONSULT TO PULMONOLOGY 
IP CONSULT TO CARDIOLOGY PROCEDURES/SURGERIES: * No surgery found * [address] COURSE:  
 
78-year-old male with past medical history of Parkinson's disease, dementia, CAD, presenting to [facility] emergency department on 12/2/2020 with respiratory distress and hypoxia. Found to have evidence of pneumonia. He was admitted for further evaluation. Patient had prolonged hospitalization course, with increased oxygen requirements and delirium. Also had new onset atrial fibrillation with RVR. Patient overall improved and was stable on 2 L of supplemental oxygen. Suspect will need long-term oxygen. Due to prolonged hospitalization course in setting of Parkinson's and dementia, patient was below his baseline and skilled nursing facility was recommended at discharge. CT chest without contrast 12/9/2020: 
IMPRESSION: 
1. Bilateral pneumonia without appreciable change. 2. Small right pleural effusion DISCHARGE DIAGNOSES / PLAN:   
 
Acute hypoxic respiratory failure: slow improvement, still requires supplemental oxygen Aspiration pneumonia: s/p treatment Volume overload: s/p lasix  
-Suspected respiratory failure due to aspiration pneumonia 
-Procalcitonin elevated at 12, repeat improved to 0.09 
-s/p zosyn- finished  
-S/p Lasix  
-Guaifenisen as needed 
-Echocardiogram with EF 95-80%, mild diastolic dysfunction 
-outpatient follow up with pulmonology Atrial fibrillation with RVR: now rate controlled 
-Cardiology consulted 12/16, recommend medical management  
-not a good candidate for anticoagulation due to age and history of rectal bleeding   
-continue aspirin 
-continue metoprolol BID 
-would avoid albuterol nebulizer- went into RVR post treatment  
  
Fever: resolved  
  
Acute encephalopathy: waxing and waning  
-Early to day and night  
  
Parkinson: at baseline as per family 
-continue with home medications 
  
Elevated serum creatinine: resolved 
-Suspected prerenal 
-losartan initially held Anemia, macrocytic: Stable 
  
Hypertension: continue amlodipine, Losartan, metoprolol  
-has fluctuating blood pressures 
  
Left hip pain:  
-x-ray with no acute abnormality   
  
   
 
ADDITIONAL CARE RECOMMENDATIONS:  
Please follow new medication list.  
 
DIET: Clear liquids, mechanical soft Oral Nutritional Supplements: Ensure Enlive with breakfast, lunch, and dinner ACTIVITY:  As tolerated EQUIPMENT needed: supplemental oxygen PENDING TEST RESULTS:  
At the time of discharge the following test results are still pending: none FOLLOW UP APPOINTMENTS:   
Follow-up Information Follow up With Specialties Details Why Contact [facility] 
[phone] Ronnie Hayes MD Hospitalist   [address] 
[phone] DISCHARGE MEDICATIONS: 
Current Discharge Medication List  
  
START taking these medications Details  
losartan (COZAAR) 50 mg tablet Take 1 Tab by mouth daily. Qty: 30 Tab, Refills: 0  
  
metoprolol tartrate (LOPRESSOR) 25 mg tablet Take 1 Tab by mouth every twelve (12) hours. Qty: 30 Tab, Refills: 0 CONTINUE these medications which have CHANGED Details  
amLODIPine (NORVASC) 10 mg tablet Take 1 Tab by mouth daily. Indications: high blood pressure Qty: 30 Tab, Refills: 0  
  
gabapentin (NEURONTIN) 100 mg capsule Take 1 Cap by mouth two (2) times a day. Max Daily Amount: 200 mg. T2C BID Qty: 10 Cap, Refills: 2 Associated Diagnoses: Spinal stenosis of lumbar region with neurogenic claudication CONTINUE these medications which have NOT CHANGED Details  
rOPINIRole (REQUIP) 1 mg tablet Take 1 mg by mouth two (2) times a day. guaiFENesin (Tussin) 100 mg/5 mL liquid Take 400 mg by mouth daily as needed for Cough. PARoxetine (PAXIL) 10 mg tablet TAKE 1 TABLET BY MOUTH DAILY Qty: 90 Tab, Refills: 3  
  
entacapone (COMTAN) 200 mg tablet TAKE 1 TABLET THREE TIMES A DAY Qty: 270 Tab, Refills: 3 Associated Diagnoses: Parkinsonism, unspecified Parkinsonism type ([address] Dementia due to Parkinson's disease without behavioral disturbance ([address] Mild cognitive impairment; Cerebrovascular accident (CVA), unspecified mechanism ([address] Bilateral carotid artery stenosis; History of stroke  
  
artificial tears, dextran 70-hypromellose, (GenTeal Tears Mild) 0.1-0.3 % ophthalmic solution Administer 1 Drop to both eyes nightly. carboxymethylcellulose sodium (THERATEARS OP) Apply  to eye. 1 -2 drops both eyes bid  
  
esomeprazole (NEXIUM) 20 mg capsule TAKE 1 CAPSULE DAILY Qty: 90 Cap, Refills: 3  
  
carbidopa-levodopa (Sinemet)  mg per tablet 2 p.o. 4 times daily  Indications: Parkinson's disease Qty: 720 Tab, Refills: 2  
  
atorvastatin (LIPITOR) 40 mg tablet TAKE 1 TABLET DAILY Qty: 90 Tab, Refills: 3  
  
acetaminophen (TYLENOL EXTRA STRENGTH) 500 mg tablet Take 1,000 mg by mouth three (3) times daily as needed for Pain. Rapid release  
  
senna-docusate (SENNA-S) 8.6-50 mg per tablet Take 1 Tab by mouth every other day. aspirin delayed-release 81 mg tablet Take 81 mg by mouth daily. vitamin e (E GEMS) 1,000 unit capsule Take 2,000 Units by mouth daily. MULTIVITAMIN PO Take 1 Tab by mouth daily. STOP taking these medications  
  
 valsartan (DIOVAN) 160 mg tablet Comments:  
Reason for Stopping:   
   
  
 
 
 
NOTIFY YOUR PHYSICIAN FOR ANY OF THE FOLLOWING:  
Fever over 101 degrees for 24 hours. Chest pain, shortness of breath, fever, chills, nausea, vomiting, diarrhea, change in mentation, falling, weakness, bleeding. Severe pain or pain not relieved by medications. Or, any other signs or symptoms that you may have questions about. DISPOSITION: 
  Home With: 
 OT  PT  HH  RN  
  
x Long term SNF/Inpatient Rehab Independent/assisted living Hospice Other:  
 
 
PATIENT CONDITION AT DISCHARGE:  
 
Functional status  
x Poor Deconditioned Independent Cognition Kiran Jamie Forgetful   
x Dementia Catheters/lines (plus indication) King PICC   
 PEG   
x None Code status  
x  Full code DNR   
 
PHYSICAL EXAMINATION AT DISCHARGE: 
Constitutional:  no distress, Lying on bed, pleasant, cooperative ENT:  Oral mucosa moist, oropharynx benign. Resp:  no wheezing/rhonchi/rales. CTAB, No accessory muscle use CV:  irregularly irregular, no murmurs, gallops, rubs GI:  Soft, non distended, non tender. normoactive bowel sounds, no hepatosplenomegaly Musculoskeletal:  No edema, warm, 2+ pulses throughout Neurologic:  Moves all extremities. Skin:  Good turgor, no rashes or ulcers CHRONIC MEDICAL DIAGNOSES: 
Problem List as of 12/18/2020 Date Reviewed: 9/14/2020 Codes Class Noted - Resolved Acute blood loss anemia ICD-10-CM: D62 
ICD-9-CM: 285.1  1/16/2019 - Present Parkinson's disease [facility]) ICD-10-CM: G20 
ICD-9-CM: 332.0  1/30/2015 - Present Long-term current use of high risk medication other than anticoagulant ICD-10-CM: Z79.899 ICD-9-CM: V58.69  6/13/2018 - Present Gastroesophageal reflux disease without esophagitis ICD-10-CM: K21.9 ICD-9-CM: 530.81  9/30/2017 - Present Pneumonia ICD-10-CM: J18.9 ICD-9-CM: 606  12/3/2020 - Present Benign prostatic hyperplasia with nocturia ICD-10-CM: N40.1, R35.1 ICD-9-CM: 600.01, 788.43  2/17/2020 - Present History of stroke ICD-10-CM: Z86.73 
ICD-9-CM: V12.54  7/29/2019 - Present Dementia due to Parkinson's disease without behavioral disturbance ([facility] ICD-10-CM: G20, F02.80 ICD-9-CM: 332.0, 294.10  7/29/2019 - Present Parkinsonism [facility]) ICD-10-CM: G20 
ICD-9-CM: 332.0  7/29/2019 - Present Bilateral carotid artery stenosis ICD-10-CM: I65.23 ICD-9-CM: 433.10, 433.30  7/29/2019 - Present Mild cognitive impairment ICD-10-CM: G31.84 ICD-9-CM: 331.83  7/29/2019 - Present Cerebrovascular accident (CVA) ([facility] ICD-10-CM: I63.9 ICD-9-CM: 434.91  7/29/2019 - Present Rectal bleeding ICD-10-CM: K62.5 ICD-9-CM: 569.3  1/15/2019 - Present PE (physical exam), annual ICD-10-CM: Z00.00 ICD-9-CM: V70.0  9/30/2017 - Present Overview Addendum 6/12/2019  8:13 AM by Kittie Dubin  
  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 
ICD-9-CM: 786.09  Unknown - Present Hearing difficulty of both ears ICD-10-CM: H91.93 
ICD-9-CM: 389.9  7/12/2017 - Present Sleep apnea in adult ICD-10-CM: G47.30 ICD-9-CM: 327.23  7/12/2017 - Present Overview Signed 7/12/2017  3:47 PM by Get Medrano No longer on CPAP @13 Tendinitis of left rotator cuff ICD-10-CM: M75.82 ICD-9-CM: 726.10  7/12/2017 - Present Coronary artery disease ICD-10-CM: I25.10 ICD-9-CM: 414.00  7/12/2017 - Present Overview Signed 7/12/2017  3:51 PM by Get Medrano By T DJD (degenerative joint disease) ICD-10-CM: M19.90 ICD-9-CM: 715.90  7/12/2017 - Present Tendinitis ICD-10-CM: M77.9 ICD-9-CM: 726.90  7/12/2017 - Present Overview Signed 7/12/2017  3:52 PM by Andrew Fowler ACHILLES Gout ICD-10-CM: M10.9 ICD-9-CM: 274.9  7/12/2017 - Present Tremor ICD-10-CM: R25.1 ICD-9-CM: 781.0  7/12/2017 - Present Overview Signed 7/12/2017  3:53 PM by Andrew Fowler LEFT HAND Restless leg syndrome ICD-10-CM: G25.81 ICD-9-CM: 333.94  7/12/2017 - Present Lumbar stenosis ICD-10-CM: M48.061 
ICD-9-CM: 724.02  1/29/2015 - Present Overview Signed 1/29/2015  6:49 AM by Clif Noe  
  S/P L4-5 microdiscectomy Essential hypertension ICD-10-CM: I10 
ICD-9-CM: 401.9  Unknown - Present MCI (mild cognitive impairment) ICD-10-CM: G31.84 ICD-9-CM: 331.83  Unknown - Present CKD (chronic kidney disease) stage 2, GFR 60-89 ml/min ICD-10-CM: N18.2 ICD-9-CM: 477. 2  Unknown - Present RESOLVED: Benign nodular prostatic hyperplasia without lower urinary tract symptoms ICD-10-CM: N40.0 ICD-9-CM: 600.10  7/12/2017 - 2/17/2020 Greater than 30 minutes were spent with the patient on counseling and coordination of care Signed:  
Marcelo Romero DO 
12/18/2020 
12:28 PM

## 2020-12-18 NOTE — PROGRESS NOTES
Problem: Self Care Deficits Care Plan (Adult) Goal: *Acute Goals and Plan of Care (Insert Text) Description:  
FUNCTIONAL STATUS PRIOR TO ADMISSION: Patient is an unreliable historian 2/2 baseline dementia. Per chart review, patient was modified independent using a rollator for short distance functional mobility and received minimal(?) assistance from staff. This date, patient reporting he self-feeds and occasionally needs help with lower body dressing. HOME SUPPORT: Patient recently moved to [facility]. Occupational Therapy Goals Weekly Re-assessment 12/18/2020 1. Patient will perform 2 unsupported seated ADL tasks with set up/supervision within 7 day(s). -Continue 2. Patient will perform upper body dressing with moderate assistance within 7 day(s). -Continue 3. Patient will perform lower body dressing with maximum assistance within 7 day(s). -Continue 4. Patient will perform toilet transfers to [facility] with moderate assistance and RW within 7 day(s). -Continue 5. Patient will perform all aspects of toileting with maximum assistance and RW within 7 day(s). -Continue Goals reviewed and updated: 12/11/2020 1. Patient will perform 2 unsupported seated ADL tasks with set up/supervision within 7 day(s). 2.  Patient will perform upper body dressing with moderate assistance within 7 day(s). 3.  Patient will perform lower body dressing with maximum assistance within 7 day(s). 4.  Patient will perform toilet transfers to [facility] with moderate assistance and RW within 7 day(s). 5.  Patient will perform all aspects of toileting with maximum assistance and RW within 7 day(s). Initiated 12/4/2020 1. Patient will perform seated ADL task with material set-up and minimal assistance within 7 day(s). MET to wash face and don/doff glasses in supported sitting 12/11/2020 2. Patient will perform upper body dressing with minimal assistance within 7 day(s). 3.  Patient will perform lower body dressing with moderate assistance within 7 day(s). 4.  Patient will perform toilet transfers to [facility] with moderate assistance and RW within 7 day(s). 5.  Patient will perform all aspects of toileting with moderate assistance and RW within 7 day(s). 12/18/2020 1329 by Sean Hammond OT Outcome: Progressing Towards Goal 
  
OCCUPATIONAL THERAPY TREATMENT/WEEKLY RE-ASSESSMENT Patient: Julian oTribio (80 y.o. male) Date: 12/18/2020 Diagnosis: Pneumonia [J18.9] <principal problem not specified> Precautions: Fall Chart, occupational therapy assessment, plan of care, and goals were reviewed. ASSESSMENT Patient continues with skilled OT services and is progressing towards goals. Pt noted with progressive alertness, as well as, progressive functional mobility/balance this date; however, overall, his functional progression has been limited by continued decreased mobility/balance, decreased strength/endurance, decreased activity tolerance and decreased problem solving/sequencing/safety/task initiation. All goals continue at their current levels, with continuing to benefit from skilled OT to address above listed deficits during acute hospitalization, with reporting therapist believing pt would benefit from SNF level rehab upon discharge. Current Level of Function Impacting Discharge (ADLs): Total A-Max A Other factors to consider for discharge: confusion, 2 person assist with transfers PLAN : 
Goals have been updated based on progression since last assessment. Patient continues to benefit from skilled intervention to address the above impairments. Continue to follow patient 5 times a week to address goals. Recommend with staff: OOB meals, Active ADL engagement, Assist x2 to/from [facility] Recommend next OT session: POC progression Recommendation for discharge: (in order for the patient to meet his/her long term goals) Therapy up to 5 days/week in SNF setting This discharge recommendation: 
Has been made in collaboration with the attending provider and/or case management IF patient discharges home will need the following DME: TBD SUBJECTIVE:  
Patient stated Laquita Mcclure are you?  OBJECTIVE DATA SUMMARY:  
Cognitive/Behavioral Status: 
Neurologic State: Alert;Confused Orientation Level: Oriented to person;Disoriented to place; Disoriented to situation;Disoriented to time Cognition: Decreased attention/concentration;Decreased command following; Impaired decision making;Poor safety awareness Perception: Cues to maintain midline in sitting;Cues to maintain midline in standing Perseveration: No perseveration noted Safety/Judgement: Decreased awareness of environment;Decreased awareness of need for assistance;Decreased awareness of need for safety;Decreased insight into deficits Functional Mobility and Transfers for ADLs: 
Bed Mobility: 
Rolling: Moderate assistance; Additional time Supine to Sit: Maximum assistance; Moderate assistance;Assist x2 Scooting: Maximum assistance Transfers: 
Sit to Stand: Moderate assistance;Minimum assistance;Assist x2 Functional Transfers Toilet Transfer : Moderate assistance;Minimum assistance Bed to Chair: Assist x2; Moderate assistance Balance: 
Sitting: Impaired; Without support Sitting - Static: Fair (occasional) Sitting - Dynamic: Fair (occasional) Standing: Impaired; With support Standing - Static: Constant support;Poor Standing - Dynamic : Constant support;Poor ADL Intervention: Lower Body Dressing Assistance Socks: Total assistance (dependent) Toileting Toileting Assistance: Total assistance(dependent) Bladder Hygiene: Maximum assistance Bowel Hygiene: Total assistance (dependent) Clothing Management: Total assistance (dependent) Cues: Verbal cues provided;Visual cues provided; Tactile cues provided(for hand placement, sequencing transfer) Adaptive Equipment: Walker(BSC) Cognitive Retraining Safety/Judgement: Decreased awareness of environment;Decreased awareness of need for assistance;Decreased awareness of need for safety;Decreased insight into deficits Patient instructed on benefits of maintaining activity tolerance, functional mobility, and independence with self care tasks during acute stay  to ensure safe return home and to baseline. Encouraged patient to increase frequency and duration OOB, be out of bed for all meals, perform daily ADLs (as approved by RN/MD regarding bathing etc), and performing functional mobility to/from bathroom. Pt educated on safe transfer techniques, with specific emphasis on proper hand placement to push up from seated surface rather than attempt to pull self up, fully positioning self in-front of desired seated location, feeling chair on back of legs and reaching back with 1-2 UE to slowly lower self to seated position. Pain: 
No c/o pain Activity Tolerance:  
Fair and requires frequent rest breaks After treatment patient left in no apparent distress:  
Call bell within reach, Caregiver / family present, and sitting on [facility] with Physical Therapist following COMMUNICATION/COLLABORATION:  
The patients plan of care was discussed with: Physical therapist, Registered nurse, and Case management. Usman Knight OT Time Calculation: 27 mins

## 2020-12-18 NOTE — PROGRESS NOTES
DARWIN rec'd from [address] [place] [address] and care assumed at this time. Medications, labs and poc reviewed.

## 2020-12-18 NOTE — PROGRESS NOTES
Pt transport will be arriving late, 2045 per phone call. Daughter upset not being informed earlier. Monitor tech communication with RN. Pt care given and 1800 meds.

## 2020-12-19 NOTE — PROGRESS NOTES
SBAR given to [employer], LPN at [facility]. All medications, Hx and care reported. All paper work ready for transport when they arrive. Daughter at bedside until dc.

## 2021-01-21 ENCOUNTER — HOSPITAL ENCOUNTER (INPATIENT)
Age: 81
LOS: 4 days | Discharge: NURSING, REHAB OR LONG-TERM CARE | DRG: 193 | End: 2021-01-25
Attending: EMERGENCY MEDICINE | Admitting: STUDENT IN AN ORGANIZED HEALTH CARE EDUCATION/TRAINING PROGRAM
Payer: MEDICARE

## 2021-01-21 ENCOUNTER — APPOINTMENT (OUTPATIENT)
Dept: GENERAL RADIOLOGY | Age: 81
DRG: 193 | End: 2021-01-21
Attending: STUDENT IN AN ORGANIZED HEALTH CARE EDUCATION/TRAINING PROGRAM
Payer: MEDICARE

## 2021-01-21 ENCOUNTER — APPOINTMENT (OUTPATIENT)
Dept: CT IMAGING | Age: 81
DRG: 193 | End: 2021-01-21
Attending: STUDENT IN AN ORGANIZED HEALTH CARE EDUCATION/TRAINING PROGRAM
Payer: MEDICARE

## 2021-01-21 DIAGNOSIS — R65.20 SEPSIS WITH ENCEPHALOPATHY WITHOUT SEPTIC SHOCK, DUE TO UNSPECIFIED ORGANISM (HCC): ICD-10-CM

## 2021-01-21 DIAGNOSIS — J18.9 PNEUMONIA DUE TO INFECTIOUS ORGANISM, UNSPECIFIED LATERALITY, UNSPECIFIED PART OF LUNG: Primary | ICD-10-CM

## 2021-01-21 DIAGNOSIS — M48.062 SPINAL STENOSIS OF LUMBAR REGION WITH NEUROGENIC CLAUDICATION: ICD-10-CM

## 2021-01-21 DIAGNOSIS — N30.90 CYSTITIS: ICD-10-CM

## 2021-01-21 DIAGNOSIS — N17.9 ACUTE KIDNEY INJURY (HCC): ICD-10-CM

## 2021-01-21 DIAGNOSIS — A41.9 SEPSIS WITH ENCEPHALOPATHY WITHOUT SEPTIC SHOCK, DUE TO UNSPECIFIED ORGANISM (HCC): ICD-10-CM

## 2021-01-21 DIAGNOSIS — G93.40 SEPSIS WITH ENCEPHALOPATHY WITHOUT SEPTIC SHOCK, DUE TO UNSPECIFIED ORGANISM (HCC): ICD-10-CM

## 2021-01-21 PROBLEM — M75.82 TENDINITIS OF LEFT ROTATOR CUFF: Status: RESOLVED | Noted: 2017-07-12 | Resolved: 2021-01-21

## 2021-01-21 PROBLEM — R25.1 TREMOR: Status: RESOLVED | Noted: 2017-07-12 | Resolved: 2021-01-21

## 2021-01-21 PROBLEM — R41.82 ALTERED MENTAL STATUS: Status: ACTIVE | Noted: 2021-01-21

## 2021-01-21 PROBLEM — M77.9 TENDINITIS: Status: RESOLVED | Noted: 2017-07-12 | Resolved: 2021-01-21

## 2021-01-21 LAB
ALBUMIN SERPL-MCNC: 2.5 G/DL (ref 3.5–5)
ALBUMIN/GLOB SERPL: 0.5 {RATIO} (ref 1.1–2.2)
ALP SERPL-CCNC: 107 U/L (ref 45–117)
ALT SERPL-CCNC: 20 U/L (ref 12–78)
ANION GAP SERPL CALC-SCNC: 4 MMOL/L (ref 5–15)
APPEARANCE UR: ABNORMAL
APTT PPP: 25.9 SEC (ref 22.1–31)
AST SERPL-CCNC: 40 U/L (ref 15–37)
ATRIAL RATE: 51 BPM
BACTERIA URNS QL MICRO: ABNORMAL /HPF
BASOPHILS # BLD: 0 K/UL (ref 0–0.1)
BASOPHILS NFR BLD: 0 % (ref 0–1)
BILIRUB SERPL-MCNC: 0.7 MG/DL (ref 0.2–1)
BILIRUB UR QL CFM: NEGATIVE
BNP SERPL-MCNC: 3381 PG/ML
BUN SERPL-MCNC: 32 MG/DL (ref 6–20)
BUN/CREAT SERPL: 20 (ref 12–20)
CALCIUM SERPL-MCNC: 9.3 MG/DL (ref 8.5–10.1)
CALCULATED P AXIS, ECG09: -2 DEGREES
CALCULATED R AXIS, ECG10: -33 DEGREES
CALCULATED T AXIS, ECG11: 9 DEGREES
CHLORIDE SERPL-SCNC: 102 MMOL/L (ref 97–108)
CO2 SERPL-SCNC: 30 MMOL/L (ref 21–32)
COLOR UR: ABNORMAL
COVID-19 RAPID TEST, COVR: NOT DETECTED
CREAT SERPL-MCNC: 1.58 MG/DL (ref 0.7–1.3)
DIAGNOSIS, 93000: NORMAL
DIFFERENTIAL METHOD BLD: ABNORMAL
EOSINOPHIL # BLD: 0.1 K/UL (ref 0–0.4)
EOSINOPHIL NFR BLD: 1 % (ref 0–7)
EPITH CASTS URNS QL MICRO: ABNORMAL /LPF
ERYTHROCYTE [DISTWIDTH] IN BLOOD BY AUTOMATED COUNT: 14.6 % (ref 11.5–14.5)
GLOBULIN SER CALC-MCNC: 5.3 G/DL (ref 2–4)
GLUCOSE SERPL-MCNC: 103 MG/DL (ref 65–100)
GLUCOSE UR STRIP.AUTO-MCNC: NEGATIVE MG/DL
HCT VFR BLD AUTO: 30.7 % (ref 36.6–50.3)
HGB BLD-MCNC: 9.5 G/DL (ref 12.1–17)
HGB UR QL STRIP: NEGATIVE
HYALINE CASTS URNS QL MICRO: ABNORMAL /LPF (ref 0–5)
IMM GRANULOCYTES # BLD AUTO: 0.1 K/UL (ref 0–0.04)
IMM GRANULOCYTES NFR BLD AUTO: 1 % (ref 0–0.5)
INR PPP: 1.2 (ref 0.9–1.1)
KETONES UR QL STRIP.AUTO: ABNORMAL MG/DL
LACTATE BLD-SCNC: 0.82 MMOL/L (ref 0.4–2)
LEUKOCYTE ESTERASE UR QL STRIP.AUTO: ABNORMAL
LYMPHOCYTES # BLD: 1.3 K/UL (ref 0.8–3.5)
LYMPHOCYTES NFR BLD: 12 % (ref 12–49)
MAGNESIUM SERPL-MCNC: 2.3 MG/DL (ref 1.6–2.4)
MCH RBC QN AUTO: 29.5 PG (ref 26–34)
MCHC RBC AUTO-ENTMCNC: 30.9 G/DL (ref 30–36.5)
MCV RBC AUTO: 95.3 FL (ref 80–99)
MONOCYTES # BLD: 0.7 K/UL (ref 0–1)
MONOCYTES NFR BLD: 6 % (ref 5–13)
NEUTS SEG # BLD: 8.9 K/UL (ref 1.8–8)
NEUTS SEG NFR BLD: 80 % (ref 32–75)
NITRITE UR QL STRIP.AUTO: POSITIVE
NRBC # BLD: 0 K/UL (ref 0–0.01)
NRBC BLD-RTO: 0 PER 100 WBC
P-R INTERVAL, ECG05: 168 MS
PH UR STRIP: 5.5 [PH] (ref 5–8)
PLATELET # BLD AUTO: 319 K/UL (ref 150–400)
PMV BLD AUTO: 10.2 FL (ref 8.9–12.9)
POTASSIUM SERPL-SCNC: 4.2 MMOL/L (ref 3.5–5.1)
PROT SERPL-MCNC: 7.8 G/DL (ref 6.4–8.2)
PROT UR STRIP-MCNC: 30 MG/DL
PROTHROMBIN TIME: 12.1 SEC (ref 9–11.1)
Q-T INTERVAL, ECG07: 438 MS
QRS DURATION, ECG06: 88 MS
QTC CALCULATION (BEZET), ECG08: 403 MS
RBC # BLD AUTO: 3.22 M/UL (ref 4.1–5.7)
RBC #/AREA URNS HPF: ABNORMAL /HPF (ref 0–5)
SARS-COV-2, COV2: NORMAL
SARS-COV-2, COV2: NORMAL
SODIUM SERPL-SCNC: 136 MMOL/L (ref 136–145)
SOURCE, COVRS: NORMAL
SP GR UR REFRACTOMETRY: 1.02 (ref 1–1.03)
THERAPEUTIC RANGE,PTTT: NORMAL SECS (ref 58–77)
TROPONIN I SERPL-MCNC: <0.05 NG/ML
UA: UC IF INDICATED,UAUC: ABNORMAL
UROBILINOGEN UR QL STRIP.AUTO: 1 EU/DL (ref 0.2–1)
VENTRICULAR RATE, ECG03: 51 BPM
WBC # BLD AUTO: 11.1 K/UL (ref 4.1–11.1)
WBC URNS QL MICRO: >100 /HPF (ref 0–4)

## 2021-01-21 PROCEDURE — 74011000258 HC RX REV CODE- 258: Performed by: STUDENT IN AN ORGANIZED HEALTH CARE EDUCATION/TRAINING PROGRAM

## 2021-01-21 PROCEDURE — 51702 INSERT TEMP BLADDER CATH: CPT

## 2021-01-21 PROCEDURE — 36415 COLL VENOUS BLD VENIPUNCTURE: CPT

## 2021-01-21 PROCEDURE — 99285 EMERGENCY DEPT VISIT HI MDM: CPT

## 2021-01-21 PROCEDURE — 74011250637 HC RX REV CODE- 250/637: Performed by: STUDENT IN AN ORGANIZED HEALTH CARE EDUCATION/TRAINING PROGRAM

## 2021-01-21 PROCEDURE — 83880 ASSAY OF NATRIURETIC PEPTIDE: CPT

## 2021-01-21 PROCEDURE — 71045 X-RAY EXAM CHEST 1 VIEW: CPT

## 2021-01-21 PROCEDURE — 87635 SARS-COV-2 COVID-19 AMP PRB: CPT

## 2021-01-21 PROCEDURE — 70450 CT HEAD/BRAIN W/O DYE: CPT

## 2021-01-21 PROCEDURE — 87040 BLOOD CULTURE FOR BACTERIA: CPT

## 2021-01-21 PROCEDURE — 94760 N-INVAS EAR/PLS OXIMETRY 1: CPT

## 2021-01-21 PROCEDURE — 83605 ASSAY OF LACTIC ACID: CPT

## 2021-01-21 PROCEDURE — U0005 INFEC AGEN DETEC AMPLI PROBE: HCPCS

## 2021-01-21 PROCEDURE — 65660000000 HC RM CCU STEPDOWN

## 2021-01-21 PROCEDURE — 77010033678 HC OXYGEN DAILY

## 2021-01-21 PROCEDURE — 96365 THER/PROPH/DIAG IV INF INIT: CPT

## 2021-01-21 PROCEDURE — 83735 ASSAY OF MAGNESIUM: CPT

## 2021-01-21 PROCEDURE — 74011250636 HC RX REV CODE- 250/636: Performed by: STUDENT IN AN ORGANIZED HEALTH CARE EDUCATION/TRAINING PROGRAM

## 2021-01-21 PROCEDURE — 87186 SC STD MICRODIL/AGAR DIL: CPT

## 2021-01-21 PROCEDURE — 84484 ASSAY OF TROPONIN QUANT: CPT

## 2021-01-21 PROCEDURE — 93005 ELECTROCARDIOGRAM TRACING: CPT

## 2021-01-21 PROCEDURE — 96367 TX/PROPH/DG ADDL SEQ IV INF: CPT

## 2021-01-21 PROCEDURE — 80053 COMPREHEN METABOLIC PANEL: CPT

## 2021-01-21 PROCEDURE — 85730 THROMBOPLASTIN TIME PARTIAL: CPT

## 2021-01-21 PROCEDURE — 85610 PROTHROMBIN TIME: CPT

## 2021-01-21 PROCEDURE — 87086 URINE CULTURE/COLONY COUNT: CPT

## 2021-01-21 PROCEDURE — 87077 CULTURE AEROBIC IDENTIFY: CPT

## 2021-01-21 PROCEDURE — 85025 COMPLETE CBC W/AUTO DIFF WBC: CPT

## 2021-01-21 PROCEDURE — 81001 URINALYSIS AUTO W/SCOPE: CPT

## 2021-01-21 RX ORDER — POLYETHYLENE GLYCOL 3350 17 G/17G
17 POWDER, FOR SOLUTION ORAL DAILY PRN
Status: DISCONTINUED | OUTPATIENT
Start: 2021-01-21 | End: 2021-01-25 | Stop reason: HOSPADM

## 2021-01-21 RX ORDER — ONDANSETRON 2 MG/ML
4 INJECTION INTRAMUSCULAR; INTRAVENOUS
Status: DISCONTINUED | OUTPATIENT
Start: 2021-01-21 | End: 2021-01-21 | Stop reason: SDUPTHER

## 2021-01-21 RX ORDER — VANCOMYCIN HYDROCHLORIDE
1250 EVERY 24 HOURS
Status: DISCONTINUED | OUTPATIENT
Start: 2021-01-22 | End: 2021-01-23

## 2021-01-21 RX ORDER — PANTOPRAZOLE SODIUM 40 MG/1
40 TABLET, DELAYED RELEASE ORAL
Status: DISCONTINUED | OUTPATIENT
Start: 2021-01-22 | End: 2021-01-25 | Stop reason: HOSPADM

## 2021-01-21 RX ORDER — ROPINIROLE 1 MG/1
1 TABLET, FILM COATED ORAL 2 TIMES DAILY
Status: DISCONTINUED | OUTPATIENT
Start: 2021-01-21 | End: 2021-01-25 | Stop reason: HOSPADM

## 2021-01-21 RX ORDER — ACETAMINOPHEN 650 MG/1
650 SUPPOSITORY RECTAL
Status: DISCONTINUED | OUTPATIENT
Start: 2021-01-21 | End: 2021-01-25 | Stop reason: HOSPADM

## 2021-01-21 RX ORDER — ONDANSETRON 2 MG/ML
4 INJECTION INTRAMUSCULAR; INTRAVENOUS
Status: DISCONTINUED | OUTPATIENT
Start: 2021-01-21 | End: 2021-01-25 | Stop reason: HOSPADM

## 2021-01-21 RX ORDER — CARBIDOPA AND LEVODOPA 25; 100 MG/1; MG/1
1 TABLET ORAL 4 TIMES DAILY
Status: DISCONTINUED | OUTPATIENT
Start: 2021-01-21 | End: 2021-01-25 | Stop reason: HOSPADM

## 2021-01-21 RX ORDER — PAROXETINE HYDROCHLORIDE 20 MG/1
10 TABLET, FILM COATED ORAL DAILY
Status: DISCONTINUED | OUTPATIENT
Start: 2021-01-22 | End: 2021-01-25 | Stop reason: HOSPADM

## 2021-01-21 RX ORDER — VANCOMYCIN 2 GRAM/500 ML IN 0.9 % SODIUM CHLORIDE INTRAVENOUS
2000
Status: COMPLETED | OUTPATIENT
Start: 2021-01-21 | End: 2021-01-21

## 2021-01-21 RX ORDER — ENOXAPARIN SODIUM 100 MG/ML
40 INJECTION SUBCUTANEOUS DAILY
Status: DISCONTINUED | OUTPATIENT
Start: 2021-01-22 | End: 2021-01-25 | Stop reason: HOSPADM

## 2021-01-21 RX ORDER — SODIUM CHLORIDE 0.9 % (FLUSH) 0.9 %
5-10 SYRINGE (ML) INJECTION AS NEEDED
Status: DISCONTINUED | OUTPATIENT
Start: 2021-01-21 | End: 2021-01-25 | Stop reason: HOSPADM

## 2021-01-21 RX ORDER — ATORVASTATIN CALCIUM 40 MG/1
40 TABLET, FILM COATED ORAL
Status: DISCONTINUED | OUTPATIENT
Start: 2021-01-21 | End: 2021-01-25 | Stop reason: HOSPADM

## 2021-01-21 RX ORDER — ACETAMINOPHEN 325 MG/1
650 TABLET ORAL
Status: DISCONTINUED | OUTPATIENT
Start: 2021-01-21 | End: 2021-01-25 | Stop reason: HOSPADM

## 2021-01-21 RX ORDER — ASPIRIN 81 MG/1
81 TABLET ORAL DAILY
Status: DISCONTINUED | OUTPATIENT
Start: 2021-01-22 | End: 2021-01-25 | Stop reason: HOSPADM

## 2021-01-21 RX ORDER — AMOXICILLIN 250 MG
1 CAPSULE ORAL EVERY OTHER DAY
Status: DISCONTINUED | OUTPATIENT
Start: 2021-01-21 | End: 2021-01-25 | Stop reason: HOSPADM

## 2021-01-21 RX ORDER — ENTACAPONE 200 MG/1
200 TABLET ORAL 3 TIMES DAILY
Status: DISCONTINUED | OUTPATIENT
Start: 2021-01-21 | End: 2021-01-25 | Stop reason: HOSPADM

## 2021-01-21 RX ORDER — SODIUM CHLORIDE 0.9 % (FLUSH) 0.9 %
5-40 SYRINGE (ML) INJECTION EVERY 8 HOURS
Status: DISCONTINUED | OUTPATIENT
Start: 2021-01-21 | End: 2021-01-25 | Stop reason: HOSPADM

## 2021-01-21 RX ORDER — PROMETHAZINE HYDROCHLORIDE 25 MG/1
12.5 TABLET ORAL
Status: DISCONTINUED | OUTPATIENT
Start: 2021-01-21 | End: 2021-01-25 | Stop reason: HOSPADM

## 2021-01-21 RX ORDER — SODIUM CHLORIDE 9 MG/ML
75 INJECTION, SOLUTION INTRAVENOUS CONTINUOUS
Status: DISCONTINUED | OUTPATIENT
Start: 2021-01-21 | End: 2021-01-23

## 2021-01-21 RX ORDER — SODIUM CHLORIDE 0.9 % (FLUSH) 0.9 %
5-40 SYRINGE (ML) INJECTION AS NEEDED
Status: DISCONTINUED | OUTPATIENT
Start: 2021-01-21 | End: 2021-01-25 | Stop reason: HOSPADM

## 2021-01-21 RX ADMIN — CARBIDOPA AND LEVODOPA 1 TABLET: 25; 100 TABLET ORAL at 18:23

## 2021-01-21 RX ADMIN — CARBIDOPA AND LEVODOPA 1 TABLET: 25; 100 TABLET ORAL at 22:49

## 2021-01-21 RX ADMIN — Medication 10 ML: at 22:00

## 2021-01-21 RX ADMIN — ROPINIROLE HYDROCHLORIDE 1 MG: 1 TABLET, FILM COATED ORAL at 18:24

## 2021-01-21 RX ADMIN — Medication 10 ML: at 14:00

## 2021-01-21 RX ADMIN — DEXTRAN 70, GLYCERIN, HYPROMELLOSE 1 DROP: 1; 2; 3 SOLUTION/ DROPS OPHTHALMIC at 22:49

## 2021-01-21 RX ADMIN — ENTACAPONE 200 MG: 200 TABLET, FILM COATED ORAL at 22:49

## 2021-01-21 RX ADMIN — PIPERACILLIN AND TAZOBACTAM 3.38 G: 3; .375 INJECTION, POWDER, LYOPHILIZED, FOR SOLUTION INTRAVENOUS at 10:29

## 2021-01-21 RX ADMIN — ATORVASTATIN CALCIUM 40 MG: 40 TABLET, FILM COATED ORAL at 22:49

## 2021-01-21 RX ADMIN — CEFEPIME 2 G: 2 INJECTION, POWDER, FOR SOLUTION INTRAVENOUS at 18:24

## 2021-01-21 RX ADMIN — SODIUM CHLORIDE 75 ML/HR: 9 INJECTION, SOLUTION INTRAVENOUS at 14:31

## 2021-01-21 RX ADMIN — ENTACAPONE 200 MG: 200 TABLET, FILM COATED ORAL at 18:23

## 2021-01-21 RX ADMIN — VANCOMYCIN HYDROCHLORIDE 2000 MG: 10 INJECTION, POWDER, LYOPHILIZED, FOR SOLUTION INTRAVENOUS at 11:02

## 2021-01-21 NOTE — ED PROVIDER NOTES
EMERGENCY DEPARTMENT HISTORY AND PHYSICAL EXAM 
     
 
Date: 1/21/2021 Patient Name: Rolando Adkins Attending of Record: Carolynniqra Cintron History of Presenting Illness Chief Complaint Patient presents with  Altered mental status History Provided By: EMS and chart review HPI: Rolando Adkins is a 78 y.o. male, has dementia, tremor, hypertension, chronic kidney disease, and history of stroke and residual deficits presents as a transfer from his nursing home earlier today. Patient has reportedly had worsened mental status for the past 24 hours. He has known bilateral pneumonia. Blood pressure this morning was reported to be 80 systolic with his heart rate in the 50s. Will provide a history, but he is able to respond to commands. A&Ox1. Hx limited to clinical condition PCP: Karis Jordan MD 
 
There are no other complaints, changes, or physical findings at this time. Current Facility-Administered Medications Medication Dose Route Frequency Provider Last Rate Last Admin  sodium chloride (NS) flush 5-10 mL  5-10 mL IntraVENous PRN Mary Heart MD      
 piperacillin-tazobactam (ZOSYN) 3.375 g in 0.9% sodium chloride (MBP/ADV) 100 mL MBP  3.375 g IntraVENous NOW Mary Heart MD      
 vancomycin (VANCOCIN) 2000 mg in  ml infusion  2,000 mg IntraVENous NOW Cynda Carrier Mary Michel MD      
 
Current Outpatient Medications Medication Sig Dispense Refill  amLODIPine (NORVASC) 10 mg tablet Take 1 Tab by mouth daily. Indications: high blood pressure 30 Tab 0  
 gabapentin (NEURONTIN) 100 mg capsule Take 1 Cap by mouth two (2) times a day. Max Daily Amount: 200 mg. T2C BID 10 Cap 2  
 losartan (COZAAR) 50 mg tablet Take 1 Tab by mouth daily. 30 Tab 0  
 metoprolol tartrate (LOPRESSOR) 25 mg tablet Take 1 Tab by mouth every twelve (12) hours. 30 Tab 0  
 rOPINIRole (REQUIP) 1 mg tablet Take 1 mg by mouth two (2) times a day.  guaiFENesin (Tussin) 100 mg/5 mL liquid Take 400 mg by mouth daily as needed for Cough.  PARoxetine (PAXIL) 10 mg tablet TAKE 1 TABLET BY MOUTH DAILY 90 Tab 3  
 entacapone (COMTAN) 200 mg tablet TAKE 1 TABLET THREE TIMES A  Tab 3  
 artificial tears, dextran 70-hypromellose, (GenTeal Tears Mild) 0.1-0.3 % ophthalmic solution Administer 1 Drop to both eyes nightly.  carboxymethylcellulose sodium (THERATEARS OP) Apply  to eye. 1 -2 drops both eyes bid  esomeprazole (NEXIUM) 20 mg capsule TAKE 1 CAPSULE DAILY 90 Cap 3  carbidopa-levodopa (Sinemet)  mg per tablet 2 p.o. 4 times daily  Indications: Parkinson's disease 720 Tab 2  
 atorvastatin (LIPITOR) 40 mg tablet TAKE 1 TABLET DAILY 90 Tab 3  
 acetaminophen (TYLENOL EXTRA STRENGTH) 500 mg tablet Take 1,000 mg by mouth three (3) times daily as needed for Pain. Rapid release  senna-docusate (SENNA-S) 8.6-50 mg per tablet Take 1 Tab by mouth every other day.  aspirin delayed-release 81 mg tablet Take 81 mg by mouth daily.  vitamin e (E GEMS) 1,000 unit capsule Take 2,000 Units by mouth daily.  MULTIVITAMIN PO Take 1 Tab by mouth daily. Past History Past Medical History: 
Past Medical History:  
Diagnosis Date  Aaron esophagus  Benign nodular prostatic hyperplasia without lower urinary tract symptoms 7/12/2017  CKD (chronic kidney disease) stage 2, GFR 60-89 ml/min  Constipation  Coronary artery disease 7/12/2017  Depression  DJD (degenerative joint disease) 7/12/2017 [address]  Gout 7/12/2017  Headache   
 Hearing difficulty of both ears 7/12/2017  Hyperlipidemia  Hypertension  Impaired cognition 7/12/2017  MCI (mild cognitive impairment)  Restless leg syndrome 7/12/2017  Sleep apnea in adult 7/12/2017 No longer on CPAP @13  Snoring  Stroke ([facility] [address]  Tendinitis 7/12/2017 ACHILLES  Tendinitis of left rotator cuff 07/12/2017  Tremor 7/12/2017 LEFT HAND  Unspecified sleep apnea   
 lost weight no longer has it Past Surgical History: 
Past Surgical History:  
Procedure Laterality Date  COLONOSCOPY N/A 1/16/2019 COLONOSCOPY performed by Eduar Talbert MD at [facility] ENDOSCOPY  COLONOSCOPY,FLEX,W/CONTROL, BLEEDING  1/16/2019  HX ORTHOPAEDIC  1/27/15 L4-L5 MICRODECOMPRESSION (Right  UT EGD TRANSORAL BIOPSY SINGLE/MULTIPLE  8/16/2011  UT EGD TRANSORAL BIOPSY SINGLE/MULTIPLE  10/1/2013 Family History: 
Family History Problem Relation Age of Onset  Heart Disease Mother  Heart Disease Father Social History: 
Social History Tobacco Use  Smoking status: Former Smoker  Smokeless tobacco: Never Used Substance Use Topics  Alcohol use: No  
 Drug use: No  
 
 
Allergies: 
No Known Allergies Review of Systems Review of Systems Unable to be obtained due to clinical condition Physical Exam  
Physical Exam 
Constitutional:   
   Appearance: Normal appearance. Comments: Alert Thin and Chronically ill appearing HENT:  
   Head: Normocephalic and atraumatic. Mouth/Throat:  
   Comments: Dry Eyes:  
   General: No scleral icterus. Conjunctiva/sclera: Conjunctivae normal.  
   Pupils: Pupils are equal, round, and reactive to light. Neck: Musculoskeletal: Normal range of motion. Cardiovascular:  
   Rate and Rhythm: Regular rhythm. Bradycardia present. Pulmonary:  
   Effort: Pulmonary effort is normal.  
   Breath sounds: Normal breath sounds. Abdominal:  
   General: Abdomen is flat. There is no distension. Palpations: Abdomen is soft. Tenderness: There is no guarding or rebound. Musculoskeletal: Normal range of motion. General: No swelling. Skin: 
   General: Skin is warm and dry. Capillary Refill: Capillary refill takes less than 2 seconds. Neurological: General: No focal deficit present. Mental Status: He is lethargic and confused. Comments: Moves all extremities 5/5  strength bilaterally Responding to commands Psychiatric:     
   Mood and Affect: Mood normal.  
 
 
Diagnostic Study Results Labs - Recent Results (from the past 12 hour(s)) EKG, 12 LEAD, INITIAL Collection Time: 01/21/21 10:11 AM  
Result Value Ref Range Ventricular Rate 51 BPM  
 Atrial Rate 51 BPM  
 P-R Interval 168 ms QRS Duration 88 ms Q-T Interval 438 ms QTC Calculation (Bezet) 403 ms Calculated P Axis -2 degrees Calculated R Axis -33 degrees Calculated T Axis 9 degrees Diagnosis Sinus bradycardia Left axis deviation When compared with ECG of 16-DEC-2020 10:21, Sinus rhythm has replaced Atrial fibrillation Vent. rate has decreased BY  47 BPM 
  
 
 
Radiologic Studies -  
XR CHEST PORT    (Results Pending) CT HEAD WO CONT    (Results Pending) CT Results  (Last 48 hours) None CXR Results  (Last 48 hours) None Medical Decision Making I am the first provider for this patient. I reviewed the vital signs, available nursing notes, past medical history, past surgical history, family history and social history. Vital Signs-Reviewed the patient's vital signs. Patient Vitals for the past 12 hrs: 
 Temp Pulse Resp BP SpO2  
01/21/21 0959 98 °F (36.7 °C) (!) 58 20 (!) 127/94 95 % ECG Interpretation: Sinus rhythm, bradycardic with heart rate 51, leftward deviated axis, normal intervals, no ischemic changes Records Reviewed: Nursing Notes and Old Medical Records Provider Notes (Medical Decision Making): DDx:  
Acquired pneumonia Sepsis Septic shock ED [facility] ED SEPSIS NOTE:  
 
11:34 AM The patient now meets criteria for: Severe Sepsis 1.  Will hold off on fluids with history of severe heart failure requiring chronic O2 
 2. Due to concern for rapidly advancing infection and deterioration of patient's condition, antibiotics are started STAT and cultures ordered. --------------------------------------------------------------------------------- 
 
 
ED Course and Progress Notes:  
Initial assessment performed. The patients presenting problems have been discussed, and they are in agreement with the care plan formulated and outlined with them. I have encouraged them to ask questions as they arise throughout their visit. ED Course as of Jan 21 1134 Thu Jan 21, 2021  
1013 Residents patient seen by me. Patient brought in by EMS for altered mental status. Here he is answering some short sentences and moving his extremities to verbal command. Has a wet cough and crackles on exam.  Differential includes acute CHF exacerbation, sepsis secondary to pneumonia, Covid infection, bronchitis. Will get labs, imaging, and likely will need admission. [JS] 1132 Multiple sources of infection. Has evidence of urinary tract infection on his UA. Also has pneumonia seen on chest x-ray. No large leukocytosis; white count was only 11. Patient with a stage I MARI. I suspect this is actually due to dehydration rather than cardiorenal syndrome, but will hold off on fluids for now given his elevated BNP and known history of heart failure.  
 [WB] ED Course User Index [JS] Stephen Rodriguez MD 
[WB] Karin Mallory MD  
 
Sign with the patient's daughter. She reports that she is 1 of 3 siblings, and one is unable to change patient's CODE STATUS at this time. He full code currently. She will call siblings to make a decision. Critical Care: CRITICAL CARE NOTE : 
 
10:15 AM 
IMPENDING DETERIORATION -Cardiovascular ASSOCIATED RISK FACTORS - Metabolic changes MANAGEMENT- Bedside Assessment INTERPRETATION -  Blood Pressure INTERVENTIONS - antibioitcs CASE REVIEW - Family TREATMENT RESPONSE -Stable PERFORMED BY - Self NOTES   : 
 
Sriram Estrada, have spent 39 minutes of critical care time involved in lab review, consultations with specialist, family decision- making, bedside attention and documentation. This time excludes time spent in any separate billed procedures. During this entire length of time I was immediately available to the patient. Diagnosis Clinical Impression: 1. Pneumonia due to infectious organism, unspecified laterality, unspecified part of lung 2. Cystitis 3. Sepsis with encephalopathy without septic shock, due to unspecified organism ([address] 4. Acute kidney injury ([facility] [address] Disposition: 
Admission Resident Signature: Addison Springer MD, PGY4

## 2021-01-21 NOTE — ED TRIAGE NOTES
Pt arrives via EMS from [address] [place] [address] for altered mental status, cough, low BP and low HR. Pt able to tell RN who he is, note congested cough, note stable VS.  Recent xray + pneumonia, Covid test 1/19 negative, repeat done yesterday and pending.

## 2021-01-21 NOTE — ED NOTES
Pt appears more awake at this time, disoriented to location and course of events but reoriented by RN. Pt able to tell RN full name and . Pt denies pain.

## 2021-01-21 NOTE — ED NOTES
Pt resting quietly with eyes closed, arousable to touch and voice, admitting MD in with pt and aware of HR.

## 2021-01-21 NOTE — ED NOTES
Spoke with pt's daughter and updated on course of events and pt status. Jessenia Nñuez [phone]

## 2021-01-21 NOTE — H&P
Hospitalist Admission Note NAME: Karen Meléndez :  1940 MRN:  [id number] Date/Time:  2021 12:58 PM 
 
Patient PCP: Wu Yuen MD 
______________________________________________________________________ Given the patient's current clinical presentation, I have a high level of concern for decompensation if discharged from the emergency department. Complex decision making was performed, which includes reviewing the patient's available past medical records, laboratory results, and x-ray films. My assessment of this patient's clinical condition and my plan of care is as follows. Assessment / Plan: Altered mental status in setting of baseline dementia : Likely due to infection Urinary tract infection 
-Presents from SNF with altered mentation from baseline. -Afebrile, no leukocytosis. -UA with pyuria, bacteria, positive nitrates and LE 
-Lactic acid WNL yesterday 
-Admit to telemetry floor 
-Started on vancomycin plus Zosyn in ED. In light of MARI will change antibiotic to Vanco and cefepime pending further culture data. Patient also has pneumonia 
-Follow-up urine and blood culture results. Hospital acquired pneumonia COPD on ? 3LPM O2 at home 
-Patient was diagnosed with pneumonia at rehab on  with abnormal chest x-ray and started on Augmentin yesterday. 
-Chest x-ray here shows scattered opacities in the left lung. Improved airspace disease in the right lung. 
-Continue empiric broad-spectrum antibiotic as above 
-Obtain sputum culture if possible. obtain urine strep and Legionella antigen. Sinus bradycardia 
-Heart rate in the high 40s and 50s. EKG NSR, no AV prolongation, Left axis deviation, poor Q wave progression, no acute ischemic changes. Continue telemetry monitoring.  
 
Acute kidney injury on C 
 -Creatinine elevated at 1.5. Possibly prerenal from dehydration. BUN/Cr 20. Gentle IV fluid in light of CHF. Check BMP in a.m. Further studies if worse. History of CAD Chronic diastolic congestive heart failure EF 60% -In December 2020. Appears euvolemic on exam. 
-Holding metoprolol due to low BP. Continue ASA and lipitor Hypertension 
-BP soft. Hold home amlodipine and metoprolol for now. Continue to monitor. Normocytic anemia Hx of MIRA 
- Hand H at baseline. Continue to monitor. History of paroxysmal atrial fibrillation Dementia Parkinson's disease Hyperlipidemia Lumbar spinal stenosis Restless leg syndrome 
-Continue home meds Code Status: Full Surrogate Decision Maker: DVT Prophylaxis: Lovenox GI Prophylaxis: not indicated Baseline: Came from [address] [place] [address] and rehab center. Was admitted in December 19. Subjective: CHIEF COMPLAINT: Altered mental status HISTORY OF PRESENT ILLNESS:    
Eddie Valencia is a 78 y.o.  male with extensive past medical history including dementia, Parkinson's disease, COPD on home O2 who presents from skilled nursing facility [facility] and rehab center) with altered mental status from baseline. Patient was admitted at [facility] from 12/2-12/18 for aspiration pneumonia and discharged to SNF. Patient is alert and oriented x2 at baseline. Per note from nursing home patient was diagnosed with pneumonia two days ago and started on Augmentin. Once yesterday he is noted to be more altered from baseline. Patient unable to provide any history. Alert and oriented x0. History taken mainly from chart review and SNF papers. In the ED chest x-ray shows increased left lung infiltrate. UA suggestive of UTI. We were asked to admit for work up and evaluation of the above problems. Past Medical History:  
Diagnosis Date  Aaron esophagus  Benign nodular prostatic hyperplasia without lower urinary tract symptoms 7/12/2017  CKD (chronic kidney disease) stage 2, GFR 60-89 ml/min  Constipation  Coronary artery disease 7/12/2017  Depression  DJD (degenerative joint disease) 7/12/2017 [address]  Gout 7/12/2017  Headache   
 Hearing difficulty of both ears 7/12/2017  Hyperlipidemia  Hypertension  Impaired cognition 7/12/2017  MCI (mild cognitive impairment)  Restless leg syndrome 7/12/2017  Sleep apnea in adult 7/12/2017 No longer on CPAP @13  Snoring  Stroke ([address]  Tendinitis 7/12/2017 ACHILLES  Tendinitis of left rotator cuff 07/12/2017  Tremor 7/12/2017 LEFT HAND  Unspecified sleep apnea   
 lost weight no longer has it Past Surgical History:  
Procedure Laterality Date  COLONOSCOPY N/A 1/16/2019 COLONOSCOPY performed by Norma Clarke MD at [facility] ENDOSCOPY  COLONOSCOPY,FLEX,W/CONTROL, BLEEDING  1/16/2019  HX ORTHOPAEDIC  1/27/15 L4-L5 MICRODECOMPRESSION (Right  [place] EGD TRANSORAL BIOPSY SINGLE/MULTIPLE  8/16/2011  [place] EGD TRANSORAL BIOPSY SINGLE/MULTIPLE  10/1/2013 Social History Tobacco Use  Smoking status: Former Smoker  Smokeless tobacco: Never Used Substance Use Topics  Alcohol use: No  
  
 
Family History Problem Relation Age of Onset  Heart Disease Mother  Heart Disease Father No Known Allergies Prior to Admission medications Medication Sig Start Date End Date Taking? Authorizing Provider  
amLODIPine (NORVASC) 10 mg tablet Take 1 Tab by mouth daily. Indications: high blood pressure 12/19/20   Saleem [employer] M, DO  
gabapentin (NEURONTIN) 100 mg capsule Take 1 Cap by mouth two (2) times a day. Max Daily Amount: 200 mg. T2C BID 12/18/20   HAILE Mcguire [employer] M, DO  
losartan (COZAAR) 50 mg tablet Take 1 Tab by mouth daily.  12/19/20   Jeanine Wells,   
 metoprolol tartrate (LOPRESSOR) 25 mg tablet Take 1 Tab by mouth every twelve (12) hours. 12/18/20   Leeanne Hdz DO  
rOPINIRole (REQUIP) 1 mg tablet Take 1 mg by mouth two (2) times a day. Provider, Historical  
guaiFENesin (Tussin) 100 mg/5 mL liquid Take 400 mg by mouth daily as needed for Cough. Provider, Historical  
PARoxetine (PAXIL) 10 mg tablet TAKE 1 TABLET BY MOUTH DAILY 10/9/20   Juwan Mckenzie MD  
entacapone (COMTAN) 200 mg tablet TAKE 1 TABLET THREE TIMES A DAY 10/9/20   Lucien French MD  
artificial tears, dextran 70-hypromellose, (GenTeal Tears Mild) 0.1-0.3 % ophthalmic solution Administer 1 Drop to both eyes nightly. Provider, Historical  
carboxymethylcellulose sodium (THERATEARS OP) Apply  to eye. 1 -2 drops both eyes bid    Provider, Historical  
esomeprazole (NEXIUM) 20 mg capsule TAKE 1 CAPSULE DAILY 8/5/20   Juwan Mckenzie MD  
carbidopa-levodopa (Sinemet)  mg per tablet 2 p.o. 4 times daily  Indications: Parkinson's disease 4/28/20   Juwan Mckenzie MD  
atorvastatin (LIPITOR) 40 mg tablet TAKE 1 TABLET DAILY 3/16/20   Juwan Mckenzie MD  
acetaminophen (TYLENOL EXTRA STRENGTH) 500 mg tablet Take 1,000 mg by mouth three (3) times daily as needed for Pain. Rapid release    Provider, Historical  
senna-docusate (SENNA-S) 8.6-50 mg per tablet Take 1 Tab by mouth every other day. Provider, Historical  
aspirin delayed-release 81 mg tablet Take 81 mg by mouth daily. Provider, Historical  
vitamin e (E GEMS) 1,000 unit capsule Take 2,000 Units by mouth daily. Provider, Historical  
MULTIVITAMIN PO Take 1 Tab by mouth daily. Provider, Historical  
 
 
REVIEW OF SYSTEMS:    
I am not able to complete the review of systems because: The patient is intubated and sedated  
x The patient has altered mental status due to his acute medical problems The patient has baseline aphasia from prior stroke(s) The patient has baseline dementia and is not reliable historian The patient is in acute medical distress and unable to provide information Total of 12 systems reviewed as follows:   
   POSITIVE= underlined text  Negative = text not underlined General:  fever, chills, sweats, generalized weakness, weight loss/gain,  
   loss of appetite Eyes:    blurred vision, eye pain, loss of vision, double vision ENT:    rhinorrhea, pharyngitis Respiratory:   cough, sputum production, SOB, MURCIA, wheezing, pleuritic pain  
Cardiology:   chest pain, palpitations, orthopnea, PND, edema, syncope Gastrointestinal:  abdominal pain , N/V, diarrhea, dysphagia, constipation, bleeding Genitourinary:  frequency, urgency, dysuria, hematuria, incontinence Muskuloskeletal :  arthralgia, myalgia, back pain Hematology:  easy bruising, nose or gum bleeding, lymphadenopathy Dermatological: rash, ulceration, pruritis, color change / jaundice Endocrine:   hot flashes or polydipsia Neurological:  headache, dizziness, confusion, focal weakness, paresthesia, Speech difficulties, memory loss, gait difficulty Psychological: Feelings of anxiety, depression, agitation Objective: VITALS:   
Visit Vitals BP (!) 114/53 (BP Patient Position: At rest) Pulse (!) 45 Temp 97.8 °F (36.6 °C) Resp 16 Ht 5' 10\" (1.778 m) Wt 76.5 kg (168 lb 10.4 oz) SpO2 100% BMI 24.20 kg/m² PHYSICAL EXAM: 
 
General:    Somnolent, not in acute cardiorespiratory distress, appears stated age. HEENT: Atraumatic, anicteric sclerae, pink conjunctivae No oral ulcers, mucosa moist, throat clear, dentition fair Neck:  Supple, symmetrical,  thyroid: non tender Lungs:   Breath sounds bilaterally. No Wheezing. Chest wall:  No tenderness  No Accessory muscle use. Heart:   Regular  rhythm,  No  murmur   No edema Abdomen:   Soft, non-tender. Not distended. Bowel sounds normal 
Extremities: No cyanosis.   No clubbing,   
 Skin turgor normal, Capillary refill normal, Radial dial pulse 2+ Skin:     Not pale. Not Jaundiced  No rashes Psych:  Deferred. Neurologic: No facial asymmetry. Somnolent. Nonverbal, does not follow command  
_______________________________________________________________________ Care Plan discussed with: 
  Comments Patient Family RN x Care Manager Consultant:     
_______________________________________________________________________ Expected  Disposition:  
Home with Family HH/PT/OT/RN   
SNF/LTC x  
TISHA   
________________________________________________________________________ TOTAL TIME: 40 Minutes Critical Care Provided     Minutes non procedure based Comments  
 x Reviewed previous records  
>50% of visit spent in counseling and coordination of care x Discussion with patient and/or family and questions answered 
  
 
________________________________________________________________________ Signed: Romel Pierson MD 
 
Procedures: see electronic medical records for all procedures/Xrays and details which were not copied into this note but were reviewed prior to creation of Plan. LAB DATA REVIEWED:   
Recent Results (from the past 24 hour(s)) URINALYSIS W/ REFLEX CULTURE Collection Time: 01/21/21 10:11 AM  
 Specimen: Miscellaneous sample; Urine Urine specimen Result Value Ref Range Color ORANGE Appearance TURBID (A) CLEAR Specific gravity 1.019 1.003 - 1.030    
 pH (UA) 5.5 5.0 - 8.0 Protein 30 (A) NEG mg/dL Glucose Negative NEG mg/dL Ketone TRACE (A) NEG mg/dL Blood Negative NEG Urobilinogen 1.0 0.2 - 1.0 EU/dL Nitrites Positive (A) NEG Leukocyte Esterase LARGE (A) NEG    
 WBC >100 (H) 0 - 4 /hpf  
 RBC 5-10 0 - 5 /hpf Epithelial cells FEW FEW /lpf Bacteria 1+ (A) NEG /hpf  
 UA:UC IF INDICATED URINE CULTURE ORDERED (A) CNI Hyaline cast 0-2 0 - 5 /lpf CBC WITH AUTOMATED DIFF Collection Time: 01/21/21 10:11 AM  
Result Value Ref Range WBC 11.1 4.1 - 11.1 K/uL  
 RBC 3.22 (L) 4.10 - 5.70 M/uL HGB 9.5 (L) 12.1 - 17.0 g/dL HCT 30.7 (L) 36.6 - 50.3 % MCV 95.3 80.0 - 99.0 FL  
 MCH 29.5 26.0 - 34.0 PG  
 MCHC 30.9 30.0 - 36.5 g/dL  
 RDW 14.6 (H) 11.5 - 14.5 % PLATELET 790 017 - 744 K/uL MPV 10.2 8.9 - 12.9 FL  
 NRBC 0.0 0  WBC ABSOLUTE NRBC 0.00 0.00 - 0.01 K/uL NEUTROPHILS 80 (H) 32 - 75 % LYMPHOCYTES 12 12 - 49 % MONOCYTES 6 5 - 13 % EOSINOPHILS 1 0 - 7 % BASOPHILS 0 0 - 1 % IMMATURE GRANULOCYTES 1 (H) 0.0 - 0.5 % ABS. NEUTROPHILS 8.9 (H) 1.8 - 8.0 K/UL  
 ABS. LYMPHOCYTES 1.3 0.8 - 3.5 K/UL  
 ABS. MONOCYTES 0.7 0.0 - 1.0 K/UL  
 ABS. EOSINOPHILS 0.1 0.0 - 0.4 K/UL  
 ABS. BASOPHILS 0.0 0.0 - 0.1 K/UL  
 ABS. IMM. GRANS. 0.1 (H) 0.00 - 0.04 K/UL  
 DF AUTOMATED PROTHROMBIN TIME + INR Collection Time: 01/21/21 10:11 AM  
Result Value Ref Range INR 1.2 (H) 0.9 - 1.1 Prothrombin time 12.1 (H) 9.0 - 11.1 sec PTT Collection Time: 01/21/21 10:11 AM  
Result Value Ref Range aPTT 25.9 22.1 - 31.0 sec  
 aPTT, therapeutic range     58.0 - 77.0 SECS  
TROPONIN I Collection Time: 01/21/21 10:11 AM  
Result Value Ref Range Troponin-I, Qt. <0.05 <0.05 ng/mL METABOLIC PANEL, COMPREHENSIVE Collection Time: 01/21/21 10:11 AM  
Result Value Ref Range Sodium 136 136 - 145 mmol/L Potassium 4.2 3.5 - 5.1 mmol/L Chloride 102 97 - 108 mmol/L  
 CO2 30 21 - 32 mmol/L Anion gap 4 (L) 5 - 15 mmol/L Glucose 103 (H) 65 - 100 mg/dL BUN 32 (H) 6 - 20 MG/DL Creatinine 1.58 (H) 0.70 - 1.30 MG/DL  
 BUN/Creatinine ratio 20 12 - 20 GFR est AA 51 (L) >60 ml/min/1.73m2 GFR est non-AA 42 (L) >60 ml/min/1.73m2 Calcium 9.3 8.5 - 10.1 MG/DL Bilirubin, total 0.7 0.2 - 1.0 MG/DL  
 ALT (SGPT) 20 12 - 78 U/L  
 AST (SGOT) 40 (H) 15 - 37 U/L Alk.  phosphatase 107 45 - 117 U/L  
 Protein, total 7.8 6.4 - 8.2 g/dL Albumin 2.5 (L) 3.5 - 5.0 g/dL Globulin 5.3 (H) 2.0 - 4.0 g/dL A-G Ratio 0.5 (L) 1.1 - 2.2 MAGNESIUM Collection Time: 01/21/21 10:11 AM  
Result Value Ref Range Magnesium 2.3 1.6 - 2.4 mg/dL NT-PRO BNP Collection Time: 01/21/21 10:11 AM  
Result Value Ref Range NT pro-BNP 3,381 (H) <450 PG/ML  
BILIRUBIN, CONFIRM Collection Time: 01/21/21 10:11 AM  
Result Value Ref Range Bilirubin UA, confirm Negative NEG    
EKG, 12 LEAD, INITIAL Collection Time: 01/21/21 10:11 AM  
Result Value Ref Range Ventricular Rate 51 BPM  
 Atrial Rate 51 BPM  
 P-R Interval 168 ms QRS Duration 88 ms Q-T Interval 438 ms QTC Calculation (Bezet) 403 ms Calculated P Axis -2 degrees Calculated R Axis -33 degrees Calculated T Axis 9 degrees Diagnosis Sinus bradycardia Left axis deviation When compared with ECG of 16-DEC-2020 10:21, Sinus rhythm has replaced Atrial fibrillation Vent. rate has decreased BY  47 BPM 
Confirmed by Errol Hamman, P.VMoisés ([id number]) on 1/21/2021 10:55:54 AM 
  
POC LACTIC ACID Collection Time: 01/21/21 10:12 AM  
Result Value Ref Range Lactic Acid (POC) 0.82 0.40 - 2.00 mmol/L  
SARS-COV-2 Collection Time: 01/21/21 11:06 AM  
Result Value Ref Range SARS-CoV-2 Please find results under separate order COVID-19 RAPID TEST Collection Time: 01/21/21 11:06 AM  
Result Value Ref Range Specimen source Nasopharyngeal    
 COVID-19 rapid test Not detected NOTD

## 2021-01-21 NOTE — PROGRESS NOTES
Pharmacy Automatic Renal Dosing Protocol - Antimicrobials Indication for Antimicrobials: PNA Current Regimen of Each Antimicrobial: 
Cefepime 2 g q8h (Start Date ; Day # 1) Vancomycin 1250 q24h (Start Date ; Day # 1) Previous Antimicrobial Therapy: 
Zosyn x 1 Goal Level: VANCOMYCIN TROUGH GOAL RANGE Vancomycin Trough: 15 - 20 mcg/mL  (AUC: 400 - 600 mg/hr/Liter/day) Date Dose & Interval Measured (mcg/mL) Extrapolated (mcg/mL) Date & time of next level: TBD Significant Cultures:  
Pending Radiology / Imaging results: (X-ray, CT scan or MRI):  
CXR - scattered parenchymal opacities that may represent PNA Paralysis, amputations, malnutrition: None Labs: 
Recent Labs  
  21 
1011 CREA 1.58* BUN 32* WBC 11.1 Temp (24hrs), Av.9 °F (36.6 °C), Min:97.8 °F (36.6 °C), Max:98 °F (36.7 °C) Is the Patient on Dialysis? No 
 
Creatinine Clearance (mL/min):  
CrCl (Actual Body Weight): 39.0 CrCl (Adjusted Body Weight): 37.9 CrCl (Ideal Body Weight): 37.2 Impression/Plan:  
Change to cefepime 2g q12h per renal dosing protocol Antimicrobial stop date: 7 days Pharmacy will follow daily and adjust medications as appropriate for renal function and/or serum levels. Thank you, Kimi Orona Recommended duration of therapy 
[email or web address]/[facility]/Pharmacy/Clinical%20Companion/Duration%20of%20ABX%20therapy. docx Renal Dosing 
[email or web address]/[facility]/Pharmacy/Clinical%20Companion/Renal%[id number]. pdf

## 2021-01-21 NOTE — ROUTINE PROCESS
TRANSFER - OUT REPORT: 
 
Verbal report given to Elinor Spicer RN on Bryon Bailey  being transferred to [facility](unit) for routine progression of care Report consisted of patients Situation, Background, Assessment and  
Recommendations(SBAR). Information from the following report(s) SBAR was reviewed with the receiving nurse. Lines:  
Peripheral IV 01/21/21 Left Forearm (Active) Site Assessment Clean, dry, & intact 01/21/21 1012 Phlebitis Assessment 0 01/21/21 1012 Infiltration Assessment 0 01/21/21 1012 Dressing Status Clean, dry, & intact 01/21/21 1012 Dressing Type Transparent 01/21/21 1012 Peripheral IV 01/21/21 Right Antecubital (Active) Site Assessment Clean, dry, & intact 01/21/21 1013 Phlebitis Assessment 0 01/21/21 1013 Infiltration Assessment 0 01/21/21 1013 Dressing Status Clean, dry, & intact 01/21/21 1013 Dressing Type Transparent 01/21/21 1013 Opportunity for questions and clarification was provided. Patient transported with: 
 O2 @ 3 liters

## 2021-01-22 LAB
ANION GAP SERPL CALC-SCNC: 4 MMOL/L (ref 5–15)
BASOPHILS # BLD: 0.1 K/UL (ref 0–0.1)
BASOPHILS NFR BLD: 1 % (ref 0–1)
BUN SERPL-MCNC: 25 MG/DL (ref 6–20)
BUN/CREAT SERPL: 22 (ref 12–20)
CALCIUM SERPL-MCNC: 8.7 MG/DL (ref 8.5–10.1)
CHLORIDE SERPL-SCNC: 108 MMOL/L (ref 97–108)
CO2 SERPL-SCNC: 23 MMOL/L (ref 21–32)
CREAT SERPL-MCNC: 1.12 MG/DL (ref 0.7–1.3)
DIFFERENTIAL METHOD BLD: ABNORMAL
EOSINOPHIL # BLD: 0.2 K/UL (ref 0–0.4)
EOSINOPHIL NFR BLD: 2 % (ref 0–7)
ERYTHROCYTE [DISTWIDTH] IN BLOOD BY AUTOMATED COUNT: 14.8 % (ref 11.5–14.5)
GLUCOSE SERPL-MCNC: 88 MG/DL (ref 65–100)
HCT VFR BLD AUTO: 29.1 % (ref 36.6–50.3)
HGB BLD-MCNC: 9 G/DL (ref 12.1–17)
IMM GRANULOCYTES # BLD AUTO: 0 K/UL (ref 0–0.04)
IMM GRANULOCYTES NFR BLD AUTO: 0 % (ref 0–0.5)
LYMPHOCYTES # BLD: 1.2 K/UL (ref 0.8–3.5)
LYMPHOCYTES NFR BLD: 11 % (ref 12–49)
MCH RBC QN AUTO: 29.9 PG (ref 26–34)
MCHC RBC AUTO-ENTMCNC: 30.9 G/DL (ref 30–36.5)
MCV RBC AUTO: 96.7 FL (ref 80–99)
MONOCYTES # BLD: 0.7 K/UL (ref 0–1)
MONOCYTES NFR BLD: 7 % (ref 5–13)
NEUTS SEG # BLD: 8.8 K/UL (ref 1.8–8)
NEUTS SEG NFR BLD: 79 % (ref 32–75)
NRBC # BLD: 0 K/UL (ref 0–0.01)
NRBC BLD-RTO: 0 PER 100 WBC
PLATELET # BLD AUTO: 305 K/UL (ref 150–400)
PMV BLD AUTO: 10.1 FL (ref 8.9–12.9)
POTASSIUM SERPL-SCNC: 4.3 MMOL/L (ref 3.5–5.1)
RBC # BLD AUTO: 3.01 M/UL (ref 4.1–5.7)
SARS-COV-2, XPLCVT: NOT DETECTED
SODIUM SERPL-SCNC: 135 MMOL/L (ref 136–145)
SOURCE, COVRS: NORMAL
WBC # BLD AUTO: 10.9 K/UL (ref 4.1–11.1)

## 2021-01-22 PROCEDURE — 97165 OT EVAL LOW COMPLEX 30 MIN: CPT | Performed by: OCCUPATIONAL THERAPIST

## 2021-01-22 PROCEDURE — 2709999900 HC NON-CHARGEABLE SUPPLY

## 2021-01-22 PROCEDURE — 80048 BASIC METABOLIC PNL TOTAL CA: CPT

## 2021-01-22 PROCEDURE — 74011250637 HC RX REV CODE- 250/637: Performed by: STUDENT IN AN ORGANIZED HEALTH CARE EDUCATION/TRAINING PROGRAM

## 2021-01-22 PROCEDURE — 36415 COLL VENOUS BLD VENIPUNCTURE: CPT

## 2021-01-22 PROCEDURE — 87449 NOS EACH ORGANISM AG IA: CPT

## 2021-01-22 PROCEDURE — 77010033678 HC OXYGEN DAILY

## 2021-01-22 PROCEDURE — 85025 COMPLETE CBC W/AUTO DIFF WBC: CPT

## 2021-01-22 PROCEDURE — 74011000258 HC RX REV CODE- 258: Performed by: STUDENT IN AN ORGANIZED HEALTH CARE EDUCATION/TRAINING PROGRAM

## 2021-01-22 PROCEDURE — 94760 N-INVAS EAR/PLS OXIMETRY 1: CPT

## 2021-01-22 PROCEDURE — 74011250636 HC RX REV CODE- 250/636: Performed by: STUDENT IN AN ORGANIZED HEALTH CARE EDUCATION/TRAINING PROGRAM

## 2021-01-22 PROCEDURE — 65660000000 HC RM CCU STEPDOWN

## 2021-01-22 RX ORDER — HYDRALAZINE HYDROCHLORIDE 20 MG/ML
10 INJECTION INTRAMUSCULAR; INTRAVENOUS
Status: DISCONTINUED | OUTPATIENT
Start: 2021-01-22 | End: 2021-01-25 | Stop reason: HOSPADM

## 2021-01-22 RX ORDER — AMLODIPINE BESYLATE 5 MG/1
10 TABLET ORAL DAILY
Status: DISCONTINUED | OUTPATIENT
Start: 2021-01-23 | End: 2021-01-25 | Stop reason: HOSPADM

## 2021-01-22 RX ADMIN — CEFEPIME 2 G: 2 INJECTION, POWDER, FOR SOLUTION INTRAVENOUS at 21:32

## 2021-01-22 RX ADMIN — ENOXAPARIN SODIUM 40 MG: 40 INJECTION SUBCUTANEOUS at 08:18

## 2021-01-22 RX ADMIN — Medication 10 ML: at 06:00

## 2021-01-22 RX ADMIN — Medication 10 ML: at 14:00

## 2021-01-22 RX ADMIN — CARBIDOPA AND LEVODOPA 1 TABLET: 25; 100 TABLET ORAL at 08:18

## 2021-01-22 RX ADMIN — Medication 10 ML: at 21:32

## 2021-01-22 RX ADMIN — PAROXETINE HYDROCHLORIDE 10 MG: 20 TABLET, FILM COATED ORAL at 08:18

## 2021-01-22 RX ADMIN — ENTACAPONE 200 MG: 200 TABLET, FILM COATED ORAL at 08:18

## 2021-01-22 RX ADMIN — VANCOMYCIN HYDROCHLORIDE 1250 MG: 10 INJECTION, POWDER, LYOPHILIZED, FOR SOLUTION INTRAVENOUS at 13:14

## 2021-01-22 RX ADMIN — ROPINIROLE HYDROCHLORIDE 1 MG: 1 TABLET, FILM COATED ORAL at 21:33

## 2021-01-22 RX ADMIN — ATORVASTATIN CALCIUM 40 MG: 40 TABLET, FILM COATED ORAL at 21:32

## 2021-01-22 RX ADMIN — ENTACAPONE 200 MG: 200 TABLET, FILM COATED ORAL at 21:32

## 2021-01-22 RX ADMIN — ROPINIROLE HYDROCHLORIDE 1 MG: 1 TABLET, FILM COATED ORAL at 08:18

## 2021-01-22 RX ADMIN — ASPIRIN 81 MG: 81 TABLET, COATED ORAL at 08:18

## 2021-01-22 RX ADMIN — PANTOPRAZOLE SODIUM 40 MG: 40 TABLET, DELAYED RELEASE ORAL at 08:18

## 2021-01-22 RX ADMIN — CEFEPIME 2 G: 2 INJECTION, POWDER, FOR SOLUTION INTRAVENOUS at 06:01

## 2021-01-22 RX ADMIN — DEXTRAN 70, GLYCERIN, HYPROMELLOSE 1 DROP: 1; 2; 3 SOLUTION/ DROPS OPHTHALMIC at 21:33

## 2021-01-22 RX ADMIN — CARBIDOPA AND LEVODOPA 1 TABLET: 25; 100 TABLET ORAL at 13:14

## 2021-01-22 RX ADMIN — CARBIDOPA AND LEVODOPA 1 TABLET: 25; 100 TABLET ORAL at 21:32

## 2021-01-22 NOTE — PROGRESS NOTES
Hospitalist Progress Note NAME: Selina Nichole :  1940 MRN:  [id number] Assessment / Plan: Altered mental status in setting of baseline dementia : Likely due to infection Urinary tract infection 
-Presents from SNF with altered mentation from baseline. -Afebrile, no leukocytosis. -UA with pyuria, bacteria, positive nitrates and LE 
-Lactic acid WNL yesterday C/w  Vanco and cefepime. Patient also has pneumonia 
 urine + proteus  and blood culture results NTD . 
  
Hospital acquired pneumonia COPD on ? 3LPM O2 at home 
-Patient was diagnosed with pneumonia at rehab on  with abnormal chest x-ray and started on Augmentin yesterday. 
-Chest x-ray here shows scattered opacities in the left lung. Improved airspace disease in the right lung. 
-Continue empiric broad-spectrum antibiotic as above 
-Obtain sputum culture if possible. obtain urine strep and Legionella antigen. 
 covid neg Sinus bradycardia 
-Heart rate in the high 40s and 50s. EKG NSR, no AV prolongation, Left axis deviation, poor Q wave progression, no acute ischemic changes. Continue telemetry monitoring. 
  
Acute kidney injury resolved  
-Creatinine elevated at 1.5. Possibly prerenal from dehydration. BUN/Cr 20. Gentle IV fluid in light of CHF. creat down to wnl  
  
History of CAD Chronic diastolic congestive heart failure EF 60% -In 2020. Appears euvolemic on exam. 
Hold BB for bradycardia Continue ASA and lipitor 
  
Hypertension 
bp trending up Restart norvasc  
  
Normocytic anemia Hx of MIRA 
- H and H at baseline. Continue to monitor. 
  
History of paroxysmal atrial fibrillation Dementia Parkinson's disease Hyperlipidemia Lumbar spinal stenosis Restless leg syndrome 
-Continue home meds 
  
  
Code Status: ddnr per children , talked with park and maurice Talked with Surrogate Decision Maker: 
  
DVT Prophylaxis: Lovenox GI Prophylaxis: not indicated 
  
 Baseline: Came from [address] [place] [address]. Was admitted in December 19. 
 
 
18.5 - 24.9 Normal weight / Body mass index is 24.2 kg/m². 18.5 - 24.9 Normal weight / Body mass index is 24.2 kg/m². C  
 
Subjective: Chief Complaint / Reason for Physician Visit FU AMS \". Discussed with RN events overnight. Review of Systems: 
Symptom Y/N Comments  Symptom Y/N Comments Fever/Chills    Chest Pain Poor Appetite    Edema Cough    Abdominal Pain Sputum    Joint Pain SOB/MURCIA    Pruritis/Rash Nausea/vomit    Tolerating PT/OT Diarrhea    Tolerating Diet Constipation    Other Could NOT obtain due to: Confused Objective: VITALS:  
Last 24hrs VS reviewed since prior progress note. Most recent are: 
Patient Vitals for the past 24 hrs: 
 Temp Pulse Resp BP SpO2  
01/22/21 1508 98.7 °F (37.1 °C) 61 16 (!) 157/74 97 % 01/22/21 1147 98 °F (36.7 °C) 62 18 (!) 146/79 98 % 01/22/21 0815 97.3 °F (36.3 °C) 63 18 (!) 146/75 97 % 01/22/21 0432 99.1 °F (37.3 °C) 62 18 117/68 100 % 01/21/21 2010 99.4 °F (37.4 °C) (!) 58 18 (!) 102/58 98 % Intake/Output Summary (Last 24 hours) at 1/22/2021 1741 Last data filed at 1/22/2021 1520 Gross per 24 hour Intake 750 ml Output 1700 ml Net -950 ml I had a face to face encounter and independently examined this patient on 1/22/2021, as outlined below: PHYSICAL EXAM: 
General: WD, WN. Alert, cooperative, no acute distress EENT:  EOMI. Anicteric sclerae. MMM Resp:  CTA bilaterally, no wheezing or rales. No accessory muscle use CV:  Regular  rhythm,  No edema GI:  Soft, Non distended, Non tender. +Bowel sounds Neurologic:  Alert and oriented X 0, normal speech, Psych:   Pt. Not anxious nor agitated Skin:  No rashes. No jaundice Reviewed most current lab test results and cultures  YES Reviewed most current radiology test results   YES Review and summation of old records today    NO 
 Reviewed patient's current orders and MAR    YES 
PMH/SH reviewed - no change compared to H&P 
________________________________________________________________________ Care Plan discussed with: 
  Comments Patient Family  x Talked to kiko VILLAREAL Care Manager x Consultant     
                 x Multidiciplinary team rounds were held today with , nursing, pharmacist and clinical coordinator. Patient's plan of care was discussed; medications were reviewed and discharge planning was addressed. ________________________________________________________________________ Total NON critical care TIME:  35 Minutes Total CRITICAL CARE TIME Spent:   Minutes non procedure based Comments >50% of visit spent in counseling and coordination of care    
________________________________________________________________________ Jillian Schilling MD  
 
Procedures: see electronic medical records for all procedures/Xrays and details which were not copied into this note but were reviewed prior to creation of Plan. LABS: 
I reviewed today's most current labs and imaging studies. Pertinent labs include: 
Recent Labs  
  01/22/21 
0526 01/21/21 
1011 WBC 10.9 11.1 HGB 9.0* 9.5* HCT 29.1* 30.7*  319 Recent Labs  
  01/22/21 
0526 01/21/21 
1011 * 136  
K 4.3 4.2  102 CO2 23 30 GLU 88 103* BUN 25* 32* CREA 1.12 1.58* CA 8.7 9.3 MG  --  2.3 ALB  --  2.5* TBILI  --  0.7 ALT  --  20 INR  --  1.2* Signed: Jillian Schilling MD

## 2021-01-22 NOTE — PROGRESS NOTES
Transition of Care Plan *Disposition: Return to [facility] for SNF, pending insurance auth *Transport at D/C: BLS via [facility] 
*ACP: MD to address code status change and complete DDNR/POST 
*IMM letter Reason for Admission:  AMS, UTI, PNA  
               
RUR Score:  21% PCP: First and Last name:  Cindy Torres MD 
 Name of Practice: [facility] Are you a current patient: Yes/No: Yes Approximate date of last visit: 9/14/20 Can you participate in a virtual visit if needed: No 
 
Do you (patient/family) have any concerns for transition/discharge? No concerns identified at this time. Plan for utilizing home health:  N/A Current Advanced Directive/Advance Care Plan:  Full code. No ACP documents on file. Daughter states there are not active documents at home. Neither pt or spouse is able to make healthcare decisions as both have dementia and are not fully oriented. LNOK are three children: Mikhail Witt, Sylwiaden Humphrey. All three are involved in pt's care and decision-making processes. Rebekah Lynn stated that everyone is in agreement for pt to be a DNR. Attending MD is aware and will further address with daughter for code status change. Transition of Care Plan: Return to [facility] to complete rehab then transition back to [facility] [place] [address] spoke with patient's daughter, Mikhail Witt ([phone]) via telephone to complete initial assessment due to pt's orientation status. CM introduced role, verified demographics, and discussed discharge planning. 77 yo male admitted to [id number] [place] [address] with AMS [facility]. Pt is currently being treated for UTI and HCAP. Rapid COVID test negative, PCR pending. PMHx is significant for COPD, CHF, CAD, HTN, PAF, CVA, Parkinson's Disease, dementia, spinal stenosis, etc. Pt uses chronic O2 at 3L. Per [facility], pt was moderately independent with ADLs when residing at [facility] ([address] [place] [address] at [facility]) and ambulated with a rollator. Since being admitted [facility] on 12/18/21, pt has made little progress and is currently requiring maximum assistance with ADLs and mobility. The current plan is for pt to return to [facility] to resume therapy with eventual return to The [address] [place] [address] where his wife is also a resident. The [address] [place] [address] will not be able to manage pt's needs if he does not progress with therapy at [facility] is understanding of this. CM will continue to follow this patient while admitted to current unit and remain available for transitional care planning as needed. If any concerns or questions arise pertaining to this patient's discharge needs, please contact CM at the contact information provided below. Care Management Interventions PCP Verified by CM: Rimma Cain) Last Visit to PCP: 09/14/20 Palliative Care Criteria Met (RRAT>21 & CHF Dx)?: Yes 
Palliative Consult Recommended?: Yes Mode of Transport at Discharge: BLS Transition of Care Consult (CM Consult): Discharge Planning, SNF(anticipate pt will return to [facility]) Discharge Durable Medical Equipment: No(rollator, home O2 3L) Physical Therapy Consult: Yes Occupational Therapy Consult: Yes Speech Therapy Consult: No 
Current Support Network: Nursing Facility, Assisted Living, Family Lives Nearby(curerently in rehab at [facility], resident at [address] [place] [address] at [facility]) Confirm Follow Up Transport: Other (see comment) The Plan for Transition of Care is Related to the Following Treatment Goals : SNF 
 The Patient and/or Patient Representative was Provided with a Choice of Provider and Agrees with the Discharge Plan?: Yes Name of the Patient Representative Who was Provided with a Choice of Provider and Agrees with the Discharge Plan: Mesfin guerrero Freedom of Choice List was Provided with Basic Dialogue that Supports the Patient's Individualized Plan of Care/Goals, Treatment Preferences and Shares the Quality Data Associated with the Providers?: Yes Discharge Location Discharge Placement: Skilled nursing facility 
 
 
---------------------------------------------------------------- Advance Care Planning General Advance Care Planning (ACP) Conversation Date of Conversation: 2021 Conducted with: Patient with Decision Making Capacity and Healthcare Decision Maker: Next of Kin by law (only applies in absence of a Healthcare Power of  or Legal Guardian) Healthcare Decision Maker:  
  Primary Decision Maker: Mirza Salmonjuanito - Daughter - [phone] Primary Decision MakerHrichi Danielle - Daughter - [phone] Primary Decision Maker: Chris Jha - Son - [phone] Today we documented Decision Maker(s) consistent with Legal Next of Kin hierarchy. Content/Action Overview:  
Unable to complete AMD due to patient mentation. LNOK, all three children, would like to change code status to DNR and complete DDNR/POST. Attending MD aware and will address with family. Reviewed DNR/DNI and patient elects DNR order - referred to ACP Clinical Specialist & placed order Topics discussed: resuscitation preferences Additional Comments: Attending MD to address code status change. Length of Voluntary ACP Conversation in minutes:  <16 minutes (Non-Billable) Connie Aguilera [facility] Care Manager [facility] 
[phone]

## 2021-01-22 NOTE — PROGRESS NOTES
Bedside shift change report given to MONO Grant (oncoming nurse) by Ksenia Larose RN (offgoing nurse). Report included the following information SBAR, Kardex, ED Summary, MAR and Recent Results.

## 2021-01-22 NOTE — ACP (ADVANCE CARE PLANNING)
Advance Care Planning Note NAME: Mansoor Mcguire :  1940 MRN:  [id number] Date/Time:  2021 5:51 PM 
 
Active Diagnoses: 
Hospital Problems  Date Reviewed: 2021 Codes Class Noted POA * (Principal) Altered mental status ICD-10-CM: R41.82 
ICD-9-CM: 780.97  2021 Unknown Dementia due to Parkinson's disease without behavioral disturbance ([facility] ICD-10-CM: G20, F02.80 ICD-9-CM: 332.0, 294.10  2019 Yes These active diagnoses are of sufficient risk that focused discussion on advance care planning is indicated in order to allow the patient to thoughtfully consider personal goals of care, and if situations arise that prevent the ability to personally give input, to ensure appropriate representation of their personal desires for different levels and aggressiveness of care. Discussion:  
Code status addressed  With 2 daughters Timothy Castillo  Over the phone because limited face to face due to covid and  Both daughters wants their dad  to be a DNR / DNI. Patient wants central line and vasopressors if needed. Patient would not want a feeding tube, if needed, for nutritional support. Patient wife has dementia and can't make decision, therefore legal NOK are the 3children Discussed with 2 of the 3children . Will change patient to [facility] per children wishes Persons present and participating in discussion: Yola Ladd MD, Yvon Haro Daughter   [phone] Melissa Scott Daughter   [phone] Time Spent:  
Total time spent  in education and discussion:   16 minutes.   
 
 
 
Yola Rogers MD  
Hospitalist

## 2021-01-22 NOTE — PROGRESS NOTES
Problem: Self Care Deficits Care Plan (Adult) Goal: *Acute Goals and Plan of Care (Insert Text) Description:  
 
FUNCTIONAL STATUS PRIOR TO ADMISSION: Patient is a poor historian, but per chart review of OT notes from 12/2020 admit, the patient was modified independent using a rollator for short distance functional mobility and received minimal(?) assistance from staff for ADLs. At the time of the 12/2020 OT evaluation the patient reported he self-feeds and occasionally needs help with lower body dressing. Per CM, since being [facility] on 12/18/21, pt has made little progress and is currently requiring maximum assistance with ADLs and mobility. HOME SUPPORT: He was living in an nursing home, but then required SNF rehab after recent hospitalization Occupational Therapy Goals Initiated 1/22/2021 1. Patient will perform grooming seated EOB with minimal assistance/contact guard assist within 7 day(s). 2.  Patient will perform upper body dressing with maximal assistance within 7 day(s). 3.  Patient will perform sponge bathing with maximal assistance within 7 day(s). 4.  Patient will perform BSC transfers with moderate assistance  within 7 day(s). 5.  Patient will perform all aspects of toileting with maximal assistance within 7 day(s). 6.  Patient will perform self feeding with supervision/set-up within 7 day(s). Outcome: Progressing Towards Goal 
 
OCCUPATIONAL THERAPY EVALUATION Patient: Linda Brito (35 y.o. male) Date: 1/22/2021 Primary Diagnosis: Altered mental status [R41.82] Precautions: Falls ASSESSMENT Based on the objective data described below, the patient presents with impaired motor control, confusion, decreased attention, decreased command following,GW, decreased activity tolerance, decreased safety awareness and decreased balance with a R/posterior lean preference which is impairing his functional independence. Motor control impairments and cognitive impairments appear to baseline, related to his h/o parkinson's and dementia. The patient's true baseline is unclear, but he is functioning below his previously reported mod I to min A overall level of function, now performing ADLs at a min A to total A level and he is max A to total A for functional mobility. The patient will benefit from acute OT intervention and will need SNF rehab VS. LTC after discharge. Functional Outcome Measure: The patient scored 5/100 on the Barthel Index outcome measure which is indicative of a 95% impairment in function. PLAN : 
Recommendations and Planned Interventions: self care training, functional mobility training, therapeutic exercise, balance training, therapeutic activities, endurance activities, neuromuscular re-education, patient education, home safety training, and family training/education Frequency/Duration: Patient will be followed by occupational therapy 3 times a week to address goals, on a trial basis Recommendation for discharge: (in order for the patient to meet his/her long term goals) Therapy up to 5 days/week in SNF setting VS. LTC Equipment recommendations for successful discharge (if) home: none OBJECTIVE DATA SUMMARY:  
HISTORY:  
Past Medical History:  
Diagnosis Date Aaron esophagus Benign nodular prostatic hyperplasia without lower urinary tract symptoms 7/12/2017 CKD (chronic kidney disease) stage 2, GFR 60-89 ml/min Constipation Coronary artery disease 7/12/2017 Depression DJD (degenerative joint disease) 7/12/2017 Falls Gout 7/12/2017 Headache Hearing difficulty of both ears 7/12/2017 Hyperlipidemia Hypertension Impaired cognition 7/12/2017 MCI (mild cognitive impairment) Restless leg syndrome 7/12/2017 Sleep apnea in adult 7/12/2017 No longer on CPAP @13 Snoring Stroke [facility]) Tendinitis 7/12/2017 ACHILLES Tendinitis of left rotator cuff 07/12/2017 Tremor 7/12/2017 LEFT HAND Unspecified sleep apnea   
 lost weight no longer has it Past Surgical History:  
Procedure Laterality Date COLONOSCOPY N/A 1/16/2019 COLONOSCOPY performed by Ramu Foster MD at [facility] ENDOSCOPY  
 COLONOSCOPY,FLEX,W/CONTROL, BLEEDING  1/16/2019 HX ORTHOPAEDIC  1/27/15 L4-L5 MICRODECOMPRESSION (Right LA EGD TRANSORAL BIOPSY SINGLE/MULTIPLE  8/16/2011 LA EGD TRANSORAL BIOPSY SINGLE/MULTIPLE  10/1/2013 Expanded or extensive additional review of patient history:  
 
Home Situation Home Environment: Skilled nursing facility Hand dominance: Right EXAMINATION OF PERFORMANCE DEFICITS: 
Cognitive/Behavioral Status: 
Neurologic State: Alert;Confused Orientation Level: Oriented to person;Oriented to place; Disoriented to time;Disoriented to situation Cognition: Decreased attention/concentration;Decreased command following; Impaired decision making; Impulsive;Memory loss;Poor safety awareness Perception: Cues to maintain midline in sitting;Verbal;Tactile;Visual(leans posteriorly and R frequently) Hearing: Auditory Auditory Impairment: None Vision/Perceptual:   
Acuity: Within Defined Limits Range of Motion: 
AROM: Grossly decreased, non-functional 
 
Strength: 
Strength: Grossly decreased, non-functional 
 
Coordination: 
Coordination: Grossly decreased, non-functional 
Fine Motor Skills-Upper: Left Impaired;Right Impaired Gross Motor Skills-Upper: Left Impaired;Right Impaired Tone & Sensation: 
Tone: Abnormal(rigid 2/2 h/o parkinson's) Balance: 
Sitting: Impaired; With support Sitting - Static: Poor (constant support) Sitting - Dynamic: Poor (constant support) Functional Mobility and Transfers for ADLs: 
Bed Mobility: 
Rolling: Maximum assistance;Assist x1 Supine to Sit: Maximum assistance;Assist x1 Sit to Supine: Maximum assistance;Assist x1 Scooting: Total assistance Transfers: 
Sit to Stand: (Max A to obtain partial stand) ADL Assessment: 
Feeding: Minimum assistance Oral Facial Hygiene/Grooming: Moderate assistance Bathing: Total assistance Upper Body Dressing: Total assistance Lower Body Dressing: Total assistance Toileting: Total assistance Functional Measure: 
Barthel Index: 
 
Bathin Bladder: 0 Bowels: 0 Groomin Dressin Feedin Mobility: 0 Stairs: 0 Toilet Use: 0 Transfer (Bed to Chair and Back): 5 Total: 5/100 Percentage of impairment  
0% 1-19% 20-39% 40-59% 60-79% 80-99% 100% Barthel Score 0-100 100 99-80 79-60 59-40 20-39 1-19 
 0 The Barthel ADL Index: Guidelines 1. The index should be used as a record of what a patient does, not as a record of what a patient could do. 2. The main aim is to establish degree of independence from any help, physical or verbal, however minor and for whatever reason. 3. The need for supervision renders the patient not independent. 4. A patient's performance should be established using the best available evidence. Asking the patient, friends/relatives and nurses are the usual sources, but direct observation and common sense are also important. However direct testing is not needed. 5. Usually the patient's performance over the preceding 24-48 hours is important, but occasionally longer periods will be relevant. 6. Middle categories imply that the patient supplies over 50 per cent of the effort. 7. Use of aids to be independent is allowed. Sumit Kent., Barthel, DMoisésWMoisés (8007). Functional evaluation: the Barthel Index. [facility] (14)2. SUSIE Mercer, Clayton Damon., Beba Dooley., [place], [address] (1999). Measuring the change indisability after inpatient rehabilitation; comparison of the responsiveness of the Barthel Index and Functional Bern Measure. Journal of Neurology, Neurosurgery, and Psychiatry, 66(4), 428-861. ABBIE Mcgrath, CHESTER Navas, & Anant Forte M.A. (2004.) Assessment of post-stroke quality of life in cost-effectiveness studies: The usefulness of the Barthel Index and the EuroQoL-5D. [facility], 13, 660-63 Activity Tolerance:  
Poor After treatment patient left in no apparent distress:   
Supine in bed, Call bell within reach, and Bed / chair alarm activated COMMUNICATION/EDUCATION:  
The patients plan of care was discussed with: Registered Nurse. Patient is unable to participate in goal setting and plan of care. This patients plan of care is appropriate for delegation to \[facility]\"". Thank you for this referral. 
PATSY Moise/L Time Calculation: 14 mins

## 2021-01-22 NOTE — PROGRESS NOTES
Bedside and Verbal shift change report given to Dharmesh Calvo (oncoming nurse) by Juan Jose Robison (offgoing nurse). Report included the following information SBAR, Kardex, ED Summary, Procedure Summary, MAR and Recent Results.

## 2021-01-23 LAB
BACTERIA SPEC CULT: ABNORMAL
CC UR VC: ABNORMAL
ERYTHROCYTE [DISTWIDTH] IN BLOOD BY AUTOMATED COUNT: 14.6 % (ref 11.5–14.5)
HCT VFR BLD AUTO: 32.3 % (ref 36.6–50.3)
HGB BLD-MCNC: 10.2 G/DL (ref 12.1–17)
MCH RBC QN AUTO: 30 PG (ref 26–34)
MCHC RBC AUTO-ENTMCNC: 31.6 G/DL (ref 30–36.5)
MCV RBC AUTO: 95 FL (ref 80–99)
NRBC # BLD: 0 K/UL (ref 0–0.01)
NRBC BLD-RTO: 0 PER 100 WBC
PLATELET # BLD AUTO: 310 K/UL (ref 150–400)
PMV BLD AUTO: 10 FL (ref 8.9–12.9)
RBC # BLD AUTO: 3.4 M/UL (ref 4.1–5.7)
SERVICE CMNT-IMP: ABNORMAL
WBC # BLD AUTO: 11 K/UL (ref 4.1–11.1)

## 2021-01-23 PROCEDURE — 77010033678 HC OXYGEN DAILY

## 2021-01-23 PROCEDURE — 97530 THERAPEUTIC ACTIVITIES: CPT

## 2021-01-23 PROCEDURE — 36415 COLL VENOUS BLD VENIPUNCTURE: CPT

## 2021-01-23 PROCEDURE — 94760 N-INVAS EAR/PLS OXIMETRY 1: CPT

## 2021-01-23 PROCEDURE — 74011250636 HC RX REV CODE- 250/636: Performed by: INTERNAL MEDICINE

## 2021-01-23 PROCEDURE — 65660000000 HC RM CCU STEPDOWN

## 2021-01-23 PROCEDURE — 74011000258 HC RX REV CODE- 258: Performed by: STUDENT IN AN ORGANIZED HEALTH CARE EDUCATION/TRAINING PROGRAM

## 2021-01-23 PROCEDURE — 85027 COMPLETE CBC AUTOMATED: CPT

## 2021-01-23 PROCEDURE — 74011250636 HC RX REV CODE- 250/636: Performed by: STUDENT IN AN ORGANIZED HEALTH CARE EDUCATION/TRAINING PROGRAM

## 2021-01-23 PROCEDURE — 74011250637 HC RX REV CODE- 250/637: Performed by: STUDENT IN AN ORGANIZED HEALTH CARE EDUCATION/TRAINING PROGRAM

## 2021-01-23 PROCEDURE — 97161 PT EVAL LOW COMPLEX 20 MIN: CPT

## 2021-01-23 PROCEDURE — 74011250637 HC RX REV CODE- 250/637: Performed by: INTERNAL MEDICINE

## 2021-01-23 RX ORDER — LOSARTAN POTASSIUM 50 MG/1
50 TABLET ORAL DAILY
Status: DISCONTINUED | OUTPATIENT
Start: 2021-01-23 | End: 2021-01-23

## 2021-01-23 RX ORDER — METOPROLOL TARTRATE 25 MG/1
25 TABLET, FILM COATED ORAL EVERY 12 HOURS
Status: DISCONTINUED | OUTPATIENT
Start: 2021-01-23 | End: 2021-01-23

## 2021-01-23 RX ORDER — LOSARTAN POTASSIUM 50 MG/1
50 TABLET ORAL DAILY
Status: DISCONTINUED | OUTPATIENT
Start: 2021-01-24 | End: 2021-01-23

## 2021-01-23 RX ORDER — METOPROLOL TARTRATE 25 MG/1
25 TABLET, FILM COATED ORAL EVERY 12 HOURS
Status: DISCONTINUED | OUTPATIENT
Start: 2021-01-23 | End: 2021-01-25 | Stop reason: HOSPADM

## 2021-01-23 RX ORDER — LABETALOL HYDROCHLORIDE 5 MG/ML
10 INJECTION, SOLUTION INTRAVENOUS
Status: DISCONTINUED | OUTPATIENT
Start: 2021-01-23 | End: 2021-01-25 | Stop reason: HOSPADM

## 2021-01-23 RX ORDER — LOSARTAN POTASSIUM 50 MG/1
50 TABLET ORAL DAILY
Status: DISCONTINUED | OUTPATIENT
Start: 2021-01-24 | End: 2021-01-25 | Stop reason: HOSPADM

## 2021-01-23 RX ADMIN — ATORVASTATIN CALCIUM 40 MG: 40 TABLET, FILM COATED ORAL at 21:12

## 2021-01-23 RX ADMIN — ROPINIROLE HYDROCHLORIDE 1 MG: 1 TABLET, FILM COATED ORAL at 17:22

## 2021-01-23 RX ADMIN — HYDRALAZINE HYDROCHLORIDE 10 MG: 20 INJECTION INTRAMUSCULAR; INTRAVENOUS at 17:22

## 2021-01-23 RX ADMIN — ENOXAPARIN SODIUM 40 MG: 40 INJECTION SUBCUTANEOUS at 08:06

## 2021-01-23 RX ADMIN — ROPINIROLE HYDROCHLORIDE 1 MG: 1 TABLET, FILM COATED ORAL at 08:05

## 2021-01-23 RX ADMIN — CARBIDOPA AND LEVODOPA 1 TABLET: 25; 100 TABLET ORAL at 08:06

## 2021-01-23 RX ADMIN — VANCOMYCIN HYDROCHLORIDE 1250 MG: 10 INJECTION, POWDER, LYOPHILIZED, FOR SOLUTION INTRAVENOUS at 12:07

## 2021-01-23 RX ADMIN — ENTACAPONE 200 MG: 200 TABLET, FILM COATED ORAL at 08:06

## 2021-01-23 RX ADMIN — Medication 10 ML: at 06:10

## 2021-01-23 RX ADMIN — ENTACAPONE 200 MG: 200 TABLET, FILM COATED ORAL at 21:11

## 2021-01-23 RX ADMIN — PAROXETINE HYDROCHLORIDE 10 MG: 20 TABLET, FILM COATED ORAL at 08:06

## 2021-01-23 RX ADMIN — DEXTRAN 70, GLYCERIN, HYPROMELLOSE 1 DROP: 1; 2; 3 SOLUTION/ DROPS OPHTHALMIC at 21:11

## 2021-01-23 RX ADMIN — CARBIDOPA AND LEVODOPA 1 TABLET: 25; 100 TABLET ORAL at 21:12

## 2021-01-23 RX ADMIN — CEFEPIME 2 G: 2 INJECTION, POWDER, FOR SOLUTION INTRAVENOUS at 17:22

## 2021-01-23 RX ADMIN — CARBIDOPA AND LEVODOPA 1 TABLET: 25; 100 TABLET ORAL at 17:22

## 2021-01-23 RX ADMIN — METOPROLOL TARTRATE 25 MG: 25 TABLET, FILM COATED ORAL at 21:11

## 2021-01-23 RX ADMIN — ASPIRIN 81 MG: 81 TABLET, COATED ORAL at 08:05

## 2021-01-23 RX ADMIN — ENTACAPONE 200 MG: 200 TABLET, FILM COATED ORAL at 17:22

## 2021-01-23 RX ADMIN — PANTOPRAZOLE SODIUM 40 MG: 40 TABLET, DELAYED RELEASE ORAL at 08:05

## 2021-01-23 RX ADMIN — AMLODIPINE BESYLATE 10 MG: 5 TABLET ORAL at 08:06

## 2021-01-23 RX ADMIN — Medication 10 ML: at 21:12

## 2021-01-23 RX ADMIN — CEFEPIME 2 G: 2 INJECTION, POWDER, FOR SOLUTION INTRAVENOUS at 06:10

## 2021-01-23 RX ADMIN — DOCUSATE SODIUM - SENNOSIDES 1 TABLET: 50; 8.6 TABLET, FILM COATED ORAL at 12:10

## 2021-01-23 RX ADMIN — CARBIDOPA AND LEVODOPA 1 TABLET: 25; 100 TABLET ORAL at 12:10

## 2021-01-23 NOTE — PROGRESS NOTES
Physician Progress Note Cesar Mayes 
CSN #:                  [id number] :                       1940 ADMIT DATE:       2021 9:44 AM 
DISCH DATE: 
RESPONDING 
PROVIDER #:        Max Hines MD 
 
 
 
 
QUERY TEXT: 
 
Pt admitted with MARI & UTI. Pt noted to have AMS. If possible, please document in the progress notes and discharge summary if you are evaluating and / or treating any of the following: The medical record reflects the following: 
Risk Factors: 78 M w/hx: Parkinson's and dementia Clinical Indicators: Per ED provider \"Encephalopathy\", Per H&P, pt with \"Altered mental status in setting of baseline dementia: likely due to infection\" Treatment: Vancomycin IV and Cefepime IV, IV fluids Please call with questions. Thank you. [email or web address] [email or web address] 
[phone] Options provided: 
-- Metabolic encephalopathy 
-- Septic encephalopathy 
-- Delirium due to, Please specify cause 
-- Delirium -- Other - I will add my own diagnosis -- Disagree - Not applicable / Not valid -- Disagree - Clinically unable to determine / Unknown 
-- Refer to Clinical Documentation Reviewer PROVIDER RESPONSE TEXT: 
 
This patient has metabolic encephalopathy.  
 
Query created by: Connie Sellers on 2021 9:21 AM 
 
 
Electronically signed by:  Max Hines MD 2021 8:15 AM

## 2021-01-23 NOTE — PROGRESS NOTES
Problem: Mobility Impaired (Adult and Pediatric) Goal: *Acute Goals and Plan of Care (Insert Text) Description: FUNCTIONAL STATUS PRIOR TO ADMISSION: Patient a poor historian and unable to provide information. Per chart review, patient was Mod I with rollator for short distances at baseline in December 2020. Discharged to SNF rehab post that admission and receiving assistance for all mobility since. HOME SUPPORT PRIOR TO ADMISSION: He was living in intermediate until recent hospitalization. Has been in SNF rehab since. Physical Therapy Goals Initiated 1/23/2021 1. Patient will move from supine to sit and sit to supine , scoot up and down, and roll side to side in bed with moderate assistance  within 7 day(s). 2.  Patient will transfer from bed to chair and chair to bed with maximal assistance using the least restrictive device within 7 day(s). 3.  Patient will perform sit to stand with moderate assistance  within 7 day(s). 4.  Patient will ambulate with maximal assistance for 15 feet with the least restrictive device within 7 day(s). Outcome: Not Met PHYSICAL THERAPY EVALUATION Patient: Mansoor Mcguire (19 y.o. male) Date: 1/23/2021 Primary Diagnosis: Altered mental status [R41.82] Precautions: Fall, DNR 
 
ASSESSMENT Based on the objective data described below, the patient presents with generalized weakness, impaired balance, decreased activity tolerance, decreased safety awareness and confusion all limiting patients functional mobility. Nasal O2 not in nose on arrival. SpO2 at 88% on RA. Reapplied nasal cannula. Patient able to follow simple one step commands most of the session with increased time. He tolerated 2 standing reps but presents with a strong posterior and right side lean. Max A x 2. Unable to take steps and unsafe to attempt transfers to chair. Patient positioned in upright sitting to eat breakfast post session. Patient unable to feed himself. Assisted with a few bites and nursing tech assisted further. Patient has been in SNF rehab since December admission. He is well below his baseline prior to that admission. Would recommend patient return to SNF rehab. Current Level of Function Impacting Discharge (mobility/balance): Max A x 2 Functional Outcome Measure: The patient scored 5/100 on the Barthel Index outcome measure which is indicative of high dependency for functional mobility/ADL. Other factors to consider for discharge: fall risk, below baseline, confusion Patient will benefit from skilled therapy intervention to address the above noted impairments. PLAN : 
Recommendations and Planned Interventions: bed mobility training, transfer training, gait training, therapeutic exercises, neuromuscular re-education, patient and family training/education, and therapeutic activities Frequency/Duration: Patient will be followed by physical therapy:  4 times a week to address goals. Recommendation for discharge: (in order for the patient to meet his/her long term goals) Therapy up to 5 days/week in SNF setting- Return (vs LTC) This discharge recommendation: 
Has not yet been discussed the attending provider and/or case management IF patient discharges home will need the following DME: to be determined (TBD) SUBJECTIVE:  
Patient stated That wasn't too bad.  OBJECTIVE DATA SUMMARY:  
HISTORY:   
Past Medical History:  
Diagnosis Date Aaron esophagus Benign nodular prostatic hyperplasia without lower urinary tract symptoms 7/12/2017 CKD (chronic kidney disease) stage 2, GFR 60-89 ml/min Constipation Coronary artery disease 7/12/2017 Depression DJD (degenerative joint disease) 7/12/2017 Falls Gout 7/12/2017 Headache Hearing difficulty of both ears 7/12/2017 Hyperlipidemia Hypertension Impaired cognition 7/12/2017 MCI (mild cognitive impairment) Restless leg syndrome 7/12/2017 Sleep apnea in adult 7/12/2017 No longer on CPAP @13 Snoring Stroke [facility]) Tendinitis 7/12/2017 ACHILLES Tendinitis of left rotator cuff 07/12/2017 Tremor 7/12/2017 LEFT HAND Unspecified sleep apnea   
 lost weight no longer has it Past Surgical History:  
Procedure Laterality Date COLONOSCOPY N/A 1/16/2019 COLONOSCOPY performed by Yolis Cantrell MD at [facility] ENDOSCOPY  
 COLONOSCOPY,FLEX,W/CONTROL, BLEEDING  1/16/2019 HX ORTHOPAEDIC  1/27/15 L4-L5 MICRODECOMPRESSION (Right [place] EGD TRANSORAL BIOPSY SINGLE/MULTIPLE  8/16/2011 [facility] EGD TRANSORAL BIOPSY SINGLE/MULTIPLE  10/1/2013 Personal factors and/or comorbidities impacting plan of care:  
 
Home Situation Home Environment: Skilled nursing facility EXAMINATION/PRESENTATION/DECISION MAKING:  
Critical Behavior: 
Neurologic State: Confused Orientation Level: Oriented to person Cognition: Decreased attention/concentration, Decreased command following Hearing: Auditory Auditory Impairment: None Range Of Motion: 
AROM: Grossly decreased, non-functional 
  
  
  
  
  
  
  
Strength:   
Strength: Grossly decreased, non-functional 
  
  
  
  
  
  
 Tone & Sensation:  
Tone: Abnormal(rigid; history of Parkinson's) Coordination: 
Coordination: Generally decreased, functional 
 
Functional Mobility: 
Bed Mobility: 
Rolling: Maximum assistance; Additional time Supine to Sit: Maximum assistance; Additional time Sit to Supine: Maximum assistance;Assist x2; Additional time Scooting: Maximum assistance;Assist x2; Additional time Transfers: 
Sit to Stand: Maximum assistance;Assist x2; Additional time(x 2 reps, use of RW, posterior and right lean) Stand to Sit: Maximum assistance;Assist x2 Balance:  
Sitting: Impaired Sitting - Static: Poor (constant support); Fair (occasional)(posterior lean initially) Sitting - Dynamic: Poor (constant support) Standing: Impaired Standing - Static: Poor(posterior and right lean) Standing - Dynamic : Poor Ambulation/Gait Training: Attempted to have patient take steps forward and laterally. Patient unable to advance feet. PT assisted with weight shifting and balance support. Functional Measure: 
Barthel Index: 
 
Bathin Bladder: 0 Bowels: 0 Groomin Dressin Feedin Mobility: 0 Stairs: 0 Toilet Use: 0 Transfer (Bed to Chair and Back): 5 Total: 5/100 The Barthel ADL Index: Guidelines 1. The index should be used as a record of what a patient does, not as a record of what a patient could do. 2. The main aim is to establish degree of independence from any help, physical or verbal, however minor and for whatever reason. 3. The need for supervision renders the patient not independent. 4. A patient's performance should be established using the best available evidence. Asking the patient, friends/relatives and nurses are the usual sources, but direct observation and common sense are also important. However direct testing is not needed. 5. Usually the patient's performance over the preceding 24-48 hours is important, but occasionally longer periods will be relevant. 6. Middle categories imply that the patient supplies over 50 per cent of the effort. 7. Use of aids to be independent is allowed. Yahir Gasca., Barthel, D.W. ([id number]). Functional evaluation: the Barthel Index. [address] (14)2. Ebonie LegerSUSIE Wilkerson, Carolina Crawford., Franciscofarnaz Herrera.[facility], [address] (1999). Measuring the change indisability after inpatient rehabilitation; comparison of the responsiveness of the Barthel Index and Functional Hyde Measure. Journal of Neurology, Neurosurgery, and Psychiatry, 66(4), 125-665. Chidi Felton, N.J.A, CHESTER Navas, & Marnie Hager MCAROLE. (2004.) Assessment of post-stroke quality of life in cost-effectiveness studies: The usefulness of the Barthel Index and the EuroQoL-5D. [facility], 13, 197-00 Physical Therapy Evaluation Charge Determination History Examination Presentation Decision-Making MEDIUM  Complexity : 1-2 comorbidities / personal factors will impact the outcome/ POC  LOW Complexity : 1-2 Standardized tests and measures addressing body structure, function, activity limitation and / or participation in recreation  LOW Complexity : Stable, uncomplicated  Other outcome measures Barthel Index  MEDIUM Based on the above components, the patient evaluation is determined to be of the following complexity level: LOW Pain Rating: 
No c/o pain Activity Tolerance:  
Fair After treatment patient left in no apparent distress:  
Supine in bed, Call bell within reach, and Bed / chair alarm activated COMMUNICATION/EDUCATION:  
The patients plan of care was discussed with: Registered nurse. Fall prevention education was provided and the patient/caregiver indicated understanding., Patient/family have participated as able in goal setting and plan of care. , and Patient/family agree to work toward stated goals and plan of care. Thank you for this referral. 
Jessica Villela, PT, DPT Time Calculation: 23 mins

## 2021-01-23 NOTE — ROUTINE PROCESS
End of Shift Note Bedside shift change report given to Chris (oncoming nurse) by Rachel Coker (offgoing nurse). Report included the following information SBAR, Kardex, Intake/Output and MAR Shift worked:  7a-7p Shift summary and any significant changes:  
 Pt had burst of SVT Concerns for physician to address:  none Zone phone for oncoming shift:   [phone] Patient Information Merritt Friday 78 y.o. 
1/21/2021  9:44 AM by Todd Green MD. Merritt Friday was admitted from [facility] Rehab Problem List 
Patient Active Problem List  
 Diagnosis Date Noted  Acute blood loss anemia 01/16/2019 Priority: 2 - Two  Parkinson's disease ([address] 01/30/2015 Priority: 2 - Two  Long-term current use of high risk medication other than anticoagulant 06/13/2018 Priority: 3 - Three  Gastroesophageal reflux disease without esophagitis 09/30/2017 Priority: 3 - Three  Altered mental status 01/21/2021  Pneumonia 12/03/2020  Benign prostatic hyperplasia with nocturia 02/17/2020  
 History of stroke 07/29/2019  Dementia due to Parkinson's disease without behavioral disturbance ([address] 07/29/2019  Parkinsonism ([address] 07/29/2019  Bilateral carotid artery stenosis 07/29/2019  Mild cognitive impairment 07/29/2019  Cerebrovascular accident (CVA) ([address] 07/29/2019  Rectal bleeding 01/15/2019  PE (physical exam), annual 09/30/2017  Aaron esophagus  Depression  Hyperlipidemia  Snoring  Hearing difficulty of both ears 07/12/2017  Sleep apnea in adult 07/12/2017  Coronary artery disease 07/12/2017  DJD (degenerative joint disease) 07/12/2017  Gout 07/12/2017  Restless leg syndrome 07/12/2017  Lumbar stenosis 01/29/2015  Essential hypertension  MCI (mild cognitive impairment)  CKD (chronic kidney disease) stage 2, GFR 60-89 ml/min Past Medical History:  
Diagnosis Date  Aaron esophagus  Benign nodular prostatic hyperplasia without lower urinary tract symptoms 7/12/2017  CKD (chronic kidney disease) stage 2, GFR 60-89 ml/min  Constipation  Coronary artery disease 7/12/2017  Depression  DJD (degenerative joint disease) 7/12/2017 [address]  Gout 7/12/2017  Headache   
 Hearing difficulty of both ears 7/12/2017  Hyperlipidemia  Hypertension  Impaired cognition 7/12/2017  MCI (mild cognitive impairment)  Restless leg syndrome 7/12/2017  Sleep apnea in adult 7/12/2017 No longer on CPAP @13  Snoring  Stroke ([address]  Tendinitis 7/12/2017 ACHILLES  Tendinitis of left rotator cuff 07/12/2017  Tremor 7/12/2017 LEFT HAND  Unspecified sleep apnea   
 lost weight no longer has it Core Measures: 
PNA:Yes Yes Activity: 
Activity Level: Up with Assistance(pt/ot) Cardiac:  
Cardiac Monitoring: Yes     
Cardiac Rhythm: Normal sinus rhythm Access:  
Current line(s): PIV Genitourinary:  
Urinary status: monique Respiratory:  
O2 Device: Nasal cannula Chronic home O2 use?: YES Incentive spirometer at bedside: NO 
  
 
GI: 
  
Current diet:  DIET DYSPHAGIA PUREED (NDD1) Passing flatus: YES Tolerating current diet: YES 
% Diet Eaten: 40 % Pain Management:  
Patient states pain is manageable on current regimen: NO 
 
Skin: 
Elier Score: 14 Interventions: PT/OT consult Patient Safety: 
Fall Score: Total Score: 3 Interventions: bed/chair alarm High Fall Risk: Yes DVT prophylaxis: DVT prophylaxis Med- Yes DVT prophylaxis SCD or LASHAUN- No  
 
Wounds: (If Applicable) Wounds- No 
Location Active Consults: 
IP CONSULT TO HOSPITALIST Length of Stay: 
Expected LOS: 4d 7h Actual LOS: 2 Discharge Plan: Yes rehab when ready Torito Duff

## 2021-01-23 NOTE — ROUTINE PROCESS
End of Shift Note Bedside shift change report given to Ghanshyam Garcia (oncoming nurse) by Dari Roberto RN (offgoing nurse). Report included the following information SBAR, Kardex, Intake/Output and MAR Shift worked:  7p-7a Shift summary and any significant changes:  
  went into Afib Concerns for physician to address:  went into Afib Zone phone for oncoming shift:   [phone] Patient Information Quique Lazcano 78 y.o. 
1/21/2021  9:44 AM by Linh Walker MD. Quique Lazcano was admitted from [facility] Rehab Problem List 
Patient Active Problem List  
 Diagnosis Date Noted  Acute blood loss anemia 01/16/2019 Priority: 2 - Two  Parkinson's disease ([address] 01/30/2015 Priority: 2 - Two  Long-term current use of high risk medication other than anticoagulant 06/13/2018 Priority: 3 - Three  Gastroesophageal reflux disease without esophagitis 09/30/2017 Priority: 3 - Three  Altered mental status 01/21/2021  Pneumonia 12/03/2020  Benign prostatic hyperplasia with nocturia 02/17/2020  
 History of stroke 07/29/2019  Dementia due to Parkinson's disease without behavioral disturbance ([address] 07/29/2019  Parkinsonism ([address] 07/29/2019  Bilateral carotid artery stenosis 07/29/2019  Mild cognitive impairment 07/29/2019  Cerebrovascular accident (CVA) ([address] 07/29/2019  Rectal bleeding 01/15/2019  PE (physical exam), annual 09/30/2017  Aaron esophagus  Depression  Hyperlipidemia  Snoring  Hearing difficulty of both ears 07/12/2017  Sleep apnea in adult 07/12/2017  Coronary artery disease 07/12/2017  DJD (degenerative joint disease) 07/12/2017  Gout 07/12/2017  Restless leg syndrome 07/12/2017  Lumbar stenosis 01/29/2015  Essential hypertension  MCI (mild cognitive impairment)  CKD (chronic kidney disease) stage 2, GFR 60-89 ml/min Past Medical History:  
Diagnosis Date  Aaron esophagus  Benign nodular prostatic hyperplasia without lower urinary tract symptoms 7/12/2017  CKD (chronic kidney disease) stage 2, GFR 60-89 ml/min  Constipation  Coronary artery disease 7/12/2017  Depression  DJD (degenerative joint disease) 7/12/2017 [address]  Gout 7/12/2017  Headache   
 Hearing difficulty of both ears 7/12/2017  Hyperlipidemia  Hypertension  Impaired cognition 7/12/2017  MCI (mild cognitive impairment)  Restless leg syndrome 7/12/2017  Sleep apnea in adult 7/12/2017 No longer on CPAP @13  Snoring  Stroke ([address]  Tendinitis 7/12/2017 ACHILLES  Tendinitis of left rotator cuff 07/12/2017  Tremor 7/12/2017 LEFT HAND  Unspecified sleep apnea   
 lost weight no longer has it Core Measures: 
PNA:Yes Yes Activity: 
Activity Level: Up with Assistance Cardiac:  
Cardiac Monitoring: Yes     
Cardiac Rhythm: Atrial fibrillation Access:  
Current line(s): PIV Genitourinary:  
Urinary status: monique Respiratory:  
O2 Device: Nasal cannula Chronic home O2 use?: YES Incentive spirometer at bedside: NO 
  
 
GI: 
  
Current diet:  DIET DYSPHAGIA PUREED (NDD1) Passing flatus: YES Tolerating current diet: YES 
% Diet Eaten: 40 % Pain Management:  
Patient states pain is manageable on current regimen: NO 
 
Skin: 
Elier Score: 14 Interventions: PT/OT consult Patient Safety: 
Fall Score: Total Score: 3 Interventions: bed/chair alarm High Fall Risk: Yes DVT prophylaxis: DVT prophylaxis Med- Yes DVT prophylaxis SCD or LASHAUN- No  
 
Wounds: (If Applicable) Wounds- No 
Location Active Consults: 
IP CONSULT TO HOSPITALIST Length of Stay: 
Expected LOS: 4d 7h Actual LOS: 2 Discharge Plan: Yes rehab when ready Drake Clarke RN

## 2021-01-23 NOTE — PROGRESS NOTES
Received call from telemetry, patient converted to Afib. Informed Charles NP. No new orders given as patient has a history of Afib.

## 2021-01-23 NOTE — PROGRESS NOTES
Received notification from bedside RN about patient with regards to: elevated BP, no prn meds VS: /103, , RR 20, O2 sat 93% Intervention given: Labetalol IV prn ordered

## 2021-01-24 LAB
L PNEUMO1 AG UR QL IA: NEGATIVE
SPECIMEN SOURCE: NORMAL

## 2021-01-24 PROCEDURE — 74011250637 HC RX REV CODE- 250/637: Performed by: STUDENT IN AN ORGANIZED HEALTH CARE EDUCATION/TRAINING PROGRAM

## 2021-01-24 PROCEDURE — 77010033678 HC OXYGEN DAILY

## 2021-01-24 PROCEDURE — 94760 N-INVAS EAR/PLS OXIMETRY 1: CPT

## 2021-01-24 PROCEDURE — 74011250637 HC RX REV CODE- 250/637: Performed by: INTERNAL MEDICINE

## 2021-01-24 PROCEDURE — 65660000000 HC RM CCU STEPDOWN

## 2021-01-24 PROCEDURE — 74011000258 HC RX REV CODE- 258: Performed by: STUDENT IN AN ORGANIZED HEALTH CARE EDUCATION/TRAINING PROGRAM

## 2021-01-24 PROCEDURE — 74011250636 HC RX REV CODE- 250/636: Performed by: STUDENT IN AN ORGANIZED HEALTH CARE EDUCATION/TRAINING PROGRAM

## 2021-01-24 RX ADMIN — ENTACAPONE 200 MG: 200 TABLET, FILM COATED ORAL at 17:25

## 2021-01-24 RX ADMIN — CEFEPIME 2 G: 2 INJECTION, POWDER, FOR SOLUTION INTRAVENOUS at 05:50

## 2021-01-24 RX ADMIN — ENOXAPARIN SODIUM 40 MG: 40 INJECTION SUBCUTANEOUS at 09:10

## 2021-01-24 RX ADMIN — CARBIDOPA AND LEVODOPA 1 TABLET: 25; 100 TABLET ORAL at 21:20

## 2021-01-24 RX ADMIN — Medication 10 ML: at 21:20

## 2021-01-24 RX ADMIN — CARBIDOPA AND LEVODOPA 1 TABLET: 25; 100 TABLET ORAL at 09:10

## 2021-01-24 RX ADMIN — METOPROLOL TARTRATE 25 MG: 25 TABLET, FILM COATED ORAL at 09:10

## 2021-01-24 RX ADMIN — CARBIDOPA AND LEVODOPA 1 TABLET: 25; 100 TABLET ORAL at 12:36

## 2021-01-24 RX ADMIN — ROPINIROLE HYDROCHLORIDE 1 MG: 1 TABLET, FILM COATED ORAL at 17:25

## 2021-01-24 RX ADMIN — METOPROLOL TARTRATE 25 MG: 25 TABLET, FILM COATED ORAL at 21:20

## 2021-01-24 RX ADMIN — AMLODIPINE BESYLATE 10 MG: 5 TABLET ORAL at 09:10

## 2021-01-24 RX ADMIN — ROPINIROLE HYDROCHLORIDE 1 MG: 1 TABLET, FILM COATED ORAL at 09:10

## 2021-01-24 RX ADMIN — CEFEPIME 2 G: 2 INJECTION, POWDER, FOR SOLUTION INTRAVENOUS at 17:25

## 2021-01-24 RX ADMIN — PAROXETINE HYDROCHLORIDE 10 MG: 20 TABLET, FILM COATED ORAL at 09:10

## 2021-01-24 RX ADMIN — LOSARTAN POTASSIUM 50 MG: 50 TABLET, FILM COATED ORAL at 09:10

## 2021-01-24 RX ADMIN — DEXTRAN 70, GLYCERIN, HYPROMELLOSE 1 DROP: 1; 2; 3 SOLUTION/ DROPS OPHTHALMIC at 21:20

## 2021-01-24 RX ADMIN — PANTOPRAZOLE SODIUM 40 MG: 40 TABLET, DELAYED RELEASE ORAL at 09:10

## 2021-01-24 RX ADMIN — ENTACAPONE 200 MG: 200 TABLET, FILM COATED ORAL at 09:10

## 2021-01-24 RX ADMIN — ASPIRIN 81 MG: 81 TABLET, COATED ORAL at 09:10

## 2021-01-24 RX ADMIN — ATORVASTATIN CALCIUM 40 MG: 40 TABLET, FILM COATED ORAL at 21:20

## 2021-01-24 RX ADMIN — Medication 10 ML: at 05:50

## 2021-01-24 RX ADMIN — CARBIDOPA AND LEVODOPA 1 TABLET: 25; 100 TABLET ORAL at 17:25

## 2021-01-24 RX ADMIN — ENTACAPONE 200 MG: 200 TABLET, FILM COATED ORAL at 21:20

## 2021-01-24 NOTE — PROGRESS NOTES
JALIL HARRY 
Discharge Planning MD notified CM patient is ready for discharge back to [address] [place] [address]. Voicemail left for Liaison Ke Gonzalez ([phone]) to coordinate patient return. CM will continue to follow patient for discharge planning needs and arrange for services as deemed necessary. Rasheeda Fox, MSN, RN Care Manager

## 2021-01-24 NOTE — ROUTINE PROCESS
End of Shift Note Bedside shift change report given to Chris (oncoming nurse) by Faith Colmenares (offgoing nurse). Report included the following information SBAR, Kardex, Intake/Output and MAR Shift worked:  7a-7p Shift summary and any significant changes:  
 none Concerns for physician to address:  none Zone phone for oncoming shift:   [phone] Patient Information Vanessa Sosa 78 y.o. 
1/21/2021  9:44 AM by Bhupinder Méndez MD. Vanessa Sosa was admitted from [facility] Rehab Problem List 
Patient Active Problem List  
 Diagnosis Date Noted  Acute blood loss anemia 01/16/2019 Priority: 2 - Two  Parkinson's disease ([address] 01/30/2015 Priority: 2 - Two  Long-term current use of high risk medication other than anticoagulant 06/13/2018 Priority: 3 - Three  Gastroesophageal reflux disease without esophagitis 09/30/2017 Priority: 3 - Three  Altered mental status 01/21/2021  Pneumonia 12/03/2020  Benign prostatic hyperplasia with nocturia 02/17/2020  
 History of stroke 07/29/2019  Dementia due to Parkinson's disease without behavioral disturbance ([address] 07/29/2019  Parkinsonism ([address] 07/29/2019  Bilateral carotid artery stenosis 07/29/2019  Mild cognitive impairment 07/29/2019  Cerebrovascular accident (CVA) ([address] 07/29/2019  Rectal bleeding 01/15/2019  PE (physical exam), annual 09/30/2017  Aaron esophagus  Depression  Hyperlipidemia  Snoring  Hearing difficulty of both ears 07/12/2017  Sleep apnea in adult 07/12/2017  Coronary artery disease 07/12/2017  DJD (degenerative joint disease) 07/12/2017  Gout 07/12/2017  Restless leg syndrome 07/12/2017  Lumbar stenosis 01/29/2015  Essential hypertension  MCI (mild cognitive impairment)  CKD (chronic kidney disease) stage 2, GFR 60-89 ml/min Past Medical History:  
Diagnosis Date  Aaron esophagus  Benign nodular prostatic hyperplasia without lower urinary tract symptoms 7/12/2017  CKD (chronic kidney disease) stage 2, GFR 60-89 ml/min  Constipation  Coronary artery disease 7/12/2017  Depression  DJD (degenerative joint disease) 7/12/2017 [address]  Gout 7/12/2017  Headache   
 Hearing difficulty of both ears 7/12/2017  Hyperlipidemia  Hypertension  Impaired cognition 7/12/2017  MCI (mild cognitive impairment)  Restless leg syndrome 7/12/2017  Sleep apnea in adult 7/12/2017 No longer on CPAP @13  Snoring  Stroke ([address]  Tendinitis 7/12/2017 ACHILLES  Tendinitis of left rotator cuff 07/12/2017  Tremor 7/12/2017 LEFT HAND  Unspecified sleep apnea   
 lost weight no longer has it Core Measures: 
PNA:Yes Yes Activity: 
Activity Level: Bed Rest 
 
Cardiac:  
Cardiac Monitoring: Yes     
Cardiac Rhythm: Normal sinus rhythm Access:  
Current line(s): PIV Genitourinary:  
Urinary status: monique Respiratory:  
O2 Device: Nasal cannula Chronic home O2 use?: YES Incentive spirometer at bedside: NO 
  
 
GI: 
Last Bowel Movement Date: (unknown ) Current diet:  DIET DYSPHAGIA PUREED (NDD1) Passing flatus: YES Tolerating current diet: YES 
% Diet Eaten: 40 % Pain Management:  
Patient states pain is manageable on current regimen: NO 
 
Skin: 
Elier Score: 14 Interventions: PT/OT consult Patient Safety: 
Fall Score: Total Score: 3 Interventions: bed/chair alarm High Fall Risk: Yes DVT prophylaxis: DVT prophylaxis Med- Yes DVT prophylaxis SCD or LASHAUN- No  
 
Wounds: (If Applicable) Wounds- No 
Location Active Consults: 
IP CONSULT TO HOSPITALIST Length of Stay: 
Expected LOS: 4d 7h Actual LOS: 3 Discharge Plan: Yes rehab when ready Lorilee Clamp

## 2021-01-24 NOTE — PROGRESS NOTES
Patient converted to A-fib. According to day shift nurse this happened last night and he converted back to NSR in the am. Patient also has a history of a-fib. Berton Osler NP aware, EKG ordered

## 2021-01-24 NOTE — PROGRESS NOTES
Hospitalist Progress Note NAME: Linda Brito :  1940 MRN:  [id number] Assessment / Plan: Altered mental status in setting of baseline dementia : Likely due to infection Urinary tract infection 
-Presents from SNF with altered mentation from baseline. -Afebrile, no leukocytosis. -UA with pyuria, bacteria, positive nitrates and LE 
-Lactic acid WNL yesterday C/w  Vanco and cefepime. Patient also has pneumonia. Will DC on levaquin  
 urine + proteus  and blood culture results NTD . Pt is still confused  
  
Hospital acquired pneumonia COPD on ? 3LPM O2 at home 
-Patient was diagnosed with pneumonia at rehab on  with abnormal chest x-ray and started on Augmentin as op  
-Chest x-ray here shows scattered opacities in the left lung. Improved airspace disease in the right lung. 
-Continue empiric broad-spectrum antibiotic as above 
-Obtain sputum culture if possible. obtain urine strep and Legionella antigen. 
 covid neg Pt doing better Sinus bradycardia 
-Heart rate in the high 40s and 50s. EKG NSR, no AV prolongation, Left axis deviation, poor Q wave progression, no acute ischemic changes. Continue telemetry monitoring. 
  
Acute kidney injury resolved  
-Creatinine elevated at 1.5. Possibly prerenal from dehydration. BUN/Cr 20. Gentle IV fluid in light of CHF. creat down to wnl  
  
History of CAD Chronic diastolic congestive heart failure EF 60% -In 2020. Appears euvolemic on exam. 
Continue ASA and lipitor 
  
Hypertension 
bp trending up Restart norvasc, cozzar and BB   
  
Normocytic anemia Hx of MIRA 
- H and H at baseline. Continue to monitor. 
  
History of paroxysmal atrial fibrillation Dementia Parkinson's disease Hyperlipidemia Lumbar spinal stenosis Restless leg syndrome 
-Continue home meds 
  
  
Code Status: ddnr per children , talked with park and maurice Updated park at bedside on , DDNR signed by park Surrogate Decision Maker: 
  
DVT Prophylaxis: Lovenox GI Prophylaxis: not indicated 
  
Baseline: [address] [address] [address] [place] Was admitted in December 19. 
 
 
18.5 - 24.9 Normal weight / Body mass index is 24.2 kg/m². 18.5 - 24.9 Normal weight / Body mass index is 24.12 kg/m². C  
 
Subjective: Chief Complaint / Reason for Physician Visit FU AMS \". Doing better  Discussed with RN events overnight. Review of Systems: 
Symptom Y/N Comments  Symptom Y/N Comments Fever/Chills    Chest Pain Poor Appetite    Edema Cough    Abdominal Pain Sputum    Joint Pain SOB/MURCIA    Pruritis/Rash Nausea/vomit    Tolerating PT/OT Diarrhea    Tolerating Diet Constipation    Other Could NOT obtain due to: Confused Objective: VITALS:  
Last 24hrs VS reviewed since prior progress note. Most recent are: 
Patient Vitals for the past 24 hrs: 
 Temp Pulse Resp BP SpO2  
01/24/21 0804 99 °F (37.2 °C) 66 20 (!) 147/82 94 % 01/24/21 0306 99.1 °F (37.3 °C) 60 20 (!) 143/79 94 % 01/23/21 2317 99.7 °F (37.6 °C) 60 18 (!) 124/91 94 % 01/23/21 2110  75  (!) 133/107   
01/23/21 1941 100.4 °F (38 °C) 70 20 (!) 142/76 97 % 01/23/21 1200 97.4 °F (36.3 °C) 66 18 (!) 193/89 96 % 01/23/21 1130 97.7 °F (36.5 °C) 66 18 (!) 142/79 90 % Intake/Output Summary (Last 24 hours) at 1/24/2021 3330 Last data filed at 1/24/2021 5440 Gross per 24 hour Intake 120 ml Output 1400 ml Net -1280 ml I had a face to face encounter and independently examined this patient on 1/24/2021, as outlined below: PHYSICAL EXAM: 
General: WD, WN. Alert, cooperative, no acute distress EENT:  EOMI. Anicteric sclerae. MMM Resp:  CTA bilaterally, no wheezing or rales. No accessory muscle use CV:  Regular  rhythm,  No edema GI:  Soft, Non distended, Non tender. +Bowel sounds Neurologic:  Alert and oriented X 0, normal speech, Psych:   Pt. Not anxious nor agitated Skin:  No rashes. No jaundice Reviewed most current lab test results and cultures  YES Reviewed most current radiology test results   YES Review and summation of old records today    NO Reviewed patient's current orders and MAR    YES 
PMH/SH reviewed - no change compared to H&P 
________________________________________________________________________ Care Plan discussed with: 
  Comments Patient Family  x Max Maldonado RN Care Manager x Consultant     
                 x Multidiciplinary team rounds were held today with , nursing, pharmacist and clinical coordinator. Patient's plan of care was discussed; medications were reviewed and discharge planning was addressed. ________________________________________________________________________ Total NON critical care TIME:  35 Minutes Total CRITICAL CARE TIME Spent:   Minutes non procedure based Comments >50% of visit spent in counseling and coordination of care    
________________________________________________________________________ Gibran Nickerson MD  
 
Procedures: see electronic medical records for all procedures/Xrays and details which were not copied into this note but were reviewed prior to creation of Plan. LABS: 
I reviewed today's most current labs and imaging studies. Pertinent labs include: 
Recent Labs  
  01/23/21 
1134 01/22/21 
0526 01/21/21 
1011 WBC 11.0 10.9 11.1 HGB 10.2* 9.0* 9.5* HCT 32.3* 29.1* 30.7*  305 319 Recent Labs  
  01/22/21 
0526 01/21/21 
1011 * 136  
K 4.3 4.2  102 CO2 23 30 GLU 88 103* BUN 25* 32* CREA 1.12 1.58* CA 8.7 9.3 MG  --  2.3 ALB  --  2.5* TBILI  --  0.7 ALT  --  20 INR  --  1.2* Signed: Gibran Nickerson MD

## 2021-01-24 NOTE — PROGRESS NOTES
End of Shift Note Bedside shift change report given to [facility] (oncoming nurse) by Claudean Redwood, GN (offgoing nurse). Report included the following information SBAR Shift worked:  [id number] Shift summary and any significant changes:  
  Patient converted to a-fib, EKG completed Concerns for physician to address:  None Zone phone for oncoming shift:   [phone] Patient Information Mitzy Dowling 78 y.o. 
1/21/2021  9:44 AM by Carlos Gonzales MD. Mitzy Dowling was admitted from [facility] Home 
 
Problem List 
Patient Active Problem List  
 Diagnosis Date Noted  Acute blood loss anemia 01/16/2019 Priority: 2 - Two  Parkinson's disease ([facility] 01/30/2015 Priority: 2 - Two  Long-term current use of high risk medication other than anticoagulant 06/13/2018 Priority: 3 - Three  Gastroesophageal reflux disease without esophagitis 09/30/2017 Priority: 3 - Three  Altered mental status 01/21/2021  Pneumonia 12/03/2020  Benign prostatic hyperplasia with nocturia 02/17/2020  
 History of stroke 07/29/2019  Dementia due to Parkinson's disease without behavioral disturbance ([facility] 07/29/2019  Parkinsonism ([facility] 07/29/2019  Bilateral carotid artery stenosis 07/29/2019  Mild cognitive impairment 07/29/2019  Cerebrovascular accident (CVA) ([address] 07/29/2019  Rectal bleeding 01/15/2019  PE (physical exam), annual 09/30/2017  Aaron esophagus  Depression  Hyperlipidemia  Snoring  Hearing difficulty of both ears 07/12/2017  Sleep apnea in adult 07/12/2017  Coronary artery disease 07/12/2017  DJD (degenerative joint disease) 07/12/2017  Gout 07/12/2017  Restless leg syndrome 07/12/2017  Lumbar stenosis 01/29/2015  Essential hypertension  MCI (mild cognitive impairment)  CKD (chronic kidney disease) stage 2, GFR 60-89 ml/min Past Medical History:  
Diagnosis Date  Aaron esophagus  Benign nodular prostatic hyperplasia without lower urinary tract symptoms 7/12/2017  CKD (chronic kidney disease) stage 2, GFR 60-89 ml/min  Constipation  Coronary artery disease 7/12/2017  Depression  DJD (degenerative joint disease) 7/12/2017 [address]  Gout 7/12/2017  Headache   
 Hearing difficulty of both ears 7/12/2017  Hyperlipidemia  Hypertension  Impaired cognition 7/12/2017  MCI (mild cognitive impairment)  Restless leg syndrome 7/12/2017  Sleep apnea in adult 7/12/2017 No longer on CPAP @13  Snoring  Stroke ([address]  Tendinitis 7/12/2017 ACHILLES  Tendinitis of left rotator cuff 07/12/2017  Tremor 7/12/2017 LEFT HAND  Unspecified sleep apnea   
 lost weight no longer has it Core Measures: CVA: No No 
CHF:No No 
PNA:No No 
 
Activity: 
Activity Level: Bed Rest 
Number times ambulated in hallways past shift: 0 Number of times OOB to chair past shift: 0 Cardiac:  
Cardiac Monitoring: Yes     
Cardiac Rhythm: Normal sinus rhythm Access:  
Current line(s): PIV Genitourinary:  
Urinary status: monique Urinary Catheter? Yes Placement Date 1/21/2021 Reason Medically Necessary acute retention Respiratory:  
O2 Device: Nasal cannula Chronic home O2 use?: NO Incentive spirometer at bedside: N/A 
  
 
GI: 
Last Bowel Movement Date: (unknown ) Current diet:  DIET DYSPHAGIA PUREED (NDD1) Passing flatus: YES Tolerating current diet: YES 
% Diet Eaten: 40 % Pain Management:  
Patient states pain is manageable on current regimen: N/A Skin: 
Elier Score: 14 Interventions: limit briefs and internal/external urinary devices Patient Safety: 
Fall Score: Total Score: 3 Interventions: bed/chair alarm and gripper socks High Fall Risk: Yes DVT prophylaxis: DVT prophylaxis Med- Yes DVT prophylaxis SCD or LASHAUN- No  
 
Wounds: (If Applicable) Wounds- No 
Location Active Consults: 
IP CONSULT TO HOSPITALIST Length of Stay: 
Expected LOS: 4d 7h Actual LOS: 3 Discharge Plan: Yes; SNF [facility]

## 2021-01-24 NOTE — PROGRESS NOTES
Pharmacy Automatic Renal Dosing Protocol - Antimicrobials Indication for Antimicrobials: PNA Current Regimen of Each Antimicrobial: 
Cefepime 2 g q8h (Start Date ; Day # 4) Previous Antimicrobial Therapy: 
Zosyn x 1 Vancomycin 1250 q24h (Start Date ; Day # 2) Significant Cultures:  
: blood - NGTD -prelim : urine: proteus - final A(S to cefepime) Radiology / Imaging results: (X-ray, CT scan or MRI):  
CXR - scattered parenchymal opacities that may represent PNA Paralysis, amputations, malnutrition: None Labs: 
Recent Labs  
  21 
1134 21 
0526 CREA  --  1.12  
BUN  --  25* WBC 11.0 10.9 Temp (24hrs), Av.4 °F (37.4 °C), Min:98.7 °F (37.1 °C), Max:100.4 °F (38 °C) Is the Patient on Dialysis? No 
 
Creatinine Clearance (mL/min):  
CrCl (Actual Body Weight): 54.8 CrCl (Adjusted Body Weight): 53.4 CrCl (Ideal Body Weight): 52.5 Impression/Plan:  
Change to cefepime 2g q12h per renal dosing protocol Antimicrobial stop date: 7 days Pharmacy will follow daily and adjust medications as appropriate for renal function and/or serum levels. Thank you, Handy Lopez, PHARMD 
 
Recommended duration of therapy 
http://[facility]/[place]/[facility]/Pharmacy/Clinical%20Companion/Duration%20of%20ABX%20therapy. docx Renal Dosing 
http://[facility]/[place]/[facility]/Pharmacy/Clinical%20Companion/Renal%[email or web address]. pdf

## 2021-01-24 NOTE — PROGRESS NOTES
Received notification from bedside RN about patient with regards to: patient's rhythm back in Afib, resting with no symptoms reported VS: /91, HR 60, RR 18, O2 sat 94% on NC Intervention given: EKG ordered : reflects sinus rhythm

## 2021-01-24 NOTE — PROGRESS NOTES
Problem: Falls - Risk of 
Goal: *Absence of Falls Description: Document Gilmore Reason Fall Risk and appropriate interventions in the flowsheet. Outcome: Progressing Towards Goal 
Note: Fall Risk Interventions: 
Mobility Interventions: Bed/chair exit alarm Mentation Interventions: Bed/chair exit alarm Medication Interventions: Bed/chair exit alarm Elimination Interventions: Bed/chair exit alarm Problem: Pressure Injury - Risk of 
Goal: *Prevention of pressure injury Description: Document Elier Scale and appropriate interventions in the flowsheet. Outcome: Progressing Towards Goal 
Note: Pressure Injury Interventions: 
Sensory Interventions: Minimize linen layers Moisture Interventions: Minimize layers Activity Interventions: Assess need for specialty bed Mobility Interventions: Assess need for specialty bed Nutrition Interventions: Document food/fluid/supplement intake Friction and Shear Interventions: Minimize layers Problem: Pneumonia: Day 1 Goal: Off Pathway (Use only if patient is Off Pathway) Outcome: Progressing Towards Goal 
Goal: Activity/Safety Outcome: Progressing Towards Goal 
Goal: Consults, if ordered Outcome: Progressing Towards Goal 
Goal: Diagnostic Test/Procedures Outcome: Progressing Towards Goal 
Goal: Nutrition/Diet Outcome: Progressing Towards Goal 
Goal: Medications Outcome: Progressing Towards Goal 
Goal: Respiratory Outcome: Progressing Towards Goal 
Goal: Treatments/Interventions/Procedures Outcome: Progressing Towards Goal 
Goal: Psychosocial 
Outcome: Progressing Towards Goal 
Goal: *Oxygen saturation within defined limits Outcome: Progressing Towards Goal 
Goal: *Influenza vaccine administered (October-March) Outcome: Progressing Towards Goal 
Goal: *Pneumoccocal vaccine administered Outcome: Progressing Towards Goal 
Goal: *Hemodynamically stable Outcome: Progressing Towards Goal 
Goal: *Demonstrates progressive activity Outcome: Progressing Towards Goal 
Goal: *Tolerating diet Outcome: Progressing Towards Goal 
  
Problem: Pneumonia: Day 2 Goal: Off Pathway (Use only if patient is Off Pathway) Outcome: Progressing Towards Goal 
Goal: Activity/Safety Outcome: Progressing Towards Goal 
Goal: Consults, if ordered Outcome: Progressing Towards Goal 
Goal: Diagnostic Test/Procedures Outcome: Progressing Towards Goal 
Goal: Nutrition/Diet Outcome: Progressing Towards Goal 
Goal: Discharge Planning Outcome: Progressing Towards Goal 
Goal: Medications Outcome: Progressing Towards Goal 
Goal: Respiratory Outcome: Progressing Towards Goal 
Goal: Treatments/Interventions/Procedures Outcome: Progressing Towards Goal 
Goal: Psychosocial 
Outcome: Progressing Towards Goal 
Goal: *Oxygen saturation within defined limits Outcome: Progressing Towards Goal 
Goal: *Hemodynamically stable Outcome: Progressing Towards Goal 
Goal: *Demonstrates progressive activity Outcome: Progressing Towards Goal 
Goal: *Tolerating diet Outcome: Progressing Towards Goal 
Goal: *Optimal pain control at patient's stated goal 
Outcome: Progressing Towards Goal

## 2021-01-25 VITALS
HEIGHT: 70 IN | OXYGEN SATURATION: 95 % | SYSTOLIC BLOOD PRESSURE: 140 MMHG | DIASTOLIC BLOOD PRESSURE: 70 MMHG | WEIGHT: 173.4 LBS | BODY MASS INDEX: 24.82 KG/M2 | HEART RATE: 60 BPM | TEMPERATURE: 98.6 F | RESPIRATION RATE: 24 BRPM

## 2021-01-25 LAB
COVID-19 RAPID TEST, COVR: NOT DETECTED
SARS-COV-2, COV2: NORMAL
SOURCE, COVRS: NORMAL

## 2021-01-25 PROCEDURE — 77010033678 HC OXYGEN DAILY

## 2021-01-25 PROCEDURE — 94760 N-INVAS EAR/PLS OXIMETRY 1: CPT

## 2021-01-25 PROCEDURE — 74011250637 HC RX REV CODE- 250/637: Performed by: INTERNAL MEDICINE

## 2021-01-25 PROCEDURE — 74011250636 HC RX REV CODE- 250/636: Performed by: STUDENT IN AN ORGANIZED HEALTH CARE EDUCATION/TRAINING PROGRAM

## 2021-01-25 PROCEDURE — 74011000258 HC RX REV CODE- 258: Performed by: STUDENT IN AN ORGANIZED HEALTH CARE EDUCATION/TRAINING PROGRAM

## 2021-01-25 PROCEDURE — 87635 SARS-COV-2 COVID-19 AMP PRB: CPT

## 2021-01-25 PROCEDURE — 74011250637 HC RX REV CODE- 250/637: Performed by: STUDENT IN AN ORGANIZED HEALTH CARE EDUCATION/TRAINING PROGRAM

## 2021-01-25 RX ORDER — GABAPENTIN 100 MG/1
100 CAPSULE ORAL 2 TIMES DAILY
Qty: 4 CAP | Refills: 0 | Status: SHIPPED | OUTPATIENT
Start: 2021-01-25 | End: 2021-01-27

## 2021-01-25 RX ORDER — LEVOFLOXACIN 750 MG/1
750 TABLET ORAL DAILY
Qty: 3 TAB | Refills: 0 | Status: SHIPPED
Start: 2021-01-25 | End: 2021-01-28

## 2021-01-25 RX ADMIN — ASPIRIN 81 MG: 81 TABLET, COATED ORAL at 09:13

## 2021-01-25 RX ADMIN — AMLODIPINE BESYLATE 10 MG: 5 TABLET ORAL at 09:13

## 2021-01-25 RX ADMIN — ENTACAPONE 200 MG: 200 TABLET, FILM COATED ORAL at 09:13

## 2021-01-25 RX ADMIN — LOSARTAN POTASSIUM 50 MG: 50 TABLET, FILM COATED ORAL at 09:13

## 2021-01-25 RX ADMIN — CARBIDOPA AND LEVODOPA 1 TABLET: 25; 100 TABLET ORAL at 09:13

## 2021-01-25 RX ADMIN — ROPINIROLE HYDROCHLORIDE 1 MG: 1 TABLET, FILM COATED ORAL at 09:13

## 2021-01-25 RX ADMIN — METOPROLOL TARTRATE 25 MG: 25 TABLET, FILM COATED ORAL at 09:13

## 2021-01-25 RX ADMIN — PAROXETINE HYDROCHLORIDE 10 MG: 20 TABLET, FILM COATED ORAL at 09:13

## 2021-01-25 RX ADMIN — CEFEPIME 2 G: 2 INJECTION, POWDER, FOR SOLUTION INTRAVENOUS at 05:29

## 2021-01-25 RX ADMIN — Medication 10 ML: at 05:29

## 2021-01-25 RX ADMIN — PANTOPRAZOLE SODIUM 40 MG: 40 TABLET, DELAYED RELEASE ORAL at 09:13

## 2021-01-25 RX ADMIN — ENOXAPARIN SODIUM 40 MG: 40 INJECTION SUBCUTANEOUS at 09:13

## 2021-01-25 NOTE — PROGRESS NOTES
Speech path Patient is being discharged today. He has no needs from our services at this time. We will sign off.  
Cecy Salinas, SLP

## 2021-01-25 NOTE — DISCHARGE INSTRUCTIONS
DISCHARGE DIAGNOSIS:  Altered mental status in setting of baseline dementia : Likely due to infection  Urinary tract infection  Hospital acquired pneumonia  COPD on ?3LPM O2 at home  Sinus bradycardia  Acute kidney injury resolved   History of CAD  Chronic diastolic congestive heart failure EF 60%  Hypertension   Normocytic anemia  Hx of MIRA  History of paroxysmal atrial fibrillation  Dementia  Parkinson's disease  Hyperlipidemia  Lumbar spinal stenosis  Restless leg syndrome      MEDICATIONS:  · It is important that you take the medication exactly as they are prescribed. · Keep your medication in the bottles provided by the pharmacist and keep a list of the medication names, dosages, and times to be taken in your wallet. · Do not take other medications without consulting your doctor. Pain Management: per above medications    What to do at [address]:  Cardiac Diet    Recommended activity: Activity as tolerated    If you have questions regarding the hospital related prescriptions or hospital related issues please call [address] [place] [address] at . You can always direct your questions to your primary care doctor if you are unable to reach your hospital physician; your PCP works as an extension of your hospital doctor just like your hospital doctor is an extension of your PCP for your time at the hospital [facility]).     If you experience any of the following symptoms then please call your primary care physician or return to the emergency room if you cannot get hold of your doctor:  Fever, chills, nausea, vomiting, diarrhea, change in mentation, falling, bleeding, shortness of breath

## 2021-01-25 NOTE — DISCHARGE SUMMARY
Hospitalist Discharge Summary Patient ID: 
Linda Brito [id number] 
87 y.o. 
1940 
1/21/2021 PCP on record: Nick Avitia MD 
 
Admit date: 1/21/2021 Discharge date and time: 1/25/2021 DISCHARGE DIAGNOSIS: 
Altered mental status in setting of baseline dementia : Likely due to infection Urinary tract infection Hospital acquired pneumonia COPD on ?3LPM O2 at home Sinus bradycardia Acute kidney injury resolved History of CAD Chronic diastolic congestive heart failure EF 60% Hypertension  
Normocytic anemia Hx of MIRA History of paroxysmal atrial fibrillation Dementia Parkinson's disease Hyperlipidemia Lumbar spinal stenosis Restless leg syndrome CONSULTATIONS: 
IP CONSULT TO HOSPITALIST Excerpted HPI from H&P of Jenna Purdy MD: 
Danny Purdy is a 78 y.o.  male with extensive past medical history including dementia, Parkinson's disease, COPD on home O2 who presents from skilled nursing facility [facility] re[facility]) with altered mental status from baseline. Patient was admitted at [facility] from 12/2-12/18 for aspiration pneumonia and discharged to SNF. Patient is alert and oriented x2 at baseline. Per note from nursing home patient was diagnosed with pneumonia two days ago and started on Augmentin. Once yesterday he is noted to be more altered from baseline. Patient unable to provide any history. Alert and oriented x0. History taken mainly from chart review and SNF papers.  
  
In the ED chest x-ray shows increased left lung infiltrate. UA suggestive of UTI. 
  
We were asked to admit for work up and evaluation of the above problems. ______________________________________________________________________ DISCHARGE SUMMARY/HOSPITAL COURSE:  for full details see H&P, daily progress notes, labs, consult notes. Altered mental status in setting of baseline dementia : Likely due to infection Urinary tract infection -Presents from SNF with altered mentation from baseline. -Afebrile, no leukocytosis. -UA with pyuria, bacteria, positive nitrates and LE 
-Lactic acid WNL yesterday C/w  Vanco and cefepime.  Patient also has pneumonia. Will DC on levaquin  
 urine + proteus  and blood culture results NTD . 
  
Hospital acquired pneumonia COPD on ?3LPM O2 at home 
-Patient was diagnosed with pneumonia at rehab on 1/19 with abnormal chest x-ray and started on Augmentin as op  
-Chest x-ray here shows scattered opacities in the left lung.  Improved airspace disease in the right lung. 
-Continue empiric broad-spectrum antibiotic as above 
 covid neg Pt doing better  
  
Sinus bradycardia resoved  
  
Acute kidney injury resolved  
-Creatinine elevated at 1.5.  Possibly prerenal from dehydration.  BUN/Cr 20s/p IV fluid .  creat down to wnl  
  
History of CAD Chronic diastolic congestive heart failure EF 60% -In December 2020.  Appears euvolemic on exam. 
Continue ASA and lipitor 
  
Hypertension C/w bp meds Normocytic anemia Hx of MIRA 
- H and H at baseline. Continue to monitor. 
  
History of paroxysmal atrial fibrillation Dementia Parkinson's disease Hyperlipidemia Lumbar spinal stenosis Restless leg syndrome 
-Continue home meds 
 _______________________________________________________________________ Patient seen and examined by me on discharge day. Pertinent Findings: 
Gen:    Not in distress Chest: Clear lungs CVS:   Regular rhythm. No edema Abd:  Soft, not distended, not tender Neuro:  Alert, 
_______________________________________________________________________ DISCHARGE MEDICATIONS:  
Current Discharge Medication List  
  
START taking these medications Details  
levoFLOXacin (Levaquin) 750 mg tablet Take 1 Tab by mouth daily for 3 days. Qty: 3 Tab, Refills: 0 CONTINUE these medications which have NOT CHANGED Details amLODIPine (NORVASC) 10 mg tablet Take 1 Tab by mouth daily. Indications: high blood pressure Qty: 30 Tab, Refills: 0  
  
gabapentin (NEURONTIN) 100 mg capsule Take 1 Cap by mouth two (2) times a day. Max Daily Amount: 200 mg. T2C BID Qty: 10 Cap, Refills: 2 Associated Diagnoses: Spinal stenosis of lumbar region with neurogenic claudication  
  
losartan (COZAAR) 50 mg tablet Take 1 Tab by mouth daily. Qty: 30 Tab, Refills: 0  
  
metoprolol tartrate (LOPRESSOR) 25 mg tablet Take 1 Tab by mouth every twelve (12) hours. Qty: 30 Tab, Refills: 0  
  
rOPINIRole (REQUIP) 1 mg tablet Take 1 mg by mouth two (2) times a day. guaiFENesin (Tussin) 100 mg/5 mL liquid Take 400 mg by mouth daily as needed for Cough. PARoxetine (PAXIL) 10 mg tablet TAKE 1 TABLET BY MOUTH DAILY Qty: 90 Tab, Refills: 3  
  
entacapone (COMTAN) 200 mg tablet TAKE 1 TABLET THREE TIMES A DAY Qty: 270 Tab, Refills: 3 Associated Diagnoses: Parkinsonism, unspecified Parkinsonism type ([address] Dementia due to Parkinson's disease without behavioral disturbance ([address] Mild cognitive impairment; Cerebrovascular accident (CVA), unspecified mechanism ([address] Bilateral carotid artery stenosis; History of stroke  
  
artificial tears, dextran 70-hypromellose, (GenTeal Tears Mild) 0.1-0.3 % ophthalmic solution Administer 1 Drop to both eyes nightly. carboxymethylcellulose sodium (THERATEARS OP) Apply  to eye. 1 -2 drops both eyes bid  
  
esomeprazole (NEXIUM) 20 mg capsule TAKE 1 CAPSULE DAILY Qty: 90 Cap, Refills: 3  
  
carbidopa-levodopa (Sinemet)  mg per tablet 2 p.o. 4 times daily  Indications: Parkinson's disease Qty: 720 Tab, Refills: 2  
  
atorvastatin (LIPITOR) 40 mg tablet TAKE 1 TABLET DAILY Qty: 90 Tab, Refills: 3  
  
acetaminophen (TYLENOL EXTRA STRENGTH) 500 mg tablet Take 1,000 mg by mouth three (3) times daily as needed for Pain. Rapid release senna-docusate (SENNA-S) 8.6-50 mg per tablet Take 1 Tab by mouth every other day. aspirin delayed-release 81 mg tablet Take 81 mg by mouth daily. vitamin e (E GEMS) 1,000 unit capsule Take 2,000 Units by mouth daily. MULTIVITAMIN PO Take 1 Tab by mouth daily. Patient Follow Up Instructions: Activity: Activity as tolerated Diet: Cardiac Diet pureed diet Wound Care: None needed Follow-up Information Follow up With Specialties Details Why Contact [facility] 
[phone] Jaclyn Ceballos MD Internal Medicine   [place] [address] [address] 
[phone] 
  
  
 
________________________________________________________________ Risk of deterioration: High 
 
Condition at Discharge:  Stable 
__________________________________________________________________ Disposition SNF/LTC 
 
____________________________________________________________________ Code Status: DNR/DNI 
___________________________________________________________________ Total time in minutes spent coordinating this discharge (includes going over instructions, follow-up, prescriptions, and preparing report for sign off to her PCP) :  >30 minutes Signed: 
Jignesh Carrillo MD

## 2021-01-25 NOTE — PROGRESS NOTES
Report called to Maxime at [facility] and rehab. All questions answered. SBAR and vitals given. They are requesting we leave monique catheter in place upon discharge. Covid was negative and pt will be leaving with orders for PO abx. [facility] is on willcall for pt to be picked up and transferred

## 2021-01-25 NOTE — PROGRESS NOTES
Problem: Falls - Risk of 
Goal: *Absence of Falls Description: Document Namrata Starch Fall Risk and appropriate interventions in the flowsheet. Outcome: Progressing Towards Goal 
Note: Fall Risk Interventions: 
Mobility Interventions: Bed/chair exit alarm Mentation Interventions: Bed/chair exit alarm Medication Interventions: Bed/chair exit alarm Elimination Interventions: Bed/chair exit alarm Problem: Pressure Injury - Risk of 
Goal: *Prevention of pressure injury Description: Document Elier Scale and appropriate interventions in the flowsheet. Outcome: Progressing Towards Goal 
Note: Pressure Injury Interventions: 
Sensory Interventions: Minimize linen layers Moisture Interventions: Internal/External urinary devices Activity Interventions: Assess need for specialty bed Mobility Interventions: Assess need for specialty bed Nutrition Interventions: Document food/fluid/supplement intake Friction and Shear Interventions: Minimize layers Problem: Pneumonia: Day 1 Goal: Off Pathway (Use only if patient is Off Pathway) Outcome: Progressing Towards Goal 
Goal: Activity/Safety Outcome: Progressing Towards Goal 
Goal: Consults, if ordered Outcome: Progressing Towards Goal 
Goal: Diagnostic Test/Procedures Outcome: Progressing Towards Goal 
Goal: Nutrition/Diet Outcome: Progressing Towards Goal 
Goal: Medications Outcome: Progressing Towards Goal 
Goal: Respiratory Outcome: Progressing Towards Goal 
Goal: Treatments/Interventions/Procedures Outcome: Progressing Towards Goal 
Goal: Psychosocial 
Outcome: Progressing Towards Goal 
Goal: *Oxygen saturation within defined limits Outcome: Progressing Towards Goal 
Goal: *Influenza vaccine administered (October-March) Outcome: Progressing Towards Goal 
Goal: *Pneumoccocal vaccine administered Outcome: Progressing Towards Goal 
Goal: *Hemodynamically stable Outcome: Progressing Towards Goal 
 Goal: *Demonstrates progressive activity Outcome: Progressing Towards Goal 
Goal: *Tolerating diet Outcome: Progressing Towards Goal 
  
Problem: Pneumonia: Day 2 Goal: Off Pathway (Use only if patient is Off Pathway) Outcome: Progressing Towards Goal 
Goal: Activity/Safety Outcome: Progressing Towards Goal 
Goal: Consults, if ordered Outcome: Progressing Towards Goal 
Goal: Diagnostic Test/Procedures Outcome: Progressing Towards Goal 
Goal: Nutrition/Diet Outcome: Progressing Towards Goal 
Goal: Discharge Planning Outcome: Progressing Towards Goal 
Goal: Medications Outcome: Progressing Towards Goal 
Goal: Respiratory Outcome: Progressing Towards Goal 
Goal: Treatments/Interventions/Procedures Outcome: Progressing Towards Goal 
Goal: Psychosocial 
Outcome: Progressing Towards Goal 
Goal: *Oxygen saturation within defined limits Outcome: Progressing Towards Goal 
Goal: *Hemodynamically stable Outcome: Progressing Towards Goal 
Goal: *Demonstrates progressive activity Outcome: Progressing Towards Goal 
Goal: *Tolerating diet Outcome: Progressing Towards Goal 
Goal: *Optimal pain control at patient's stated goal 
Outcome: Progressing Towards Goal 
  
Problem: Pneumonia: Day 3 Goal: Off Pathway (Use only if patient is Off Pathway) Outcome: Progressing Towards Goal 
Goal: Activity/Safety Outcome: Progressing Towards Goal 
Goal: Consults, if ordered Outcome: Progressing Towards Goal 
Goal: Diagnostic Test/Procedures Outcome: Progressing Towards Goal 
Goal: Nutrition/Diet Outcome: Progressing Towards Goal 
Goal: Discharge Planning Outcome: Progressing Towards Goal 
Goal: Medications Outcome: Progressing Towards Goal 
Goal: Respiratory Outcome: Progressing Towards Goal 
Goal: Treatments/Interventions/Procedures Outcome: Progressing Towards Goal 
Goal: Psychosocial 
Outcome: Progressing Towards Goal 
Goal: *Oxygen saturation within defined limits Outcome: Progressing Towards Goal 
 Goal: *Hemodynamically stable Outcome: Progressing Towards Goal 
Goal: *Demonstrates progressive activity Outcome: Progressing Towards Goal 
Goal: *Tolerating diet Outcome: Progressing Towards Goal 
Goal: *Describes available resources and support systems Outcome: Progressing Towards Goal 
Goal: *Optimal pain control at patient's stated goal 
Outcome: Progressing Towards Goal

## 2021-01-25 NOTE — PROGRESS NOTES
End of Shift Note Bedside shift change report given to [facility] (oncoming nurse) by NATALIO Meléndez (offgoing nurse). Report included the following information SBAR Shift worked:  [id number] Shift summary and any significant changes:  
  None Concerns for physician to address:  None Zone phone for oncoming shift:   [phone] Patient Information Anne Tsang 78 y.o. 
1/21/2021  9:44 AM by Jack London MD. Anne Tsang was admitted from [facility] 
 
Problem List 
Patient Active Problem List  
 Diagnosis Date Noted  Acute blood loss anemia 01/16/2019 Priority: 2 - Two  Parkinson's disease ([address] 01/30/2015 Priority: 2 - Two  Long-term current use of high risk medication other than anticoagulant 06/13/2018 Priority: 3 - Three  Gastroesophageal reflux disease without esophagitis 09/30/2017 Priority: 3 - Three  Altered mental status 01/21/2021  Pneumonia 12/03/2020  Benign prostatic hyperplasia with nocturia 02/17/2020  
 History of stroke 07/29/2019  Dementia due to Parkinson's disease without behavioral disturbance ([address] 07/29/2019  Parkinsonism ([address] 07/29/2019  Bilateral carotid artery stenosis 07/29/2019  Mild cognitive impairment 07/29/2019  Cerebrovascular accident (CVA) ([address] 07/29/2019  Rectal bleeding 01/15/2019  PE (physical exam), annual 09/30/2017  Aaron esophagus  Depression  Hyperlipidemia  Snoring  Hearing difficulty of both ears 07/12/2017  Sleep apnea in adult 07/12/2017  Coronary artery disease 07/12/2017  DJD (degenerative joint disease) 07/12/2017  Gout 07/12/2017  Restless leg syndrome 07/12/2017  Lumbar stenosis 01/29/2015  Essential hypertension  MCI (mild cognitive impairment)  CKD (chronic kidney disease) stage 2, GFR 60-89 ml/min Past Medical History:  
Diagnosis Date  Aaron esophagus  Benign nodular prostatic hyperplasia without lower urinary tract symptoms 7/12/2017  CKD (chronic kidney disease) stage 2, GFR 60-89 ml/min  Constipation  Coronary artery disease 7/12/2017  Depression  DJD (degenerative joint disease) 7/12/2017 [address]  Gout 7/12/2017  Headache   
 Hearing difficulty of both ears 7/12/2017  Hyperlipidemia  Hypertension  Impaired cognition 7/12/2017  MCI (mild cognitive impairment)  Restless leg syndrome 7/12/2017  Sleep apnea in adult 7/12/2017 No longer on CPAP @13  Snoring  Stroke ([address]  Tendinitis 7/12/2017 ACHILLES  Tendinitis of left rotator cuff 07/12/2017  Tremor 7/12/2017 LEFT HAND  Unspecified sleep apnea   
 lost weight no longer has it Core Measures: CVA: No No 
CHF:No No 
PNA:No No 
 
Activity: 
Activity Level: Bed Rest 
Number times ambulated in hallways past shift: 0 Number of times OOB to chair past shift: 0 Cardiac:  
Cardiac Monitoring: Yes     
Cardiac Rhythm: Sinus tachycardia Access:  
Current line(s): PIV Genitourinary:  
Urinary status: monique Urinary Catheter? Yes Placement Date 1/21/2021 Reason Medically Necessary acute retention Respiratory:  
O2 Device: Nasal cannula Chronic home O2 use?: NO Incentive spirometer at bedside: N/A 
  
 
GI: 
Last Bowel Movement Date: (unknown) Current diet:  DIET DYSPHAGIA PUREED (NDD1) Passing flatus: YES Tolerating current diet: YES 
% Diet Eaten: 40 % Pain Management:  
Patient states pain is manageable on current regimen: N/A Skin: 
Elier Score: 14 Interventions: increase time out of bed and internal/external urinary devices Patient Safety: 
Fall Score: Total Score: 3 Interventions: bed/chair alarm and gripper socks High Fall Risk: Yes DVT prophylaxis: DVT prophylaxis Med- Yes DVT prophylaxis SCD or LASHAUN- No  
 
Wounds: (If Applicable) Wounds- No 
Location Active Consults: IP CONSULT TO HOSPITALIST Length of Stay: 
Expected LOS: 4d 7h Actual LOS: 4 Discharge Plan: Yes; NATALIO Orellana

## 2021-01-25 NOTE — PROGRESS NOTES
Facility called requesting prescription for gabapentin be faxed to them. Explained he was not being DC with gabapentin. Despite many efforts to explain why he does not need the script since returning to the facility he came from, nurse was adamant about getting prescription. MD is printing and signing so we can fax.

## 2021-01-25 NOTE — PROGRESS NOTES
Transition of Care Plan to SNF/Rehab Communication to Patient/Family: Met with patient and family and they are agreeable to the transition plan. The Plan for Transition of Care is related to the following treatment goals:  
 
CM aware that pt is d/c on today and transitioning to [address] [place] [address]. CM contact snf, to verify if pt can return today. CM left a voicemail requesting a return call. CM spoke with pt's daughter: Sina Funk, via telephone, to inform her of pt's d/c. Marilee agreeable to pt's d/c and transport to snf. CM informed Sina Funk that transport is scheduled today at 11AM. The Patient and/or patient representative was provided with a choice of provider and agrees  with the discharge plan. Yes [x] No [] A Freedom of choice list was provided with basic dialogue that supports the patient's individualized plan of care/goals and shares the quality data associated with the providers. Yes [x] No [] SNF/Rehab Transition: 
Patient has been accepted to [facility] SNF/Rehab and meets criteria for admission. Patient will transported by [facility] and expected to leave at 11AM. Communication to SNF/Rehab: 
Bedside RN Cecile Chinchilla, has been notified to update the transition plan to the facility and call report ([phone]). Discharge information has been updated on the AVS. And communicated to facility via [employer], or CC link. Discharge instructions to be fax'd to facility at [facility] #). Patient has been identified as part of the  [employer].  For Care Coordination associated with that Bundle Program, please contact Bundle information has been communication to . Nursing Please include all hard scripts for controlled substances, med rec and dc summary, and AVS in packet. Reviewed and confirmed with facility, [facility], can manage the patient care needs for the following:  
 
Hannah Ramires with (X) only those applicable: 
Medication: [x]Medications are available at the facility []IV Antibiotics []Controlled Substance  hard copies available sent. []Weekly Labs Equipment: 
[]CPAP/BiPAP []Wound Vacuum []King or Urinary Device []PICC/Central Line []Nebulizer []Ventilator Treatment: 
[]Isolation (for MRSA, VRE, etc.) []Surgical Drain Management []Tracheostomy Care 
[]Dressing Changes []Dialysis with transportation []PEG Care []Oxygen []Daily Weights for Heart Failure Dietary: 
[]Any diet limitations []Tube Feedings []Total Parenteral Management (TPN) Financial Resources: 
[]Medicaid Application Completed []UAI Completed and copy given to pt/family 
and copy given to pt/family 
[]A screening has previously been completed. []Level II Completed 
 
[] Private pay individual who will not become  
financially eligible for Medicaid within 6 months from admission to a [address] [place] [address] facility. [] Individual refused to have screening conducted. []Medicaid Application Completed [x]The screening denied because it was determined individual did not need/did not qualify for nursing facility level of care. [] Out of state residents seeking direct admission to a [address] facility. [] Individuals who are inpatients of an out of state hospital, or in state or out of state veterans/ hospital and seek direct admission to a [address] facility [] Individuals who are pateints or residents of a state owned/operated facility that is licensed by [employer] ([employer]) and seek direct admission to [address] facility [] A screening not required for enrollment in [facility] services as set out in 12 VAC 30- [] [facility]) staff shall perform screenings of the [facility] clients. Advanced Care Plan: 
[]Surrogate Decision Maker of Care 
[]POA []Communicated Code Status and copy sent. Other: family wants pt to return back home

## 2021-01-25 NOTE — PROGRESS NOTES
CRAIG: 
 
Insurance Auth COVID test 
 
UPDATE: 12:37PM 
 
CM informed that covid test resulted-negative. CM arrange transport for pt to d/c to facility today at 1:30PM. CM completed the needs of the pt at this time. Samantha Amayas, MSJIMMY, [address] [place] [address] UPDATE: 10:46AM 
 
 
CM informed from SNF: [address] [place] [address], have received auth from insurance provider. Pt is currently waiting for covid test to result. SamanthaDARSHAN Mir, [address] [place] [address] CM received call from [address] [place] [address], via telephone regarding pt's acceptance to their facility. CM informed that pt will require insurance auth to be accepted to facility. CM informed that [employer] will possibly be accepted today. CM informed that pt will also need updated covid test since the last tested resulted on 1/21/21. [facility] accepts covid test for 72hrs. CM placed pt's transport on \"will call\". CM will inform pt's nurse and enter a delay. CM will inform pt's daughter of the following. VoltaDARSHAN Mir, [address] [place] [address]

## 2021-02-09 ENCOUNTER — PATIENT OUTREACH (OUTPATIENT)
Dept: CASE MANAGEMENT | Age: 81
End: 2021-02-09

## 2021-02-10 NOTE — PROGRESS NOTES
[address] [place] [address] Dr Discharge Note *The information contained in this note was received during a weekly care coordination call with the post acute facility* Reported that patient passed away at [facility] on 2/5/2021. PCP: No primary care provider on file. CRAIG appointment: No future appointments. Community Care Team will sign off at this time. Medications were not reconciled and general patient assessment was not completed during this skilled nursing facility outreach.

## 2021-03-16 NOTE — PROGRESS NOTES
EKG completed, unable to scan into patient's chart. EKG put into patient file at nurses station Protopic Pregnancy And Lactation Text: This medication is Pregnancy Category C. It is unknown if this medication is excreted in breast milk when applied topically.

## (undated) DEVICE — SET ADMIN 16ML TBNG L100IN 2 Y INJ SITE IV PIGGY BK DISP

## (undated) DEVICE — SOLIDIFIER MEDC 1200ML -- CONVERT TO 356117

## (undated) DEVICE — KENDALL RADIOLUCENT FOAM MONITORING ELECTRODE RECTANGULAR SHAPE: Brand: KENDALL

## (undated) DEVICE — BASIN EMSIS 16OZ GRAPHITE PLAS KID SHP MOLD GRAD FOR ORAL

## (undated) DEVICE — 1200 GUARD II KIT W/5MM TUBE W/O VAC TUBE: Brand: GUARDIAN

## (undated) DEVICE — CATH IV AUTOGRD BC PNK 20GA 25 -- INSYTE

## (undated) DEVICE — SYR 10ML LUER LOK 1/5ML GRAD --

## (undated) DEVICE — NEEDLE HYPO 18GA L1.5IN PNK S STL HUB POLYPR SHLD REG BVL

## (undated) DEVICE — Device

## (undated) DEVICE — SYR 3ML LL TIP 1/10ML GRAD --

## (undated) DEVICE — MULTIPLE BAND LIGATOR: Brand: SPEEDBAND SUPERVIEW SUPER 7

## (undated) DEVICE — Z DISCONTINUED PER MEDLINE LINE GAS SAMPLING O2/CO2 LNG AD 13 FT NSL W/ TBNG FILTERLINE

## (undated) DEVICE — TOWEL 4 PLY TISS 19X30 SUE WHT

## (undated) DEVICE — NEONATAL-ADULT SPO2 SENSOR: Brand: NELLCOR

## (undated) DEVICE — MEDI-VAC YANK SUCT HNDL W/TPRD BULBOUS TIP: Brand: CARDINAL HEALTH

## (undated) DEVICE — BLOCK BITE ENDOSCP AD 21 MM W/ DIL BLU LF DISP